# Patient Record
Sex: FEMALE | Race: WHITE | Employment: OTHER | URBAN - METROPOLITAN AREA
[De-identification: names, ages, dates, MRNs, and addresses within clinical notes are randomized per-mention and may not be internally consistent; named-entity substitution may affect disease eponyms.]

---

## 2018-06-19 ENCOUNTER — HOSPITAL ENCOUNTER (OUTPATIENT)
Age: 78
Discharge: HOME | End: 2018-06-21
Payer: MEDICARE

## 2018-06-19 PROCEDURE — 87088 URINE BACTERIA CULTURE: CPT

## 2018-06-19 PROCEDURE — 88112 CYTOPATH CELL ENHANCE TECH: CPT

## 2018-06-22 LAB — URINE CULTURE, ROUTINE: NORMAL

## 2018-06-28 ENCOUNTER — HOSPITAL ENCOUNTER (OUTPATIENT)
Dept: ULTRASOUND IMAGING | Age: 78
Discharge: HOME | End: 2018-06-30
Payer: MEDICARE

## 2018-06-28 DIAGNOSIS — M54.9 BACK PAIN, UNSPECIFIED BACK LOCATION, UNSPECIFIED BACK PAIN LATERALITY, UNSPECIFIED CHRONICITY: ICD-10-CM

## 2018-06-28 PROCEDURE — 76856 US EXAM PELVIC COMPLETE: CPT

## 2018-06-28 PROCEDURE — 76770 US EXAM ABDO BACK WALL COMP: CPT

## 2018-07-16 ENCOUNTER — HOSPITAL ENCOUNTER (OUTPATIENT)
Dept: NUCLEAR MEDICINE | Age: 78
Discharge: HOME | End: 2018-07-18
Payer: MEDICARE

## 2018-07-16 DIAGNOSIS — N13.30 HYDRONEPHROSIS, UNSPECIFIED HYDRONEPHROSIS TYPE: ICD-10-CM

## 2018-07-16 PROCEDURE — 78708 K FLOW/FUNCT IMAGE W/DRUG: CPT

## 2018-07-16 PROCEDURE — 3430000000 HC RX DIAGNOSTIC RADIOPHARMACEUTICAL: Performed by: RADIOLOGY

## 2018-07-16 PROCEDURE — 6360000002 HC RX W HCPCS: Performed by: UROLOGY

## 2018-07-16 PROCEDURE — A9562 TC99M MERTIATIDE: HCPCS | Performed by: RADIOLOGY

## 2018-07-16 RX ORDER — FUROSEMIDE 10 MG/ML
10 INJECTION INTRAMUSCULAR; INTRAVENOUS ONCE
Status: COMPLETED | OUTPATIENT
Start: 2018-07-16 | End: 2018-07-16

## 2018-07-16 RX ADMIN — FUROSEMIDE 10 MG: 10 INJECTION, SOLUTION INTRAMUSCULAR; INTRAVENOUS at 11:50

## 2018-07-16 RX ADMIN — Medication 10 MILLICURIE: at 11:24

## 2018-10-11 ENCOUNTER — HOSPITAL ENCOUNTER (EMERGENCY)
Age: 78
Discharge: HOME | End: 2018-10-11
Payer: MEDICARE

## 2018-10-11 ENCOUNTER — APPOINTMENT (OUTPATIENT)
Dept: GENERAL RADIOLOGY | Age: 78
End: 2018-10-11
Payer: MEDICARE

## 2018-10-11 VITALS
SYSTOLIC BLOOD PRESSURE: 148 MMHG | HEART RATE: 71 BPM | DIASTOLIC BLOOD PRESSURE: 72 MMHG | OXYGEN SATURATION: 99 % | TEMPERATURE: 98 F | RESPIRATION RATE: 17 BRPM

## 2018-10-11 DIAGNOSIS — M25.562 ACUTE PAIN OF LEFT KNEE: Primary | ICD-10-CM

## 2018-10-11 PROCEDURE — 6370000000 HC RX 637 (ALT 250 FOR IP): Performed by: PHYSICIAN ASSISTANT

## 2018-10-11 PROCEDURE — 73564 X-RAY EXAM KNEE 4 OR MORE: CPT

## 2018-10-11 PROCEDURE — 99283 EMERGENCY DEPT VISIT LOW MDM: CPT

## 2018-10-11 RX ORDER — TRAMADOL HYDROCHLORIDE 50 MG/1
50 TABLET ORAL ONCE
Status: COMPLETED | OUTPATIENT
Start: 2018-10-11 | End: 2018-10-11

## 2018-10-11 RX ORDER — TRAMADOL HYDROCHLORIDE 50 MG/1
50 TABLET ORAL EVERY 6 HOURS PRN
Qty: 20 TABLET | Refills: 0 | Status: SHIPPED | OUTPATIENT
Start: 2018-10-11 | End: 2018-10-16

## 2018-10-11 RX ADMIN — TRAMADOL HYDROCHLORIDE 50 MG: 50 TABLET, FILM COATED ORAL at 16:00

## 2018-10-11 ASSESSMENT — PAIN SCALES - GENERAL
PAINLEVEL_OUTOF10: 10
PAINLEVEL_OUTOF10: 10

## 2018-10-11 ASSESSMENT — PAIN DESCRIPTION - PAIN TYPE: TYPE: ACUTE PAIN

## 2018-10-11 ASSESSMENT — PAIN DESCRIPTION - LOCATION: LOCATION: KNEE

## 2018-10-11 ASSESSMENT — PAIN DESCRIPTION - DESCRIPTORS: DESCRIPTORS: ACHING

## 2018-10-11 ASSESSMENT — PAIN DESCRIPTION - ORIENTATION: ORIENTATION: RIGHT

## 2018-10-11 NOTE — ED PROVIDER NOTES
10/11/18  **This report was transcribed using voice recognition software. Every effort was made to ensure accuracy; however, inadvertent computerized transcription errors may be present.     END OF ED PROVIDER NOTE        Carlitos Owen PA-C  10/11/18 4020

## 2019-02-22 ENCOUNTER — HOSPITAL ENCOUNTER (OUTPATIENT)
Age: 79
Discharge: HOME | End: 2019-02-24
Payer: MEDICARE

## 2019-02-22 PROCEDURE — 88112 CYTOPATH CELL ENHANCE TECH: CPT

## 2019-06-24 ENCOUNTER — HOSPITAL ENCOUNTER (OUTPATIENT)
Dept: CT IMAGING | Age: 79
Discharge: HOME | End: 2019-06-26
Payer: MEDICARE

## 2019-06-24 DIAGNOSIS — R51.9 RIGHT-SIDED HEADACHE: ICD-10-CM

## 2019-06-24 PROCEDURE — 70486 CT MAXILLOFACIAL W/O DYE: CPT

## 2019-09-17 ENCOUNTER — HOSPITAL ENCOUNTER (OUTPATIENT)
Age: 79
Discharge: HOME | End: 2019-09-19
Payer: MEDICARE

## 2019-09-17 PROCEDURE — 87088 URINE BACTERIA CULTURE: CPT

## 2019-09-19 LAB — URINE CULTURE, ROUTINE: NORMAL

## 2020-04-15 ENCOUNTER — HOSPITAL ENCOUNTER (OUTPATIENT)
Age: 80
Discharge: HOME | End: 2020-04-17
Payer: MEDICARE

## 2020-04-15 PROCEDURE — 87186 SC STD MICRODIL/AGAR DIL: CPT

## 2020-04-15 PROCEDURE — 87088 URINE BACTERIA CULTURE: CPT

## 2020-04-17 LAB
ORGANISM: ABNORMAL
URINE CULTURE, ROUTINE: ABNORMAL

## 2020-05-01 ENCOUNTER — HOSPITAL ENCOUNTER (OUTPATIENT)
Age: 80
Discharge: HOME | End: 2020-05-03
Payer: MEDICARE

## 2020-05-01 PROCEDURE — 88112 CYTOPATH CELL ENHANCE TECH: CPT

## 2020-09-03 ENCOUNTER — HOSPITAL ENCOUNTER (OUTPATIENT)
Age: 80
Discharge: HOME | End: 2020-09-05
Payer: MEDICARE

## 2020-09-03 PROCEDURE — 87088 URINE BACTERIA CULTURE: CPT

## 2020-09-05 LAB — URINE CULTURE, ROUTINE: NORMAL

## 2020-11-11 ENCOUNTER — APPOINTMENT (OUTPATIENT)
Dept: CT IMAGING | Age: 80
End: 2020-11-11
Payer: MEDICARE

## 2020-11-11 ENCOUNTER — HOSPITAL ENCOUNTER (EMERGENCY)
Age: 80
Discharge: HOME | End: 2020-11-11
Attending: EMERGENCY MEDICINE
Payer: MEDICARE

## 2020-11-11 VITALS
WEIGHT: 149 LBS | HEART RATE: 95 BPM | TEMPERATURE: 96.9 F | BODY MASS INDEX: 24.83 KG/M2 | DIASTOLIC BLOOD PRESSURE: 85 MMHG | HEIGHT: 65 IN | RESPIRATION RATE: 18 BRPM | OXYGEN SATURATION: 99 % | SYSTOLIC BLOOD PRESSURE: 175 MMHG

## 2020-11-11 LAB
ALBUMIN SERPL-MCNC: 3.9 G/DL (ref 3.5–5.2)
ALP BLD-CCNC: 80 U/L (ref 35–104)
ALT SERPL-CCNC: 8 U/L (ref 0–32)
AMPHETAMINE SCREEN, URINE: NOT DETECTED
ANION GAP SERPL CALCULATED.3IONS-SCNC: 13 MMOL/L (ref 7–16)
AST SERPL-CCNC: 16 U/L (ref 0–31)
BACTERIA: ABNORMAL /HPF
BARBITURATE SCREEN URINE: NOT DETECTED
BASOPHILS ABSOLUTE: 0.02 E9/L (ref 0–0.2)
BASOPHILS RELATIVE PERCENT: 0.3 % (ref 0–2)
BENZODIAZEPINE SCREEN, URINE: POSITIVE
BILIRUB SERPL-MCNC: 0.5 MG/DL (ref 0–1.2)
BILIRUBIN URINE: NEGATIVE
BLOOD, URINE: ABNORMAL
BUN BLDV-MCNC: 10 MG/DL (ref 8–23)
CALCIUM SERPL-MCNC: 8.8 MG/DL (ref 8.6–10.2)
CANNABINOID SCREEN URINE: NOT DETECTED
CHLORIDE BLD-SCNC: 96 MMOL/L (ref 98–107)
CLARITY: CLEAR
CO2: 22 MMOL/L (ref 22–29)
COCAINE METABOLITE SCREEN URINE: NOT DETECTED
COLOR: YELLOW
CREAT SERPL-MCNC: 0.6 MG/DL (ref 0.5–1)
EOSINOPHILS ABSOLUTE: 0.01 E9/L (ref 0.05–0.5)
EOSINOPHILS RELATIVE PERCENT: 0.2 % (ref 0–6)
FENTANYL SCREEN, URINE: NOT DETECTED
GFR AFRICAN AMERICAN: >60
GFR NON-AFRICAN AMERICAN: >60 ML/MIN/1.73
GLUCOSE BLD-MCNC: 124 MG/DL (ref 74–99)
GLUCOSE URINE: NEGATIVE MG/DL
HCT VFR BLD CALC: 43.3 % (ref 34–48)
HEMOGLOBIN: 14.2 G/DL (ref 11.5–15.5)
IMMATURE GRANULOCYTES #: 0.02 E9/L
IMMATURE GRANULOCYTES %: 0.3 % (ref 0–5)
KETONES, URINE: 40 MG/DL
LEUKOCYTE ESTERASE, URINE: ABNORMAL
LYMPHOCYTES ABSOLUTE: 1.1 E9/L (ref 1.5–4)
LYMPHOCYTES RELATIVE PERCENT: 17.3 % (ref 20–42)
Lab: ABNORMAL
MCH RBC QN AUTO: 28.1 PG (ref 26–35)
MCHC RBC AUTO-ENTMCNC: 32.8 % (ref 32–34.5)
MCV RBC AUTO: 85.7 FL (ref 80–99.9)
METHADONE SCREEN, URINE: NOT DETECTED
MONOCYTES ABSOLUTE: 0.4 E9/L (ref 0.1–0.95)
MONOCYTES RELATIVE PERCENT: 6.3 % (ref 2–12)
NEUTROPHILS ABSOLUTE: 4.82 E9/L (ref 1.8–7.3)
NEUTROPHILS RELATIVE PERCENT: 75.6 % (ref 43–80)
NITRITE, URINE: NEGATIVE
OPIATE SCREEN URINE: NOT DETECTED
OXYCODONE URINE: NOT DETECTED
PDW BLD-RTO: 13.3 FL (ref 11.5–15)
PH UA: 6 (ref 5–9)
PHENCYCLIDINE SCREEN URINE: NOT DETECTED
PLATELET # BLD: 221 E9/L (ref 130–450)
PMV BLD AUTO: 8.8 FL (ref 7–12)
POTASSIUM SERPL-SCNC: 3.8 MMOL/L (ref 3.5–5)
PROTEIN UA: NEGATIVE MG/DL
RBC # BLD: 5.05 E12/L (ref 3.5–5.5)
RBC UA: ABNORMAL /HPF (ref 0–2)
SODIUM BLD-SCNC: 131 MMOL/L (ref 132–146)
SPECIFIC GRAVITY UA: 1.02 (ref 1–1.03)
TOTAL PROTEIN: 7.1 G/DL (ref 6.4–8.3)
TROPONIN: <0.01 NG/ML (ref 0–0.03)
UROBILINOGEN, URINE: 0.2 E.U./DL
WBC # BLD: 6.4 E9/L (ref 4.5–11.5)
WBC UA: ABNORMAL /HPF (ref 0–5)

## 2020-11-11 PROCEDURE — 96374 THER/PROPH/DIAG INJ IV PUSH: CPT

## 2020-11-11 PROCEDURE — 93005 ELECTROCARDIOGRAM TRACING: CPT | Performed by: PHYSICIAN ASSISTANT

## 2020-11-11 PROCEDURE — 99284 EMERGENCY DEPT VISIT MOD MDM: CPT

## 2020-11-11 PROCEDURE — 70450 CT HEAD/BRAIN W/O DYE: CPT

## 2020-11-11 PROCEDURE — 81001 URINALYSIS AUTO W/SCOPE: CPT

## 2020-11-11 PROCEDURE — 84484 ASSAY OF TROPONIN QUANT: CPT

## 2020-11-11 PROCEDURE — 6370000000 HC RX 637 (ALT 250 FOR IP): Performed by: EMERGENCY MEDICINE

## 2020-11-11 PROCEDURE — 2580000003 HC RX 258: Performed by: EMERGENCY MEDICINE

## 2020-11-11 PROCEDURE — 80307 DRUG TEST PRSMV CHEM ANLYZR: CPT

## 2020-11-11 PROCEDURE — 80053 COMPREHEN METABOLIC PANEL: CPT

## 2020-11-11 PROCEDURE — 85025 COMPLETE CBC W/AUTO DIFF WBC: CPT

## 2020-11-11 RX ORDER — CEFDINIR 300 MG/1
300 CAPSULE ORAL 2 TIMES DAILY
Qty: 14 CAPSULE | Refills: 0 | Status: SHIPPED | OUTPATIENT
Start: 2020-11-11 | End: 2020-11-18

## 2020-11-11 RX ORDER — CEFDINIR 300 MG/1
300 CAPSULE ORAL ONCE
Status: COMPLETED | OUTPATIENT
Start: 2020-11-11 | End: 2020-11-11

## 2020-11-11 RX ORDER — ONDANSETRON 2 MG/ML
4 INJECTION INTRAMUSCULAR; INTRAVENOUS ONCE
Status: DISCONTINUED | OUTPATIENT
Start: 2020-11-11 | End: 2020-11-11 | Stop reason: HOSPADM

## 2020-11-11 RX ORDER — 0.9 % SODIUM CHLORIDE 0.9 %
1000 INTRAVENOUS SOLUTION INTRAVENOUS ONCE
Status: COMPLETED | OUTPATIENT
Start: 2020-11-11 | End: 2020-11-11

## 2020-11-11 RX ADMIN — SODIUM CHLORIDE 1000 ML: 9 INJECTION, SOLUTION INTRAVENOUS at 18:36

## 2020-11-11 RX ADMIN — CEFDINIR 300 MG: 300 CAPSULE ORAL at 19:53

## 2020-11-11 NOTE — ED NOTES
FIRST PROVIDER CONTACT ASSESSMENT NOTE      Department of Emergency Medicine   ED  First Provider Note   11/11/20  1:23 PM EST    Chief Complaint: Headache (Since monday that is causing nausea and weakness. She states when she closes her eyes she can see [place] girls dancing and other things.)      History of Present Illness:    Everardo Oleary is a 78 y.o. female who presents to the ED by private car for headache, hallucinations. Focused Screening Exam:  Constitutional:  Alert, appears stated age and is in no distress. Regular heart rate and rhythm.      *ALLERGIES*     Sulfa antibiotics     ED Triage Vitals [11/11/20 1308]   BP Temp Temp src Pulse Resp SpO2 Height Weight   -- 96.9 °F (36.1 °C) -- 91 -- 96 % -- --        Initial Plan of Care:  Initiate Treatment-Testing, Proceed toTreatment Area When Bed Available for ED Attending/MLP to Continue Care    -----------------END OF FIRST PROVIDER CONTACT ASSESSMENT NOTE--------------  Electronically signed by TAWANNA Bowling   DD: 11/11/20       TAWANNA Bowling  11/11/20 8294

## 2020-11-11 NOTE — ED NOTES
Bed: 12  Expected date:   Expected time:   Means of arrival:   Comments:  triage     Batsheva Devlin RN  11/11/20 5968

## 2020-11-12 LAB
EKG ATRIAL RATE: 87 BPM
EKG P AXIS: 85 DEGREES
EKG P-R INTERVAL: 148 MS
EKG Q-T INTERVAL: 350 MS
EKG QRS DURATION: 82 MS
EKG QTC CALCULATION (BAZETT): 421 MS
EKG R AXIS: 30 DEGREES
EKG T AXIS: 76 DEGREES
EKG VENTRICULAR RATE: 87 BPM

## 2020-11-12 PROCEDURE — 93010 ELECTROCARDIOGRAM REPORT: CPT | Performed by: INTERNAL MEDICINE

## 2020-11-12 ASSESSMENT — ENCOUNTER SYMPTOMS
EYE PAIN: 0
ABDOMINAL PAIN: 0
SHORTNESS OF BREATH: 0
SORE THROAT: 0
NAUSEA: 1
BACK PAIN: 0
VOMITING: 0

## 2020-11-12 NOTE — ED PROVIDER NOTES
Patient is 66-year-old female presented emergency department due to having a headache Sunday night that was frontal in origin and then followed by the headache she had 2 days of what she states is seeing pictures and images when she closes her eyes that she is never had before. She also is seeing characters and faces. Patient denies having happened for she denies any new medications. She denies any drug use. States she has no pain anywhere she is worried about this change and want to get evaluated. Patient also worried that she might have had a stroke so she want to have a imaging done           Symptoms are worsened by nothing       Symptoms are improved by nothing    Denies any associated Chest pain, N/V/D    Review of Systems   Constitutional: Negative for chills and fever. HENT: Negative for ear pain and sore throat. Eyes: Positive for visual disturbance. Negative for pain. Respiratory: Negative for shortness of breath. Cardiovascular: Negative for chest pain. Gastrointestinal: Positive for nausea. Negative for abdominal pain and vomiting. Genitourinary: Negative for flank pain and pelvic pain. Musculoskeletal: Negative for back pain and neck pain. Skin: Negative for rash. Neurological: Negative for headaches (none currently). Physical Exam  Vitals signs and nursing note reviewed. Constitutional:       Appearance: She is well-developed. HENT:      Head: Normocephalic and atraumatic. Right Ear: External ear normal.      Left Ear: External ear normal.      Nose: Nose normal.      Mouth/Throat:      Mouth: Mucous membranes are moist.      Pharynx: Oropharynx is clear. Eyes:      Pupils: Pupils are equal, round, and reactive to light. Neck:      Musculoskeletal: Normal range of motion and neck supple. Cardiovascular:      Rate and Rhythm: Normal rate and regular rhythm. Heart sounds: Normal heart sounds.    Pulmonary:      Effort: Pulmonary effort is normal. No -------------------------------------------------  Labs:  Results for orders placed or performed during the hospital encounter of 11/11/20   CBC Auto Differential   Result Value Ref Range    WBC 6.4 4.5 - 11.5 E9/L    RBC 5.05 3.50 - 5.50 E12/L    Hemoglobin 14.2 11.5 - 15.5 g/dL    Hematocrit 43.3 34.0 - 48.0 %    MCV 85.7 80.0 - 99.9 fL    MCH 28.1 26.0 - 35.0 pg    MCHC 32.8 32.0 - 34.5 %    RDW 13.3 11.5 - 15.0 fL    Platelets 596 966 - 411 E9/L    MPV 8.8 7.0 - 12.0 fL    Neutrophils % 75.6 43.0 - 80.0 %    Immature Granulocytes % 0.3 0.0 - 5.0 %    Lymphocytes % 17.3 (L) 20.0 - 42.0 %    Monocytes % 6.3 2.0 - 12.0 %    Eosinophils % 0.2 0.0 - 6.0 %    Basophils % 0.3 0.0 - 2.0 %    Neutrophils Absolute 4.82 1.80 - 7.30 E9/L    Immature Granulocytes # 0.02 E9/L    Lymphocytes Absolute 1.10 (L) 1.50 - 4.00 E9/L    Monocytes Absolute 0.40 0.10 - 0.95 E9/L    Eosinophils Absolute 0.01 (L) 0.05 - 0.50 E9/L    Basophils Absolute 0.02 0.00 - 0.20 E9/L   Comprehensive Metabolic Panel   Result Value Ref Range    Sodium 131 (L) 132 - 146 mmol/L    Potassium 3.8 3.5 - 5.0 mmol/L    Chloride 96 (L) 98 - 107 mmol/L    CO2 22 22 - 29 mmol/L    Anion Gap 13 7 - 16 mmol/L    Glucose 124 (H) 74 - 99 mg/dL    BUN 10 8 - 23 mg/dL    CREATININE 0.6 0.5 - 1.0 mg/dL    GFR Non-African American >60 >=60 mL/min/1.73    GFR African American >60     Calcium 8.8 8.6 - 10.2 mg/dL    Total Protein 7.1 6.4 - 8.3 g/dL    Alb 3.9 3.5 - 5.2 g/dL    Total Bilirubin 0.5 0.0 - 1.2 mg/dL    Alkaline Phosphatase 80 35 - 104 U/L    ALT 8 0 - 32 U/L    AST 16 0 - 31 U/L   Troponin   Result Value Ref Range    Troponin <0.01 0.00 - 0.03 ng/mL   Urinalysis   Result Value Ref Range    Color, UA Yellow Straw/Yellow    Clarity, UA Clear Clear    Glucose, Ur Negative Negative mg/dL    Bilirubin Urine Negative Negative    Ketones, Urine 40 (A) Negative mg/dL    Specific Gravity, UA 1.020 1.005 - 1.030    Blood, Urine TRACE-INTACT Negative    pH, UA 6.0 5.0 - 9.0    Protein, UA Negative Negative mg/dL    Urobilinogen, Urine 0.2 <2.0 E.U./dL    Nitrite, Urine Negative Negative    Leukocyte Esterase, Urine MODERATE (A) Negative   URINE DRUG SCREEN   Result Value Ref Range    Amphetamine Screen, Urine NOT DETECTED Negative <1000 ng/mL    Barbiturate Screen, Ur NOT DETECTED Negative < 200 ng/mL    Benzodiazepine Screen, Urine POSITIVE (A) Negative < 200 ng/mL    Cannabinoid Scrn, Ur NOT DETECTED Negative < 50ng/mL    Cocaine Metabolite Screen, Urine NOT DETECTED Negative < 300 ng/mL    Opiate Scrn, Ur NOT DETECTED Negative < 300ng/mL    PCP Screen, Urine NOT DETECTED Negative < 25 ng/mL    Methadone Screen, Urine NOT DETECTED Negative <300 ng/mL    Oxycodone Urine NOT DETECTED Negative <100 ng/mL    FENTANYL SCREEN, URINE NOT DETECTED Negative <1 ng/mL    Drug Screen Comment: see below    Microscopic Urinalysis   Result Value Ref Range    WBC, UA 10-20 (A) 0 - 5 /HPF    RBC, UA 2-5 0 - 2 /HPF    Bacteria, UA FEW (A) None Seen /HPF   EKG 12 Lead   Result Value Ref Range    Ventricular Rate 87 BPM    Atrial Rate 87 BPM    P-R Interval 148 ms    QRS Duration 82 ms    Q-T Interval 350 ms    QTc Calculation (Bazett) 421 ms    P Axis 85 degrees    R Axis 30 degrees    T Axis 76 degrees       Radiology:  CT HEAD WO CONTRAST   Final Result   Mild atrophy and moderate likely chronic microvascular ischemic disease.             ------------------------- NURSING NOTES AND VITALS REVIEWED ---------------------------  Date / Time Roomed:  11/11/2020  4:54 PM  ED Bed Assignment:  CHAVO/CHAVO    The nursing notes within the ED encounter and vital signs as below have been reviewed.    BP (!) 175/85   Pulse 95   Temp 96.9 °F (36.1 °C)   Resp 18   Ht 5' 5\" (1.651 m)   Wt 149 lb (67.6 kg)   SpO2 99%   BMI 24.79 kg/m²   Oxygen Saturation Interpretation: Normal      ------------------------------------------ PROGRESS NOTES ------------------------------------------  9:42 AM EST  I have spoken with the patient and discussed todays results, in addition to providing specific details for the plan of care and counseling regarding the diagnosis and prognosis. Their questions are answered at this time and they are agreeable with the plan. I discussed at length with them reasons for immediate return here for re evaluation. They will followup with PCP by calling their office Tomorrow      --------------------------------- ADDITIONAL PROVIDER NOTES ---------------------------------  At this time the patient is without objective evidence of an acute process requiring hospitalization or inpatient management. They have remained hemodynamically stable throughout their entire ED visit and are stable for discharge with outpatient follow-up. The plan has been discussed in detail and they are aware of the specific conditions for emergent return, as well as the importance of follow-up. Discharge Medication List as of 11/11/2020  7:56 PM      START taking these medications    Details   cefdinir (OMNICEF) 300 MG capsule Take 1 capsule by mouth 2 times daily for 7 days, Disp-14 capsule,R-0Print             Diagnosis:  1. Acute cystitis with hematuria    2. Visual disturbance        Disposition:  Patient's disposition: Discharge to home  Patient's condition is stable.                   Susana Helton DO  Resident  11/12/20 9054

## 2021-03-09 ENCOUNTER — HOSPITAL ENCOUNTER (OUTPATIENT)
Age: 81
Discharge: HOME | End: 2021-03-09
Payer: MEDICARE

## 2021-03-09 LAB
ALBUMIN SERPL-MCNC: 4.2 G/DL (ref 3.5–5.2)
ALP BLD-CCNC: 81 U/L (ref 35–104)
ALT SERPL-CCNC: 20 U/L (ref 0–32)
ANION GAP SERPL CALCULATED.3IONS-SCNC: 8 MMOL/L (ref 7–16)
AST SERPL-CCNC: 17 U/L (ref 0–31)
BASOPHILS ABSOLUTE: 0.02 E9/L (ref 0–0.2)
BASOPHILS RELATIVE PERCENT: 0.2 % (ref 0–2)
BILIRUB SERPL-MCNC: 0.2 MG/DL (ref 0–1.2)
BUN BLDV-MCNC: 15 MG/DL (ref 8–23)
CALCIUM SERPL-MCNC: 9.2 MG/DL (ref 8.6–10.2)
CHLORIDE BLD-SCNC: 101 MMOL/L (ref 98–107)
CO2: 29 MMOL/L (ref 22–29)
CREAT SERPL-MCNC: 0.7 MG/DL (ref 0.5–1)
EOSINOPHILS ABSOLUTE: 0.01 E9/L (ref 0.05–0.5)
EOSINOPHILS RELATIVE PERCENT: 0.1 % (ref 0–6)
GFR AFRICAN AMERICAN: >60
GFR NON-AFRICAN AMERICAN: >60 ML/MIN/1.73
GLUCOSE BLD-MCNC: 122 MG/DL (ref 74–99)
HCT VFR BLD CALC: 49.3 % (ref 34–48)
HEMOGLOBIN: 15.8 G/DL (ref 11.5–15.5)
IMMATURE GRANULOCYTES #: 0.09 E9/L
IMMATURE GRANULOCYTES %: 0.9 % (ref 0–5)
LYMPHOCYTES ABSOLUTE: 1.72 E9/L (ref 1.5–4)
LYMPHOCYTES RELATIVE PERCENT: 16.7 % (ref 20–42)
MCH RBC QN AUTO: 27.8 PG (ref 26–35)
MCHC RBC AUTO-ENTMCNC: 32 % (ref 32–34.5)
MCV RBC AUTO: 86.8 FL (ref 80–99.9)
MONOCYTES ABSOLUTE: 1.01 E9/L (ref 0.1–0.95)
MONOCYTES RELATIVE PERCENT: 9.8 % (ref 2–12)
NEUTROPHILS ABSOLUTE: 7.46 E9/L (ref 1.8–7.3)
NEUTROPHILS RELATIVE PERCENT: 72.3 % (ref 43–80)
PDW BLD-RTO: 13.8 FL (ref 11.5–15)
PLATELET # BLD: 324 E9/L (ref 130–450)
PMV BLD AUTO: 9.2 FL (ref 7–12)
POTASSIUM SERPL-SCNC: 4.2 MMOL/L (ref 3.5–5)
RBC # BLD: 5.68 E12/L (ref 3.5–5.5)
SEDIMENTATION RATE, ERYTHROCYTE: 2 MM/HR (ref 0–20)
SODIUM BLD-SCNC: 138 MMOL/L (ref 132–146)
TOTAL PROTEIN: 7 G/DL (ref 6.4–8.3)
WBC # BLD: 10.3 E9/L (ref 4.5–11.5)

## 2021-03-09 PROCEDURE — 85025 COMPLETE CBC W/AUTO DIFF WBC: CPT

## 2021-03-09 PROCEDURE — 36415 COLL VENOUS BLD VENIPUNCTURE: CPT

## 2021-03-09 PROCEDURE — 85651 RBC SED RATE NONAUTOMATED: CPT

## 2021-03-09 PROCEDURE — 80053 COMPREHEN METABOLIC PANEL: CPT

## 2021-03-09 PROCEDURE — 86038 ANTINUCLEAR ANTIBODIES: CPT

## 2021-03-10 LAB — ANTI-NUCLEAR ANTIBODY (ANA): NEGATIVE

## 2022-02-27 ENCOUNTER — HOSPITAL ENCOUNTER (EMERGENCY)
Age: 82
Discharge: HOME | End: 2022-02-27
Attending: EMERGENCY MEDICINE
Payer: MEDICARE

## 2022-02-27 ENCOUNTER — APPOINTMENT (OUTPATIENT)
Dept: GENERAL RADIOLOGY | Age: 82
End: 2022-02-27
Payer: MEDICARE

## 2022-02-27 ENCOUNTER — APPOINTMENT (OUTPATIENT)
Dept: CT IMAGING | Age: 82
End: 2022-02-27
Payer: MEDICARE

## 2022-02-27 VITALS
DIASTOLIC BLOOD PRESSURE: 70 MMHG | RESPIRATION RATE: 24 BRPM | OXYGEN SATURATION: 98 % | HEART RATE: 97 BPM | SYSTOLIC BLOOD PRESSURE: 170 MMHG | TEMPERATURE: 98.1 F

## 2022-02-27 DIAGNOSIS — M54.50 ACUTE BILATERAL LOW BACK PAIN, UNSPECIFIED WHETHER SCIATICA PRESENT: Primary | ICD-10-CM

## 2022-02-27 DIAGNOSIS — M51.36 BULGING LUMBAR DISC: ICD-10-CM

## 2022-02-27 LAB
ALBUMIN SERPL-MCNC: 4.4 G/DL (ref 3.5–5.2)
ALP BLD-CCNC: 71 U/L (ref 35–104)
ALT SERPL-CCNC: 11 U/L (ref 0–32)
AMORPHOUS: NORMAL
ANION GAP SERPL CALCULATED.3IONS-SCNC: 10 MMOL/L (ref 7–16)
ANISOCYTOSIS: ABNORMAL
AST SERPL-CCNC: 15 U/L (ref 0–31)
BACTERIA: NORMAL /HPF
BASOPHILS ABSOLUTE: 0.07 E9/L (ref 0–0.2)
BASOPHILS RELATIVE PERCENT: 0.9 % (ref 0–2)
BILIRUB SERPL-MCNC: 0.3 MG/DL (ref 0–1.2)
BILIRUBIN URINE: NEGATIVE
BLOOD, URINE: NEGATIVE
BUN BLDV-MCNC: 15 MG/DL (ref 6–23)
BURR CELLS: ABNORMAL
CALCIUM SERPL-MCNC: 9.4 MG/DL (ref 8.6–10.2)
CHLORIDE BLD-SCNC: 102 MMOL/L (ref 98–107)
CLARITY: NORMAL
CO2: 26 MMOL/L (ref 22–29)
COLOR: YELLOW
CREAT SERPL-MCNC: 0.7 MG/DL (ref 0.5–1)
EOSINOPHILS ABSOLUTE: 0 E9/L (ref 0.05–0.5)
EOSINOPHILS RELATIVE PERCENT: 0.1 % (ref 0–6)
EPITHELIAL CELLS, UA: NORMAL /HPF
GFR AFRICAN AMERICAN: >60
GFR NON-AFRICAN AMERICAN: >60 ML/MIN/1.73
GLUCOSE BLD-MCNC: 116 MG/DL (ref 74–99)
GLUCOSE URINE: NEGATIVE MG/DL
HCT VFR BLD CALC: 40.3 % (ref 34–48)
HEMOGLOBIN: 13.1 G/DL (ref 11.5–15.5)
KETONES, URINE: NEGATIVE MG/DL
LACTIC ACID: 1 MMOL/L (ref 0.5–2.2)
LEUKOCYTE ESTERASE, URINE: NEGATIVE
LIPASE: 28 U/L (ref 13–60)
LYMPHOCYTES ABSOLUTE: 0.3 E9/L (ref 1.5–4)
LYMPHOCYTES RELATIVE PERCENT: 4.3 % (ref 20–42)
MCH RBC QN AUTO: 28.6 PG (ref 26–35)
MCHC RBC AUTO-ENTMCNC: 32.5 % (ref 32–34.5)
MCV RBC AUTO: 88 FL (ref 80–99.9)
MONOCYTES ABSOLUTE: 0.46 E9/L (ref 0.1–0.95)
MONOCYTES RELATIVE PERCENT: 6.1 % (ref 2–12)
NEUTROPHILS ABSOLUTE: 6.76 E9/L (ref 1.8–7.3)
NEUTROPHILS RELATIVE PERCENT: 88.7 % (ref 43–80)
NITRITE, URINE: NEGATIVE
OVALOCYTES: ABNORMAL
PDW BLD-RTO: 13 FL (ref 11.5–15)
PH UA: 6.5 (ref 5–9)
PLATELET # BLD: 205 E9/L (ref 130–450)
PMV BLD AUTO: 8.6 FL (ref 7–12)
POIKILOCYTES: ABNORMAL
POTASSIUM SERPL-SCNC: 4.3 MMOL/L (ref 3.5–5)
PROTEIN UA: NEGATIVE MG/DL
RBC # BLD: 4.58 E12/L (ref 3.5–5.5)
RBC UA: NORMAL /HPF (ref 0–2)
SODIUM BLD-SCNC: 138 MMOL/L (ref 132–146)
SPECIFIC GRAVITY UA: 1.01 (ref 1–1.03)
TEAR DROP CELLS: ABNORMAL
TOTAL PROTEIN: 7 G/DL (ref 6.4–8.3)
TROPONIN, HIGH SENSITIVITY: 15 NG/L (ref 0–9)
TROPONIN, HIGH SENSITIVITY: 15 NG/L (ref 0–9)
UROBILINOGEN, URINE: 0.2 E.U./DL
WBC # BLD: 7.6 E9/L (ref 4.5–11.5)
WBC UA: NORMAL /HPF (ref 0–5)

## 2022-02-27 PROCEDURE — 72131 CT LUMBAR SPINE W/O DYE: CPT

## 2022-02-27 PROCEDURE — 85025 COMPLETE CBC W/AUTO DIFF WBC: CPT

## 2022-02-27 PROCEDURE — 83690 ASSAY OF LIPASE: CPT

## 2022-02-27 PROCEDURE — 99285 EMERGENCY DEPT VISIT HI MDM: CPT

## 2022-02-27 PROCEDURE — 6360000004 HC RX CONTRAST MEDICATION: Performed by: RADIOLOGY

## 2022-02-27 PROCEDURE — 36415 COLL VENOUS BLD VENIPUNCTURE: CPT

## 2022-02-27 PROCEDURE — 74177 CT ABD & PELVIS W/CONTRAST: CPT

## 2022-02-27 PROCEDURE — 93005 ELECTROCARDIOGRAM TRACING: CPT | Performed by: STUDENT IN AN ORGANIZED HEALTH CARE EDUCATION/TRAINING PROGRAM

## 2022-02-27 PROCEDURE — 83605 ASSAY OF LACTIC ACID: CPT

## 2022-02-27 PROCEDURE — 84484 ASSAY OF TROPONIN QUANT: CPT

## 2022-02-27 PROCEDURE — 81001 URINALYSIS AUTO W/SCOPE: CPT

## 2022-02-27 PROCEDURE — 71045 X-RAY EXAM CHEST 1 VIEW: CPT

## 2022-02-27 PROCEDURE — 6360000002 HC RX W HCPCS: Performed by: STUDENT IN AN ORGANIZED HEALTH CARE EDUCATION/TRAINING PROGRAM

## 2022-02-27 PROCEDURE — 96374 THER/PROPH/DIAG INJ IV PUSH: CPT

## 2022-02-27 PROCEDURE — 80053 COMPREHEN METABOLIC PANEL: CPT

## 2022-02-27 RX ORDER — ORPHENADRINE CITRATE 30 MG/ML
INJECTION INTRAMUSCULAR; INTRAVENOUS
Status: DISCONTINUED
Start: 2022-02-27 | End: 2022-02-27 | Stop reason: HOSPADM

## 2022-02-27 RX ORDER — CYCLOBENZAPRINE HCL 10 MG
10 TABLET ORAL 3 TIMES DAILY PRN
Qty: 15 TABLET | Refills: 0 | Status: SHIPPED | OUTPATIENT
Start: 2022-02-27 | End: 2022-03-04

## 2022-02-27 RX ORDER — ORPHENADRINE CITRATE 30 MG/ML
60 INJECTION INTRAMUSCULAR; INTRAVENOUS ONCE
Status: DISCONTINUED | OUTPATIENT
Start: 2022-02-27 | End: 2022-02-27

## 2022-02-27 RX ORDER — ORPHENADRINE CITRATE 30 MG/ML
60 INJECTION INTRAMUSCULAR; INTRAVENOUS ONCE
Status: COMPLETED | OUTPATIENT
Start: 2022-02-27 | End: 2022-02-27

## 2022-02-27 RX ADMIN — IOPAMIDOL 90 ML: 755 INJECTION, SOLUTION INTRAVENOUS at 16:52

## 2022-02-27 RX ADMIN — ORPHENADRINE CITRATE 60 MG: 30 INJECTION INTRAMUSCULAR; INTRAVENOUS at 15:35

## 2022-02-27 ASSESSMENT — ENCOUNTER SYMPTOMS
ABDOMINAL PAIN: 1
VOMITING: 0
COUGH: 0
DIARRHEA: 0
COLOR CHANGE: 0
NAUSEA: 0
SHORTNESS OF BREATH: 0
BACK PAIN: 1

## 2022-02-27 ASSESSMENT — PAIN DESCRIPTION - ORIENTATION: ORIENTATION: LOWER

## 2022-02-27 ASSESSMENT — PAIN DESCRIPTION - DESCRIPTORS
DESCRIPTORS: SPASM
DESCRIPTORS: SPASM

## 2022-02-27 ASSESSMENT — PAIN SCALES - GENERAL
PAINLEVEL_OUTOF10: 9

## 2022-02-27 ASSESSMENT — PAIN DESCRIPTION - FREQUENCY: FREQUENCY: CONTINUOUS

## 2022-02-27 ASSESSMENT — PAIN DESCRIPTION - PAIN TYPE
TYPE: ACUTE PAIN
TYPE: ACUTE PAIN

## 2022-02-27 ASSESSMENT — PAIN DESCRIPTION - LOCATION
LOCATION: BACK
LOCATION: BACK

## 2022-02-27 NOTE — ED PROVIDER NOTES
HPI   This is an 73-year-old female patient presents to emergency department for evaluation of back pain. Patient reports pain sudden onset, to both sides of her back and nonradiating. Patient's pain started 5 days prior after that she bent over to lift up a case of water. She felt a pain in her back at that time. She does note some associated abdominal pain however she states this is chronic and has some urinary issues for which she follows with urology. She denies any saddle paresthesias or anesthesias, no urinary incontinence or bowel incontinence. Denies any fevers, chills, nausea, vomiting, chest pain, shortness of breath. Patient took Valium for her pain without any improvement. Review of Systems   Constitutional: Negative for chills and fever. HENT: Negative for congestion. Respiratory: Negative for cough and shortness of breath. Cardiovascular: Negative for chest pain. Gastrointestinal: Positive for abdominal pain. Negative for diarrhea, nausea and vomiting. Genitourinary: Negative for difficulty urinating, dysuria and hematuria. No bowel or bladder incontinence, no dysuria. Musculoskeletal: Positive for back pain. Skin: Negative for color change. All other systems reviewed and are negative. Physical Exam  Vitals and nursing note reviewed. Constitutional:       Appearance: She is not ill-appearing. HENT:      Head: Normocephalic and atraumatic. Nose: Nose normal. No congestion. Mouth/Throat:      Mouth: Mucous membranes are moist.      Pharynx: Oropharynx is clear. Eyes:      General: No scleral icterus. Conjunctiva/sclera: Conjunctivae normal.   Cardiovascular:      Rate and Rhythm: Regular rhythm. Pulses: Normal pulses. Heart sounds: Normal heart sounds. Pulmonary:      Effort: Pulmonary effort is normal.      Breath sounds: Normal breath sounds. Abdominal:      General: There is no distension. Palpations: Abdomen is soft. Tenderness: There is no abdominal tenderness. Musculoskeletal:      Comments: Lumbar tenderness to palpation, no overlying skin changes, paraspinal muscular tenderness to palpation bilaterally as well. No reproduction of pain with straight leg raising bilaterally. Skin:     General: Skin is warm and dry. Capillary Refill: Capillary refill takes less than 2 seconds. Neurological:      General: No focal deficit present. Mental Status: She is alert. Comments: Motor strength 5 out of 5 in the lower extremities bilaterally, sensation grossly intact. Procedures     MDM   49-year-old female patient presents to emergency department for evaluation of back pain. Patient's back pain reproducible with tenderness to palpation. Started after straining her back while lifting up a case of water. Basic labs and imaging performed. No acute findings on EKG, troponin negative x2. Abdominal labs negative, no lactic acidosis or leukocytosis. No acute findings on abdominal CT scan, patient found to have bulging disc. At this time her pain is controlled, patient discharged home with judicious ibuprofen and muscle relaxers. She is to follow-up with her primary care physician. EKG: This EKG is signed and interpreted by me. Rate: 89  Rhythm: Sinus  Interpretation: 1st degree block, normal QTC, no ST elevations, T wave inversions in aVL. Comparison: T wave inversions present in aVL on EKG in 2020. ED Course as of 02/28/22 1010   Sun Feb 27, 2022   1701 Patient's pain improved at this time. [FG]      ED Course User Index  [FG] Jos Weems DO          ED Course as of 02/28/22 1010   Sun Feb 27, 2022   1701 Patient's pain improved at this time.  [FG]      ED Course User Index  [FG] Jos Weems DO       --------------------------------------------- PAST HISTORY ---------------------------------------------  Past Medical History:  has a past medical history of Cancer ([facility] [address] and Cataract. Past Surgical History:  has a past surgical history that includes Hysterectomy; Cystoscopy; Eye surgery (11/29/2012); and eye surgery (Left, 08/18/2016). Social History:  reports that she has never smoked. She has never used smokeless tobacco. She reports current alcohol use. She reports that she does not use drugs. Family History: family history is not on file. The patients home medications have been reviewed.     Allergies: Sulfa antibiotics    -------------------------------------------------- RESULTS -------------------------------------------------  Labs:  Results for orders placed or performed during the hospital encounter of 02/27/22   CBC with Auto Differential   Result Value Ref Range    WBC 7.6 4.5 - 11.5 E9/L    RBC 4.58 3.50 - 5.50 E12/L    Hemoglobin 13.1 11.5 - 15.5 g/dL    Hematocrit 40.3 34.0 - 48.0 %    MCV 88.0 80.0 - 99.9 fL    MCH 28.6 26.0 - 35.0 pg    MCHC 32.5 32.0 - 34.5 %    RDW 13.0 11.5 - 15.0 fL    Platelets 291 355 - 114 E9/L    MPV 8.6 7.0 - 12.0 fL    Neutrophils % 88.7 (H) 43.0 - 80.0 %    Lymphocytes % 4.3 (L) 20.0 - 42.0 %    Monocytes % 6.1 2.0 - 12.0 %    Eosinophils % 0.1 0.0 - 6.0 %    Basophils % 0.9 0.0 - 2.0 %    Neutrophils Absolute 6.76 1.80 - 7.30 E9/L    Lymphocytes Absolute 0.30 (L) 1.50 - 4.00 E9/L    Monocytes Absolute 0.46 0.10 - 0.95 E9/L    Eosinophils Absolute 0.00 (L) 0.05 - 0.50 E9/L    Basophils Absolute 0.07 0.00 - 0.20 E9/L    Anisocytosis 1+     Poikilocytes 1+     Matilde Cells 2+     Ovalocytes 1+     Tear Drop Cells 1+    Comprehensive Metabolic Panel   Result Value Ref Range    Sodium 138 132 - 146 mmol/L    Potassium 4.3 3.5 - 5.0 mmol/L    Chloride 102 98 - 107 mmol/L    CO2 26 22 - 29 mmol/L    Anion Gap 10 7 - 16 mmol/L    Glucose 116 (H) 74 - 99 mg/dL    BUN 15 6 - 23 mg/dL    CREATININE 0.7 0.5 - 1.0 mg/dL    GFR Non-African American >60 >=60 mL/min/1.73    GFR African American >60     Calcium 9.4 8.6 - 10.2 mg/dL    Total Protein 7.0 6.4 - 8.3 g/dL    Albumin 4.4 3.5 - 5.2 g/dL    Total Bilirubin 0.3 0.0 - 1.2 mg/dL    Alkaline Phosphatase 71 35 - 104 U/L    ALT 11 0 - 32 U/L    AST 15 0 - 31 U/L   Lipase   Result Value Ref Range    Lipase 28 13 - 60 U/L   Lactic Acid   Result Value Ref Range    Lactic Acid 1.0 0.5 - 2.2 mmol/L   Troponin   Result Value Ref Range    Troponin, High Sensitivity 15 (H) 0 - 9 ng/L   Urinalysis with Microscopic   Result Value Ref Range    Color, UA Yellow Straw/Yellow    Clarity, UA SL CLOUDY Clear    Glucose, Ur Negative Negative mg/dL    Bilirubin Urine Negative Negative    Ketones, Urine Negative Negative mg/dL    Specific Gravity, UA 1.015 1.005 - 1.030    Blood, Urine Negative Negative    pH, UA 6.5 5.0 - 9.0    Protein, UA Negative Negative mg/dL    Urobilinogen, Urine 0.2 <2.0 E.U./dL    Nitrite, Urine Negative Negative    Leukocyte Esterase, Urine Negative Negative    WBC, UA NONE 0 - 5 /HPF    RBC, UA NONE 0 - 2 /HPF    Epithelial Cells, UA FEW /HPF    Bacteria, UA NONE SEEN None Seen /HPF    Amorphous, UA FEW    Troponin   Result Value Ref Range    Troponin, High Sensitivity 15 (H) 0 - 9 ng/L   EKG 12 Lead   Result Value Ref Range    Ventricular Rate 89 BPM    Atrial Rate 89 BPM    P-R Interval 236 ms    QRS Duration 84 ms    Q-T Interval 356 ms    QTc Calculation (Bazett) 433 ms    P Axis 60 degrees    R Axis 5 degrees    T Axis 66 degrees       Radiology:  CT LUMBAR SPINE WO CONTRAST   Final Result   1. No signs of acute fracture or spondylolisthesis      2. Prominent central disc osteophyte complex at L3-L4 causing mild spinal   stenosis      3. Mild broad-based disc bulging at L4-L5 causing only mild spinal stenosis      RECOMMENDATIONS:   Unavailable         CT ABDOMEN PELVIS W IV CONTRAST Additional Contrast? None   Final Result   1. Probable liver and bilateral renal cortical cysts, as described above.       2.  Mild fullness of the renal collecting systems, which could be secondary to reflux from a distended urinary bladder. 3.  Other findings, as described above. XR CHEST PORTABLE   Final Result   No acute process. ------------------------- NURSING NOTES AND VITALS REVIEWED ---------------------------  Date / Time Roomed:  2/27/2022  1:10 PM  ED Bed Assignment:  15/15    The nursing notes within the ED encounter and vital signs as below have been reviewed. BP (!) 170/70   Pulse 97   Temp 98.1 °F (36.7 °C)   Resp 24   SpO2 98%   Oxygen Saturation Interpretation: Normal      ------------------------------------------ PROGRESS NOTES ------------------------------------------  10:07 AM EST  I have spoken with the patient and discussed todays results, in addition to providing specific details for the plan of care and counseling regarding the diagnosis and prognosis. Their questions are answered at this time and they are agreeable with the plan. I discussed at length with them reasons for immediate return here for re evaluation. They will followup with their primary care physician by calling their office on Monday.      --------------------------------- ADDITIONAL PROVIDER NOTES ---------------------------------  At this time the patient is without objective evidence of an acute process requiring hospitalization or inpatient management. They have remained hemodynamically stable throughout their entire ED visit and are stable for discharge with outpatient follow-up. The plan has been discussed in detail and they are aware of the specific conditions for emergent return, as well as the importance of follow-up. Discharge Medication List as of 2/27/2022  7:21 PM      START taking these medications    Details   cyclobenzaprine (FLEXERIL) 10 MG tablet Take 1 tablet by mouth 3 times daily as needed for Muscle spasms, Disp-15 tablet, R-0Print             Diagnosis:  1.  Acute bilateral low back pain, unspecified whether sciatica present    2. Bulging lumbar disc Disposition:  Patient's disposition: Discharge to home  Patient's condition is stable.        Cuate Werner DO  Resident  02/28/22 1011

## 2022-02-27 NOTE — ED NOTES
The patient arrives via ems from home with 5 days ago lower back pain 10/10 describes as \"muscle spasm\". .  The patient was bending over  a 12 pack of pepsi      Leann Hensley RN  02/27/22 0740

## 2022-02-28 LAB
EKG ATRIAL RATE: 89 BPM
EKG P AXIS: 60 DEGREES
EKG P-R INTERVAL: 236 MS
EKG Q-T INTERVAL: 356 MS
EKG QRS DURATION: 84 MS
EKG QTC CALCULATION (BAZETT): 433 MS
EKG R AXIS: 5 DEGREES
EKG T AXIS: 66 DEGREES
EKG VENTRICULAR RATE: 89 BPM

## 2022-02-28 PROCEDURE — 93010 ELECTROCARDIOGRAM REPORT: CPT | Performed by: INTERNAL MEDICINE

## 2022-12-08 ENCOUNTER — HOSPITAL ENCOUNTER (INPATIENT)
Age: 82
LOS: 2 days | Discharge: HOME | End: 2022-12-10
Attending: EMERGENCY MEDICINE | Admitting: FAMILY MEDICINE
Payer: MEDICARE

## 2022-12-08 ENCOUNTER — APPOINTMENT (OUTPATIENT)
Dept: GENERAL RADIOLOGY | Age: 82
End: 2022-12-08
Payer: MEDICARE

## 2022-12-08 DIAGNOSIS — I48.91 ATRIAL FIBRILLATION WITH RAPID VENTRICULAR RESPONSE (HCC): Primary | ICD-10-CM

## 2022-12-08 LAB
ANION GAP SERPL CALCULATED.3IONS-SCNC: 14 MMOL/L (ref 7–16)
BASOPHILS ABSOLUTE: 0.02 E9/L (ref 0–0.2)
BASOPHILS RELATIVE PERCENT: 0.3 % (ref 0–2)
BUN BLDV-MCNC: 13 MG/DL (ref 6–23)
CALCIUM SERPL-MCNC: 9.6 MG/DL (ref 8.6–10.2)
CHLORIDE BLD-SCNC: 100 MMOL/L (ref 98–107)
CO2: 21 MMOL/L (ref 22–29)
CREAT SERPL-MCNC: 0.7 MG/DL (ref 0.5–1)
EKG ATRIAL RATE: 111 BPM
EKG Q-T INTERVAL: 270 MS
EKG QRS DURATION: 78 MS
EKG QTC CALCULATION (BAZETT): 412 MS
EKG R AXIS: 0 DEGREES
EKG T AXIS: 47 DEGREES
EKG VENTRICULAR RATE: 140 BPM
EOSINOPHILS ABSOLUTE: 0.01 E9/L (ref 0.05–0.5)
EOSINOPHILS RELATIVE PERCENT: 0.2 % (ref 0–6)
GFR SERPL CREATININE-BSD FRML MDRD: >60 ML/MIN/1.73
GLUCOSE BLD-MCNC: 129 MG/DL (ref 74–99)
HCT VFR BLD CALC: 39.5 % (ref 34–48)
HEMOGLOBIN: 13 G/DL (ref 11.5–15.5)
IMMATURE GRANULOCYTES #: 0.02 E9/L
IMMATURE GRANULOCYTES %: 0.3 % (ref 0–5)
INR BLD: 1.1
LYMPHOCYTES ABSOLUTE: 0.79 E9/L (ref 1.5–4)
LYMPHOCYTES RELATIVE PERCENT: 12.4 % (ref 20–42)
MAGNESIUM: 2.1 MG/DL (ref 1.6–2.6)
MCH RBC QN AUTO: 29.3 PG (ref 26–35)
MCHC RBC AUTO-ENTMCNC: 32.9 % (ref 32–34.5)
MCV RBC AUTO: 89.2 FL (ref 80–99.9)
MONOCYTES ABSOLUTE: 0.45 E9/L (ref 0.1–0.95)
MONOCYTES RELATIVE PERCENT: 7.1 % (ref 2–12)
NEUTROPHILS ABSOLUTE: 5.07 E9/L (ref 1.8–7.3)
NEUTROPHILS RELATIVE PERCENT: 79.7 % (ref 43–80)
PDW BLD-RTO: 12.9 FL (ref 11.5–15)
PLATELET # BLD: 235 E9/L (ref 130–450)
PMV BLD AUTO: 9.1 FL (ref 7–12)
POTASSIUM SERPL-SCNC: 4.8 MMOL/L (ref 3.5–5)
PROTHROMBIN TIME: 12 SEC (ref 9.3–12.4)
RBC # BLD: 4.43 E12/L (ref 3.5–5.5)
SARS-COV-2, NAAT: NOT DETECTED
SODIUM BLD-SCNC: 135 MMOL/L (ref 132–146)
TROPONIN, HIGH SENSITIVITY: 17 NG/L (ref 0–9)
TROPONIN, HIGH SENSITIVITY: 42 NG/L (ref 0–9)
TSH SERPL DL<=0.05 MIU/L-ACNC: 1.59 UIU/ML (ref 0.27–4.2)
WBC # BLD: 6.4 E9/L (ref 4.5–11.5)

## 2022-12-08 PROCEDURE — 85610 PROTHROMBIN TIME: CPT

## 2022-12-08 PROCEDURE — 83735 ASSAY OF MAGNESIUM: CPT

## 2022-12-08 PROCEDURE — 6370000000 HC RX 637 (ALT 250 FOR IP): Performed by: FAMILY MEDICINE

## 2022-12-08 PROCEDURE — 2060000000 HC ICU INTERMEDIATE R&B

## 2022-12-08 PROCEDURE — 85025 COMPLETE CBC W/AUTO DIFF WBC: CPT

## 2022-12-08 PROCEDURE — 80048 BASIC METABOLIC PNL TOTAL CA: CPT

## 2022-12-08 PROCEDURE — 2500000003 HC RX 250 WO HCPCS

## 2022-12-08 PROCEDURE — 87635 SARS-COV-2 COVID-19 AMP PRB: CPT

## 2022-12-08 PROCEDURE — 84443 ASSAY THYROID STIM HORMONE: CPT

## 2022-12-08 PROCEDURE — 93005 ELECTROCARDIOGRAM TRACING: CPT

## 2022-12-08 PROCEDURE — 99285 EMERGENCY DEPT VISIT HI MDM: CPT

## 2022-12-08 PROCEDURE — 96365 THER/PROPH/DIAG IV INF INIT: CPT

## 2022-12-08 PROCEDURE — 93010 ELECTROCARDIOGRAM REPORT: CPT | Performed by: INTERNAL MEDICINE

## 2022-12-08 PROCEDURE — 71045 X-RAY EXAM CHEST 1 VIEW: CPT

## 2022-12-08 PROCEDURE — 96366 THER/PROPH/DIAG IV INF ADDON: CPT

## 2022-12-08 PROCEDURE — 84484 ASSAY OF TROPONIN QUANT: CPT

## 2022-12-08 RX ORDER — MAGNESIUM OXIDE 400 MG/1
800 TABLET ORAL NIGHTLY
COMMUNITY

## 2022-12-08 RX ORDER — LANOLIN ALCOHOL/MO/W.PET/CERES
500 CREAM (GRAM) TOPICAL
Status: DISCONTINUED | OUTPATIENT
Start: 2022-12-08 | End: 2022-12-10 | Stop reason: HOSPADM

## 2022-12-08 RX ORDER — ACETAMINOPHEN 160 MG
1000 TABLET,DISINTEGRATING ORAL
COMMUNITY
Start: 2022-12-10

## 2022-12-08 RX ORDER — DILTIAZEM HYDROCHLORIDE 5 MG/ML
10 INJECTION INTRAVENOUS ONCE
Status: COMPLETED | OUTPATIENT
Start: 2022-12-08 | End: 2022-12-08

## 2022-12-08 RX ORDER — LISINOPRIL 10 MG/1
10 TABLET ORAL DAILY
COMMUNITY

## 2022-12-08 RX ORDER — DIAZEPAM 5 MG/1
5 TABLET ORAL EVERY 8 HOURS PRN
Status: DISCONTINUED | OUTPATIENT
Start: 2022-12-08 | End: 2022-12-10 | Stop reason: HOSPADM

## 2022-12-08 RX ORDER — LANOLIN ALCOHOL/MO/W.PET/CERES
800 CREAM (GRAM) TOPICAL NIGHTLY
Status: DISCONTINUED | OUTPATIENT
Start: 2022-12-08 | End: 2022-12-10 | Stop reason: HOSPADM

## 2022-12-08 RX ORDER — CHOLECALCIFEROL (VITAMIN D3) 125 MCG
500 CAPSULE ORAL
COMMUNITY

## 2022-12-08 RX ORDER — DIAZEPAM 5 MG/1
5 TABLET ORAL PRN
COMMUNITY

## 2022-12-08 RX ORDER — MULTIVITAMIN WITH IRON
1 TABLET ORAL DAILY
Status: DISCONTINUED | OUTPATIENT
Start: 2022-12-08 | End: 2022-12-09

## 2022-12-08 RX ORDER — HYDROXYCHLOROQUINE SULFATE 200 MG/1
200 TABLET, FILM COATED ORAL
Status: DISCONTINUED | OUTPATIENT
Start: 2022-12-09 | End: 2022-12-10 | Stop reason: HOSPADM

## 2022-12-08 RX ORDER — ASPIRIN 325 MG
325 TABLET ORAL DAILY PRN
Status: DISCONTINUED | OUTPATIENT
Start: 2022-12-09 | End: 2022-12-10 | Stop reason: HOSPADM

## 2022-12-08 RX ORDER — ASPIRIN 325 MG
325 TABLET ORAL PRN
COMMUNITY

## 2022-12-08 RX ORDER — HYDROXYCHLOROQUINE SULFATE 200 MG/1
200 TABLET, FILM COATED ORAL
COMMUNITY

## 2022-12-08 RX ORDER — LISINOPRIL 5 MG/1
10 TABLET ORAL DAILY
Status: DISCONTINUED | OUTPATIENT
Start: 2022-12-09 | End: 2022-12-10 | Stop reason: HOSPADM

## 2022-12-08 RX ADMIN — DEXTROSE MONOHYDRATE 5 MG/HR: 50 INJECTION, SOLUTION INTRAVENOUS at 14:36

## 2022-12-08 RX ADMIN — DILTIAZEM HYDROCHLORIDE 10 MG: 5 INJECTION INTRAVENOUS at 14:36

## 2022-12-08 RX ADMIN — MAGNESIUM OXIDE 400 MG (241.3 MG MAGNESIUM) TABLET 800 MG: TABLET at 22:26

## 2022-12-08 ASSESSMENT — ENCOUNTER SYMPTOMS
EYE REDNESS: 0
VOMITING: 0
ABDOMINAL PAIN: 0
SHORTNESS OF BREATH: 0
NAUSEA: 0
SORE THROAT: 0
DIARRHEA: 0
TROUBLE SWALLOWING: 0
EYES NEGATIVE: 1

## 2022-12-08 ASSESSMENT — LIFESTYLE VARIABLES
HOW MANY STANDARD DRINKS CONTAINING ALCOHOL DO YOU HAVE ON A TYPICAL DAY: PATIENT DOES NOT DRINK
HOW OFTEN DO YOU HAVE A DRINK CONTAINING ALCOHOL: NEVER

## 2022-12-08 ASSESSMENT — PAIN DESCRIPTION - DIRECTION: RADIATING_TOWARDS: LEFT ARM

## 2022-12-08 ASSESSMENT — PAIN DESCRIPTION - LOCATION: LOCATION: CHEST

## 2022-12-08 ASSESSMENT — PAIN DESCRIPTION - FREQUENCY: FREQUENCY: INTERMITTENT

## 2022-12-08 ASSESSMENT — PAIN - FUNCTIONAL ASSESSMENT: PAIN_FUNCTIONAL_ASSESSMENT: 0-10

## 2022-12-08 ASSESSMENT — PAIN SCALES - GENERAL: PAINLEVEL_OUTOF10: 8

## 2022-12-08 NOTE — ED PROVIDER NOTES
Tea Ball is an 24-year-old female presents the emergency department for palpitations that started 1 week ago. She reports the complaint is intermittent, moderate severity, nothing makes better or worse. She states that the palpitations worsened in severity over the past 24 hours and are now constant today. She used her pulse oximeter at home which showed her heart rate was in the 180s so she came to the emergency department for evaluation. She denies any history of atrial fibrillation. She denies history of blood clot, recent surgery, recent travel, heart disease. Does have a history of hypertension hyperlipidemia. No history of diabetes. She denies headache, shortness of breath, abdominal pain, nausea, vomiting, and diarrhea. She does complain of some chest pain that radiates to her left arm. The history is provided by the patient. Review of Systems   Constitutional:  Negative for chills and fever. HENT: Negative. Negative for congestion, sore throat and trouble swallowing. Eyes: Negative. Negative for redness. Respiratory:  Negative for shortness of breath. Cardiovascular:  Positive for chest pain and palpitations. Negative for leg swelling. Gastrointestinal:  Negative for abdominal pain, diarrhea, nausea and vomiting. Genitourinary: Negative. Negative for dysuria and hematuria. Musculoskeletal:  Negative for neck pain and neck stiffness. Skin: Negative. Neurological:  Negative for dizziness, light-headedness and headaches. Psychiatric/Behavioral: Negative. Negative for agitation and behavioral problems. Physical Exam  Vitals and nursing note reviewed. Constitutional:       Appearance: Normal appearance. HENT:      Head: Normocephalic and atraumatic. Eyes:      Pupils: Pupils are equal, round, and reactive to light. Cardiovascular:      Rate and Rhythm: Tachycardia present. Rhythm irregular.    Pulmonary:      Effort: Pulmonary effort is normal. Breath sounds: No wheezing, rhonchi or rales. Abdominal:      Palpations: Abdomen is soft. Tenderness: There is no abdominal tenderness. There is no guarding or rebound. Musculoskeletal:      Cervical back: Normal range of motion. No rigidity. Skin:     General: Skin is warm and dry. Capillary Refill: Capillary refill takes less than 2 seconds. Neurological:      General: No focal deficit present. Mental Status: She is alert and oriented to person, place, and time. Psychiatric:         Mood and Affect: Mood normal.         Behavior: Behavior normal.      EKG: This EKG is signed and interpreted by me. Rate: 140  Rhythm: Atrial fibrillation with RVR  Interpretation: Normal axis. No ST or T wave changes. No STEMI. Comparison: New onset atrial fibrillation. Procedures     MDM  Number of Diagnoses or Management Options  Atrial fibrillation with rapid ventricular response [facility])  Diagnosis management comments: Patient presented the emergency department with new onset atrial fibrillation and rapid ventricular response. Labs were unremarkable and EKG showed no ischemic changes. Patient was administered Cardizem with improvement on reevaluation, she is now in sinus rhythm. Discussed case with Dr. Marcai Chavarria who admit the patient to a telemetry floor. She wishes to see Dr. Mike De León as her cardiologist.         --------------------------------------------- PAST HISTORY ---------------------------------------------  Past Medical History:  has a past medical history of Cancer ([facility] [address] and Cataract. Past Surgical History:  has a past surgical history that includes Hysterectomy; Cystoscopy; Eye surgery (11/29/2012); and eye surgery (Left, 08/18/2016). Social History:  reports that she has never smoked. She has never used smokeless tobacco. She reports current alcohol use. She reports that she does not use drugs. Family History: family history is not on file.      The patients home medications have been reviewed.     Allergies: Sulfa antibiotics    -------------------------------------------------- RESULTS -------------------------------------------------    Lab  Results for orders placed or performed during the hospital encounter of 12/08/22   COVID-19, Rapid    Specimen: Nasopharyngeal Swab   Result Value Ref Range    SARS-CoV-2, NAAT Not Detected Not Detected   CBC with Auto Differential   Result Value Ref Range    WBC 6.4 4.5 - 11.5 E9/L    RBC 4.43 3.50 - 5.50 E12/L    Hemoglobin 13.0 11.5 - 15.5 g/dL    Hematocrit 39.5 34.0 - 48.0 %    MCV 89.2 80.0 - 99.9 fL    MCH 29.3 26.0 - 35.0 pg    MCHC 32.9 32.0 - 34.5 %    RDW 12.9 11.5 - 15.0 fL    Platelets 168 746 - 423 E9/L    MPV 9.1 7.0 - 12.0 fL    Neutrophils % 79.7 43.0 - 80.0 %    Immature Granulocytes % 0.3 0.0 - 5.0 %    Lymphocytes % 12.4 (L) 20.0 - 42.0 %    Monocytes % 7.1 2.0 - 12.0 %    Eosinophils % 0.2 0.0 - 6.0 %    Basophils % 0.3 0.0 - 2.0 %    Neutrophils Absolute 5.07 1.80 - 7.30 E9/L    Immature Granulocytes # 0.02 E9/L    Lymphocytes Absolute 0.79 (L) 1.50 - 4.00 E9/L    Monocytes Absolute 0.45 0.10 - 0.95 E9/L    Eosinophils Absolute 0.01 (L) 0.05 - 0.50 E9/L    Basophils Absolute 0.02 0.00 - 0.20 E9/L   BMP   Result Value Ref Range    Sodium 135 132 - 146 mmol/L    Potassium 4.8 3.5 - 5.0 mmol/L    Chloride 100 98 - 107 mmol/L    CO2 21 (L) 22 - 29 mmol/L    Anion Gap 14 7 - 16 mmol/L    Glucose 129 (H) 74 - 99 mg/dL    BUN 13 6 - 23 mg/dL    Creatinine 0.7 0.5 - 1.0 mg/dL    Est, Glom Filt Rate >60 >=60 mL/min/1.73    Calcium 9.6 8.6 - 10.2 mg/dL   Protime-INR   Result Value Ref Range    Protime 12.0 9.3 - 12.4 sec    INR 1.1    Troponin   Result Value Ref Range    Troponin, High Sensitivity 17 (H) 0 - 9 ng/L   TSH   Result Value Ref Range    TSH 1.590 0.270 - 4.200 uIU/mL   Magnesium   Result Value Ref Range    Magnesium 2.1 1.6 - 2.6 mg/dL   EKG 12 Lead   Result Value Ref Range    Ventricular Rate 140 BPM    Atrial Rate 111 BPM    QRS Duration 78 ms    Q-T Interval 270 ms    QTc Calculation (Bazett) 412 ms    R Axis 0 degrees    T Axis 47 degrees       Radiology  XR CHEST PORTABLE    Result Date: 12/8/2022  EXAMINATION: ONE XRAY VIEW OF THE CHEST 12/8/2022 2:08 pm COMPARISON: 02/27/2022 HISTORY: ORDERING SYSTEM PROVIDED HISTORY: chest pain TECHNOLOGIST PROVIDED HISTORY: Reason for exam:->chest pain What reading provider will be dictating this exam?->CRC FINDINGS: The lungs are without acute focal process. There is no effusion or pneumothorax. The cardiomediastinal silhouette is without acute process. The osseous structures are without acute process. No acute process. ------------------------- NURSING NOTES AND VITALS REVIEWED ---------------------------  Date / Time Roomed:  12/8/2022  1:25 PM  ED Bed Assignment:  16/16    The nursing notes within the ED encounter and vital signs as below have been reviewed. Patient Vitals for the past 24 hrs:   BP Temp Pulse Resp SpO2 Height Weight   12/08/22 1545 -- -- 77 22 98 % -- --   12/08/22 1530 -- -- 76 22 98 % -- --   12/08/22 1515 -- -- 74 25 98 % -- --   12/08/22 1500 (!) 127/59 -- 75 25 99 % -- --   12/08/22 1445 -- -- (!) 160 22 (!) 79 % -- --   12/08/22 1430 -- -- (!) 136 26 98 % -- --   12/08/22 1357 -- 98.1 °F (36.7 °C) -- -- -- -- --   12/08/22 1321 (!) 131/54 -- (!) 164 18 100 % 5' 4\" (1.626 m) 131 lb (59.4 kg)       Oxygen Saturation Interpretation: Normal      ------------------------------------------ PROGRESS NOTES ------------------------------------------      I have spoken with the patient and discussed todays results, in addition to providing specific details for the plan of care and counseling regarding the diagnosis and prognosis.   Their questions are answered at this time and they are agreeable with the plan.      --------------------------------- ADDITIONAL PROVIDER NOTES ---------------------------------  Consultations:  Spoke with Dr. Jerad Barrera, They will admit this patient. This patient's ED course included: a personal history and physicial examination, re-evaluation prior to disposition, IV medications, cardiac monitoring, and continuous pulse oximetry    This patient has improved after Cardizem during their ED course. Clinical Impression  1. Atrial fibrillation with rapid ventricular response (HCC)          Disposition  Patient's disposition: Admit to telemetry  Patient's condition is serious. ED Course as of 12/08/22 1629   Thu Dec 08, 2022   1518 Reevaluation-patient reports that her chest pressure is much improved after medications in the emergency department. She is sinus on the monitor. Ordered repeat EKG.  [DT]      ED Course User Index  [DT] Early Gregorio Flower DO  Resident  12/08/22 [address] DO Mook  Resident  12/08/22 0282

## 2022-12-09 PROBLEM — R77.8 ELEVATED TROPONIN: Status: ACTIVE | Noted: 2022-12-09

## 2022-12-09 PROBLEM — I10 PRIMARY HYPERTENSION: Status: ACTIVE | Noted: 2022-12-09

## 2022-12-09 PROBLEM — R79.89 ELEVATED TROPONIN: Status: ACTIVE | Noted: 2022-12-09

## 2022-12-09 LAB
ANION GAP SERPL CALCULATED.3IONS-SCNC: 9 MMOL/L (ref 7–16)
BUN BLDV-MCNC: 8 MG/DL (ref 6–23)
CALCIUM SERPL-MCNC: 9.2 MG/DL (ref 8.6–10.2)
CHLORIDE BLD-SCNC: 103 MMOL/L (ref 98–107)
CO2: 24 MMOL/L (ref 22–29)
CREAT SERPL-MCNC: 0.6 MG/DL (ref 0.5–1)
EKG ATRIAL RATE: 84 BPM
EKG P AXIS: 68 DEGREES
EKG P-R INTERVAL: 170 MS
EKG Q-T INTERVAL: 346 MS
EKG QRS DURATION: 78 MS
EKG QTC CALCULATION (BAZETT): 408 MS
EKG R AXIS: 20 DEGREES
EKG T AXIS: 58 DEGREES
EKG VENTRICULAR RATE: 84 BPM
GFR SERPL CREATININE-BSD FRML MDRD: >60 ML/MIN/1.73
GLUCOSE BLD-MCNC: 108 MG/DL (ref 74–99)
HCT VFR BLD CALC: 38.4 % (ref 34–48)
HEMOGLOBIN: 12.7 G/DL (ref 11.5–15.5)
MCH RBC QN AUTO: 28.9 PG (ref 26–35)
MCHC RBC AUTO-ENTMCNC: 33.1 % (ref 32–34.5)
MCV RBC AUTO: 87.5 FL (ref 80–99.9)
PDW BLD-RTO: 12.9 FL (ref 11.5–15)
PLATELET # BLD: 246 E9/L (ref 130–450)
PMV BLD AUTO: 8.9 FL (ref 7–12)
POTASSIUM SERPL-SCNC: 4.6 MMOL/L (ref 3.5–5)
RBC # BLD: 4.39 E12/L (ref 3.5–5.5)
SODIUM BLD-SCNC: 136 MMOL/L (ref 132–146)
TROPONIN, HIGH SENSITIVITY: 26 NG/L (ref 0–9)
WBC # BLD: 5.7 E9/L (ref 4.5–11.5)

## 2022-12-09 PROCEDURE — 93010 ELECTROCARDIOGRAM REPORT: CPT | Performed by: INTERNAL MEDICINE

## 2022-12-09 PROCEDURE — 99222 1ST HOSP IP/OBS MODERATE 55: CPT | Performed by: INTERNAL MEDICINE

## 2022-12-09 PROCEDURE — 2060000000 HC ICU INTERMEDIATE R&B

## 2022-12-09 PROCEDURE — 85027 COMPLETE CBC AUTOMATED: CPT

## 2022-12-09 PROCEDURE — 36415 COLL VENOUS BLD VENIPUNCTURE: CPT

## 2022-12-09 PROCEDURE — 93306 TTE W/DOPPLER COMPLETE: CPT

## 2022-12-09 PROCEDURE — 80048 BASIC METABOLIC PNL TOTAL CA: CPT

## 2022-12-09 PROCEDURE — APPSS60 APP SPLIT SHARED TIME 46-60 MINUTES: Performed by: CLINICAL NURSE SPECIALIST

## 2022-12-09 PROCEDURE — 6370000000 HC RX 637 (ALT 250 FOR IP): Performed by: FAMILY MEDICINE

## 2022-12-09 PROCEDURE — 84484 ASSAY OF TROPONIN QUANT: CPT

## 2022-12-09 RX ORDER — POLYVINYL ALCOHOL 14 MG/ML
1 SOLUTION/ DROPS OPHTHALMIC PRN
Status: DISCONTINUED | OUTPATIENT
Start: 2022-12-09 | End: 2022-12-10 | Stop reason: HOSPADM

## 2022-12-09 RX ORDER — VITAMIN B COMPLEX
1000 TABLET ORAL
Status: DISCONTINUED | OUTPATIENT
Start: 2022-12-10 | End: 2022-12-10 | Stop reason: HOSPADM

## 2022-12-09 RX ADMIN — LISINOPRIL 10 MG: 5 TABLET ORAL at 08:18

## 2022-12-09 RX ADMIN — CYANOCOBALAMIN TAB 1000 MCG 500 MCG: 1000 TAB at 12:56

## 2022-12-09 RX ADMIN — POLYVINYL ALCOHOL 1 DROP: 14 SOLUTION/ DROPS OPHTHALMIC at 12:56

## 2022-12-09 RX ADMIN — HYDROXYCHLOROQUINE SULFATE 200 MG: 200 TABLET ORAL at 08:20

## 2022-12-09 RX ADMIN — MAGNESIUM OXIDE 400 MG (241.3 MG MAGNESIUM) TABLET 800 MG: TABLET at 20:47

## 2022-12-09 ASSESSMENT — PAIN SCALES - GENERAL: PAINLEVEL_OUTOF10: 0

## 2022-12-09 ASSESSMENT — ENCOUNTER SYMPTOMS: SHORTNESS OF BREATH: 0

## 2022-12-09 NOTE — PLAN OF CARE
Problem: Discharge Planning  Goal: Discharge to home or other facility with appropriate resources  12/9/2022 0949 by Derek Agarwal RN  Outcome: Progressing  12/9/2022 0948 by Derek Agarwal RN  Outcome: Progressing  12/9/2022 0538 by Redd Drew RN  Outcome: Progressing  Flowsheets  Taken 12/8/2022 2219  Discharge to home or other facility with appropriate resources: Identify barriers to discharge with patient and caregiver  Taken 12/8/2022 2200  Discharge to home or other facility with appropriate resources: Identify barriers to discharge with patient and caregiver     Problem: Safety - Adult  Goal: Free from fall injury  12/9/2022 0949 by Derek Agarwal RN  Outcome: Progressing  12/9/2022 0948 by Derek Agarwal RN  Outcome: Progressing  12/9/2022 0538 by Redd Drew RN  Outcome: Progressing  Flowsheets (Taken 12/9/2022 0156)  Free From Fall Injury: Instruct family/caregiver on patient safety     Problem: ABCDS Injury Assessment  Goal: Absence of physical injury  12/9/2022 0949 by Derek Agarwal RN  Outcome: Progressing  12/9/2022 0948 by Derek Agarwal RN  Outcome: Progressing  12/9/2022 0538 by Redd Drew RN  Outcome: Progressing  Flowsheets (Taken 12/9/2022 0156)  Absence of Physical Injury: Implement safety measures based on patient assessment     Problem: Chronic Conditions and Co-morbidities  Goal: Patient's chronic conditions and co-morbidity symptoms are monitored and maintained or improved  12/9/2022 0949 by Derek Agarwal RN  Outcome: Progressing  12/9/2022 0948 by Derek Agarwal RN  Outcome: Progressing     Problem: Cardiovascular - Adult  Goal: Absence of cardiac dysrhythmias or at baseline  Outcome: Progressing

## 2022-12-09 NOTE — H&P
HISTORY AND PHYSICAL             Date: 12/9/2022        Patient Name: Kelvin John     YOB: 1940      Age:  78 y.o. Chief Complaint     Chief Complaint   Patient presents with    Chest Pain     C/O off and on chest pain for 2 days with pain radiating down left arm, no cardiac Hx Faxed EKG showed Afib RVR no meds given en route took 325 ASA this morning        History Obtained From   patient    History of Present Illness   Patient presented to the ER with intermittent chest discomfort and palpitations for two days. Past Medical History     Past Medical History:   Diagnosis Date    Cancer ([address] [address] [facility] [address] left    8-18-16 for removal        Past Surgical History     Past Surgical History:   Procedure Laterality Date    CYSTOSCOPY      MULITPLE    EYE SURGERY  11/29/2012    right eye cataract with lens implant    EYE SURGERY Left 08/18/2016    removal of cataract with insertion of IOL    HYSTERECTOMY          Medications Prior to Admission     Prior to Admission medications    Medication Sig Start Date End Date Taking? Authorizing Provider   magnesium oxide (MAG-OX) 400 MG tablet Take 800 mg by mouth nightly   Yes Historical Provider, MD   lisinopril (PRINIVIL;ZESTRIL) 10 MG tablet Take 10 mg by mouth daily   Yes Historical Provider, MD   hydroxychloroquine (PLAQUENIL) 200 MG tablet Take 200 mg by mouth daily (with breakfast)   Yes Historical Provider, MD   vitamin B-12 (CYANOCOBALAMIN) 500 MCG tablet Take 500 mcg by mouth Daily with lunch   Yes Historical Provider, MD   Cholecalciferol (VITAMIN D3) 50 MCG (2000 UT) CAPS Take 1,000 Units by mouth Daily with lunch   Yes Historical Provider, MD   aspirin 325 MG tablet Take 325 mg by mouth as needed for Pain   Yes Historical Provider, MD   diazePAM (VALIUM) 5 MG tablet Take 5 mg by mouth as needed for Anxiety. Yes Historical Provider, MD   Misc.  Devices [facility]) MISC Aid with ambulation 10/11/18   Jem Marquez PA-C HYDROcodone-acetaminophen (NORCO) 5-325 MG per tablet Take 1 tablet by mouth every 6 hours as needed for Pain . Patient not taking: Reported on 12/8/2022 4/5/17   Anju Meehan APRANANTH - CNP   Multiple Vitamin (MULTI VITAMIN DAILY PO) Take 1 tablet by mouth daily  8-12-16   Patient not taking: Reported on 12/8/2022    Historical Provider, MD        Allergies   Sulfa antibiotics    Social History     Social History       Tobacco History       Smoking Status  Never      Smokeless Tobacco Use  Never              Alcohol History       Alcohol Use Status  Yes Comment  occa              Drug Use       Drug Use Status  No              Sexual Activity       Sexually Active  Not Asked                    Family History   No family history on file. Review of Systems   Review of Systems   Constitutional:  Positive for activity change. HENT:  Negative for congestion. Respiratory:  Negative for shortness of breath. Cardiovascular:  Positive for chest pain and palpitations. Genitourinary:  Negative for difficulty urinating. Neurological:  Negative for dizziness. Physical Exam   BP (!) 156/68   Pulse 75   Temp 98 °F (36.7 °C) (Oral)   Resp 18   Ht 5' 4\" (1.626 m)   Wt 131 lb (59.4 kg)   SpO2 99%   BMI 22.49 kg/m²     Physical Exam  Vitals reviewed. HENT:      Head: Normocephalic. Mouth/Throat:      Mouth: Mucous membranes are moist.   Eyes:      Pupils: Pupils are equal, round, and reactive to light. Cardiovascular:      Rate and Rhythm: Normal rate. Rhythm irregular. Pulmonary:      Effort: Pulmonary effort is normal. No respiratory distress. Breath sounds: Normal breath sounds. No wheezing. Abdominal:      General: Abdomen is flat. Bowel sounds are normal. There is no distension. Tenderness: There is no abdominal tenderness. Skin:     General: Skin is warm and dry. Capillary Refill: Capillary refill takes less than 2 seconds.    Neurological:      General: No focal deficit present. Mental Status: She is alert and oriented to person, place, and time.    Psychiatric:         Mood and Affect: Mood normal.         Behavior: Behavior normal.       Labs      Recent Results (from the past 24 hour(s))   EKG 12 Lead    Collection Time: 12/08/22  1:29 PM   Result Value Ref Range    Ventricular Rate 140 BPM    Atrial Rate 111 BPM    QRS Duration 78 ms    Q-T Interval 270 ms    QTc Calculation (Bazett) 412 ms    R Axis 0 degrees    T Axis 47 degrees   CBC with Auto Differential    Collection Time: 12/08/22  1:50 PM   Result Value Ref Range    WBC 6.4 4.5 - 11.5 E9/L    RBC 4.43 3.50 - 5.50 E12/L    Hemoglobin 13.0 11.5 - 15.5 g/dL    Hematocrit 39.5 34.0 - 48.0 %    MCV 89.2 80.0 - 99.9 fL    MCH 29.3 26.0 - 35.0 pg    MCHC 32.9 32.0 - 34.5 %    RDW 12.9 11.5 - 15.0 fL    Platelets 884 551 - 168 E9/L    MPV 9.1 7.0 - 12.0 fL    Neutrophils % 79.7 43.0 - 80.0 %    Immature Granulocytes % 0.3 0.0 - 5.0 %    Lymphocytes % 12.4 (L) 20.0 - 42.0 %    Monocytes % 7.1 2.0 - 12.0 %    Eosinophils % 0.2 0.0 - 6.0 %    Basophils % 0.3 0.0 - 2.0 %    Neutrophils Absolute 5.07 1.80 - 7.30 E9/L    Immature Granulocytes # 0.02 E9/L    Lymphocytes Absolute 0.79 (L) 1.50 - 4.00 E9/L    Monocytes Absolute 0.45 0.10 - 0.95 E9/L    Eosinophils Absolute 0.01 (L) 0.05 - 0.50 E9/L    Basophils Absolute 0.02 0.00 - 0.20 E9/L   BMP    Collection Time: 12/08/22  1:50 PM   Result Value Ref Range    Sodium 135 132 - 146 mmol/L    Potassium 4.8 3.5 - 5.0 mmol/L    Chloride 100 98 - 107 mmol/L    CO2 21 (L) 22 - 29 mmol/L    Anion Gap 14 7 - 16 mmol/L    Glucose 129 (H) 74 - 99 mg/dL    BUN 13 6 - 23 mg/dL    Creatinine 0.7 0.5 - 1.0 mg/dL    Est, Glom Filt Rate >60 >=60 mL/min/1.73    Calcium 9.6 8.6 - 10.2 mg/dL   Protime-INR    Collection Time: 12/08/22  1:50 PM   Result Value Ref Range    Protime 12.0 9.3 - 12.4 sec    INR 1.1    Troponin    Collection Time: 12/08/22  1:50 PM   Result Value Ref Range    Troponin, High Sensitivity 17 (H) 0 - 9 ng/L   TSH    Collection Time: 12/08/22  1:50 PM   Result Value Ref Range    TSH 1.590 0.270 - 4.200 uIU/mL   COVID-19, Rapid    Collection Time: 12/08/22  1:50 PM    Specimen: Nasopharyngeal Swab   Result Value Ref Range    SARS-CoV-2, NAAT Not Detected Not Detected   Magnesium    Collection Time: 12/08/22  1:50 PM   Result Value Ref Range    Magnesium 2.1 1.6 - 2.6 mg/dL   Troponin    Collection Time: 12/08/22  4:33 PM   Result Value Ref Range    Troponin, High Sensitivity 42 (H) 0 - 9 ng/L   EKG 12 Lead    Collection Time: 12/08/22  4:54 PM   Result Value Ref Range    Ventricular Rate 84 BPM    Atrial Rate 84 BPM    P-R Interval 170 ms    QRS Duration 78 ms    Q-T Interval 346 ms    QTc Calculation (Bazett) 408 ms    P Axis 68 degrees    R Axis 20 degrees    T Axis 58 degrees        Imaging/Diagnostics Last 24 Hours   XR CHEST PORTABLE    Result Date: 12/8/2022  EXAMINATION: ONE XRAY VIEW OF THE CHEST 12/8/2022 2:08 pm COMPARISON: 02/27/2022 HISTORY: ORDERING SYSTEM PROVIDED HISTORY: chest pain TECHNOLOGIST PROVIDED HISTORY: Reason for exam:->chest pain What reading provider will be dictating this exam?->CRC FINDINGS: The lungs are without acute focal process. There is no effusion or pneumothorax. The cardiomediastinal silhouette is without acute process. The osseous structures are without acute process. No acute process. Assessment      Hospital Problems             Last Modified POA    * (Principal) Atrial fibrillation with RVR ([address] 12/8/2022 Yes   HTN    Plan   Cardizem gtt, wean as tolerated  Cardiology consult.   Will need [address]  Monitor labs and exam.    Consultations Ordered:  IP CONSULT TO INTERNAL MEDICINE  IP CONSULT TO CARDIOLOGY    Electronically signed by General Julia MD on 12/9/22 at 6:25 AM EST

## 2022-12-09 NOTE — CONSULTS
Inpatient Cardiology Consultation      Reason for Consult:  Atrial fibrillation     Consulting Physician: Dr. Joel León     Requesting Physician:  Dr. Shafer [facility]     Date of Consultation: 12/9/2022    History of Present Illness:   Ms. Sherlyn Taylor is an 78year old lady who is previously unknown to our practice; she has no known structural heart disease. She has been living alone since the death of her  earlier this year, and performs her own household chores without dyspnea or chest discomfort. About one week ago she began having palpitations, feeling like her heart was racing. This became worse yesterday and her pulse oximeter recorded heart rate in the 180s. On arrival, EKG showed AF with RVR at 140 bpm; she was treated with diltiazem and converted to SR. She remains in SR at this time and has had no further palpitations. Troponin levels are 17 >>42. Her sister is prescribed Eliquis for AF and she would prefer to use this as well. 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  Authorizing Provider   magnesium oxide (MAG-OX) 400 MG tablet Take 800 mg by mouth nightly   Yes Historical Provider, MD   lisinopril (PRINIVIL;ZESTRIL) 10 MG tablet Take 10 mg by mouth daily   Yes Historical Provider, MD   hydroxychloroquine (PLAQUENIL) 200 MG tablet Take 200 mg by mouth daily (with breakfast)   Yes Historical Provider, MD   vitamin B-12 (CYANOCOBALAMIN) 500 MCG tablet Take 500 mcg by mouth Daily with lunch   Yes Historical Provider, MD   Cholecalciferol (VITAMIN D3) 50 MCG (2000 UT) CAPS Take 1,000 Units by mouth Daily with lunch   Yes Historical Provider, MD   aspirin 325 MG tablet Take 325 mg by mouth as needed for Pain   Yes Historical Provider, MD   diazePAM (VALIUM) 5 MG tablet Take 5 mg by mouth as needed for Anxiety. Yes Historical Provider, MD Alfar[facility]. Devices [facility]en Far[facility]) [facility] Aid with ambulation 10/11/18   Sonu Marie PA-C       Current Medications:    Current Facility-Administered Medications: polyvinyl alcohol (LIQUIFILM TEARS) 1.4 % ophthalmic solution 1 drop, 1 drop, Both Eyes, PRN  dilTIAZem 100 mg in dextrose 5 % 100 mL infusion (ADD-Grahn), 2.5-15 mg/hr, IntraVENous, Continuous  multivitamin 1 tablet, 1 tablet, Oral, Daily  aspirin tablet 325 mg, 325 mg, Oral, PRN  diazePAM (VALIUM) tablet 5 mg, 5 mg, Oral, Q8H PRN  hydroxychloroquine (PLAQUENIL) tablet 200 mg, 200 mg, Oral, Daily with breakfast  lisinopril (PRINIVIL;ZESTRIL) tablet 10 mg, 10 mg, Oral, Daily  magnesium oxide (MAG-OX) tablet 800 mg, 800 mg, Oral, Nightly  vitamin B-12 (CYANOCOBALAMIN) tablet 500 mcg, 500 mcg, Oral, Lunch    Allergies:  Sulfa antibiotics    Social History:    She has a cane but typically doesn't use it. There is no history of tobacco, alcohol, or illicit drug use. Family History:   Noncontributory due to her age     Review of Systems:   Constitutional: Denies fatigue, fevers, chills or night sweats  ENT: Denies headaches, bleeding gums, or epistaxis   Cardiovascular: Denies chest pain or lower extremity swelling. Palpitations as above. Respiratory: Denies dyspnea, cough, orthopnea or PND. Gastrointestinal: Denies nausea, vomiting, diarrhea, abdominal pain or dark/bloody stools. Genitourinary: Denies urgency, dysuria or hematuria. Musculoskeletal: Denies gait disturbance,  myalgias or arthralgias. Integumentary: Denies rash or ulcerations. Neurological: Denies dizziness or syncope. Lower extremity neuropathy   Psychiatric: Denies anxiety or depression. Endocrine: Denies temperature intolerance or recent weight change. Hematologic/Lymphatic: Denies abnormal bruising or bleeding.      Physical Exam:   BP Range : 156/678 to 127/59  BP (!) 156/68   Pulse 75   Temp 98 °F (36.7 °C) (Oral) Resp 18   Ht 5' 4\" (1.626 m)   Wt 131 lb (59.4 kg)   SpO2 99%   BMI 22.49 kg/m²   CONST:  Well developed, well nourished elderly lady who appears of stated age. Awake, alert and cooperative. No apparent distress. HEENT:   Head- Normocephalic, atraumatic   Eyes- Conjunctivae pink  Throat- Oral mucosa pink and moist  Neck-  No stridor,  jugular venous distention or carotid bruit. CHEST: Chest symmetrical and non-tender to palpation. Respirations unlabored. RESPIRATORY:  Breath sounds clear throughout    CARDIOVASCULAR:     Heart Ausculation- Regular rate and rhythm, no murmur,  s3, s4 or rub   PV: No lower extremity edema. No varicosities. Pedal pulses palpable  ABDOMEN: Soft, non-tender to light palpation. Bowel sounds present. No palpable masses   MS: Good muscle strength and tone. No atrophy or abnormal movements. : Deferred  SKIN: Warm and dry   NEURO / PSYCH: Oriented to person, place and time. Speech clear and appropriate. Follows all commands. Pleasant affect     Telemetry: SR    ECG 12/08/2022: AF, 140 bpm   ECG 12/08/2022: SR, 84 bpm     No intake or output data in the 24 hours ending 12/09/22 0751    Labs:   CBC:   Recent Labs     12/08/22  1350 12/09/22  0602   WBC 6.4 5.7   HGB 13.0 12.7   HCT 39.5 38.4    246     BMP:   Recent Labs     12/08/22  1350 12/09/22  0602    136   K 4.8 4.6   CO2 21* 24   BUN 13 8   CREATININE 0.7 0.6   LABGLOM >60 >60   CALCIUM 9.6 9.2     Mag:   Recent Labs     12/08/22  1350   MG 2.1     TSH:   Recent Labs     12/08/22  1350   TSH 1.590       PT/INR:   Recent Labs     12/08/22  1350   PROTIME 12.0   INR 1.1       CARDIAC ENZYMES:  Recent Labs     12/08/22  1350 12/08/22  1633   TROPHS 17* 42*     CXR 12/08/2022:  No acute process. Assessment and Recommendations to follow by Dr. Yony Orlando.      Electronically signed by VARSHA Joyner on 12/9/2022 at 7:51 AM

## 2022-12-09 NOTE — PLAN OF CARE
Problem: Discharge Planning  Goal: Discharge to home or other facility with appropriate resources  Outcome: Progressing  Flowsheets  Taken 12/8/2022 2219  Discharge to home or other facility with appropriate resources: Identify barriers to discharge with patient and caregiver  Taken 12/8/2022 2200  Discharge to home or other facility with appropriate resources: Identify barriers to discharge with patient and caregiver     Problem: Safety - Adult  Goal: Free from fall injury  Outcome: Progressing  Flowsheets (Taken 12/9/2022 0156)  Free From Fall Injury: Instruct family/caregiver on patient safety     Problem: ABCDS Injury Assessment  Goal: Absence of physical injury  Outcome: Progressing  Flowsheets (Taken 12/9/2022 0156)  Absence of Physical Injury: Implement safety measures based on patient assessment

## 2022-12-09 NOTE — CARE COORDINATION
Spoke with patient. She is from home, lives alone. She does not use assistive devices typically but has a cane for days she needs it. PCP is Dr. Polo Mckeon. No current homecare. She plans for home with no needs when released. For questions I can be reached at [phone].  [place]

## 2022-12-10 VITALS
HEIGHT: 64 IN | RESPIRATION RATE: 18 BRPM | HEART RATE: 72 BPM | WEIGHT: 131 LBS | DIASTOLIC BLOOD PRESSURE: 60 MMHG | SYSTOLIC BLOOD PRESSURE: 148 MMHG | OXYGEN SATURATION: 97 % | TEMPERATURE: 98.3 F | BODY MASS INDEX: 22.36 KG/M2

## 2022-12-10 LAB
ANION GAP SERPL CALCULATED.3IONS-SCNC: 9 MMOL/L (ref 7–16)
BUN BLDV-MCNC: 9 MG/DL (ref 6–23)
CALCIUM SERPL-MCNC: 9.3 MG/DL (ref 8.6–10.2)
CHLORIDE BLD-SCNC: 101 MMOL/L (ref 98–107)
CO2: 26 MMOL/L (ref 22–29)
CREAT SERPL-MCNC: 0.7 MG/DL (ref 0.5–1)
GFR SERPL CREATININE-BSD FRML MDRD: >60 ML/MIN/1.73
GLUCOSE BLD-MCNC: 101 MG/DL (ref 74–99)
HCT VFR BLD CALC: 39 % (ref 34–48)
HEMOGLOBIN: 12.6 G/DL (ref 11.5–15.5)
MCH RBC QN AUTO: 28.8 PG (ref 26–35)
MCHC RBC AUTO-ENTMCNC: 32.3 % (ref 32–34.5)
MCV RBC AUTO: 89 FL (ref 80–99.9)
PDW BLD-RTO: 12.8 FL (ref 11.5–15)
PLATELET # BLD: 241 E9/L (ref 130–450)
PMV BLD AUTO: 8.7 FL (ref 7–12)
POTASSIUM SERPL-SCNC: 4.8 MMOL/L (ref 3.5–5)
RBC # BLD: 4.38 E12/L (ref 3.5–5.5)
SODIUM BLD-SCNC: 136 MMOL/L (ref 132–146)
WBC # BLD: 5.2 E9/L (ref 4.5–11.5)

## 2022-12-10 PROCEDURE — 99232 SBSQ HOSP IP/OBS MODERATE 35: CPT | Performed by: INTERNAL MEDICINE

## 2022-12-10 PROCEDURE — 36415 COLL VENOUS BLD VENIPUNCTURE: CPT

## 2022-12-10 PROCEDURE — 6370000000 HC RX 637 (ALT 250 FOR IP): Performed by: INTERNAL MEDICINE

## 2022-12-10 PROCEDURE — 6370000000 HC RX 637 (ALT 250 FOR IP): Performed by: FAMILY MEDICINE

## 2022-12-10 PROCEDURE — 85027 COMPLETE CBC AUTOMATED: CPT

## 2022-12-10 PROCEDURE — 80048 BASIC METABOLIC PNL TOTAL CA: CPT

## 2022-12-10 RX ORDER — DILTIAZEM HYDROCHLORIDE 120 MG/1
120 CAPSULE, COATED, EXTENDED RELEASE ORAL DAILY
Status: DISCONTINUED | OUTPATIENT
Start: 2022-12-10 | End: 2022-12-10

## 2022-12-10 RX ADMIN — DIAZEPAM 5 MG: 5 TABLET ORAL at 00:50

## 2022-12-10 RX ADMIN — HYDROXYCHLOROQUINE SULFATE 200 MG: 200 TABLET ORAL at 09:43

## 2022-12-10 RX ADMIN — LISINOPRIL 10 MG: 5 TABLET ORAL at 09:43

## 2022-12-10 RX ADMIN — APIXABAN 5 MG: 5 TABLET, FILM COATED ORAL at 09:30

## 2022-12-10 NOTE — PROGRESS NOTES
INPATIENT CARDIOLOGY FOLLOW-UP    Name: Florian Adams    Age: 78 y.o. Date of Admission: 12/8/2022  1:25 PM    Date of Service: 12/10/2022    Primary Cardiologist: New to me from this admission    Chief Complaint: Follow-up for new onset paroxysmal atrial fibrillation with RVR    Interim History:  No new overnight cardiac complaints. Currently with no complaints of CP, SOB, palpitations, dizziness, or lightheadedness. SR on telemetry. No recurrence of atrial fibrillation overnight.   Review of Systems:   Negative except as described above    Problem List:  Patient Active Problem List   Diagnosis    Atrial fibrillation with rapid ventricular response ([facility] [address]    Primary hypertension    Elevated troponin       Current Medications:    Current Facility-Administered Medications:     apixaban (ELIQUIS) tablet 5 mg, 5 mg, Oral, BID, Demetrice Hines MD, 5 mg at 12/10/22 0930    polyvinyl alcohol (LIQUIFILM TEARS) 1.4 % ophthalmic solution 1 drop, 1 drop, Both Eyes, PRN, Nura Jane MD, 1 drop at 12/09/22 1256    perflutren lipid microspheres (DEFINITY) injection 1.5 mL, 1.5 mL, IntraVENous, ONCE PRN, Johny Gutierrez, APRN - CNS    Vitamin D (CHOLECALCIFEROL) tablet 1,000 Units, 1,000 Units, Oral, Lunch, Nura Jane MD    dilTIAZem 100 mg in dextrose 5 % 100 mL infusion ([facility]), 2.5-15 mg/hr, IntraVENous, Continuous, Nura Jane MD, Held at 12/08/22 1811    aspirin tablet 325 mg, 325 mg, Oral, Daily PRN, Nura Jane MD    diazePAM (VALIUM) tablet 5 mg, 5 mg, Oral, Q8H PRN, Nura Jane MD, 5 mg at 12/10/22 0050    hydroxychloroquine (PLAQUENIL) tablet 200 mg, 200 mg, Oral, Daily with breakfast, Nura Jane MD, 200 mg at 12/10/22 0943    lisinopril (PRINIVIL;ZESTRIL) tablet 10 mg, 10 mg, Oral, Daily, Nura Jane MD, 10 mg at 12/10/22 0943    magnesium oxide (MAG-OX) tablet 800 mg, 800 mg, Oral, Nightly, Nura Jane MD, 800 mg at 12/09/22 2047    vitamin B-12 (CYANOCOBALAMIN) tablet 500 mcg, 500 mcg, Oral, Lunch, Ceasar López MD, 500 mcg at 12/09/22 1256    Current Outpatient Medications:     metoprolol tartrate (LOPRESSOR) 25 MG tablet, Take 1 tablet by mouth 2 times daily, Disp: 60 tablet, Rfl: 5    apixaban (ELIQUIS) 5 MG TABS tablet, Take 1 tablet by mouth 2 times daily, Disp: 60 tablet, Rfl: 5    magnesium oxide (MAG-OX) 400 MG tablet, Take 800 mg by mouth nightly, Disp: , Rfl:     lisinopril (PRINIVIL;ZESTRIL) 10 MG tablet, Take 10 mg by mouth daily, Disp: , Rfl:     hydroxychloroquine (PLAQUENIL) 200 MG tablet, Take 200 mg by mouth daily (with breakfast), Disp: , Rfl:     vitamin B-12 (CYANOCOBALAMIN) 500 MCG tablet, Take 500 mcg by mouth Daily with lunch, Disp: , Rfl:     Cholecalciferol (VITAMIN D3) 50 MCG (2000 UT) CAPS, Take 1,000 Units by mouth Daily with lunch, Disp: , Rfl:     aspirin 325 MG tablet, Take 325 mg by mouth as needed for Pain, Disp: , Rfl:     diazePAM (VALIUM) 5 MG tablet, Take 5 mg by mouth as needed for Anxiety. , Disp: , Rfl:     Misc. Devices (WALKER) MISC, Aid with ambulation, Disp: 1 each, Rfl: 0    Physical Exam:  BP (!) 148/60   Pulse 72   Temp 98.3 °F (36.8 °C) (Oral)   Resp 18   Ht 5' 4\" (1.626 m)   Wt 131 lb (59.4 kg)   SpO2 97%   BMI 22.49 kg/m²   Wt Readings from Last 3 Encounters:   12/08/22 131 lb (59.4 kg)   11/11/20 149 lb (67.6 kg)   04/05/17 154 lb (69.9 kg)     Appearance: Awake, alert, no acute respiratory distress  Skin: Intact, no rash  Head: Normocephalic, atraumatic  Eyes: EOMI, no conjunctival erythema  ENMT: No pharyngeal erythema, MMM, no rhinorrhea  Neck: Supple, no elevated JVP, no carotid bruits  Lungs: Clear to auscultation bilaterally. No wheezes, rales, or rhonchi.   Cardiac: PMI nondisplaced, Regular rhythm with a normal rate, S1 & S2 normal, no murmurs  Abdomen: Soft, nontender, +bowel sounds  Extremities: Moves all extremities x 4, no lower extremity edema  Neurologic: No focal motor deficits apparent, normal mood and affect  Peripheral Pulses: Intact posterior tibial pulses bilaterally    Intake/Output:  No intake or output data in the 24 hours ending 12/10/22 1415  No intake/output data recorded. Laboratory Tests:  Recent Labs     12/08/22  1350 12/09/22  0602 12/10/22  0524    136 136   K 4.8 4.6 4.8    103 101   CO2 21* 24 26   BUN 13 8 9   CREATININE 0.7 0.6 0.7   GLUCOSE 129* 108* 101*   CALCIUM 9.6 9.2 9.3     Lab Results   Component Value Date/Time    MG 2.1 12/08/2022 01:50 PM     No results for input(s): ALKPHOS, ALT, AST, PROT, BILITOT, BILIDIR, LABALBU in the last 72 hours. Recent Labs     12/08/22  1350 12/09/22  0602 12/10/22  0524   WBC 6.4 5.7 5.2   RBC 4.43 4.39 4.38   HGB 13.0 12.7 12.6   HCT 39.5 38.4 39.0   MCV 89.2 87.5 89.0   MCH 29.3 28.9 28.8   MCHC 32.9 33.1 32.3   RDW 12.9 12.9 12.8    246 241   MPV 9.1 8.9 8.7     Lab Results   Component Value Date    TROPONINI <0.01 11/11/2020    TROPONINI <0.01 06/02/2014     Lab Results   Component Value Date    INR 1.1 12/08/2022    PROTIME 12.0 12/08/2022     Lab Results   Component Value Date    TSH 1.590 12/08/2022     No results found for: LABA1C  No results found for: EAG  No results found for: CHOL  No results found for: TRIG  No results found for: HDL  No results found for: LDLCALC, LDLCHOLESTEROL  No results found for: LABVLDL, VLDL  No results found for: CHOLHDLRATIO  No results for input(s): PROBNP in the last 72 hours. Cardiac Tests:    Echocardiogram from 12/9/2022:   Normal left ventricular chamber size. Normal left ventricular systolic function. Visually estimated LVEF is 55-60 %. No wall motion abnormalities. Normal diastolic function. Normal left atrial pressure. Normal right ventricle structure and function. Normal left atrial size. Normal right atrial size   Mild tricuspid regurgitation. Likely normal estimated PA pressure.    No comparison study available. Impression  New onset atrial fibrillation with RVR-now in sinus rhythm. 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 We will initiate Lopressor 25 twice daily for rate control. We will also initiate Eliquis 5 twice daily for anticoagulation. Echocardiogram results as above. Thyroid function within normal limits. Disease process explained to patient. All questions answered  Continue home lisinopril as able. If she is able to ambulate without symptoms, she is overall stable from a cardiac standpoint. I will follow-up with her in the office in 1 month after discharge. Gregg Montana MD, [address] [facility]    NOTE: This report was transcribed using voice recognition software. Every effort was made to ensure accuracy; however, inadvertent computerized transcription errors may be present.

## 2022-12-10 NOTE — DISCHARGE SUMMARY
Discharge Summary    Date: 12/10/2022  Patient Name: Sloane Joaquin    YOB: 1940     Age: 78 y.o. Admit Date: 12/8/2022  Discharge Date: 12/10/2022  Discharge Condition: Stable    Admission Diagnosis  Atrial fibrillation with rapid ventricular response ([address] [I48.91]; Atrial fibrillation with RVR ([address] [I48.91]      Discharge Diagnosis  Principal Problem:    Atrial fibrillation with rapid ventricular response ([address]  Active Problems:    Primary hypertension    Elevated troponin  Resolved Problems:    * No resolved hospital problems. [facility] AND [facility] Stay  Narrative of Hospital Course:  Patient admitted with new onset Afib RVR. Converted to NSR with cardizem gtt  ECHO normal per cardiology  Lopressor and eliquis started on discharge. Consultants:  IP CONSULT TO INTERNAL MEDICINE  IP CONSULT TO CARDIOLOGY    Surgeries/procedures Performed:      Treatments:            Discharge Plan/Disposition:  Home    Hospital/Incidental Findings Requiring Follow Up:    Patient Instructions:    Diet: Cardiac Diet    Activity:Activity as Tolerated  For number of days (if applicable): Other Instructions:    Provider Follow-Up:   No follow-ups on file.      Significant Diagnostic Studies:    Recent Labs:  Admission on 12/08/2022  WBC                                           Date: 12/08/2022  Value: 6.4         Ref range: 4.5 - 11.5 E9/L    Status: Final  RBC                                           Date: 12/08/2022  Value: 4.43        Ref range: 3.50 - 5.50 E12/L  Status: Final  Hemoglobin                                    Date: 12/08/2022  Value: 13.0        Ref range: 11.5 - 15.5 g/dL   Status: Final  Hematocrit                                    Date: 12/08/2022  Value: 39.5        Ref range: 34.0 - 48.0 %      Status: Final  MCV                                           Date: 12/08/2022  Value: 89.2        Ref range: 80.0 - 99.9 fL     Status: Final  MCH                                           Date: 12/08/2022  Value: 29.3        Ref range: 26.0 - 35.0 pg     Status: Final  MCHC                                          Date: 12/08/2022  Value: 32.9        Ref range: 32.0 - 34.5 %      Status: Final  RDW                                           Date: 12/08/2022  Value: 12.9        Ref range: 11.5 - 15.0 fL     Status: Final  Platelets                                     Date: 12/08/2022  Value: 235         Ref range: 130 - 450 E9/L     Status: Final  MPV                                           Date: 12/08/2022  Value: 9.1         Ref range: 7.0 - 12.0 fL      Status: Final  Neutrophils %                                 Date: 12/08/2022  Value: 79.7        Ref range: 43.0 - 80.0 %      Status: Final  Immature Granulocytes %                       Date: 12/08/2022  Value: 0.3         Ref range: 0.0 - 5.0 %        Status: Final  Lymphocytes %                                 Date: 12/08/2022  Value: 12.4 (A)    Ref range: 20.0 - 42.0 %      Status: Final  Monocytes %                                   Date: 12/08/2022  Value: 7.1         Ref range: 2.0 - 12.0 %       Status: Final  Eosinophils %                                 Date: 12/08/2022  Value: 0.2         Ref range: 0.0 - 6.0 %        Status: Final  Basophils %                                   Date: 12/08/2022  Value: 0.3         Ref range: 0.0 - 2.0 %        Status: Final  Neutrophils Absolute                          Date: 12/08/2022  Value: 5.07        Ref range: 1.80 - 7.30 E9/L   Status: Final  Immature Granulocytes #                       Date: 12/08/2022  Value: 0.02        Ref range: E9/L               Status: Final  Lymphocytes Absolute                          Date: 12/08/2022  Value: 0.79 (A)    Ref range: 1.50 - 4.00 E9/L   Status: Final  Monocytes Absolute                            Date: 12/08/2022  Value: 0.45        Ref range: 0.10 - 0.95 E9/L   Status: Final  Eosinophils Absolute                          Date: 12/08/2022  Value: 0.01 (A) Ref range: 0.05 - 0.50 E9/L   Status: Final  Basophils Absolute                            Date: 12/08/2022  Value: 0.02        Ref range: 0.00 - 0.20 E9/L   Status: Final  Sodium                                        Date: 12/08/2022  Value: 135         Ref range: 132 - 146 mmol/L   Status: Final  Potassium                                     Date: 12/08/2022  Value: 4.8         Ref range: 3.5 - 5.0 mmol/L   Status: Final  Chloride                                      Date: 12/08/2022  Value: 100         Ref range: 98 - 107 mmol/L    Status: Final  CO2                                           Date: 12/08/2022  Value: 21 (A)      Ref range: 22 - 29 mmol/L     Status: Final  Anion Gap                                     Date: 12/08/2022  Value: 14          Ref range: 7 - 16 mmol/L      Status: Final  Glucose                                       Date: 12/08/2022  Value: 129 (A)     Ref range: 74 - 99 mg/dL      Status: Final  BUN                                           Date: 12/08/2022  Value: 13          Ref range: 6 - 23 mg/dL       Status: Final  Creatinine                                    Date: 12/08/2022  Value: 0.7         Ref range: 0.5 - 1.0 mg/dL    Status: Final  Est, Glom Filt Rate                           Date: 12/08/2022  Value: >60         Ref range: >=60 mL/min/1.73   Status: Final                Comment: Pediatric calculator link  Kiara.[email or web address]_calculatorped  Effective Oct 3, 2022  These results are not intended for use in patients  <25years of age. eGFR results are calculated without  a race factor using the 2021 CKD-EPI equation. Careful  clinical correlation is recommended, particularly when  comparing to results calculated using previous equations.   The CKD-EPI equation is less accurate in patients with  extremes of muscle mass, extra-renal metabolism of  creatinine, excessive creatinine ingestion, or following  therapy that affects renal tubular secretion. Calcium                                       Date: 12/08/2022  Value: 9.6         Ref range: 8.6 - 10.2 mg/dL   Status: Final  Protime                                       Date: 12/08/2022  Value: 12.0        Ref range: 9.3 - 12.4 sec     Status: Final  INR                                           Date: 12/08/2022  Value: 1.1           Status: Final  Troponin, High Sensitivity                    Date: 12/08/2022  Value: 17 (A)      Ref range: 0 - 9 ng/L         Status: Final                Comment: High Sensitivity Troponin values cannot be compared with  other Troponin methodologies. Patients with high levels of Biotin oral intake (i.e. >5 mg/day)  may have falsely decreased Troponin levels. Samples collected  within 8 hours of biotin intake may require additional information  for diagnosis.     TSH                                           Date: 12/08/2022  Value: 1.590       Ref range: 0.270 - 4.200 uI*  Status: Final  Ventricular Rate                              Date: 12/08/2022  Value: 140         Ref range: BPM                Status: Final  Atrial Rate                                   Date: 12/08/2022  Value: 111         Ref range: BPM                Status: Final  QRS Duration                                  Date: 12/08/2022  Value: 78          Ref range: ms                 Status: Final  Q-T Interval                                  Date: 12/08/2022  Value: 270         Ref range: ms                 Status: Final  QTc Calculation (Bazett)                      Date: 12/08/2022  Value: 412         Ref range: ms                 Status: Final  R Axis                                        Date: 12/08/2022  Value: 0           Ref range: degrees            Status: Final  T Axis                                        Date: 12/08/2022  Value: 47          Ref range: degrees            Status: Final  SARS-CoV-2, NAAT                              Date: 12/08/2022  Value: Not Detected Ref range: Not Detected       Status: Final                Comment: Rapid NAAT:   Negative results should be treated as presumptive and,  if inconsistent with clinical signs and symptoms or necessary for  patient management, should be tested with an alternative molecular  assay. Negative results do not preclude SARS-CoV-2 infection and  should not be used as the sole basis for patient management decisions. This test has been authorized by the FDA under an Emergency Use  Authorization (EUA) for use by authorized laboratories. Fact sheet for Healthcare Providers:  ResellerReport.com.cy  Fact sheet for Patients: CV Ingenuity.cy    METHODOLOGY: Isothermal Nucleic Acid Amplification    Magnesium                                     Date: 12/08/2022  Value: 2.1         Ref range: 1.6 - 2.6 mg/dL    Status: Final  Troponin, High Sensitivity                    Date: 12/08/2022  Value: 42 (A)      Ref range: 0 - 9 ng/L         Status: Final                Comment: High Sensitivity Troponin values cannot be compared with  other Troponin methodologies. Patients with high levels of Biotin oral intake (i.e. >5 mg/day)  may have falsely decreased Troponin levels. Samples collected  within 8 hours of biotin intake may require additional information  for diagnosis.     Ventricular Rate                              Date: 12/08/2022  Value: 84          Ref range: BPM                Status: Final  Atrial Rate                                   Date: 12/08/2022  Value: 84          Ref range: BPM                Status: Final  P-R Interval                                  Date: 12/08/2022  Value: 170         Ref range: ms                 Status: Final  QRS Duration                                  Date: 12/08/2022  Value: 78          Ref range: ms                 Status: Final  Q-T Interval                                  Date: 12/08/2022  Value: 346         Ref range: ms Status: Final  QTc Calculation (Bazett)                      Date: 12/08/2022  Value: 408         Ref range: ms                 Status: Final  P Axis                                        Date: 12/08/2022  Value: 68          Ref range: degrees            Status: Final  R Axis                                        Date: 12/08/2022  Value: 20          Ref range: degrees            Status: Final  T Axis                                        Date: 12/08/2022  Value: 58          Ref range: degrees            Status: Final  WBC                                           Date: 12/09/2022  Value: 5.7         Ref range: 4.5 - 11.5 E9/L    Status: Final  RBC                                           Date: 12/09/2022  Value: 4.39        Ref range: 3.50 - 5.50 E12/L  Status: Final  Hemoglobin                                    Date: 12/09/2022  Value: 12.7        Ref range: 11.5 - 15.5 g/dL   Status: Final  Hematocrit                                    Date: 12/09/2022  Value: 38.4        Ref range: 34.0 - 48.0 %      Status: Final  MCV                                           Date: 12/09/2022  Value: 87.5        Ref range: 80.0 - 99.9 fL     Status: Final  MCH                                           Date: 12/09/2022  Value: 28.9        Ref range: 26.0 - 35.0 pg     Status: Final  MCHC                                          Date: 12/09/2022  Value: 33.1        Ref range: 32.0 - 34.5 %      Status: Final  RDW                                           Date: 12/09/2022  Value: 12.9        Ref range: 11.5 - 15.0 fL     Status: Final  Platelets                                     Date: 12/09/2022  Value: 246         Ref range: 130 - 450 E9/L     Status: Final  MPV                                           Date: 12/09/2022  Value: 8.9         Ref range: 7.0 - 12.0 fL      Status: Final  Sodium                                        Date: 12/09/2022  Value: 136         Ref range: 132 - 146 mmol/L   Status: Final  Potassium Date: 12/09/2022  Value: 4.6         Ref range: 3.5 - 5.0 mmol/L   Status: Final  Chloride                                      Date: 12/09/2022  Value: 103         Ref range: 98 - 107 mmol/L    Status: Final  CO2                                           Date: 12/09/2022  Value: 24          Ref range: 22 - 29 mmol/L     Status: Final  Anion Gap                                     Date: 12/09/2022  Value: 9           Ref range: 7 - 16 mmol/L      Status: Final  Glucose                                       Date: 12/09/2022  Value: 108 (A)     Ref range: 74 - 99 mg/dL      Status: Final  BUN                                           Date: 12/09/2022  Value: 8           Ref range: 6 - 23 mg/dL       Status: Final  Creatinine                                    Date: 12/09/2022  Value: 0.6         Ref range: 0.5 - 1.0 mg/dL    Status: Final  Est, Glom Filt Rate                           Date: 12/09/2022  Value: >60         Ref range: >=60 mL/min/1.73   Status: Final                Comment: Pediatric calculator link  [facility].at. [email or web address]_calculatorped  Effective Oct 3, 2022  These results are not intended for use in patients  <25years of age. eGFR results are calculated without  a race factor using the 2021 CKD-EPI equation. Careful  clinical correlation is recommended, particularly when  comparing to results calculated using previous equations. The CKD-EPI equation is less accurate in patients with  extremes of muscle mass, extra-renal metabolism of  creatinine, excessive creatinine ingestion, or following  therapy that affects renal tubular secretion.     Calcium                                       Date: 12/09/2022  Value: 9.2         Ref range: 8.6 - 10.2 mg/dL   Status: Final  Troponin, High Sensitivity                    Date: 12/09/2022  Value: 26 (A)      Ref range: 0 - 9 ng/L         Status: Final                Comment: High Sensitivity Troponin values cannot be compared with  other Troponin methodologies. Patients with high levels of Biotin oral intake (i.e. >5 mg/day)  may have falsely decreased Troponin levels. Samples collected  within 8 hours of biotin intake may require additional information  for diagnosis.     WBC                                           Date: 12/10/2022  Value: 5.2         Ref range: 4.5 - 11.5 E9/L    Status: Final  RBC                                           Date: 12/10/2022  Value: 4.38        Ref range: 3.50 - 5.50 E12/L  Status: Final  Hemoglobin                                    Date: 12/10/2022  Value: 12.6        Ref range: 11.5 - 15.5 g/dL   Status: Final  Hematocrit                                    Date: 12/10/2022  Value: 39.0        Ref range: 34.0 - 48.0 %      Status: Final  MCV                                           Date: 12/10/2022  Value: 89.0        Ref range: 80.0 - 99.9 fL     Status: Final  MCH                                           Date: 12/10/2022  Value: 28.8        Ref range: 26.0 - 35.0 pg     Status: Final  MCHC                                          Date: 12/10/2022  Value: 32.3        Ref range: 32.0 - 34.5 %      Status: Final  RDW                                           Date: 12/10/2022  Value: 12.8        Ref range: 11.5 - 15.0 fL     Status: Final  Platelets                                     Date: 12/10/2022  Value: 241         Ref range: 130 - 450 E9/L     Status: Final  MPV                                           Date: 12/10/2022  Value: 8.7         Ref range: 7.0 - 12.0 fL      Status: Final  Sodium                                        Date: 12/10/2022  Value: 136         Ref range: 132 - 146 mmol/L   Status: Final  Potassium                                     Date: 12/10/2022  Value: 4.8         Ref range: 3.5 - 5.0 mmol/L   Status: Final  Chloride                                      Date: 12/10/2022  Value: 101         Ref range: 98 - 107 mmol/L    Status: Final  CO2 Date: 12/10/2022  Value: 26          Ref range: 22 - 29 mmol/L     Status: Final  Anion Gap                                     Date: 12/10/2022  Value: 9           Ref range: 7 - 16 mmol/L      Status: Final  Glucose                                       Date: 12/10/2022  Value: 101 (A)     Ref range: 74 - 99 mg/dL      Status: Final  BUN                                           Date: 12/10/2022  Value: 9           Ref range: 6 - 23 mg/dL       Status: Final  Creatinine                                    Date: 12/10/2022  Value: 0.7         Ref range: 0.5 - 1.0 mg/dL    Status: Final  Est, Glom Filt Rate                           Date: 12/10/2022  Value: >60         Ref range: >=60 mL/min/1.73   Status: Final                Comment: Pediatric calculator link  [email or web address]_[email or web address]  Effective Oct 3, 2022  These results are not intended for use in patients  <25years of age. eGFR results are calculated without  a race factor using the 2021 CKD-EPI equation. Careful  clinical correlation is recommended, particularly when  comparing to results calculated using previous equations. The CKD-EPI equation is less accurate in patients with  extremes of muscle mass, extra-renal metabolism of  creatinine, excessive creatinine ingestion, or following  therapy that affects renal tubular secretion. Calcium                                       Date: 12/10/2022  Value: 9.3         Ref range: 8.6 - 10.2 mg/dL   Status: Final  ------------    Radiology last 7 days:  XR CHEST PORTABLE    Result Date: 12/8/2022  No acute process.         [unfilled]    Discharge Medications    Current Discharge Medication List    START taking these medications    metoprolol tartrate (LOPRESSOR) 25 MG tablet  Take 1 tablet by mouth 2 times daily  Qty: 60 tablet Refills: 5    apixaban (ELIQUIS) 5 MG TABS tablet  Take 1 tablet by mouth 2 times daily  Qty: 60 tablet

## 2022-12-10 NOTE — PLAN OF CARE
Problem: Discharge Planning  Goal: Discharge to home or other facility with appropriate resources  12/9/2022 2142 by Jane Caballero RN  Outcome: Progressing  Flowsheets (Taken 12/9/2022 2050)  Discharge to home or other facility with appropriate resources:   Identify barriers to discharge with patient and caregiver   Arrange for needed discharge resources and transportation as appropriate   Identify discharge learning needs (meds, wound care, etc)  12/9/2022 0949 by Juliana Pollock RN  Outcome: Progressing  12/9/2022 0948 by Juliana Pollock RN  Outcome: Progressing     Problem: Safety - Adult  Goal: Free from fall injury  12/9/2022 2142 by Jane Caballero RN  Outcome: Progressing  [address] (Taken 12/9/2022 2139)  Free From Fall Injury: Instruct family/caregiver on patient safety  12/9/2022 0949 by Juliana Pollock RN  Outcome: Progressing  12/9/2022 0948 by Juliana Pollock RN  Outcome: Progressing     Problem: ABCDS Injury Assessment  Goal: Absence of physical injury  12/9/2022 2142 by Jane Caballero RN  Outcome: Progressing  Flowsheets (Taken 12/9/2022 2139)  Absence of Physical Injury: Implement safety measures based on patient assessment  12/9/2022 0949 by Juliana Pollock RN  Outcome: Progressing  12/9/2022 0948 by Juliana Pollock RN  Outcome: Progressing     Problem: Chronic Conditions and Co-morbidities  Goal: Patient's chronic conditions and co-morbidity symptoms are monitored and maintained or improved  12/9/2022 2142 by Jane Caballero RN  Outcome: Progressing  12/9/2022 0949 by Juliana Pollock RN  Outcome: Progressing  12/9/2022 0948 by Juliana Pollock RN  Outcome: Progressing     Problem: Cardiovascular - Adult  Goal: Absence of cardiac dysrhythmias or at baseline  12/9/2022 2142 by Jane Caballero RN  Outcome: Progressing  Flowsheets (Taken 12/9/2022 2050)  Absence of cardiac dysrhythmias or at baseline: Monitor cardiac rate and rhythm  12/9/2022 0949 by Juliana Pollock RN  Outcome: Progressing

## 2022-12-10 NOTE — PROGRESS NOTES
Patient requested not to be woken up at 0300 for vitals, stated she has not slept well since being here.

## 2022-12-10 NOTE — PLAN OF CARE
Problem: Discharge Planning  Goal: Discharge to home or other facility with appropriate resources  Recent Flowsheet Documentation  Taken 12/10/2022 0730 by Jo Ann Lopez RN  Discharge to home or other facility with appropriate resources:   Identify barriers to discharge with patient and caregiver   Arrange for needed discharge resources and transportation as appropriate  12/9/2022 2142 by Warden Conchis RN  Outcome: Progressing  Flowsheets (Taken 12/9/2022 2050)  Discharge to home or other facility with appropriate resources:   Identify barriers to discharge with patient and caregiver   Arrange for needed discharge resources and transportation as appropriate   Identify discharge learning needs (meds, wound care, etc)     Problem: Safety - Adult  Goal: Free from fall injury  12/10/2022 1137 by Jo Ann Lopez RN  Outcome: Progressing  Flowsheets (Taken 12/10/2022 0730)  Free From Fall Injury: Instruct family/caregiver on patient safety  12/9/2022 2142 by Warden Conchis RN  Outcome: Progressing  [address] (Taken 12/9/2022 2139)  Free From Fall Injury: Instruct family/caregiver on patient safety     Problem: ABCDS Injury Assessment  Goal: Absence of physical injury  Recent Flowsheet Documentation  Taken 12/10/2022 0730 by Jo Ann Lopez RN  Absence of Physical Injury: Implement safety measures based on patient assessment  12/9/2022 2142 by Warden Conchis RN  Outcome: Progressing  Flowsheets (Taken 12/9/2022 2139)  Absence of Physical Injury: Implement safety measures based on patient assessment     Problem: Chronic Conditions and Co-morbidities  Goal: Patient's chronic conditions and co-morbidity symptoms are monitored and maintained or improved  Recent Flowsheet Documentation  Taken 12/10/2022 0730 by Harsh Phipps 34 - Patient's Chronic Conditions and Co-Morbidity Symptoms are Monitored and Maintained or Improved: Monitor and assess patient's chronic conditions and comorbid symptoms for stability, deterioration, or improvement  12/9/2022 2142 by Allison Mario RN  Outcome: Progressing     Problem: Cardiovascular - Adult  Goal: Absence of cardiac dysrhythmias or at baseline  Recent Flowsheet Documentation  Taken 12/10/2022 0730 by Edwin Moraes RN  Absence of cardiac dysrhythmias or at baseline:   Monitor cardiac rate and rhythm   Assess for signs of decreased cardiac output  12/9/2022 2142 by Allison Mario RN  Outcome: Progressing  Flowsheets (Taken 12/9/2022 2050)  Absence of cardiac dysrhythmias or at baseline: Monitor cardiac rate and rhythm

## 2022-12-26 ENCOUNTER — HOSPITAL ENCOUNTER (INPATIENT)
Age: 82
LOS: 2 days | Discharge: HOME | DRG: 309 | End: 2022-12-28
Attending: EMERGENCY MEDICINE | Admitting: FAMILY MEDICINE
Payer: MEDICARE

## 2022-12-26 ENCOUNTER — APPOINTMENT (OUTPATIENT)
Dept: GENERAL RADIOLOGY | Age: 82
DRG: 309 | End: 2022-12-26
Payer: MEDICARE

## 2022-12-26 DIAGNOSIS — I48.0 PAROXYSMAL ATRIAL FIBRILLATION (HCC): ICD-10-CM

## 2022-12-26 DIAGNOSIS — R00.2 PALPITATIONS: Primary | ICD-10-CM

## 2022-12-26 PROBLEM — I48.91 A-FIB (HCC): Status: ACTIVE | Noted: 2022-12-26

## 2022-12-26 LAB
ALBUMIN SERPL-MCNC: 4.2 G/DL (ref 3.5–5.2)
ALP BLD-CCNC: 105 U/L (ref 35–104)
ALT SERPL-CCNC: 16 U/L (ref 0–32)
ANION GAP SERPL CALCULATED.3IONS-SCNC: 12 MMOL/L (ref 7–16)
AST SERPL-CCNC: 18 U/L (ref 0–31)
BASOPHILS ABSOLUTE: 0.03 E9/L (ref 0–0.2)
BASOPHILS RELATIVE PERCENT: 0.5 % (ref 0–2)
BILIRUB SERPL-MCNC: 0.4 MG/DL (ref 0–1.2)
BUN BLDV-MCNC: 15 MG/DL (ref 6–23)
CALCIUM SERPL-MCNC: 9.7 MG/DL (ref 8.6–10.2)
CHLORIDE BLD-SCNC: 97 MMOL/L (ref 98–107)
CHP ED QC CHECK: YES
CO2: 22 MMOL/L (ref 22–29)
CREAT SERPL-MCNC: 0.7 MG/DL (ref 0.5–1)
EOSINOPHILS ABSOLUTE: 0.02 E9/L (ref 0.05–0.5)
EOSINOPHILS RELATIVE PERCENT: 0.3 % (ref 0–6)
GFR SERPL CREATININE-BSD FRML MDRD: >60 ML/MIN/1.73
GLUCOSE BLD-MCNC: 108 MG/DL
GLUCOSE BLD-MCNC: 119 MG/DL (ref 74–99)
HCT VFR BLD CALC: 42 % (ref 34–48)
HEMOGLOBIN: 14 G/DL (ref 11.5–15.5)
IMMATURE GRANULOCYTES #: 0.02 E9/L
IMMATURE GRANULOCYTES %: 0.3 % (ref 0–5)
INFLUENZA A BY PCR: NOT DETECTED
INFLUENZA B BY PCR: NOT DETECTED
LACTIC ACID: 1.6 MMOL/L (ref 0.5–2.2)
LYMPHOCYTES ABSOLUTE: 1.11 E9/L (ref 1.5–4)
LYMPHOCYTES RELATIVE PERCENT: 17.2 % (ref 20–42)
MAGNESIUM: 2.4 MG/DL (ref 1.6–2.6)
MCH RBC QN AUTO: 29.2 PG (ref 26–35)
MCHC RBC AUTO-ENTMCNC: 33.3 % (ref 32–34.5)
MCV RBC AUTO: 87.5 FL (ref 80–99.9)
METER GLUCOSE: 108 MG/DL (ref 74–99)
MONOCYTES ABSOLUTE: 0.41 E9/L (ref 0.1–0.95)
MONOCYTES RELATIVE PERCENT: 6.4 % (ref 2–12)
NEUTROPHILS ABSOLUTE: 4.85 E9/L (ref 1.8–7.3)
NEUTROPHILS RELATIVE PERCENT: 75.3 % (ref 43–80)
PDW BLD-RTO: 12.8 FL (ref 11.5–15)
PLATELET # BLD: 237 E9/L (ref 130–450)
PMV BLD AUTO: 8.7 FL (ref 7–12)
POTASSIUM SERPL-SCNC: 4.9 MMOL/L (ref 3.5–5)
PRO-BNP: 217 PG/ML (ref 0–450)
RBC # BLD: 4.8 E12/L (ref 3.5–5.5)
SARS-COV-2, NAAT: NOT DETECTED
SODIUM BLD-SCNC: 131 MMOL/L (ref 132–146)
TOTAL PROTEIN: 7 G/DL (ref 6.4–8.3)
TROPONIN, HIGH SENSITIVITY: 17 NG/L (ref 0–9)
TROPONIN, HIGH SENSITIVITY: 18 NG/L (ref 0–9)
WBC # BLD: 6.4 E9/L (ref 4.5–11.5)

## 2022-12-26 PROCEDURE — 87502 INFLUENZA DNA AMP PROBE: CPT

## 2022-12-26 PROCEDURE — 93005 ELECTROCARDIOGRAM TRACING: CPT

## 2022-12-26 PROCEDURE — G0378 HOSPITAL OBSERVATION PER HR: HCPCS

## 2022-12-26 PROCEDURE — 87635 SARS-COV-2 COVID-19 AMP PRB: CPT

## 2022-12-26 PROCEDURE — 83605 ASSAY OF LACTIC ACID: CPT

## 2022-12-26 PROCEDURE — 82962 GLUCOSE BLOOD TEST: CPT

## 2022-12-26 PROCEDURE — 80053 COMPREHEN METABOLIC PANEL: CPT

## 2022-12-26 PROCEDURE — 84484 ASSAY OF TROPONIN QUANT: CPT

## 2022-12-26 PROCEDURE — 6370000000 HC RX 637 (ALT 250 FOR IP): Performed by: FAMILY MEDICINE

## 2022-12-26 PROCEDURE — 99285 EMERGENCY DEPT VISIT HI MDM: CPT

## 2022-12-26 PROCEDURE — 83880 ASSAY OF NATRIURETIC PEPTIDE: CPT

## 2022-12-26 PROCEDURE — 2140000000 HC CCU INTERMEDIATE R&B

## 2022-12-26 PROCEDURE — 71045 X-RAY EXAM CHEST 1 VIEW: CPT

## 2022-12-26 PROCEDURE — 36415 COLL VENOUS BLD VENIPUNCTURE: CPT

## 2022-12-26 PROCEDURE — 83735 ASSAY OF MAGNESIUM: CPT

## 2022-12-26 PROCEDURE — 85025 COMPLETE CBC W/AUTO DIFF WBC: CPT

## 2022-12-26 RX ORDER — DIAZEPAM 5 MG/1
5 TABLET ORAL PRN
Status: DISCONTINUED | OUTPATIENT
Start: 2022-12-26 | End: 2022-12-27

## 2022-12-26 RX ORDER — ASPIRIN 325 MG
325 TABLET ORAL PRN
Status: DISCONTINUED | OUTPATIENT
Start: 2022-12-26 | End: 2022-12-27

## 2022-12-26 RX ORDER — HYDROXYCHLOROQUINE SULFATE 200 MG/1
200 TABLET, FILM COATED ORAL
Status: DISCONTINUED | OUTPATIENT
Start: 2022-12-27 | End: 2022-12-28 | Stop reason: HOSPADM

## 2022-12-26 RX ORDER — LANOLIN ALCOHOL/MO/W.PET/CERES
800 CREAM (GRAM) TOPICAL NIGHTLY
Status: DISCONTINUED | OUTPATIENT
Start: 2022-12-26 | End: 2022-12-28 | Stop reason: HOSPADM

## 2022-12-26 RX ORDER — LANOLIN ALCOHOL/MO/W.PET/CERES
500 CREAM (GRAM) TOPICAL
Status: DISCONTINUED | OUTPATIENT
Start: 2022-12-26 | End: 2022-12-28 | Stop reason: HOSPADM

## 2022-12-26 RX ORDER — LISINOPRIL 10 MG/1
10 TABLET ORAL DAILY
Status: DISCONTINUED | OUTPATIENT
Start: 2022-12-26 | End: 2022-12-28 | Stop reason: HOSPADM

## 2022-12-26 RX ADMIN — CYANOCOBALAMIN TAB 1000 MCG 500 MCG: 1000 TAB at 18:55

## 2022-12-26 RX ADMIN — APIXABAN 2.5 MG: 2.5 TABLET, FILM COATED ORAL at 20:59

## 2022-12-26 ASSESSMENT — PAIN SCALES - GENERAL: PAINLEVEL_OUTOF10: 0

## 2022-12-26 ASSESSMENT — LIFESTYLE VARIABLES: HOW OFTEN DO YOU HAVE A DRINK CONTAINING ALCOHOL: NEVER

## 2022-12-26 NOTE — PROGRESS NOTES
Dr. Francois Prajapati MD,    Your patient is on a medication that requires a renal dose adjustment. Renal Function Assessment:    Date Body Weight IBW  Adjusted BW SCr  CrCl Dialysis status   12/26/2022 130 lb (59 kg)  Ideal body weight: 57 kg (125 lb 10.6 oz)  Adjusted ideal body weight: 57.8 kg (127 lb 6.4 oz) Serum creatinine: 0.7 mg/dL 12/26/22 1428  Estimated creatinine clearance: 54 mL/min N/a       Pharmacy has renally dose-adjusted the following medication(s):    Date Original Order Renally Adjusted Order   12/26/2022 Eliquis 5mg bid Eliquis 2.5mg bid               Please contact pharmacy with any questions regarding these changes.     Roland Mora [facility]  12/26/2022  6:23 PM

## 2022-12-26 NOTE — PLAN OF CARE
Problem: Discharge Planning  Goal: Discharge to home or other facility with appropriate resources  Outcome: Progressing  Flowsheets (Taken 12/26/2022 1735)  Discharge to home or other facility with appropriate resources:   Identify barriers to discharge with patient and caregiver   Arrange for needed discharge resources and transportation as appropriate     Problem: Safety - Adult  Goal: Free from fall injury  Outcome: Progressing  Flowsheets (Taken 12/26/2022 1735)  Free From Fall Injury: Instruct family/caregiver on patient safety     Problem: ABCDS Injury Assessment  Goal: Absence of physical injury  Outcome: Progressing  Flowsheets (Taken 12/26/2022 1735)  Absence of Physical Injury: Implement safety measures based on patient assessment

## 2022-12-26 NOTE — PROGRESS NOTES
Cardiology consult placed to Trudy Snider NP; [employer] states that the consult will be handled in the morning.

## 2022-12-26 NOTE — ED PROVIDER NOTES
26-year-old female history of A. apurva on metoprolol and Eliquis presents emerged department with concerns of palpitations. Patient said today her pulse ox at home  heart rate was jumping from . This concerned her for she has been feeling  since she was discharged from the hospital December 10 for A. apurva. Patient reports she has recently cut down her metoprolol dose. Her symptoms of palpitations have been constant with waxing waning severity. Worsened with exertion better with nothing. Patient is compliant with her Eliquis and metoprolol medications. She also endorses shortness of breath on exertion. She denies the following: Fever, headache, chills, chest pain, pleuritic chest pain, leg erythema/edema/swelling/asymmetry, abdominal pain, nausea, vomiting, changes in bowel and urinary habits, urinary symptoms. Chief Complaint   Patient presents with    Palpitations     Patient states her heart rate at home was anywhere between 24 and 180. She C/O weakness. Review of Systems   Pertinent stated in HPI above  Physical Exam  Constitutional:       General: She is not in acute distress. Comments: Patient has a soft spoken voice which is her baseline according to her sister. HENT:      Head: Normocephalic and atraumatic. Mouth/Throat:      Mouth: Mucous membranes are moist.   Eyes:      Extraocular Movements: Extraocular movements intact. Pupils: Pupils are equal, round, and reactive to light. Cardiovascular:      Rate and Rhythm: Normal rate and regular rhythm. Pulmonary:      Effort: Pulmonary effort is normal.      Breath sounds: Normal breath sounds. Abdominal:      Palpations: Abdomen is soft. Tenderness: There is no abdominal tenderness. Musculoskeletal:      Cervical back: Normal range of motion and neck supple. Right lower leg: No edema. Left lower leg: No edema. Skin:     General: Skin is warm.       Capillary Refill: Capillary refill takes less than 2 seconds. Neurological:      General: No focal deficit present. Mental Status: She is alert and oriented to person, place, and time. Mental status is at baseline. Psychiatric:         Mood and Affect: Mood is depressed. Comments: Patient is depressed due to the fact her   not too long ago. Procedures     EKG: This EKG is signed by emergency department physician. Rate: 89  Rhythm: Sinus  AXIS:left axis  ST Changes: No ST elevations  Interpretation: Normal sinus rhythm  Comparison: No acute changes      MDM     49-year-old female history of A. fib on metoprolol and Eliquis presents emerged department with concerns of palpitations. Patient said today her pulse ox at home  heart rate was jumping from . This concerned her for she has been feeling  since she was discharged from the hospital December 10 for A. fib. Patient reports she has recently cut down her metoprolol dose. Her symptoms of palpitations have been constant with waxing waning severity. Worsened with exertion better with nothing. Patient is compliant with her Eliquis and metoprolol medications. She also endorses shortness of breath on exertion. She denies the following: Fever, headache, chills, chest pain, pleuritic chest pain, leg erythema/edema/swelling/asymmetry, abdominal pain, nausea, vomiting, changes in bowel and urinary habits, urinary symptoms. Upon entering the room patient is responsive but soft-spoken. I brought her sister from the waiting room to get more history. The patient has been depressed due to losing her  back in March, and her soft voice is normal for her. Patient has not seen a cardiologist since being discharged from the hospital.  She is hemodynamically stable on 100% room air. She is in sinus rhythm. EKG shows no ST elevations. Lungs are clear to auscultation. Abdomen soft and nontender. No lower extremity edema/erythema/tenderness on palpation.   EKG is unremarkable for ischemic changes. Chest x-ray is unremarkable for cardiopulmonary process. Patient negative for the flu. She does not have a white blood cell count. Patient has remained in normal sinus rhythm throughout the stay of the emergency department course. Patient still reports she is having palpitations and shortness of breath on exertion. I spoke with Dr. Bettye Covarrubias about the patient in great detail and he agreed to admit the patient for follow-up and possibly adjust medicine doses. I spoke to the patient about the plan to admit her so she can get proper cardiology follow-up as she has not done so in the past.  She agreed to be admitted for further work-up.                     --------------------------------------------- PAST HISTORY ---------------------------------------------  Past Medical History:  has a past medical history of Cancer ([address] and Cataract. Past Surgical History:  has a past surgical history that includes Hysterectomy; Cystoscopy; Eye surgery (11/29/2012); and eye surgery (Left, 08/18/2016). Social History:  reports that she has never smoked. She has never used smokeless tobacco. She reports current alcohol use. She reports that she does not use drugs. Family History: family history is not on file. The patients home medications have been reviewed.     Allergies: Sulfa antibiotics    -------------------------------------------------- RESULTS -------------------------------------------------    LABS:  Results for orders placed or performed during the hospital encounter of 12/26/22   COVID-19, Rapid    Specimen: Nasopharyngeal Swab   Result Value Ref Range    SARS-CoV-2, NAAT Not Detected Not Detected   Rapid influenza A/B antigens    Specimen: Nasopharyngeal   Result Value Ref Range    Influenza A by PCR Not Detected Not Detected    Influenza B by PCR Not Detected Not Detected   CBC with Auto Differential   Result Value Ref Range    WBC 6.4 4.5 - 11.5 E9/L    RBC 4.80 3.50 - 5.50 E12/L Hemoglobin 14.0 11.5 - 15.5 g/dL    Hematocrit 42.0 34.0 - 48.0 %    MCV 87.5 80.0 - 99.9 fL    MCH 29.2 26.0 - 35.0 pg    MCHC 33.3 32.0 - 34.5 %    RDW 12.8 11.5 - 15.0 fL    Platelets 830 736 - 291 E9/L    MPV 8.7 7.0 - 12.0 fL    Neutrophils % 75.3 43.0 - 80.0 %    Immature Granulocytes % 0.3 0.0 - 5.0 %    Lymphocytes % 17.2 (L) 20.0 - 42.0 %    Monocytes % 6.4 2.0 - 12.0 %    Eosinophils % 0.3 0.0 - 6.0 %    Basophils % 0.5 0.0 - 2.0 %    Neutrophils Absolute 4.85 1.80 - 7.30 E9/L    Immature Granulocytes # 0.02 E9/L    Lymphocytes Absolute 1.11 (L) 1.50 - 4.00 E9/L    Monocytes Absolute 0.41 0.10 - 0.95 E9/L    Eosinophils Absolute 0.02 (L) 0.05 - 0.50 E9/L    Basophils Absolute 0.03 0.00 - 0.20 E9/L   Troponin   Result Value Ref Range    Troponin, High Sensitivity 17 (H) 0 - 9 ng/L   Lactic Acid   Result Value Ref Range    Lactic Acid 1.6 0.5 - 2.2 mmol/L   Comprehensive Metabolic Panel   Result Value Ref Range    Sodium 131 (L) 132 - 146 mmol/L    Potassium 4.9 3.5 - 5.0 mmol/L    Chloride 97 (L) 98 - 107 mmol/L    CO2 22 22 - 29 mmol/L    Anion Gap 12 7 - 16 mmol/L    Glucose 119 (H) 74 - 99 mg/dL    BUN 15 6 - 23 mg/dL    Creatinine 0.7 0.5 - 1.0 mg/dL    Est, Glom Filt Rate >60 >=60 mL/min/1.73    Calcium 9.7 8.6 - 10.2 mg/dL    Total Protein 7.0 6.4 - 8.3 g/dL    Albumin 4.2 3.5 - 5.2 g/dL    Total Bilirubin 0.4 0.0 - 1.2 mg/dL    Alkaline Phosphatase 105 (H) 35 - 104 U/L    ALT 16 0 - 32 U/L    AST 18 0 - 31 U/L   Magnesium   Result Value Ref Range    Magnesium 2.4 1.6 - 2.6 mg/dL   Brain Natriuretic Peptide   Result Value Ref Range    Pro- 0 - 450 pg/mL   Troponin   Result Value Ref Range    Troponin, High Sensitivity 18 (H) 0 - 9 ng/L   POCT Glucose   Result Value Ref Range    Glucose 108 mg/dL    QC OK?  yes    POCT Glucose   Result Value Ref Range    Meter Glucose 108 (H) 74 - 99 mg/dL   EKG 12 Lead   Result Value Ref Range    Ventricular Rate 89 BPM    Atrial Rate 89 BPM    P-R Interval 176 ms    QRS Duration 76 ms    Q-T Interval 328 ms    QTc Calculation (Bazett) 399 ms    P Axis 83 degrees    R Axis 5 degrees    T Axis 64 degrees       RADIOLOGY:  XR CHEST PORTABLE   Final Result   No acute process. ------------------------- NURSING NOTES AND VITALS REVIEWED ---------------------------  Date / Time Roomed:  12/26/2022  2:00 PM  ED Bed Assignment:  [id number]    The nursing notes within the ED encounter and vital signs as below have been reviewed. Patient Vitals for the past 24 hrs:   BP Temp Temp src Pulse Resp SpO2 Height Weight   12/26/22 1735 (!) 149/63 97.3 °F (36.3 °C) Temporal 99 20 99 % -- --   12/26/22 1532 (!) 140/62 -- -- 77 18 98 % -- --   12/26/22 1420 (!) 175/80 97.4 °F (36.3 °C) Rectal 88 18 100 % -- --   12/26/22 1403 (!) 163/100 97.7 °F (36.5 °C) Oral (!) 153 16 97 % 5' 5\" (1.651 m) 130 lb (59 kg)       Oxygen Saturation Interpretation: Normal      Diagnosis:  1. Palpitations        Disposition:  Patient's disposition: Admit to medicine for further work-up of controlling patient's palpitations. Cardiology is consulted. Patient's condition is stable. Attending was present and dose throughout encounter including all critical portions;  See Attending Note/Attestation for Final Plan      Julianna Chu DO  Resident  12/26/22 8592

## 2022-12-27 LAB
ANION GAP SERPL CALCULATED.3IONS-SCNC: 12 MMOL/L (ref 7–16)
BUN BLDV-MCNC: 9 MG/DL (ref 6–23)
CALCIUM SERPL-MCNC: 8.8 MG/DL (ref 8.6–10.2)
CHLORIDE BLD-SCNC: 99 MMOL/L (ref 98–107)
CO2: 21 MMOL/L (ref 22–29)
CREAT SERPL-MCNC: 0.6 MG/DL (ref 0.5–1)
EKG ATRIAL RATE: 89 BPM
EKG P AXIS: 83 DEGREES
EKG P-R INTERVAL: 176 MS
EKG Q-T INTERVAL: 328 MS
EKG QRS DURATION: 76 MS
EKG QTC CALCULATION (BAZETT): 399 MS
EKG R AXIS: 5 DEGREES
EKG T AXIS: 64 DEGREES
EKG VENTRICULAR RATE: 89 BPM
GFR SERPL CREATININE-BSD FRML MDRD: >60 ML/MIN/1.73
GLUCOSE BLD-MCNC: 90 MG/DL (ref 74–99)
HCT VFR BLD CALC: 36.2 % (ref 34–48)
HEMOGLOBIN: 12.1 G/DL (ref 11.5–15.5)
MCH RBC QN AUTO: 28.5 PG (ref 26–35)
MCHC RBC AUTO-ENTMCNC: 33.4 % (ref 32–34.5)
MCV RBC AUTO: 85.2 FL (ref 80–99.9)
PDW BLD-RTO: 12.9 FL (ref 11.5–15)
PLATELET # BLD: 211 E9/L (ref 130–450)
PMV BLD AUTO: 8.5 FL (ref 7–12)
POTASSIUM SERPL-SCNC: 4.4 MMOL/L (ref 3.5–5)
RBC # BLD: 4.25 E12/L (ref 3.5–5.5)
SODIUM BLD-SCNC: 132 MMOL/L (ref 132–146)
WBC # BLD: 5.4 E9/L (ref 4.5–11.5)

## 2022-12-27 PROCEDURE — 99223 1ST HOSP IP/OBS HIGH 75: CPT | Performed by: INTERNAL MEDICINE

## 2022-12-27 PROCEDURE — 36415 COLL VENOUS BLD VENIPUNCTURE: CPT

## 2022-12-27 PROCEDURE — 85027 COMPLETE CBC AUTOMATED: CPT

## 2022-12-27 PROCEDURE — 6370000000 HC RX 637 (ALT 250 FOR IP): Performed by: FAMILY MEDICINE

## 2022-12-27 PROCEDURE — 80048 BASIC METABOLIC PNL TOTAL CA: CPT

## 2022-12-27 PROCEDURE — G0378 HOSPITAL OBSERVATION PER HR: HCPCS

## 2022-12-27 PROCEDURE — 6370000000 HC RX 637 (ALT 250 FOR IP): Performed by: NURSE PRACTITIONER

## 2022-12-27 PROCEDURE — 2140000000 HC CCU INTERMEDIATE R&B

## 2022-12-27 PROCEDURE — 99222 1ST HOSP IP/OBS MODERATE 55: CPT | Performed by: INTERNAL MEDICINE

## 2022-12-27 PROCEDURE — 93010 ELECTROCARDIOGRAM REPORT: CPT | Performed by: INTERNAL MEDICINE

## 2022-12-27 RX ORDER — METOPROLOL SUCCINATE 25 MG/1
25 TABLET, EXTENDED RELEASE ORAL DAILY
Status: DISCONTINUED | OUTPATIENT
Start: 2022-12-27 | End: 2022-12-28 | Stop reason: HOSPADM

## 2022-12-27 RX ORDER — POLYVINYL ALCOHOL 14 MG/ML
1 SOLUTION/ DROPS OPHTHALMIC EVERY 4 HOURS PRN
Status: DISCONTINUED | OUTPATIENT
Start: 2022-12-27 | End: 2022-12-28 | Stop reason: HOSPADM

## 2022-12-27 RX ORDER — ASPIRIN 325 MG
325 TABLET ORAL NIGHTLY PRN
Status: DISCONTINUED | OUTPATIENT
Start: 2022-12-27 | End: 2022-12-28 | Stop reason: HOSPADM

## 2022-12-27 RX ORDER — DIAZEPAM 2 MG/1
5 TABLET ORAL EVERY 6 HOURS PRN
Status: DISCONTINUED | OUTPATIENT
Start: 2022-12-27 | End: 2022-12-28 | Stop reason: HOSPADM

## 2022-12-27 RX ADMIN — SALINE NASAL SPRAY 1 SPRAY: 1.5 SOLUTION NASAL at 14:19

## 2022-12-27 RX ADMIN — Medication 800 MG: at 22:15

## 2022-12-27 RX ADMIN — CYANOCOBALAMIN TAB 1000 MCG 500 MCG: 1000 TAB at 12:26

## 2022-12-27 RX ADMIN — POLYVINYL ALCOHOL 1 DROP: 14 SOLUTION/ DROPS OPHTHALMIC at 14:19

## 2022-12-27 RX ADMIN — APIXABAN 2.5 MG: 2.5 TABLET, FILM COATED ORAL at 08:22

## 2022-12-27 RX ADMIN — DIAZEPAM 5 MG: 2 TABLET ORAL at 01:18

## 2022-12-27 RX ADMIN — LISINOPRIL 10 MG: 10 TABLET ORAL at 08:22

## 2022-12-27 RX ADMIN — METOPROLOL SUCCINATE 25 MG: 25 TABLET, EXTENDED RELEASE ORAL at 16:23

## 2022-12-27 RX ADMIN — METOPROLOL TARTRATE 25 MG: 25 TABLET, FILM COATED ORAL at 08:22

## 2022-12-27 RX ADMIN — APIXABAN 2.5 MG: 2.5 TABLET, FILM COATED ORAL at 22:15

## 2022-12-27 RX ADMIN — DIAZEPAM 5 MG: 2 TABLET ORAL at 23:04

## 2022-12-27 RX ADMIN — HYDROXYCHLOROQUINE SULFATE 200 MG: 200 TABLET ORAL at 08:22

## 2022-12-27 ASSESSMENT — PAIN SCALES - GENERAL
PAINLEVEL_OUTOF10: 0

## 2022-12-27 ASSESSMENT — ENCOUNTER SYMPTOMS: SHORTNESS OF BREATH: 0

## 2022-12-27 NOTE — CONSULTS
[address] [facility],[address] and [facility] Electrophysiology  Consultation Report  PATIENT: Deena Santamaria  MEDICAL RECORD NUMBER: [id number]  DATE OF SERVICE:  12/27/2022  ATTENDING ELECTROPHYSIOLOGIST: Dr Manjit Florentino   PRIMARY ELECTROPHYSIOLOGIST: None previous  REFERRING PHYSICIAN: Dr. Awan, Lisa Johansen MD  CHIEF COMPLAINT: Palpitations  Admitting diagnosis: Paroxysmal A. fib, tachybradycardia syndrome possible    HPI: This is a 78 y.o. female with the PMH as noted below significant for lupus on chronic Plaquenil and recently diagnosed paroxysmal atrial fibrillation. She was first diagnosed with A. fib the beginning of December 2022 , was put on Eliquis and metoprolol 25 mg twice daily and discharged from the hospital with plans for outpatient follow-up. Since discharge from the hospital she has been doing well but she has had been checking her heart rate with a pulse oximeter and has found some irregular beats at times. She states that her heart rate has ranged sometimes in the 30s to 130s at home and she had spoken to her friend that is a retired RN who then also called her primary care doctor to report low heart rates at times and her metoprolol was discontinued. She was taken off metoprolol since last Thursday 12/22/22 by her PCP. On 12/26/2022, she started to have palpitations and checked her pulse ox again at home and her heart rate was up to 185 bpm, it did decreased down to more stable rates but she did see a low heart rate in the \"20s. \". She was nervous due to this and having more frequent palpitations recently so she called EMS and was taken to the emergency room. In the emergency room, lab work was unremarkable other than sodium 131 and at bedtime troponin of 18. EKG showed sinus rhythm 89 bpm.  She did have intermittent brief episodes of A. fib RVR in the 150s in the emergency room. She was admitted for further evaluation and treatment.   Cardiology evaluated patient and consult electrophysiology for possible tacky/bradycardia syndrome. She has been mostly sinus rhythm with only intermittent episode of A. fib since admission. She has been given metoprolol 25 this morning and tolerated this well. She went back into A. fib with rates 100s without symptoms this afternoon around 3:30 PM.  At home her symptoms in atrial fibrillation were palpitations, some dyspnea on exertion and fatigue. Today she was not symptomatic when she went back into A. fib but rates were mostly controlled. Patient Active Problem List    Diagnosis Date Noted    Palpitations 12/26/2022     Priority: Medium    A-fib ([address] 12/26/2022     Priority: Medium    Primary hypertension 12/09/2022     Priority: Medium    Elevated troponin 12/09/2022     Priority: Medium    Atrial fibrillation with rapid ventricular response ([address] 12/08/2022     Priority: Medium       Past Medical History:   Diagnosis Date    Cancer [facility]) [address] [facility] [address]    8-18-16 for removal       History reviewed. No pertinent family history.     Social History     Tobacco Use    Smoking status: Never    Smokeless tobacco: Never   Substance Use Topics    Alcohol use: Yes     Comment: occa       Current Facility-Administered Medications   Medication Dose Route Frequency Provider Last Rate Last Admin    diazePAM (VALIUM) tablet 5 mg  5 mg Oral Q6H PRN Faith Mayfield MD   5 mg at 12/27/22 0118    magnesium oxide (MAG-OX) tablet 800 mg  800 mg Oral Nightly Faith Mayfield MD        lisinopril (PRINIVIL;ZESTRIL) tablet 10 mg  10 mg Oral Daily Faith Mayfield MD   10 mg at 12/27/22 5255    hydroxychloroquine (PLAQUENIL) tablet 200 mg  200 mg Oral Daily with breakfast Faith Mayfield MD   200 mg at 12/27/22 6752    vitamin B-12 (CYANOCOBALAMIN) tablet 500 mcg  500 mcg Oral Lunch Faith Mayfield MD   500 mcg at 12/26/22 8883    aspirin tablet 325 mg  325 mg Oral PRN Faith Mayfield MD        metoprolol tartrate (LOPRESSOR) tablet 25 mg  25 mg Oral BID Alexa Gilliam MD   25 mg at 12/27/22 6859    apixaban (ELIQUIS) tablet 2.5 mg  2.5 mg Oral BID Alexa Gilliam MD   2.5 mg at 12/27/22 9623        Allergies   Allergen Reactions    Sulfa Antibiotics Other (See Comments)     Body aches       ROS:   Constitutional: Negative for fever, activity change and appetite change. HENT: Negative for epistaxis or JVD  Eyes: Negative for diploplia, blurred vision. Respiratory: Negative for cough, chest tightness, shortness of breath and wheezing. Cardiovascular: pertinent positives in HPI  Gastrointestinal: Negative for abdominal pain and blood in stool. All other review of systems are negative     PHYSICAL EXAM:   Vitals:    12/27/22 0405 12/27/22 0805 12/27/22 0820 12/27/22 1109   BP: (!) 137/59 (!) 143/60  (!) 162/65   Pulse: 75 92 66 66   Resp: 18 20  18   Temp: 97.5 °F (36.4 °C) 98.2 °F (36.8 °C)  98.6 °F (37 °C)   TempSrc: Temporal Temporal  Temporal   SpO2: 98% 100%  100%   Weight:       Height:          Constitutional: Well-developed, no acute distress  Eyes: conjunctivae normal, no xanthelasma   Ears, Nose, Throat: oral mucosa moist, no cyanosis   CV: no JVD or leg edema, normally placed PMI, irregular rate and rhythm. Normal S1S2 and no S3. No murmurs, rubs, or gallops.    Lungs: clear to auscultation bilaterally, normal respiratory effort without used of accessory muscles  Abdomen: soft, non-tender, nondistended  Musculoskeletal: no digital clubbing, no edema   Skin: warm, no rashes     I have personally reviewed the laboratory, cardiac diagnostic and radiographic testing as outlined below:    Data:    Recent Labs     12/26/22  1428 12/27/22  0453   WBC 6.4 5.4   HGB 14.0 12.1   HCT 42.0 36.2    211     Recent Labs     12/26/22  1428 12/27/22  0453   * 132   K 4.9 4.4   CL 97* 99   CO2 22 21*   BUN 15 9   CREATININE 0.7 0.6   CALCIUM 9.7 8.8      Lab Results Component Value Date/Time    MG 2.4 2022 02:28 PM     No results for input(s): TSH in the last 72 hours. No results for input(s): INR in the last 72 hours. Telemetry: Sinus rhythm, A. fib started around 3:30 PM today. EK2022: Sinus rhythm rate of 89 bpm,  please see scan in Cardiology. Echocardiogram: 2022    Summary   Normal left ventricular chamber size. Normal left ventricular systolic function. Visually estimated LVEF is 55-60 %. No wall motion abnormalities. Normal diastolic function. Normal left atrial pressure. Normal right ventricle structure and function. Normal left atrial size. Normal right atrial size   Mild tricuspid regurgitation. Likely normal estimated PA pressure. No comparison study available. ---by Amarjit Marie MD    Cardiac Catheterization: none    Stress Test: none     Assessment/Plan:     1. Paroxysmal atrial fibrillation  -New diagnosis on 2022, treated with IV Cardizem and started on Eliquis and metoprolol on discharge. Metoprolol was stopped due to reported bradycardia ( with rates of 30 bpm per patient found on pulse ox on 2022). She has not had any evidence of significant bradycardia since admission. She denies any symptoms of bradycardia and specifically denies any lightheadedness, dizziness, near-syncope or syncope. -She has tolerated metoprolol in the hospital with no evidence of heart block or significant bradycardia  -[id number] score of at least 3 on Eliquis. She should be on low-dose Eliquis due to age greater than [de-identified] and weight less than 60 kg. Discussed with patient today in the hospital and she understands this change. - ejection fraction normal    2. Lupus on chronic Plaquenil    3. Hypertension  -On lisinopril at home    4. History of Bladder cancer and no chemo therapy or radiation but did have BCG treatments    Recommendations:  1.   Change metoprolol to tartrate to metoprolol succinate 25 mg daily, give first dose this afternoon since she just flipped into atrial fibrillation. 2.  Possible discharge this evening or tomorrow morning with 28-day Zio patch monitor on discharge  3. Decrease Eliquis dose to 2.5 mg twice daily on discharge    Thank you for allowing me to participate in your patient's care. Please call me if there are any questions or concerns. Hubert Arango, APRN - CNP  Cardiac Electrophysiology  [facility]) Physicians  [facility] [place] [place] [facility]  12:21 PM  12/27/2022  PHYSICIAN ADDENDUM:  I independently contributed to, made amendments as needed, and finalized the above documentation. I contributed >50% to the overall encounter time including review of testing, formulating a plan with the APC and communication with the other care team members and family. Agree with history and physical as noted above    Assessment    New onset paroxysmal atrial fibrillation. Her episodes are self-limited  Normal echocardiography with normal atrial sizes    2. Possible sinus node dysfunction. Bradycardia has been noted by the patient using her oximeter and not documented on monitoring during her hospitalizations    Recommendation    Need for antiarrhythmic drug therapy or any other specific need for intervention from electrophysiology standpoint  will have to be defined. Therefore suggest use of an event monitor as outpatient and follow-up in the office thereafter to discuss the same if needed. This was explained to the patient and she understands and is agreeable. All of the above was discussed with the patient ,>50% of the time involved face-to-face time providing counseling and or coordination of care with the other providers, preparation for the clinic visit, reviewing records/tests, counseling/education of the patient, ordering medications/tests/procedures, coordinating care, and documenting clinical information in the EHR.      Kaushik Benítez MD [facility]  Cardiac electrophysiology  [facility] physicians

## 2022-12-27 NOTE — H&P
HISTORY AND PHYSICAL             Date: 12/27/2022        Patient Name: Angel Lucia     YOB: 1940      Age:  78 y.o. Chief Complaint     Chief Complaint   Patient presents with    Palpitations     Patient states her heart rate at home was anywhere between 24 and 180. She C/O weakness. History Obtained From   patient    HPI:  Patient says she felt weak and her pulse was on her pulse ox at home ranging from 24 to 180. Past Medical History     Past Medical History:   Diagnosis Date    Cancer ([facility] [id number] [id number] left    8-18-16 for removal        Past Surgical History     Past Surgical History:   Procedure Laterality Date    CYSTOSCOPY      MULITPLE    EYE SURGERY  11/29/2012    right eye cataract with lens implant    EYE SURGERY Left 08/18/2016    removal of cataract with insertion of IOL    HYSTERECTOMY (CERVIX STATUS UNKNOWN)          Medications Prior to Admission     Prior to Admission medications    Medication Sig Start Date End Date Taking?  Authorizing Provider   metoprolol tartrate (LOPRESSOR) 25 MG tablet Take 1 tablet by mouth 2 times daily 12/10/22   Kassandra Crigler, MD   apixaban Danygilbert Villalobos) 5 MG TABS tablet Take 1 tablet by mouth 2 times daily 12/10/22   Kassandra Crigler, MD   magnesium oxide (MAG-OX) 400 MG tablet Take 800 mg by mouth nightly    Historical Provider, MD   lisinopril (PRINIVIL;ZESTRIL) 10 MG tablet Take 10 mg by mouth daily    Historical Provider, MD   hydroxychloroquine (PLAQUENIL) 200 MG tablet Take 200 mg by mouth daily (with breakfast)    Historical Provider, MD   vitamin B-12 (CYANOCOBALAMIN) 500 MCG tablet Take 500 mcg by mouth Daily with lunch    Historical Provider, MD   Cholecalciferol (VITAMIN D3) 50 MCG (2000 UT) CAPS Take 1,000 Units by mouth Daily with lunch 12/10/22   Historical Provider, MD   aspirin 325 MG tablet Take 325 mg by mouth as needed for Pain    Historical Provider, MD   diazePAM (VALIUM) 5 MG tablet Take 5 mg by mouth as needed for Anxiety. Historical Provider, MD Alfaro. [employer]) MISC Aid with ambulation 10/11/18   Chai Toledo PA-C        Allergies   Sulfa antibiotics    Social History     Social History       Tobacco History       Smoking Status  Never      Smokeless Tobacco Use  Never              Alcohol History       Alcohol Use Status  Yes Comment  occa              Drug Use       Drug Use Status  No              Sexual Activity       Sexually Active  Not Asked                    Family History   History reviewed. No pertinent family history. Review of Systems   Review of Systems   Constitutional:  Positive for activity change. HENT:  Negative for congestion. Respiratory:  Negative for shortness of breath. Cardiovascular:  Positive for chest pain and palpitations. Genitourinary:  Negative for difficulty urinating. Neurological:  Negative for dizziness. Physical Exam   BP (!) 137/59   Pulse 75   Temp 97.5 °F (36.4 °C) (Temporal)   Resp 18   Ht 5' 5\" (1.651 m)   Wt 130 lb (59 kg)   SpO2 98%   BMI 21.63 kg/m²     Physical Exam  Vitals reviewed. HENT:      Head: Normocephalic. Mouth/Throat:      Mouth: Mucous membranes are moist.   Eyes:      Pupils: Pupils are equal, round, and reactive to light. Cardiovascular:      Rate and Rhythm: Normal rate and regular rhythm. Pulses: Normal pulses. Heart sounds: Normal heart sounds. Pulmonary:      Effort: Pulmonary effort is normal. No respiratory distress. Breath sounds: Normal breath sounds. No wheezing. Abdominal:      General: Abdomen is flat. Bowel sounds are normal. There is no distension. Tenderness: There is no abdominal tenderness. Skin:     General: Skin is warm and dry. Capillary Refill: Capillary refill takes less than 2 seconds. Neurological:      General: No focal deficit present. Mental Status: She is alert and oriented to person, place, and time.    Psychiatric: Mood and Affect: Mood normal.         Behavior: Behavior normal.       Labs      Recent Results (from the past 24 hour(s))   EKG 12 Lead    Collection Time: 12/26/22  2:07 PM   Result Value Ref Range    Ventricular Rate 89 BPM    Atrial Rate 89 BPM    P-R Interval 176 ms    QRS Duration 76 ms    Q-T Interval 328 ms    QTc Calculation (Bazett) 399 ms    P Axis 83 degrees    R Axis 5 degrees    T Axis 64 degrees   CBC with Auto Differential    Collection Time: 12/26/22  2:28 PM   Result Value Ref Range    WBC 6.4 4.5 - 11.5 E9/L    RBC 4.80 3.50 - 5.50 E12/L    Hemoglobin 14.0 11.5 - 15.5 g/dL    Hematocrit 42.0 34.0 - 48.0 %    MCV 87.5 80.0 - 99.9 fL    MCH 29.2 26.0 - 35.0 pg    MCHC 33.3 32.0 - 34.5 %    RDW 12.8 11.5 - 15.0 fL    Platelets 559 232 - 631 E9/L    MPV 8.7 7.0 - 12.0 fL    Neutrophils % 75.3 43.0 - 80.0 %    Immature Granulocytes % 0.3 0.0 - 5.0 %    Lymphocytes % 17.2 (L) 20.0 - 42.0 %    Monocytes % 6.4 2.0 - 12.0 %    Eosinophils % 0.3 0.0 - 6.0 %    Basophils % 0.5 0.0 - 2.0 %    Neutrophils Absolute 4.85 1.80 - 7.30 E9/L    Immature Granulocytes # 0.02 E9/L    Lymphocytes Absolute 1.11 (L) 1.50 - 4.00 E9/L    Monocytes Absolute 0.41 0.10 - 0.95 E9/L    Eosinophils Absolute 0.02 (L) 0.05 - 0.50 E9/L    Basophils Absolute 0.03 0.00 - 0.20 E9/L   Troponin    Collection Time: 12/26/22  2:28 PM   Result Value Ref Range    Troponin, High Sensitivity 17 (H) 0 - 9 ng/L   COVID-19, Rapid    Collection Time: 12/26/22  2:28 PM    Specimen: Nasopharyngeal Swab   Result Value Ref Range    SARS-CoV-2, NAAT Not Detected Not Detected   Lactic Acid    Collection Time: 12/26/22  2:28 PM   Result Value Ref Range    Lactic Acid 1.6 0.5 - 2.2 mmol/L   Comprehensive Metabolic Panel    Collection Time: 12/26/22  2:28 PM   Result Value Ref Range    Sodium 131 (L) 132 - 146 mmol/L    Potassium 4.9 3.5 - 5.0 mmol/L    Chloride 97 (L) 98 - 107 mmol/L    CO2 22 22 - 29 mmol/L    Anion Gap 12 7 - 16 mmol/L    Glucose 119 (H) 74 - 99 mg/dL    BUN 15 6 - 23 mg/dL    Creatinine 0.7 0.5 - 1.0 mg/dL    Est, Glom Filt Rate >60 >=60 mL/min/1.73    Calcium 9.7 8.6 - 10.2 mg/dL    Total Protein 7.0 6.4 - 8.3 g/dL    Albumin 4.2 3.5 - 5.2 g/dL    Total Bilirubin 0.4 0.0 - 1.2 mg/dL    Alkaline Phosphatase 105 (H) 35 - 104 U/L    ALT 16 0 - 32 U/L    AST 18 0 - 31 U/L   Magnesium    Collection Time: 12/26/22  2:28 PM   Result Value Ref Range    Magnesium 2.4 1.6 - 2.6 mg/dL   Rapid influenza A/B antigens    Collection Time: 12/26/22  2:28 PM    Specimen: Nasopharyngeal   Result Value Ref Range    Influenza A by PCR Not Detected Not Detected    Influenza B by PCR Not Detected Not Detected   Brain Natriuretic Peptide    Collection Time: 12/26/22  2:28 PM   Result Value Ref Range    Pro- 0 - 450 pg/mL   POCT Glucose    Collection Time: 12/26/22  2:33 PM   Result Value Ref Range    Meter Glucose 108 (H) 74 - 99 mg/dL   POCT Glucose    Collection Time: 12/26/22  2:37 PM   Result Value Ref Range    Glucose 108 mg/dL    QC OK? yes    Troponin    Collection Time: 12/26/22  3:38 PM   Result Value Ref Range    Troponin, High Sensitivity 18 (H) 0 - 9 ng/L   CBC    Collection Time: 12/27/22  4:53 AM   Result Value Ref Range    WBC 5.4 4.5 - 11.5 E9/L    RBC 4.25 3.50 - 5.50 E12/L    Hemoglobin 12.1 11.5 - 15.5 g/dL    Hematocrit 36.2 34.0 - 48.0 %    MCV 85.2 80.0 - 99.9 fL    MCH 28.5 26.0 - 35.0 pg    MCHC 33.4 32.0 - 34.5 %    RDW 12.9 11.5 - 15.0 fL    Platelets 211 130 - 450 E9/L    MPV 8.5 7.0 - 12.0 fL   Basic Metabolic Panel    Collection Time: 12/27/22  4:53 AM   Result Value Ref Range    Sodium 132 132 - 146 mmol/L    Potassium 4.4 3.5 - 5.0 mmol/L    Chloride 99 98 - 107 mmol/L    CO2 21 (L) 22 - 29 mmol/L    Anion Gap 12 7 - 16 mmol/L    Glucose 90 74 - 99 mg/dL    BUN 9 6 - 23 mg/dL    Creatinine 0.6 0.5 - 1.0 mg/dL    Est, Glom Filt Rate >60 >=60 mL/min/1.73    Calcium 8.8 8.6 - 10.2 mg/dL        Imaging/Diagnostics Last 24 Hours   XR  CHEST PORTABLE    Result Date: 12/8/2022  EXAMINATION: ONE XRAY VIEW OF THE CHEST 12/8/2022 2:08 pm COMPARISON: 02/27/2022 HISTORY: ORDERING SYSTEM PROVIDED HISTORY: chest pain TECHNOLOGIST PROVIDED HISTORY: Reason for exam:->chest pain What reading provider will be dictating this exam?->CRC FINDINGS: The lungs are without acute focal process. There is no effusion or pneumothorax. The cardiomediastinal silhouette is without acute process. The osseous structures are without acute process. No acute process. Assessment      Hospital Problems             Last Modified POA    * (Principal) Palpitations 12/26/2022 Yes    A-fib ([address] 12/26/2022 Yes   HTN    Plan:  Cardiology consult. Continue meds.   Monitor labs and exam.    Consultations Ordered:  IP CONSULT TO INTERNAL MEDICINE  IP CONSULT TO CARDIOLOGY

## 2022-12-27 NOTE — CONSULTS
Inpatient Cardiology Consultation      Reason for Consult: Palpitations    Consulting Physician: Dr. Vikki Perez    Requesting Physician: Dr. Donna Payan    Date of Consultation: 12/27/2022    HISTORY OF PRESENT ILLNESS:     Is a 78-year-old female is known to [facility] cardiology through a hospital consultation with Dr. Sree Palm on December 9 of this year. At that time she was having palpitations and was in atrial fibrillation with rapid ventricular response. She converted with IV diltiazem. She was started on oral anticoagulation with Eliquis, low-dose beta-blockers. Last Thursday she felt \"nutty \". She called her neighbor and she was told her heart rate was fast but she does not remember the number. She called paramedics. reportedly an EKG was sent to the emergency room and she was told that it was normal.  She later checked her pulse oximeter and her heart rate was 30 and the O2 saturation was in the 90s. She called primary care and was told to stop taking metoprolol. Yesterday she was not feeling well and was very weak. She checked her heart rate again and it was 186 bpm with an O2 saturation of 90%. Later her heart rate dropped to 40 bpm and then to 24 bpm.  She called paramedics and was told her heart rate was fast at 170 bpm.    She presented to the emergency room yesterday with weakness and an abnormal pulse oximeter reading with a heart rate of 186 bpm.      EKG on admission was normal sinus rhythm with a heart rate of 89 bpm no acute ST-T wave changes. Blood pressure on admission 163/100 and documented heart rate at 153 bpm and she was afebrile with no hypoxia on room air. Chest x-ray showed no acute process.     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 Normal left ventricular systolic function. Visually estimated LVEF is 55-60 %. No wall motion abnormalities. Normal diastolic function. Normal left atrial pressure. Normal right ventricle structure and function. Normal left atrial size. Normal right atrial size   Mild tricuspid regurgitation. Likely normal estimated PA pressure. No comparison study available           Medications Prior to admit:  Prior to Admission medications    Medication Sig Start Date End Date Taking? Authorizing Provider   metoprolol tartrate (LOPRESSOR) 25 MG tablet Take 1 tablet by mouth 2 times daily 12/10/22   Ady Hua MD   apixaban Indu Nipple) 5 MG TABS tablet Take 1 tablet by mouth 2 times daily 12/10/22   Ady Hua MD   magnesium oxide (MAG-OX) 400 MG tablet Take 800 mg by mouth nightly    Historical Provider, MD   lisinopril (PRINIVIL;ZESTRIL) 10 MG tablet Take 10 mg by mouth daily    Historical Provider, MD   hydroxychloroquine (PLAQUENIL) 200 MG tablet Take 200 mg by mouth daily (with breakfast)    Historical Provider, MD   vitamin B-12 (CYANOCOBALAMIN) 500 MCG tablet Take 500 mcg by mouth Daily with lunch    Historical Provider, MD   Cholecalciferol (VITAMIN D3) 50 MCG (2000 UT) CAPS Take 1,000 Units by mouth Daily with lunch 12/10/22   Historical Provider, MD   aspirin 325 MG tablet Take 325 mg by mouth as needed for Pain    Historical Provider, MD   diazePAM (VALIUM) 5 MG tablet Take 5 mg by mouth as needed for Anxiety. Historical Provider, MD   Misc.  Devices [facility]) MISC Aid with ambulation 10/11/18   Keara Scott PA-C Current Medications:    Current Facility-Administered Medications: diazePAM (VALIUM) tablet 5 mg, 5 mg, Oral, Q6H PRN  magnesium oxide (MAG-OX) tablet 800 mg, 800 mg, Oral, Nightly  lisinopril (PRINIVIL;ZESTRIL) tablet 10 mg, 10 mg, Oral, Daily  hydroxychloroquine (PLAQUENIL) tablet 200 mg, 200 mg, Oral, Daily with breakfast  vitamin B-12 (CYANOCOBALAMIN) tablet 500 mcg, 500 mcg, Oral, Lunch  aspirin tablet 325 mg, 325 mg, Oral, PRN  metoprolol tartrate (LOPRESSOR) tablet 25 mg, 25 mg, Oral, BID  apixaban (ELIQUIS) tablet 2.5 mg, 2.5 mg, Oral, BID    Allergies:  Sulfa antibiotics    Social History: Non-smoker nondrinker and recently  and lives alone        Family History: Noncontributory  History reviewed. No pertinent family history. REVIEW OF SYSTEMS:     Constitutional: Denies fatigue, fevers, chills or night sweats  Eyes: Denies visual changes or drainage  ENT: Denies headaches or hearing loss. No mouth sores or sore throat. No epistaxis   Cardiovascular: Denies chest pain, pressure or palpitations. No lower extremity swelling. Respiratory: Denies MCCOY, cough, orthopnea or PND. No hemoptysis   Gastrointestinal: Denies hematemesis or anorexia. No hematochezia or melena    Genitourinary: Denies urgency, dysuria or hematuria. Musculoskeletal: Denies gait disturbance, weakness or joint complaints  Integumentary: Denies rash, hives or pruritis   Neurological: Denies dizziness, headaches or seizures. No numbness or tingling  Psychiatric: Denies anxiety or depression. Endocrine: Denies temperature intolerance. No recent weight change. .  Hematologic/Lymphatic: Denies abnormal bruising or bleeding. No swollen lymph nodes    PHYSICAL EXAM:   BP (!) 143/60   Pulse 92   Temp 98.2 °F (36.8 °C) (Temporal)   Resp 20   Ht 5' 5\" (1.651 m)   Wt 130 lb (59 kg)   SpO2 100%   BMI 21.63 kg/m²   CONST:  Well developed, well nourished who appears of stated age. Awake, alert and cooperative.  No apparent distress. HEENT:   Head- Normocephalic, atraumatic   Eyes- Conjunctivae pink, anicteric  Throat- Oral mucosa pink and moist  Neck-  No stridor, trachea midline, no jugular venous distention. No carotid bruit. CHEST: Chest symmetrical and non-tender to palpation. No accessory muscle use or intercostal retractions  RESPIRATORY: Lung sounds - clear throughout fields   CARDIOVASCULAR:     Heart Inspection- shows no noted pulsations  Heart Palpation- no heaves or thrills; PMI is non-displaced   Heart Ausculation- Regular rate and rhythm, no murmur. No s3, s4 or rub   PV: No lower extremity edema. No varicosities. Pedal pulses palpable, no clubbing or cyanosis   ABDOMEN: Soft, non-tender to light palpation. Bowel sounds present. No palpable masses no organomegaly; no abdominal bruit  MS: Good muscle strength and tone. No atrophy or abnormal movements. : Deferred  SKIN: Warm and dry no statis dermatitis or ulcers   NEURO / PSYCH: Oriented to person, place and time. Speech clear and appropriate. Follows all commands. Pleasant affect     DATA:    ECG / Tele strips normal sinus rhythm  Diagnostic:      Intake/Output Summary (Last 24 hours) at 12/27/2022 0914  Last data filed at 12/27/2022 0405  Gross per 24 hour   Intake 120 ml   Output 400 ml   Net -280 ml       Labs:   CBC:   Recent Labs     12/26/22  1428 12/27/22  0453   WBC 6.4 5.4   HGB 14.0 12.1   HCT 42.0 36.2    211     BMP:   Recent Labs     12/26/22  1428 12/27/22  0453   * 132   K 4.9 4.4   CO2 22 21*   BUN 15 9   CREATININE 0.7 0.6   LABGLOM >60 >60   CALCIUM 9.7 8.8     Mag:   Recent Labs     12/26/22  1428   MG 2.4     Phos: No results for input(s): PHOS in the last 72 hours.   TFT:   Lab Results   Component Value Date    TSH 1.590 12/08/2022      HgA1c: No results found for: LABA1C  No results found for: EAG  proBNP:   Lab Results   Component Value Date/Time    PROBNP 217 12/26/2022 02:28 PM       TROPONIN:  Lab Results   Component Value Date/Time    TROPHS 18 12/26/2022 03:38 PM    TROPHS 17 12/26/2022 02:28 PM    TROPHS 26 12/09/2022 08:15 AM    TROPHS 42 12/08/2022 04:33 PM    TROPHS 17 12/08/2022 01:50 PM     CK:No results found for: CKTOTAL  FASTING LIPID PANEL:No results found for: CHOL, HDL, LDLDIRECT, LDLCALC, TRIG  LIVER PROFILE:  Recent Labs     12/26/22  1428   AST 18   ALT 16   LABALBU 4.2     Assessment discussed with Dr. Araseli Chacon  Paroxysmal atrial fibrillation with questionable sick sinus syndrome. Hypertension uncontrolled on admission  Hyponatremia  Cutaneous lupus  History of bladder cancer  Familial neuropathy    Plan:  Continue eliquis. EP consult obtained. Metoprolol was changed to Toprol as per EP. Probable discharge in a.m.  ZIO monitor on hospital discharge. Cardiology will sign off. May call if needed. May follow-up with Dr. Kiera Loza by the end of January 2023. Electronically signed by VARSHA Pimentel CNP on 12/27/2022 at 9:14 AM      Patient chart reviewed with VARSHA Pimentel CNP. Patient recent hospitalization, current hospitalization, past medical history, medications, EKG, laboratory data and imaging were all reviewed. Patient seen and examined independently in the presence of her nurse. I agree with the above assessment and plan made in collaboration with VARSHA Pimentel CNP. Thank you for allowing me to share in the care of patient.   Donata Mcburney, MD.

## 2022-12-27 NOTE — PLAN OF CARE
Problem: Discharge Planning  Goal: Discharge to home or other facility with appropriate resources  12/27/2022 1758 by Gibson Ca RN  Outcome: Progressing  Flowsheets  Taken 12/27/2022 1620  Discharge to home or other facility with appropriate resources:   Identify barriers to discharge with patient and caregiver   Arrange for needed discharge resources and transportation as appropriate  Taken 12/27/2022 1225  Discharge to home or other facility with appropriate resources:   Identify barriers to discharge with patient and caregiver   Arrange for needed discharge resources and transportation as appropriate  12/27/2022 1023 by Gibson Ca RN  Outcome: Progressing  Flowsheets  Taken 12/27/2022 0820 by Gibson Ca RN  Discharge to home or other facility with appropriate resources:   Identify barriers to discharge with patient and caregiver   Arrange for needed discharge resources and transportation as appropriate  Taken 12/26/2022 2030 by Ngoc Bass RN  Discharge to home or other facility with appropriate resources:   Identify barriers to discharge with patient and caregiver   Arrange for needed discharge resources and transportation as appropriate   Identify discharge learning needs (meds, wound care, etc)     Problem: Safety - Adult  Goal: Free from fall injury  12/27/2022 1758 by Gibson Ca RN  Outcome: Progressing  12/27/2022 1023 by Gibson Ca RN  Outcome: Progressing  Flowsheets (Taken 12/27/2022 0820)  Free From Fall Injury: Instruct family/caregiver on patient safety     Problem: ABCDS Injury Assessment  Goal: Absence of physical injury  12/27/2022 1758 by Gibson Ca RN  Outcome: Progressing  12/27/2022 1023 by Gibson Ca RN  Outcome: Progressing  Flowsheets (Taken 12/27/2022 0820)  Absence of Physical Injury: Implement safety measures based on patient assessment

## 2022-12-27 NOTE — PATIENT CARE CONFERENCE
[facility] Quality Flow/Interdisciplinary Rounds Progress Note        Quality Flow Rounds held on December 27, 2022    Disciplines Attending:  Bedside Nurse, , , and Nursing Unit Leadership    Latoya Shafer was admitted on 12/26/2022  2:00 PM    Anticipated Discharge Date:       Disposition:    Kailash Score:  Kailash Scale Score: 20    Readmission Risk              Risk of Unplanned Readmission:  10           Discussed patient goal for the day, patient clinical progression, and barriers to discharge.   The following Goal(s) of the Day/Commitment(s) have been identified:  Labs - Report Results      Saima Chaudhari RN  December 27, 2022

## 2022-12-28 VITALS
OXYGEN SATURATION: 99 % | TEMPERATURE: 97.5 F | BODY MASS INDEX: 21.66 KG/M2 | RESPIRATION RATE: 18 BRPM | DIASTOLIC BLOOD PRESSURE: 67 MMHG | HEART RATE: 71 BPM | SYSTOLIC BLOOD PRESSURE: 149 MMHG | WEIGHT: 130 LBS | HEIGHT: 65 IN

## 2022-12-28 PROBLEM — I49.5 SINUS NODE DYSFUNCTION (HCC): Status: ACTIVE | Noted: 2022-12-28

## 2022-12-28 LAB
ANION GAP SERPL CALCULATED.3IONS-SCNC: 10 MMOL/L (ref 7–16)
BUN BLDV-MCNC: 12 MG/DL (ref 6–23)
CALCIUM SERPL-MCNC: 8.8 MG/DL (ref 8.6–10.2)
CHLORIDE BLD-SCNC: 96 MMOL/L (ref 98–107)
CO2: 24 MMOL/L (ref 22–29)
CREAT SERPL-MCNC: 0.7 MG/DL (ref 0.5–1)
EKG ATRIAL RATE: 62 BPM
EKG P AXIS: 70 DEGREES
EKG P-R INTERVAL: 176 MS
EKG Q-T INTERVAL: 406 MS
EKG QRS DURATION: 78 MS
EKG QTC CALCULATION (BAZETT): 412 MS
EKG R AXIS: 19 DEGREES
EKG T AXIS: 56 DEGREES
EKG VENTRICULAR RATE: 62 BPM
GFR SERPL CREATININE-BSD FRML MDRD: >60 ML/MIN/1.73
GLUCOSE BLD-MCNC: 89 MG/DL (ref 74–99)
HCT VFR BLD CALC: 37.4 % (ref 34–48)
HEMOGLOBIN: 12.7 G/DL (ref 11.5–15.5)
MCH RBC QN AUTO: 29.6 PG (ref 26–35)
MCHC RBC AUTO-ENTMCNC: 34 % (ref 32–34.5)
MCV RBC AUTO: 87.2 FL (ref 80–99.9)
PDW BLD-RTO: 12.7 FL (ref 11.5–15)
PLATELET # BLD: 217 E9/L (ref 130–450)
PMV BLD AUTO: 8.7 FL (ref 7–12)
POTASSIUM SERPL-SCNC: 5.3 MMOL/L (ref 3.5–5)
RBC # BLD: 4.29 E12/L (ref 3.5–5.5)
SODIUM BLD-SCNC: 130 MMOL/L (ref 132–146)
WBC # BLD: 5.3 E9/L (ref 4.5–11.5)

## 2022-12-28 PROCEDURE — 6370000000 HC RX 637 (ALT 250 FOR IP): Performed by: FAMILY MEDICINE

## 2022-12-28 PROCEDURE — 99232 SBSQ HOSP IP/OBS MODERATE 35: CPT | Performed by: INTERNAL MEDICINE

## 2022-12-28 PROCEDURE — 85027 COMPLETE CBC AUTOMATED: CPT

## 2022-12-28 PROCEDURE — G0378 HOSPITAL OBSERVATION PER HR: HCPCS

## 2022-12-28 PROCEDURE — 93005 ELECTROCARDIOGRAM TRACING: CPT | Performed by: FAMILY MEDICINE

## 2022-12-28 PROCEDURE — 6370000000 HC RX 637 (ALT 250 FOR IP): Performed by: NURSE PRACTITIONER

## 2022-12-28 PROCEDURE — 99232 SBSQ HOSP IP/OBS MODERATE 35: CPT | Performed by: NURSE PRACTITIONER

## 2022-12-28 PROCEDURE — 80048 BASIC METABOLIC PNL TOTAL CA: CPT

## 2022-12-28 PROCEDURE — 93242 EXT ECG>48HR<7D RECORDING: CPT

## 2022-12-28 PROCEDURE — 36415 COLL VENOUS BLD VENIPUNCTURE: CPT

## 2022-12-28 PROCEDURE — 93010 ELECTROCARDIOGRAM REPORT: CPT | Performed by: INTERNAL MEDICINE

## 2022-12-28 RX ORDER — METOPROLOL SUCCINATE 25 MG/1
25 TABLET, EXTENDED RELEASE ORAL DAILY
Qty: 30 TABLET | Refills: 3 | Status: SHIPPED | OUTPATIENT
Start: 2022-12-29

## 2022-12-28 RX ADMIN — HYDROXYCHLOROQUINE SULFATE 200 MG: 200 TABLET ORAL at 08:56

## 2022-12-28 RX ADMIN — APIXABAN 2.5 MG: 2.5 TABLET, FILM COATED ORAL at 08:58

## 2022-12-28 RX ADMIN — METOPROLOL SUCCINATE 25 MG: 25 TABLET, EXTENDED RELEASE ORAL at 08:57

## 2022-12-28 ASSESSMENT — PAIN DESCRIPTION - DESCRIPTORS: DESCRIPTORS: OTHER (COMMENT)

## 2022-12-28 ASSESSMENT — PAIN SCALES - GENERAL
PAINLEVEL_OUTOF10: 0
PAINLEVEL_OUTOF10: 0
PAINLEVEL_OUTOF10: 2
PAINLEVEL_OUTOF10: 0
PAINLEVEL_OUTOF10: 4
PAINLEVEL_OUTOF10: 0

## 2022-12-28 ASSESSMENT — PAIN DESCRIPTION - PAIN TYPE: TYPE: ACUTE PAIN

## 2022-12-28 ASSESSMENT — PAIN DESCRIPTION - LOCATION: LOCATION: HEAD

## 2022-12-28 ASSESSMENT — PAIN DESCRIPTION - FREQUENCY: FREQUENCY: INTERMITTENT

## 2022-12-28 ASSESSMENT — PAIN DESCRIPTION - ONSET: ONSET: GRADUAL

## 2022-12-28 ASSESSMENT — PAIN DESCRIPTION - ORIENTATION: ORIENTATION: ANTERIOR

## 2022-12-28 ASSESSMENT — PAIN - FUNCTIONAL ASSESSMENT: PAIN_FUNCTIONAL_ASSESSMENT: ACTIVITIES ARE NOT PREVENTED

## 2022-12-28 NOTE — PROGRESS NOTES
CLINICAL PHARMACY NOTE: MEDS TO BEDS    Total # of Prescriptions Filled: 2   The following medications were delivered to the patient:  Metoprolol ER 25  Eliquis 2.5    Additional Documentation:    To PT in room

## 2022-12-28 NOTE — PROGRESS NOTES
Zio patch xt applied ,Steven Early notified that order for Taveras Frisk is  for 28 days . Serial number is [id number], pt registered in system.  Instructions given to patient, voiced understanding

## 2022-12-28 NOTE — PROGRESS NOTES
Physician Progress Note      PATIENT:               Epi Benavides  CSN #:                  [id number]  :                       1940  ADMIT DATE:       2022 2:00 PM  [address] [place] DATE:  RESPONDING  PROVIDER #:        Apple Franz MD          QUERY TEXT:    Patient admitted with paroxysmal atrial fibrillation with LYDIA?DS?-VASc score 4   (age, female, HTN) and is maintained on Eliquis. If possible, please   document in progress notes and discharge summary if you are evaluating and/or   treating any of the following: The medical record reflects the following:  Risk Factors: HTN, advanced age, female  Clinical Indicators: Paroxysmal atrial fibrillation; LYDIA?DS?-VASc score 3   (age, female, HTN)  Treatment: Eliquis  Options provided:  -- Secondary hypercoagulable state in a patient with atrial fibrillation  -- Other - I will add my own diagnosis  -- Disagree - Not applicable / Not valid  -- Disagree - Clinically unable to determine / Unknown  -- Refer to Clinical Documentation Reviewer    PROVIDER RESPONSE TEXT:    This patient has secondary hypercoagulable state in a patient with atrial   fibrillation.     Query created by: Molina Cruz on 2022 12:31 PM      Electronically signed by:  Apple Franz MD 2022 12:52 PM

## 2022-12-28 NOTE — DISCHARGE SUMMARY
Discharge Summary    Date: 12/28/2022  Patient Name: Antwon Arreola    YOB: 1940     Age: 78 y.o. Admit Date: 12/26/2022  Discharge Date: 12/28/2022  Discharge Condition: Stable    Admission Diagnosis  Palpitations [R00.2]; A-fib [facility]) [I48.91]      Discharge Diagnosis  Principal Problem:    Palpitations  Active Problems:    A-fib (HCC)    Sinus node dysfunction (HCC)  Resolved Problems:    * No resolved hospital problems. [facility] Stay  Narrative of Hospital Course:  Patient admitted for palpitations/ Afib  EP switched patient to metoprolol succinate. Home in stable   Condition. Consultants:  IP CONSULT TO INTERNAL MEDICINE  IP CONSULT TO CARDIOLOGY  IP CONSULT TO ELECTROPHYSIOLOGY    Surgeries/procedures Performed:      Treatments:            Discharge Plan/Disposition:  Home    Hospital/Incidental Findings Requiring Follow Up:    Patient Instructions:    Diet: Cardiac Diet    Activity:Activity as Tolerated  For number of days (if applicable): Other Instructions:    Provider Follow-Up:   No follow-ups on file.      Significant Diagnostic Studies:    Recent Labs:  Admission on 12/26/2022  WBC                                           Date: 12/26/2022  Value: 6.4         Ref range: 4.5 - 11.5 E9/L    Status: Final  RBC                                           Date: 12/26/2022  Value: 4.80        Ref range: 3.50 - 5.50 E12/L  Status: Final  Hemoglobin                                    Date: 12/26/2022  Value: 14.0        Ref range: 11.5 - 15.5 g/dL   Status: Final  Hematocrit                                    Date: 12/26/2022  Value: 42.0        Ref range: 34.0 - 48.0 %      Status: Final  MCV                                           Date: 12/26/2022  Value: 87.5        Ref range: 80.0 - 99.9 fL     Status: Final  MCH                                           Date: 12/26/2022  Value: 29.2        Ref range: 26.0 - 35.0 pg     Status: Final  MCHC Date: 12/26/2022  Value: 33.3        Ref range: 32.0 - 34.5 %      Status: Final  RDW                                           Date: 12/26/2022  Value: 12.8        Ref range: 11.5 - 15.0 fL     Status: Final  Platelets                                     Date: 12/26/2022  Value: 237         Ref range: 130 - 450 E9/L     Status: Final  MPV                                           Date: 12/26/2022  Value: 8.7         Ref range: 7.0 - 12.0 fL      Status: Final  Neutrophils %                                 Date: 12/26/2022  Value: 75.3        Ref range: 43.0 - 80.0 %      Status: Final  Immature Granulocytes %                       Date: 12/26/2022  Value: 0.3         Ref range: 0.0 - 5.0 %        Status: Final  Lymphocytes %                                 Date: 12/26/2022  Value: 17.2 (A)    Ref range: 20.0 - 42.0 %      Status: Final  Monocytes %                                   Date: 12/26/2022  Value: 6.4         Ref range: 2.0 - 12.0 %       Status: Final  Eosinophils %                                 Date: 12/26/2022  Value: 0.3         Ref range: 0.0 - 6.0 %        Status: Final  Basophils %                                   Date: 12/26/2022  Value: 0.5         Ref range: 0.0 - 2.0 %        Status: Final  Neutrophils Absolute                          Date: 12/26/2022  Value: 4.85        Ref range: 1.80 - 7.30 E9/L   Status: Final  Immature Granulocytes #                       Date: 12/26/2022  Value: 0.02        Ref range: E9/L               Status: Final  Lymphocytes Absolute                          Date: 12/26/2022  Value: 1.11 (A)    Ref range: 1.50 - 4.00 E9/L   Status: Final  Monocytes Absolute                            Date: 12/26/2022  Value: 0.41        Ref range: 0.10 - 0.95 E9/L   Status: Final  Eosinophils Absolute                          Date: 12/26/2022  Value: 0.02 (A)    Ref range: 0.05 - 0.50 E9/L   Status: Final  Basophils Absolute                            Date: 12/26/2022  Value: 0.03        Ref range: 0.00 - 0.20 E9/L   Status: Final  Glucose                                       Date: 12/26/2022  Value: 108         Ref range: mg/dL              Status: Final  QC OK? Date: 12/26/2022  Value: yes           Status: Final  Troponin, High Sensitivity                    Date: 12/26/2022  Value: 17 (A)      Ref range: 0 - 9 ng/L         Status: Final                Comment: High Sensitivity Troponin values cannot be compared with  other Troponin methodologies. Patients with high levels of Biotin oral intake (i.e. >5 mg/day)  may have falsely decreased Troponin levels. Samples collected  within 8 hours of biotin intake may require additional information  for diagnosis. SARS-CoV-2, NAAT                              Date: 12/26/2022  Value: Not Detected                     Ref range: Not Detected       Status: Final                Comment: Rapid NAAT:   Negative results should be treated as presumptive and,  if inconsistent with clinical signs and symptoms or necessary for  patient management, should be tested with an alternative molecular  assay. Negative results do not preclude SARS-CoV-2 infection and  should not be used as the sole basis for patient management decisions. This test has been authorized by the FDA under an Emergency Use  Authorization (EUA) for use by authorized laboratories.     Fact sheet for Healthcare Providers:  http://www.sheree.aleisha/  Fact sheet for Patients: http://www.fozia-magalie.aleisha/    METHODOLOGY: Isothermal Nucleic Acid Amplification    Lactic Acid                                   Date: 12/26/2022  Value: 1.6         Ref range: 0.5 - 2.2 mmol/L   Status: Final  Sodium                                        Date: 12/26/2022  Value: 131 (A)     Ref range: 132 - 146 mmol/L   Status: Final  Potassium                                     Date: 12/26/2022  Value: 4.9         Ref range: 3.5 - 5.0 mmol/L   Status: Final  Chloride                                      Date: 12/26/2022  Value: 97 (A)      Ref range: 98 - 107 mmol/L    Status: Final  CO2                                           Date: 12/26/2022  Value: 22          Ref range: 22 - 29 mmol/L     Status: Final  Anion Gap                                     Date: 12/26/2022  Value: 12          Ref range: 7 - 16 mmol/L      Status: Final  Glucose                                       Date: 12/26/2022  Value: 119 (A)     Ref range: 74 - 99 mg/dL      Status: Final  BUN                                           Date: 12/26/2022  Value: 15          Ref range: 6 - 23 mg/dL       Status: Final  Creatinine                                    Date: 12/26/2022  Value: 0.7         Ref range: 0.5 - 1.0 mg/dL    Status: Final  Est, Glom Filt Rate                           Date: 12/26/2022  Value: >60         Ref range: >=60 mL/min/1.73   Status: Final                Comment: Pediatric calculator link  [email or web address]. [email or web address]  Effective Oct 3, 2022  These results are not intended for use in patients  <25years of age. eGFR results are calculated without  a race factor using the 2021 CKD-EPI equation. Careful  clinical correlation is recommended, particularly when  comparing to results calculated using previous equations. The CKD-EPI equation is less accurate in patients with  extremes of muscle mass, extra-renal metabolism of  creatinine, excessive creatinine ingestion, or following  therapy that affects renal tubular secretion.     Calcium                                       Date: 12/26/2022  Value: 9.7         Ref range: 8.6 - 10.2 mg/dL   Status: Final  Total Protein                                 Date: 12/26/2022  Value: 7.0         Ref range: 6.4 - 8.3 g/dL     Status: Final  Albumin                                       Date: 12/26/2022  Value: 4.2         Ref range: 3.5 - 5.2 g/dL     Status: Final  Total Bilirubin                               Date: 12/26/2022  Value: 0.4         Ref range: 0.0 - 1.2 mg/dL    Status: Final  Alkaline Phosphatase                          Date: 12/26/2022  Value: 105 (A)     Ref range: 35 - 104 U/L       Status: Final  ALT                                           Date: 12/26/2022  Value: 16          Ref range: 0 - 32 U/L         Status: Final  AST                                           Date: 12/26/2022  Value: 18          Ref range: 0 - 31 U/L         Status: Final  Magnesium                                     Date: 12/26/2022  Value: 2.4         Ref range: 1.6 - 2.6 mg/dL    Status: Final  Influenza A by PCR                            Date: 12/26/2022  Value: Not Detected                     Ref range: Not Detected       Status: Final  Influenza B by PCR                            Date: 12/26/2022  Value: Not Detected                     Ref range: Not Detected       Status: Final  Ventricular Rate                              Date: 12/26/2022  Value: 89          Ref range: BPM                Status: Final  Atrial Rate                                   Date: 12/26/2022  Value: 89          Ref range: BPM                Status: Final  P-R Interval                                  Date: 12/26/2022  Value: 176         Ref range: ms                 Status: Final  QRS Duration                                  Date: 12/26/2022  Value: 76          Ref range: ms                 Status: Final  Q-T Interval                                  Date: 12/26/2022  Value: 328         Ref range: ms                 Status: Final  QTc Calculation (Bazett)                      Date: 12/26/2022  Value: 399         Ref range: ms                 Status: Final  P Axis                                        Date: 12/26/2022  Value: 83          Ref range: degrees            Status: Final  R Axis                                        Date: 12/26/2022  Value: 5           Ref range: degrees Status: Final  T Axis                                        Date: 12/26/2022  Value: 64          Ref range: degrees            Status: Final  Pro-BNP                                       Date: 12/26/2022  Value: 217         Ref range: 0 - 450 pg/mL      Status: Final  Meter Glucose                                 Date: 12/26/2022  Value: 108 (A)     Ref range: 74 - 99 mg/dL      Status: Final  Troponin, High Sensitivity                    Date: 12/26/2022  Value: 18 (A)      Ref range: 0 - 9 ng/L         Status: Final                Comment: High Sensitivity Troponin values cannot be compared with  other Troponin methodologies. Patients with high levels of Biotin oral intake (i.e. >5 mg/day)  may have falsely decreased Troponin levels. Samples collected  within 8 hours of biotin intake may require additional information  for diagnosis.     WBC                                           Date: 12/27/2022  Value: 5.4         Ref range: 4.5 - 11.5 E9/L    Status: Final  RBC                                           Date: 12/27/2022  Value: 4.25        Ref range: 3.50 - 5.50 E12/L  Status: Final  Hemoglobin                                    Date: 12/27/2022  Value: 12.1        Ref range: 11.5 - 15.5 g/dL   Status: Final  Hematocrit                                    Date: 12/27/2022  Value: 36.2        Ref range: 34.0 - 48.0 %      Status: Final  MCV                                           Date: 12/27/2022  Value: 85.2        Ref range: 80.0 - 99.9 fL     Status: Final  MCH                                           Date: 12/27/2022  Value: 28.5        Ref range: 26.0 - 35.0 pg     Status: Final  MCHC                                          Date: 12/27/2022  Value: 33.4        Ref range: 32.0 - 34.5 %      Status: Final  RDW                                           Date: 12/27/2022  Value: 12.9        Ref range: 11.5 - 15.0 fL     Status: Final  Platelets                                     Date: 12/27/2022  Value: 211         Ref range: 130 - 450 E9/L     Status: Final  MPV                                           Date: 12/27/2022  Value: 8.5         Ref range: 7.0 - 12.0 fL      Status: Final  Sodium                                        Date: 12/27/2022  Value: 132         Ref range: 132 - 146 mmol/L   Status: Final  Potassium                                     Date: 12/27/2022  Value: 4.4         Ref range: 3.5 - 5.0 mmol/L   Status: Final  Chloride                                      Date: 12/27/2022  Value: 99          Ref range: 98 - 107 mmol/L    Status: Final  CO2                                           Date: 12/27/2022  Value: 21 (A)      Ref range: 22 - 29 mmol/L     Status: Final  Anion Gap                                     Date: 12/27/2022  Value: 12          Ref range: 7 - 16 mmol/L      Status: Final  Glucose                                       Date: 12/27/2022  Value: 90          Ref range: 74 - 99 mg/dL      Status: Final  BUN                                           Date: 12/27/2022  Value: 9           Ref range: 6 - 23 mg/dL       Status: Final  Creatinine                                    Date: 12/27/2022  Value: 0.6         Ref range: 0.5 - 1.0 mg/dL    Status: Final  Est, Glom Filt Rate                           Date: 12/27/2022  Value: >60         Ref range: >=60 mL/min/1.73   Status: Final                Comment: Pediatric calculator link  [email or web address]  Effective Oct 3, 2022  These results are not intended for use in patients  <25years of age. eGFR results are calculated without  a race factor using the 2021 CKD-EPI equation. Careful  clinical correlation is recommended, particularly when  comparing to results calculated using previous equations.   The CKD-EPI equation is less accurate in patients with  extremes of muscle mass, extra-renal metabolism of  creatinine, excessive creatinine ingestion, or following  therapy that affects renal tubular secretion.     Calcium                                       Date: 12/27/2022  Value: 8.8         Ref range: 8.6 - 10.2 mg/dL   Status: Final  WBC                                           Date: 12/28/2022  Value: 5.3         Ref range: 4.5 - 11.5 E9/L    Status: Final  RBC                                           Date: 12/28/2022  Value: 4.29        Ref range: 3.50 - 5.50 E12/L  Status: Final  Hemoglobin                                    Date: 12/28/2022  Value: 12.7        Ref range: 11.5 - 15.5 g/dL   Status: Final  Hematocrit                                    Date: 12/28/2022  Value: 37.4        Ref range: 34.0 - 48.0 %      Status: Final  MCV                                           Date: 12/28/2022  Value: 87.2        Ref range: 80.0 - 99.9 fL     Status: Final  MCH                                           Date: 12/28/2022  Value: 29.6        Ref range: 26.0 - 35.0 pg     Status: Final  MCHC                                          Date: 12/28/2022  Value: 34.0        Ref range: 32.0 - 34.5 %      Status: Final  RDW                                           Date: 12/28/2022  Value: 12.7        Ref range: 11.5 - 15.0 fL     Status: Final  Platelets                                     Date: 12/28/2022  Value: 217         Ref range: 130 - 450 E9/L     Status: Final  MPV                                           Date: 12/28/2022  Value: 8.7         Ref range: 7.0 - 12.0 fL      Status: Final  Sodium                                        Date: 12/28/2022  Value: 130 (A)     Ref range: 132 - 146 mmol/L   Status: Final  Potassium                                     Date: 12/28/2022  Value: 5.3 (A)     Ref range: 3.5 - 5.0 mmol/L   Status: Final  Chloride                                      Date: 12/28/2022  Value: 96 (A)      Ref range: 98 - 107 mmol/L    Status: Final  CO2                                           Date: 12/28/2022  Value: 24          Ref range: 22 - 29 mmol/L     Status: Final  Anion Gap                                     Date: 12/28/2022  Value: 10          Ref range: 7 - 16 mmol/L      Status: Final  Glucose                                       Date: 12/28/2022  Value: 89          Ref range: 74 - 99 mg/dL      Status: Final  BUN                                           Date: 12/28/2022  Value: 12          Ref range: 6 - 23 mg/dL       Status: Final  Creatinine                                    Date: 12/28/2022  Value: 0.7         Ref range: 0.5 - 1.0 mg/dL    Status: Final  Est, Glom Filt Rate                           Date: 12/28/2022  Value: >60         Ref range: >=60 mL/min/1.73   Status: Final                Comment: Pediatric calculator link  [email or web address]. [email or web address]  Effective Oct 3, 2022  These results are not intended for use in patients  <25years of age. eGFR results are calculated without  a race factor using the 2021 CKD-EPI equation. Careful  clinical correlation is recommended, particularly when  comparing to results calculated using previous equations. The CKD-EPI equation is less accurate in patients with  extremes of muscle mass, extra-renal metabolism of  creatinine, excessive creatinine ingestion, or following  therapy that affects renal tubular secretion.     Calcium                                       Date: 12/28/2022  Value: 8.8         Ref range: 8.6 - 10.2 mg/dL   Status: Final  Ventricular Rate                              Date: 12/28/2022  Value: 62          Ref range: BPM                Status: Preliminary  Atrial Rate                                   Date: 12/28/2022  Value: 62          Ref range: BPM                Status: Preliminary  P-R Interval                                  Date: 12/28/2022  Value: 176         Ref range: ms                 Status: Preliminary  QRS Duration                                  Date: 12/28/2022  Value: 78          Ref range: ms                 Status: Preliminary  Q-T Interval                                  Date: 12/28/2022  Value: 406         Ref range: ms                 Status: Preliminary  QTc Calculation (Bazett)                      Date: 12/28/2022  Value: 412         Ref range: ms                 Status: Preliminary  P Axis                                        Date: 12/28/2022  Value: 70          Ref range: degrees            Status: Preliminary  R Axis                                        Date: 12/28/2022  Value: 19          Ref range: degrees            Status: Preliminary  T Axis                                        Date: 12/28/2022  Value: 56          Ref range: degrees            Status: Preliminary  ------------    Radiology last 7 days:  XR CHEST PORTABLE    Result Date: 12/26/2022  No acute process. [unfilled]    Discharge Medications    Current Discharge Medication List    START taking these medications    metoprolol succinate (TOPROL XL) 25 MG extended release tablet  Take 1 tablet by mouth daily  Qty: 30 tablet Refills: 3          Current Discharge Medication List    CONTINUE these medications which have CHANGED    apixaban (ELIQUIS) 2.5 MG TABS tablet  Take 1 tablet by mouth 2 times daily  Qty: 60 tablet Refills: 0          Current Discharge Medication List    CONTINUE these medications which have NOT CHANGED    magnesium oxide (MAG-OX) 400 MG tablet  Take 800 mg by mouth nightly    lisinopril (PRINIVIL;ZESTRIL) 10 MG tablet  Take 10 mg by mouth daily    hydroxychloroquine (PLAQUENIL) 200 MG tablet  Take 200 mg by mouth daily (with breakfast)    vitamin B-12 (CYANOCOBALAMIN) 500 MCG tablet  Take 500 mcg by mouth Daily with lunch    Cholecalciferol (VITAMIN D3) 50 MCG (2000 UT) CAPS  Take 1,000 Units by mouth Daily with lunch    aspirin 325 MG tablet  Take 325 mg by mouth as needed for Pain    diazePAM (VALIUM) 5 MG tablet  Take 5 mg by mouth as needed for Anxiety. Misc.  Devices Valeria Leung) MISC  Aid with ambulation  Qty: 1 each Refills: 0          Current Discharge Medication List    STOP taking these medications    metoprolol tartrate (LOPRESSOR) 25 MG tablet  Comments:  Reason for Stopping:          Time Spent on Discharge:  15 minutes were spent in patient examination, evaluation, counseling as well as medication reconciliation, prescriptions for required medications, discharge plan, and follow up.     Electronically signed by Nathaniel Leavitt MD on 12/28/22 at 11:53 AM EST

## 2022-12-28 NOTE — PROGRESS NOTES
[address] [facility],2[address] Floor and [address] [facility] Electrophysiology  Inpatient progress note   PATIENT: Nadia Root  MEDICAL RECORD NUMBER: [id number]  DATE OF SERVICE:  12/28/2022  ATTENDING ELECTROPHYSIOLOGIST: Dr Emiliano De La Garza   PRIMARY ELECTROPHYSIOLOGIST: None previous  REFERRING PHYSICIAN: Dr. Natali Murray, Cyndy Brar MD  CHIEF COMPLAINT: Palpitations  Admitting diagnosis: Paroxysmal A. fib, tachybradycardia syndrome possible    HPI: This is a 78 y.o. female with the PMH as noted below significant for lupus on chronic Plaquenil and recently diagnosed paroxysmal atrial fibrillation. She was first diagnosed with A. fib the beginning of December 2022 , was put on Eliquis and metoprolol 25 mg twice daily and discharged from the hospital with plans for outpatient follow-up. Since discharge from the hospital she has been doing well but she has had been checking her heart rate with a pulse oximeter and has found some irregular beats at times. She states that her heart rate has ranged sometimes in the 30s to 130s at home and she had spoken to her friend that is a retired RN who then also called her primary care doctor to report low heart rates at times and her metoprolol was discontinued. She was taken off metoprolol since last Thursday 12/22/22 by her PCP. On 12/26/2022, she started to have palpitations and checked her pulse ox again at home and her heart rate was up to 185 bpm, it did decreased down to more stable rates but she did see a low heart rate in the \"20s. \". She was nervous due to this and having more frequent palpitations recently so she called EMS and was taken to the emergency room. In the emergency room, lab work was unremarkable other than sodium 131 and at bedtime troponin of 18. EKG showed sinus rhythm 89 bpm.  She did have intermittent brief episodes of A. fib RVR in the 150s in the emergency room. She was admitted for further evaluation and treatment.   Cardiology evaluated patient and consult electrophysiology for possible tacky/bradycardia syndrome. She has been mostly sinus rhythm with only intermittent episode of A. fib since admission. She has been given metoprolol 25 this morning and tolerated this well. She went back into A. fib with rates 100s without symptoms this afternoon around 3:30 PM.  At home her symptoms in atrial fibrillation were palpitations, some dyspnea on exertion and fatigue. Today she was not symptomatic when she went back into A. fib but rates were mostly controlled. 12/28/22: Patient remains asymptomatic with short self-limited episodes of atrial fibrillation. Tolerating current medical therapy    Patient Active Problem List    Diagnosis Date Noted    Sinus node dysfunction ([address] 12/28/2022     Priority: Medium    Palpitations 12/26/2022     Priority: Medium    A-fib ([address] 12/26/2022     Priority: Medium    Primary hypertension 12/09/2022     Priority: Medium    Elevated troponin 12/09/2022     Priority: Medium    Atrial fibrillation with rapid ventricular response ([address] 12/08/2022     Priority: Medium       Past Medical History:   Diagnosis Date    Cancer [facility]) [address] [facility] [address] left    8-18-16 for removal    Familial peripheral neuropathy     Paroxysmal atrial fibrillation ([address]        History reviewed. No pertinent family history.     Social History     Tobacco Use    Smoking status: Never    Smokeless tobacco: Never   Substance Use Topics    Alcohol use: Yes     Comment: occa       Current Facility-Administered Medications   Medication Dose Route Frequency Provider Last Rate Last Admin    diazePAM (VALIUM) tablet 5 mg  5 mg Oral Q6H PRN Alexa Gilliam MD   5 mg at 12/27/22 2304    sodium chloride (OCEAN, BABY AYR) 0.65 % nasal spray 1 spray  1 spray Each Nostril Q4H PRN Alexa Gilliam MD   1 spray at 12/27/22 1419    polyvinyl alcohol (LIQUIFILM TEARS) 1.4 % ophthalmic solution 1 drop  1 drop Both Eyes Q4H PRN Rock Burner Minda Perrin MD   1 drop at 12/27/22 1419    aspirin tablet 325 mg  325 mg Oral Nightly PRN VARSHA Morris - CNP        metoprolol succinate (TOPROL XL) extended release tablet 25 mg  25 mg Oral Daily VARSHA Morris CNP   25 mg at 12/28/22 0857    magnesium oxide (MAG-OX) tablet 800 mg  800 mg Oral Nightly Francois Prajapati MD   800 mg at 12/27/22 2215    lisinopril (PRINIVIL;ZESTRIL) tablet 10 mg  10 mg Oral Daily Juventino Fernandez MD   10 mg at 12/27/22 9761    hydroxychloroquine (PLAQUENIL) tablet 200 mg  200 mg Oral Daily with breakfast Francois Prajapati MD   200 mg at 12/28/22 6972    vitamin B-12 (CYANOCOBALAMIN) tablet 500 mcg  500 mcg Oral Lunch Francois Prajapati MD   500 mcg at 12/27/22 1226    apixaban (ELIQUIS) tablet 2.5 mg  2.5 mg Oral BID Francois Prajapati MD   2.5 mg at 12/28/22 1748     Current Outpatient Medications   Medication Sig Dispense Refill    apixaban (ELIQUIS) 2.5 MG TABS tablet Take 1 tablet by mouth 2 times daily 60 tablet 0    [START ON 12/29/2022] metoprolol succinate (TOPROL XL) 25 MG extended release tablet Take 1 tablet by mouth daily 30 tablet 3    magnesium oxide (MAG-OX) 400 MG tablet Take 800 mg by mouth nightly      lisinopril (PRINIVIL;ZESTRIL) 10 MG tablet Take 10 mg by mouth daily      hydroxychloroquine (PLAQUENIL) 200 MG tablet Take 200 mg by mouth daily (with breakfast)      vitamin B-12 (CYANOCOBALAMIN) 500 MCG tablet Take 500 mcg by mouth Daily with lunch      Cholecalciferol (VITAMIN D3) 50 MCG (2000 UT) CAPS Take 1,000 Units by mouth Daily with lunch      aspirin 325 MG tablet Take 325 mg by mouth as needed for Pain      diazePAM (VALIUM) 5 MG tablet Take 5 mg by mouth as needed for Anxiety. Misc. Devices (WALKER) [address] [facility] Aid with ambulation 1 each 0        Allergies   Allergen Reactions    Sulfa Antibiotics Other (See Comments)     Body aches       ROS:   Constitutional: Negative for fever, activity change and appetite change.    HENT: Negative for epistaxis or JVD  Eyes: Negative for diploplia, blurred vision. Respiratory: Negative for cough, chest tightness, shortness of breath and wheezing. Cardiovascular: pertinent positives in HPI  Gastrointestinal: Negative for abdominal pain and blood in stool. All other review of systems are negative     PHYSICAL EXAM:   Vitals:    22 2238 22 0409 22 0707 22 1119   BP: (!) 130/55 (!) 97/53 (!) 124/59 (!) 149/67   Pulse: 62 62 65 71   Resp: 18 18 18 18   Temp: 97 °F (36.1 °C) 97.2 °F (36.2 °C) 97.2 °F (36.2 °C) 97.5 °F (36.4 °C)   TempSrc: Temporal Temporal Temporal Temporal   SpO2: 98% 98% 99% 99%   Weight:       Height:          Constitutional: Well-developed, no acute distress  Eyes: conjunctivae normal, no xanthelasma   Ears, Nose, Throat: oral mucosa moist, no cyanosis   CV: no JVD or leg edema, normally placed PMI, irregular rate and rhythm. Normal S1S2 and no S3. No murmurs, rubs, or gallops. Lungs: clear to auscultation bilaterally, normal respiratory effort without used of accessory muscles  Abdomen: soft, non-tender, nondistended  Musculoskeletal: no digital clubbing, no edema   Skin: warm, no rashes     I have personally reviewed the laboratory, cardiac diagnostic and radiographic testing as outlined below:    Data:    Recent Labs     22  1428 22  0453 22  0626   WBC 6.4 5.4 5.3   HGB 14.0 12.1 12.7   HCT 42.0 36.2 37.4    211 217     Recent Labs     22  1428 22  0453 22  0626   * 132 130*   K 4.9 4.4 5.3*   CL 97* 99 96*   CO2 22 21* 24   BUN 15 9 12   CREATININE 0.7 0.6 0.7   CALCIUM 9.7 8.8 8.8      Lab Results   Component Value Date/Time    MG 2.4 2022 02:28 PM     No results for input(s): TSH in the last 72 hours. No results for input(s): INR in the last 72 hours.     Telemetry: Sinus rhythm, intermittent short episodes of afib     EK2022: Sinus rhythm rate of 89 bpm,  please see scan in Cardiology. Echocardiogram: 12/9/2022    Summary   Normal left ventricular chamber size. Normal left ventricular systolic function. Visually estimated LVEF is 55-60 %. No wall motion abnormalities. Normal diastolic function. Normal left atrial pressure. Normal right ventricle structure and function. Normal left atrial size. Normal right atrial size   Mild tricuspid regurgitation. Likely normal estimated PA pressure. No comparison study available. ---by Joesph Lanza MD    Cardiac Catheterization: none    Stress Test: none     Assessment/Plan:     1. Paroxysmal atrial fibrillation  -New diagnosis on December 8, 2022, treated with IV Cardizem and started on Eliquis and metoprolol on discharge. Metoprolol was stopped due to reported bradycardia ( with rates of 30 bpm per patient found on pulse ox on 12/22/2022). She has not had any evidence of significant bradycardia since admission. She denies any symptoms of bradycardia and specifically denies any lightheadedness, dizziness, near-syncope or syncope. -She has tolerated metoprolol in the hospital with no evidence of heart block or significant bradycardia  -[id number] score of at least 3 on Eliquis. She should be on low-dose Eliquis due to age greater than [de-identified] and weight less than 60 kg. Discussed with patient today in the hospital and she understands this change. - ejection fraction normal    2. Lupus on chronic Plaquenil    3. Hypertension  -On lisinopril at home    4. History of Bladder cancer and no chemo therapy or radiation but did have BCG treatments    Recommendations:  1. Toprol 25 mg daily   2. Discharge with 28-day Zio patch monitor   3. Decrease Eliquis dose to 2.5 mg twice daily on discharge  4. Follow up in office after monitor with Dr Emiliano De La Garza     Thank you for allowing me to participate in your patient's care. Please call me if there are any questions or concerns.       Cheryle Banks, VARSHA - CNP  Cardiac Electrophysiology  [facility] Physicians  [facility] [place]: [facility]  4:08 PM  12/28/2022  PHYSICIAN ADDENDUM:  I independently contributed to, made amendments as needed, and finalized the above documentation. I contributed >50% to the overall encounter time including review of testing, formulating a plan with the APC and communication with the other care team members and family.      Marva Mason  Cardiac electrophysiology  [facility]) physicians

## 2022-12-29 ENCOUNTER — TELEPHONE (OUTPATIENT)
Dept: ADMINISTRATIVE | Age: 82
End: 2022-12-29

## 2022-12-29 NOTE — TELEPHONE ENCOUNTER
Pt calling at the request of  Dr. Tony Cohen to cancel her Rafat HFU with Dr. Almas Melendez. She is wearing a 28 day monitor and was informed to r/s for sometimes after that in Feb.  Please call her to r/s her HFU apt - when the Feb schedule is available. Thank you.

## 2023-01-08 PROBLEM — R77.8 ELEVATED TROPONIN: Status: RESOLVED | Noted: 2022-12-09 | Resolved: 2023-01-08

## 2023-01-08 PROBLEM — R79.89 ELEVATED TROPONIN: Status: RESOLVED | Noted: 2022-12-09 | Resolved: 2023-01-08

## 2023-01-10 ENCOUNTER — TELEPHONE (OUTPATIENT)
Dept: NON INVASIVE DIAGNOSTICS | Age: 83
End: 2023-01-10

## 2023-01-10 NOTE — TELEPHONE ENCOUNTER
Patient called because she hurt her back and can not move very well. She is going to take off the monitor and mail it in. She is cancelling her appointment to apply the new monitor because she needs an MRI.

## 2023-01-23 DIAGNOSIS — I48.0 PAROXYSMAL ATRIAL FIBRILLATION (HCC): ICD-10-CM

## 2023-01-23 DIAGNOSIS — R00.2 PALPITATIONS: ICD-10-CM

## 2023-02-20 ENCOUNTER — TELEPHONE (OUTPATIENT)
Dept: ADMINISTRATIVE | Age: 83
End: 2023-02-20

## 2023-02-21 ENCOUNTER — TELEPHONE (OUTPATIENT)
Dept: NON INVASIVE DIAGNOSTICS | Age: 83
End: 2023-02-21

## 2023-02-21 NOTE — TELEPHONE ENCOUNTER
----- Message from Garima Langston MD sent at 2/20/2023  9:37 PM EST -----  I spoke to her  MR  ----- Message -----  From: Santa Rocha  Sent: 2/20/2023  11:05 AM EST  To: Garima Langston MD    Anastacia Reyez is calling for her monitor results. She would like a sooner appointment but can not come to the [address] [place] [address] office. She states here heart is racing and then its in the 50's.   Please advise

## 2023-03-06 ENCOUNTER — OFFICE VISIT (OUTPATIENT)
Dept: CARDIOLOGY CLINIC | Age: 83
End: 2023-03-06
Payer: MEDICARE

## 2023-03-06 VITALS
BODY MASS INDEX: 20.93 KG/M2 | OXYGEN SATURATION: 95 % | WEIGHT: 125.6 LBS | RESPIRATION RATE: 16 BRPM | DIASTOLIC BLOOD PRESSURE: 60 MMHG | SYSTOLIC BLOOD PRESSURE: 138 MMHG | HEART RATE: 66 BPM | HEIGHT: 65 IN

## 2023-03-06 DIAGNOSIS — E78.49 OTHER HYPERLIPIDEMIA: ICD-10-CM

## 2023-03-06 DIAGNOSIS — I48.0 PAF (PAROXYSMAL ATRIAL FIBRILLATION) (HCC): Primary | ICD-10-CM

## 2023-03-06 DIAGNOSIS — I48.91 ATRIAL FIBRILLATION WITH RAPID VENTRICULAR RESPONSE (HCC): ICD-10-CM

## 2023-03-06 DIAGNOSIS — I49.5 SINUS NODE DYSFUNCTION (HCC): ICD-10-CM

## 2023-03-06 PROCEDURE — 1123F ACP DISCUSS/DSCN MKR DOCD: CPT | Performed by: INTERNAL MEDICINE

## 2023-03-06 PROCEDURE — 93000 ELECTROCARDIOGRAM COMPLETE: CPT | Performed by: INTERNAL MEDICINE

## 2023-03-06 PROCEDURE — 3075F SYST BP GE 130 - 139MM HG: CPT | Performed by: INTERNAL MEDICINE

## 2023-03-06 PROCEDURE — 3078F DIAST BP <80 MM HG: CPT | Performed by: INTERNAL MEDICINE

## 2023-03-06 PROCEDURE — 99214 OFFICE O/P EST MOD 30 MIN: CPT | Performed by: INTERNAL MEDICINE

## 2023-03-06 RX ORDER — ACETAMINOPHEN 500 MG
500 TABLET ORAL EVERY 6 HOURS PRN
COMMUNITY

## 2023-03-06 NOTE — PATIENT INSTRUCTIONS
Continue all your medications at current doses. Take the toprop xl at night. Please check blood work (Lipid panel)  Restrict sodium intake to less than 2-2.5 g/day. Restrict fluid intake to less than 2.2 L/day. Goal BP is less than 130/80. Please try to exercise for 150 minutes a week. I will see you back in the office in 6 months. Please call the office at ([phone], option [phone]) if you have any questions.

## 2023-03-06 NOTE — PROGRESS NOTES
CHIEF COMPLAINT:   Chief Complaint   Patient presents with    Atrial Fibrillation     Overdue post hosp visit. Echo 12/9/22. Wore 14 day Zio XT 12/28/22. Last labs 12/28/22. Is c/o SOBOE & palpitations. No other cardiac complaints. Shortness of Breath    Palpitations        HISTORY OF PRESENT ILLNESS: Patient is a 78 y.o. female who is here for a follow up visit with me. She has a history of paroxysmal atrial fibrillation, hypertension, hyperlipidemia, rheumatoid arthritis. She was admitted to the hospital in December with atrial fibrillation with RVR. She underwent spontaneous conversion. She was initiated on appropriate medications and discharged. She had another episode of atrial fibrillation for which she needed hospital admission. She was evaluated by Dr. Elder Decker with EP at that time. She again had spontaneous conversion. She was initiated on Toprol-XL. She was placed on appropriately dosed Eliquis. She underwent a 2-week monitor. Since then, she has not had any further visits to the hospital or emergency room    At today's office visit, She  denies any chest pain, shortness of breath, palpitations, dizziness, pedal edema. The patient is capable of activities of daily living. There is dyspnea on more than moderate exertion. There is no orthopnea or PND. She is compliant with medications as well as diet and exercise regimen. Prior Cardiac workup:  Echocardiogram: 12/9/2022    Summary   Normal left ventricular chamber size. Normal left ventricular systolic function. Visually estimated LVEF is 55-60 %. No wall motion abnormalities. Normal diastolic function. Normal left atrial pressure. Normal right ventricle structure and function. Normal left atrial size. Normal right atrial size   Mild tricuspid regurgitation. Likely normal estimated PA pressure. No comparison study available. Holter monitor from December 2022: Episodes of SVT seen.   Could be paroxysmal atrial fibrillation.  Longest episode 8 hours.  Past Medical History:   Diagnosis Date    Cancer (HCC) 1990 1995    BLADDER    Cataract left    8-18-16 for removal    Familial peripheral neuropathy     Lupus (HCC)     Paroxysmal atrial fibrillation (HCC)        Allergies   Allergen Reactions    Sulfa Antibiotics Other (See Comments)     Body aches       Current Outpatient Medications   Medication Sig Dispense Refill    acetaminophen (TYLENOL) 500 MG tablet Take 500 mg by mouth every 6 hours as needed for Pain      apixaban (ELIQUIS) 2.5 MG TABS tablet Take 1 tablet by mouth 2 times daily 60 tablet 0    metoprolol succinate (TOPROL XL) 25 MG extended release tablet Take 1 tablet by mouth daily 30 tablet 3    magnesium oxide (MAG-OX) 400 MG tablet Take 400 mg by mouth nightly      lisinopril (PRINIVIL;ZESTRIL) 10 MG tablet Take 10 mg by mouth daily      hydroxychloroquine (PLAQUENIL) 200 MG tablet Take 200 mg by mouth daily (with breakfast)      vitamin B-12 (CYANOCOBALAMIN) 500 MCG tablet Take 500 mcg by mouth Daily with lunch      Cholecalciferol (VITAMIN D3) 50 MCG (2000 UT) CAPS Take 1,000 Units by mouth Daily with lunch      diazePAM (VALIUM) 5 MG tablet Take 5 mg by mouth as needed for Anxiety.      Misc. Devices (WALKER) MISC Aid with ambulation 1 each 0     No current facility-administered medications for this visit.       Social History     Socioeconomic History    Marital status:      Spouse name: Not on file    Number of children: Not on file    Years of education: Not on file    Highest education level: Not on file   Occupational History    Not on file   Tobacco Use    Smoking status: Never    Smokeless tobacco: Never   Vaping Use    Vaping Use: Never used   Substance and Sexual Activity    Alcohol use: Yes     Comment: rare    Drug use: Never    Sexual activity: Not on file   Other Topics Concern    Not on file   Social History Narrative    Drinks decaf tea     Social Determinants of Health     Financial  Resource Strain: Not on file   Food Insecurity: Not on file   Transportation Needs: Not on file   Physical Activity: Not on file   Stress: Not on file   Social Connections: Not on file   Intimate Partner Violence: Not on file   Housing Stability: Not on file       Family History   Problem Relation Age of Onset    Other Mother         multiple myeloma    Pacemaker Mother     Colon Cancer Father        Review of Systems  Constitutional: Negative for fever, malaise/fatigue and weight loss. HENT: Negative for sore throat and tinnitus. Eyes: Negative for blurred vision and double vision. Respiratory: Negative for shortness of breath. Negative for cough and wheezing. Cardiovascular: As mentioned in HPI. Gastrointestinal: Negative for abdominal pain, heartburn, nausea and vomiting. Genitourinary: Negative. Musculoskeletal: Negative for back pain, joint pain and myalgias. Neurological: Negative for dizziness, tremors, loss of consciousness and headaches. Endo/Heme/Allergies: Negative. Psychiatric/Behavioral: Negative for depression and suicidal ideas. Physical Exam   /60 (Site: Right Upper Arm, Position: Sitting, Cuff Size: Medium Adult)   Pulse 66   Resp 16   Ht 5' 5\" (1.651 m)   Wt 125 lb 9.6 oz (57 kg)   SpO2 95%   BMI 20.90 kg/m²   Constitutional: Oriented to person, place, and time. Well-developed and well-nourished. No distress. Head: Normocephalic and atraumatic. Eyes: EOM are normal. Pupils are equal, round, and reactive to light. Neck: Normal range of motion. Neck supple. No hepatojugular reflux and no JVD present. Carotid bruit is not present. No tracheal deviation present. No thyromegaly present. Cardiovascular: Normal rate, regular rhythm, normal heart sounds and intact distal pulses. Exam reveals no gallop and no friction rub. No murmur heard. Pulmonary/Chest: Effort normal and breath sounds normal. No respiratory distress. No wheezes. No rales. No tenderness. Abdominal: Soft. Bowel sounds are normal. No distension and no mass. No tenderness. No rebound and no guarding. Musculoskeletal: Normal range of motion. No edema and no tenderness. Lymphadenopathy:   No cervical adenopathy. Neurological: Alert and oriented to person, place, and time. Skin: Skin is warm and dry. No rash noted. Not diaphoretic. No erythema. Psychiatric: Normal mood and affect. Behavior is normal.     Procedures and Testing  EKG: The office independently reviewed by me. 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 To be Holter monitor showing recurrent paroxysmal supraventricular tachycardia/PAF. She has had some episodes of symptomatic bradycardia. I will have her take her Toprol-XL at night instead of during the day. She will continue with appropriately dosed Eliquis at 2.5 mg twice daily. She does have a follow-up appointment with Dr. Kaushik Kenny which she will keep should she continue to have symptoms. There is some evidence of sinus node dysfunction and we will have to closely monitor her to see if she will need a pacemaker down the line. She is having ongoing issues with back pain secondary to stress fractures. She has a follow-up with orthopedic surgery as well as an MRI scheduled for later this week. Her pressures appear stable currently. Apart from Toprol-XL, she is on lisinopril 10 mg daily. No evidence of obstructive sleep apnea    I will follow-up with her in 6 months. Rob Jerez MD, [facility] Cardiology     NOTE: This report was transcribed using voice recognition software. Every effort was made to ensure accuracy; however, inadvertent computerized transcription errors may be present.

## 2023-03-07 ENCOUNTER — HOSPITAL ENCOUNTER (OUTPATIENT)
Age: 83
Discharge: HOME | End: 2023-03-07
Payer: MEDICARE

## 2023-03-07 DIAGNOSIS — E78.49 OTHER HYPERLIPIDEMIA: ICD-10-CM

## 2023-03-07 LAB
CHOLESTEROL, TOTAL: 217 MG/DL (ref 0–199)
HDLC SERPL-MCNC: 79 MG/DL
LDL CHOLESTEROL CALCULATED: 117 MG/DL (ref 0–99)
TRIGL SERPL-MCNC: 104 MG/DL (ref 0–149)
VLDLC SERPL CALC-MCNC: 21 MG/DL

## 2023-03-07 PROCEDURE — 36415 COLL VENOUS BLD VENIPUNCTURE: CPT

## 2023-03-07 PROCEDURE — 80061 LIPID PANEL: CPT

## 2023-03-10 ENCOUNTER — TELEPHONE (OUTPATIENT)
Dept: CARDIOLOGY CLINIC | Age: 83
End: 2023-03-10

## 2023-03-10 NOTE — TELEPHONE ENCOUNTER
----- Message from Zacarias Verdin MD sent at 3/10/2023  3:03 PM EST -----  LDL is at 117. Goal is less than 100. No changes needed apart from dietary and lifestyle modifications.

## 2023-03-17 ENCOUNTER — OFFICE VISIT (OUTPATIENT)
Dept: NON INVASIVE DIAGNOSTICS | Age: 83
End: 2023-03-17
Payer: MEDICARE

## 2023-03-17 VITALS
RESPIRATION RATE: 18 BRPM | WEIGHT: 125.2 LBS | SYSTOLIC BLOOD PRESSURE: 120 MMHG | OXYGEN SATURATION: 95 % | BODY MASS INDEX: 20.86 KG/M2 | HEART RATE: 73 BPM | HEIGHT: 65 IN | DIASTOLIC BLOOD PRESSURE: 60 MMHG

## 2023-03-17 DIAGNOSIS — I48.91 ATRIAL FIBRILLATION WITH RAPID VENTRICULAR RESPONSE (HCC): Primary | ICD-10-CM

## 2023-03-17 PROCEDURE — 1123F ACP DISCUSS/DSCN MKR DOCD: CPT | Performed by: INTERNAL MEDICINE

## 2023-03-17 PROCEDURE — 93000 ELECTROCARDIOGRAM COMPLETE: CPT | Performed by: INTERNAL MEDICINE

## 2023-03-17 PROCEDURE — 99214 OFFICE O/P EST MOD 30 MIN: CPT | Performed by: INTERNAL MEDICINE

## 2023-03-17 PROCEDURE — 3078F DIAST BP <80 MM HG: CPT | Performed by: INTERNAL MEDICINE

## 2023-03-17 PROCEDURE — 3074F SYST BP LT 130 MM HG: CPT | Performed by: INTERNAL MEDICINE

## 2023-03-17 NOTE — PROGRESS NOTES
Cardiac Electrophysiology Outpatient Progress Note    Jan Phillips  1940  Date of Service: 3/17/2023  Referring Provider/PCP: Alexandra Tejada MD  Chief Complaint:   Chief Complaint   Patient presents with    Atrial Fibrillation     Ov- F/U in office after monitor w/ Dr. Macho Alfaro. Patient has palpitations         Patient Active Problem List    Diagnosis Date Noted    Sinus node dysfunction ([facility] 12/28/2022     Priority: Medium    Palpitations 12/26/2022     Priority: Medium    A-fib ([facility] 12/26/2022     Priority: Medium    Primary hypertension 12/09/2022     Priority: Medium    Atrial fibrillation with rapid ventricular response ([facility] 12/08/2022     Priority: Medium       Current Outpatient Medications   Medication Sig Dispense Refill    acetaminophen (TYLENOL) 500 MG tablet Take 500 mg by mouth every 6 hours as needed for Pain      apixaban (ELIQUIS) 2.5 MG TABS tablet Take 1 tablet by mouth 2 times daily 60 tablet 0    metoprolol succinate (TOPROL XL) 25 MG extended release tablet Take 1 tablet by mouth daily 30 tablet 3    magnesium oxide (MAG-OX) 400 MG tablet Take 400 mg by mouth nightly      lisinopril (PRINIVIL;ZESTRIL) 10 MG tablet Take 10 mg by mouth daily      hydroxychloroquine (PLAQUENIL) 200 MG tablet Take 200 mg by mouth daily (with breakfast)      vitamin B-12 (CYANOCOBALAMIN) 500 MCG tablet Take 500 mcg by mouth Daily with lunch      Cholecalciferol (VITAMIN D3) 50 MCG (2000 UT) CAPS Take 1,000 Units by mouth Daily with lunch      diazePAM (VALIUM) 5 MG tablet Take 5 mg by mouth as needed for Anxiety. Misc. Devices (WALKER) MISC Aid with ambulation 1 each 0     No current facility-administered medications for this visit.          Allergies   Allergen Reactions    Sulfa Antibiotics Other (See Comments)     Body aches       SUBJECTIVE: Jan Phillips presents to the office today for follow up.  78 y.o. female with the PMH as noted below significant for lupus on chronic Plaquenil and recently diagnosed paroxysmal atrial fibrillation. She was first diagnosed with A. fib the beginning of December 2022 , was put on Eliquis and metoprolol 25 mg twice daily and discharged from the hospital with plans for outpatient follow-up. Subsequently her dose of metoprolol had to be decreased due to bradycardia documented by the patient with a pulse oximeter at home. She was hospitalized in evaluated by me in late December. She was discharged home on Eliquis and low-dose beta-blockade. She was discharged home with an event monitor which has shown multiple episodes of SVT/AF with rapid rates. Patient developed compression fractures in her lumbar spine in early January 2023 but has not required kyphoplasty or any other surgical intervention so far. She was wearing her event monitor at that time and the episodes of the rapid heartbeat noted were most likely related to severe back pain at that time. She continues to have episodes of sudden weakness but is not clear about her heart rate at that time. Her main complaint however currently is of the difficulty with her back pain and inability to sit down due to the back discomfort. She is undecided about any surgical intervention so far. ROS:   Constitutional: Negative for fever, activity change and appetite change. HENT: Negative for epistaxis, difficulty swallowing. Eyes: Negative for blurred vision or double vision. Respiratory: Negative for cough, chest tightness, shortness of breath and wheezing. Cardiovascular: Negative for chest pain, palpitations and leg swelling. Gastrointestinal: Negative for abdominal pain, heartburn, or blood in stool. Genitourinary: Negative for hematuria, burning or frequency. Musculoskeletal: Negative for myalgias, stiffness, or swelling. Skin: Negative for rash, pain, or lumps. Neurological: Negative for syncope, seizures, or headaches. Psychiatric/Behavioral: Negative for confusion and agitation.  The patient is not nervous/anxious. PHYSICAL EXAM:  Vitals:    03/17/23 1142   BP: 120/60   Pulse: 73   Resp: 18   SpO2: 95%   Weight: 125 lb 3.2 oz (56.8 kg)   Height: 5' 5\" (1.651 m)     Constitutional: Oriented to person, place, and time. Well-developed and cooperative. Head: Normocephalic and atraumatic. Eyes: Conjunctivae are normal. Pupils are equal, round, and reactive to light. Neck: No hepatojugular reflux and no JVD present. Cardiovascular: Laterally displaced PMI, normal S1 and S2 with left sternal border and the apex,   Pulmonary/Chest: Effort normal and breath sounds normal. No respiratory distress. Abdominal: Soft. Normal appearance and nondistended  Musculoskeletal: Normal range of motion of all extremities, no muscle weakness. Neurological: Alert and oriented to person, place, and time. Gait normal.   Skin: Skin is warm and dry. No bruising, no ecchymosis and no rash noted. Extremity: No clubbing or cyanosis. No edema. Psychiatric: Normal mood and affect.  Thought content normal.     Pertinent Labs:  CBC:   WBC   Date Value Ref Range Status   12/28/2022 5.3 4.5 - 11.5 E9/L Final   12/27/2022 5.4 4.5 - 11.5 E9/L Final   12/26/2022 6.4 4.5 - 11.5 E9/L Final     Hemoglobin   Date Value Ref Range Status   12/28/2022 12.7 11.5 - 15.5 g/dL Final   12/27/2022 12.1 11.5 - 15.5 g/dL Final   12/26/2022 14.0 11.5 - 15.5 g/dL Final     Hematocrit   Date Value Ref Range Status   12/28/2022 37.4 34.0 - 48.0 % Final   12/27/2022 36.2 34.0 - 48.0 % Final   12/26/2022 42.0 34.0 - 48.0 % Final     Platelets   Date Value Ref Range Status   12/28/2022 217 130 - 450 E9/L Final   12/27/2022 211 130 - 450 E9/L Final   12/26/2022 237 130 - 450 E9/L Final     BMP:   Lab Results   Component Value Date/Time     12/28/2022 06:26 AM    K 5.3 12/28/2022 06:26 AM    CL 96 12/28/2022 06:26 AM    CO2 24 12/28/2022 06:26 AM    BUN 12 12/28/2022 06:26 AM    CREATININE 0.7 12/28/2022 06:26 AM    GLUCOSE 89 12/28/2022 06:26 AM    CALCIUM 8.8 12/28/2022 06:26 AM      ABGs: No results found for: PHART, PO2ART, MIQ3LPF  INR:   Lab Results   Component Value Date    INR 1.1 12/08/2022     BNP: No results found for: BNP  TSH:   Lab Results   Component Value Date    TSH 1.590 12/08/2022      Cardiac Injury Profile:   Troponin   Date Value Ref Range Status   11/11/2020 <0.01 0.00 - 0.03 ng/mL Final     Comment:     TROPONIN T BLOOD LEVELS:         0.03 ng/mL     Upper Reference Limit  0.04 - 0.09 ng/mL     Possible myocardial injury      >= 0.10 ng/mL     Myocardial injury       Lipid Profile:   Lab Results   Component Value Date/Time    TRIG 104 03/07/2023 07:52 AM    HDL 79 03/07/2023 07:52 AM    LDLCALC 117 03/07/2023 07:52 AM    CHOL 217 03/07/2023 07:52 AM      Hemoglobin A1C: No results found for: LABA1C      Pertinent Cardiac Testing:       ECG 3/17/2023: Normal sinus rhythm    Echocardiogram: 12/9/2022    Summary   Normal left ventricular chamber size.   Normal left ventricular systolic function.   Visually estimated LVEF is 55-60 %.   No wall motion abnormalities.   Normal diastolic function.   Normal left atrial pressure.   Normal right ventricle structure and function.   Normal left atrial size.   Normal right atrial size   Mild tricuspid regurgitation.   Likely normal estimated PA pressure.   No comparison study available.  ---by Romeo Pereyra MD      Event monitor--12/28/2022 to 1/11/2023    Patient had a min HR of 43 bpm, max HR of 112 bpm, and avg HR of 67  bpm.  Predominant underlying rhythm was Sinus Rhythm. 13 Supraventricular  Tachycardia runs occurred, the run with the fastest interval lasting 4 hours 58 mins with a max rate of 203 bpm, the longest lasting 8 hours 21 mins with an avg rate of 127 bpm.  Supraventricular Tachycardia was detected within +/- 45 seconds of  Symptomatic patient event(s). SVT episodes are AT/PAF.      I have independently reviewed all of the ECGs and rhythm strips per above    I have  personally reviewed the laboratory, cardiac diagnostic and radiographic testing as outlined above:      Impression:       1.  Paroxysmal atrial fibrillation  -New diagnosis on December 8, 2022.  -She  tolerated metoprolol in the hospital with no evidence of heart block or significant bradycardia  -[id number] score of at least 3 on Eliquis.  Event monitoring in early January shows paroxysmal atrial fibrillation with ventricular rates from 120 to 200 bpm.  However the patient did have severe back pain at that time.    2.  Possible underlying sinus node dysfunction    3.  Lupus on chronic Plaquenil     4.  Hypertension  -On lisinopril at home     5. History of Bladder cancer and no chemo therapy or radiation but did have BCG treatments      Recommendations:    1.  Patient will contact her orthopedic surgeon for intervention for her back in the near future  2.  Repeat event monitoring following improvement in back pain.  3.  3 months    All of the above was discussed with the patient ,>50% of the time involved face-to-face time providing counseling and or coordination of care with the other providers, preparation for the clinic visit, reviewing records/tests, counseling/education of the patient, ordering medications/tests/procedures, coordinating care, and documenting clinical information in the EHR.     Thank you for allowing me to participate in your patient's care.    Rosa Ludwig MD, [facility]  Cardiac Electrophysiology  [facility]  [facility] [place]: Valentin Fischer

## 2023-03-23 ENCOUNTER — HOSPITAL ENCOUNTER (EMERGENCY)
Age: 83
Discharge: HOME | End: 2023-03-23
Attending: EMERGENCY MEDICINE
Payer: MEDICARE

## 2023-03-23 ENCOUNTER — APPOINTMENT (OUTPATIENT)
Dept: GENERAL RADIOLOGY | Age: 83
End: 2023-03-23
Payer: MEDICARE

## 2023-03-23 VITALS
HEART RATE: 93 BPM | SYSTOLIC BLOOD PRESSURE: 147 MMHG | RESPIRATION RATE: 18 BRPM | OXYGEN SATURATION: 100 % | DIASTOLIC BLOOD PRESSURE: 83 MMHG | TEMPERATURE: 97.1 F

## 2023-03-23 DIAGNOSIS — R07.9 CHEST PAIN, UNSPECIFIED TYPE: Primary | ICD-10-CM

## 2023-03-23 LAB
ANION GAP SERPL CALCULATED.3IONS-SCNC: 12 MMOL/L (ref 7–16)
BASOPHILS # BLD: 0.03 E9/L (ref 0–0.2)
BASOPHILS NFR BLD: 0.5 % (ref 0–2)
BUN SERPL-MCNC: 13 MG/DL (ref 6–23)
CALCIUM SERPL-MCNC: 9.4 MG/DL (ref 8.6–10.2)
CHLORIDE SERPL-SCNC: 98 MMOL/L (ref 98–107)
CO2 SERPL-SCNC: 25 MMOL/L (ref 22–29)
CREAT SERPL-MCNC: 0.6 MG/DL (ref 0.5–1)
EOSINOPHIL # BLD: 0.03 E9/L (ref 0.05–0.5)
EOSINOPHIL NFR BLD: 0.5 % (ref 0–6)
ERYTHROCYTE [DISTWIDTH] IN BLOOD BY AUTOMATED COUNT: 14.2 FL (ref 11.5–15)
GLUCOSE SERPL-MCNC: 109 MG/DL (ref 74–99)
HCT VFR BLD AUTO: 40.1 % (ref 34–48)
HGB BLD-MCNC: 12.8 G/DL (ref 11.5–15.5)
IMM GRANULOCYTES # BLD: 0.02 E9/L
IMM GRANULOCYTES NFR BLD: 0.3 % (ref 0–5)
LYMPHOCYTES # BLD: 1.33 E9/L (ref 1.5–4)
LYMPHOCYTES NFR BLD: 22.4 % (ref 20–42)
MCH RBC QN AUTO: 28.4 PG (ref 26–35)
MCHC RBC AUTO-ENTMCNC: 31.9 % (ref 32–34.5)
MCV RBC AUTO: 89.1 FL (ref 80–99.9)
MONOCYTES # BLD: 0.43 E9/L (ref 0.1–0.95)
MONOCYTES NFR BLD: 7.2 % (ref 2–12)
NEUTROPHILS # BLD: 4.11 E9/L (ref 1.8–7.3)
NEUTS SEG NFR BLD: 69.1 % (ref 43–80)
PLATELET # BLD AUTO: 280 E9/L (ref 130–450)
PMV BLD AUTO: 8.9 FL (ref 7–12)
POTASSIUM SERPL-SCNC: 4.4 MMOL/L (ref 3.5–5)
RBC # BLD AUTO: 4.5 E12/L (ref 3.5–5.5)
SARS-COV-2 RDRP RESP QL NAA+PROBE: NOT DETECTED
SODIUM SERPL-SCNC: 135 MMOL/L (ref 132–146)
TROPONIN, HIGH SENSITIVITY: 17 NG/L (ref 0–9)
TROPONIN, HIGH SENSITIVITY: 19 NG/L (ref 0–9)
WBC # BLD: 6 E9/L (ref 4.5–11.5)

## 2023-03-23 PROCEDURE — 36415 COLL VENOUS BLD VENIPUNCTURE: CPT

## 2023-03-23 PROCEDURE — 71045 X-RAY EXAM CHEST 1 VIEW: CPT

## 2023-03-23 PROCEDURE — 93005 ELECTROCARDIOGRAM TRACING: CPT

## 2023-03-23 PROCEDURE — 84484 ASSAY OF TROPONIN QUANT: CPT

## 2023-03-23 PROCEDURE — 80048 BASIC METABOLIC PNL TOTAL CA: CPT

## 2023-03-23 PROCEDURE — 87635 SARS-COV-2 COVID-19 AMP PRB: CPT

## 2023-03-23 PROCEDURE — 85025 COMPLETE CBC W/AUTO DIFF WBC: CPT

## 2023-03-23 PROCEDURE — 99285 EMERGENCY DEPT VISIT HI MDM: CPT

## 2023-03-23 ASSESSMENT — LIFESTYLE VARIABLES
HOW OFTEN DO YOU HAVE A DRINK CONTAINING ALCOHOL: NEVER
HOW MANY STANDARD DRINKS CONTAINING ALCOHOL DO YOU HAVE ON A TYPICAL DAY: PATIENT DOES NOT DRINK

## 2023-03-23 ASSESSMENT — PAIN DESCRIPTION - LOCATION: LOCATION: CHEST

## 2023-03-23 ASSESSMENT — PAIN DESCRIPTION - ONSET: ONSET: ON-GOING

## 2023-03-23 ASSESSMENT — PAIN DESCRIPTION - PAIN TYPE: TYPE: ACUTE PAIN

## 2023-03-23 ASSESSMENT — PAIN DESCRIPTION - ORIENTATION: ORIENTATION: MID;LEFT

## 2023-03-23 ASSESSMENT — PAIN SCALES - GENERAL: PAINLEVEL_OUTOF10: 7

## 2023-03-23 ASSESSMENT — PAIN - FUNCTIONAL ASSESSMENT: PAIN_FUNCTIONAL_ASSESSMENT: 0-10

## 2023-03-23 ASSESSMENT — PAIN DESCRIPTION - FREQUENCY: FREQUENCY: CONTINUOUS

## 2023-03-23 NOTE — ED PROVIDER NOTES
considerations/shared decision making with patient, consults, disposition:        CC/HPI Summary, DDx, ED Course, Reassessment, Tests Considered, Patient expectation:   67-year-old female presenting to the emergency department with chief complaint of chest pain. Differential diagnosis includes but is not limited to: Acute coronary syndrome, pneumonia, pneumothorax. No evidence of pneumonia or pneumothorax on imaging. Normal EKG with no evidence of elevating troponin. ED Course as of 03/24/23 0100   Thu Mar 23, 2023   1753 Evaluated at bedside at the time and is resting comfortably and hemodynamically stable. Patient not demonstrating any signs of distress. Lab studies for CBC, BMP, EKG and troponin as well as COVID-19 swab were ordered. [RW]   1937 Initial troponin found to be 19 so repeat troponin is ordered at this time. [RW]   1937 Portable chest x-ray demonstrate no significant findings of pneumonia, pneumothorax, or mediastinal widening on my interpretation. [RW]   2032 The patient and through inform decision-making patient to be discharged home with close cardiology follow-up. Patient is chest pain-free and is comfortable with this decision. [RW]      ED Course User Index  [RW] Deisi Hernandez DO      Extensively reviewed laboratory and imaging findings with patient/family members and all questions answered at this time to the fullest extent possible. Patient has remained hemodynamically stable throughout the course of her emergency department stay and is to be discharged home after shared decision making. She has been closely monitored with multiple reevaluations and is agreeable with decision to be discharged home with close follow-up with cardiologist as well as her family physician. Patient given strict return precautions to the emergency department and is discharged at this time.     Social Determinants affecting Dx or Tx: Patient has good follow-up and is a active participant in her medical coronary artery disease. Records Reviewed: EKG from 3/17/2023 reviewed in comparison to the one performed today. I am the Primary Clinician of Record. CONSULTS: (Who and What was discussed)  None    FINAL IMPRESSION      1. Chest pain, unspecified type          DISPOSITION/PLAN     DISPOSITION Decision To Discharge 03/23/2023 08:32:12 PM      PATIENT REFERRED TO:  Denver Stoner MD  [facility] [address]  [phone]    Schedule an appointment as soon as possible for a visit in 2 days      [address] [place] [address] Emergency Department  [address] [place] [id number]  [phone]  Go to   If symptoms worsen    DISCHARGE MEDICATIONS:  Discharge Medication List as of 3/23/2023  8:33 PM               (Please note that portions of this note were completed with a voice recognition program.  Efforts were made to edit the dictations but occasionally words are mis-transcribed.)    Petra Pantoja DO (electronically signed)       Petra Pantoja DO  Resident  03/24/23 0100        ATTENDING PROVIDER ATTESTATION:     Huma Dhillon presented to the emergency department for evaluation of Chest Pain (Mid/Left CP, 7/10,  wraps around to back and under arm, worse when laying down,  Eliquis, Afib. Refused ASA and nitro for EMS)    I have reviewed and discussed the case, including pertinent history (medical, surgical, family and social) and exam findings with the Resident and the Nurse assigned to Huma Dhillon. I have personally performed and/or participated in the history, exam, medical decision making, and procedures and agree with all pertinent clinical information. I have reviewed my findings and recommendations with Huma Dhillon and members of family present at the time of disposition. I have personally reviewed all laboratory radiology results from today's visit.   I have personally reviewed and agree with resident interpretation of EKG

## 2023-03-23 NOTE — ED NOTES
The following labs were labeled with appropriate pt sticker and tubed to lab:     [] Blue     [] Lavender   [] on ice  [x] Green/yellow  [] Green/black [] on ice  [] Reji Felty  [] on ice  [] Yellow  [] Red  [] Type/ Screen  [] ABG  [] VBG    [] COVID-19 swab    [] Rapid  [] PCR  [] Flu swab  [] Peds Viral Panel     [] Urine Sample  [] Fecal Sample  [] Pelvic Cultures  [] Blood Cultures  [] X 2  [] STREP Cultures         Nathaly Rodriguez RN  03/23/23 3852

## 2023-03-23 NOTE — ED NOTES
Received report from [address]; assumed care of pt at this time; currently pain free; vitals stable; needs met; call bell in reach; will monitor     Yadi Johnson RN  03/23/23 5440

## 2023-03-24 LAB
EKG ATRIAL RATE: 79 BPM
EKG P AXIS: 88 DEGREES
EKG P-R INTERVAL: 174 MS
EKG Q-T INTERVAL: 360 MS
EKG QRS DURATION: 80 MS
EKG QTC CALCULATION (BAZETT): 412 MS
EKG R AXIS: 27 DEGREES
EKG T AXIS: 75 DEGREES
EKG VENTRICULAR RATE: 79 BPM

## 2023-03-24 PROCEDURE — 93010 ELECTROCARDIOGRAM REPORT: CPT | Performed by: INTERNAL MEDICINE

## 2023-03-24 NOTE — ED NOTES
Patient family called to advise that Andra Montanez is on her way to pick patient up.       Debbie Dale RN  03/23/23 2100

## 2023-03-24 NOTE — DISCHARGE INSTRUCTIONS
Please follow-up with your family physician as well as your cardiologist over the next several weeks. Return to the emergency department if your symptoms persist or worsen.

## 2023-04-19 ENCOUNTER — OFFICE VISIT (OUTPATIENT)
Dept: NEUROSURGERY | Age: 83
End: 2023-04-19
Payer: MEDICARE

## 2023-04-19 VITALS
DIASTOLIC BLOOD PRESSURE: 69 MMHG | OXYGEN SATURATION: 96 % | TEMPERATURE: 97.9 F | HEART RATE: 75 BPM | SYSTOLIC BLOOD PRESSURE: 156 MMHG

## 2023-04-19 DIAGNOSIS — S32.000A COMPRESSION FRACTURE OF LUMBAR VERTEBRA, UNSPECIFIED LUMBAR VERTEBRAL LEVEL, INITIAL ENCOUNTER (HCC): Primary | ICD-10-CM

## 2023-04-19 PROCEDURE — 3078F DIAST BP <80 MM HG: CPT | Performed by: NEUROLOGICAL SURGERY

## 2023-04-19 PROCEDURE — 1123F ACP DISCUSS/DSCN MKR DOCD: CPT | Performed by: NEUROLOGICAL SURGERY

## 2023-04-19 PROCEDURE — 3077F SYST BP >= 140 MM HG: CPT | Performed by: NEUROLOGICAL SURGERY

## 2023-04-19 PROCEDURE — 99204 OFFICE O/P NEW MOD 45 MIN: CPT | Performed by: NEUROLOGICAL SURGERY

## 2023-04-19 ASSESSMENT — ENCOUNTER SYMPTOMS
RESPIRATORY NEGATIVE: 1
EYES NEGATIVE: 1
GASTROINTESTINAL NEGATIVE: 1
BACK PAIN: 1
ALLERGIC/IMMUNOLOGIC NEGATIVE: 1

## 2023-04-19 NOTE — PROGRESS NOTES
Mouth/Throat:      Mouth: Mucous membranes are moist.      Pharynx: Oropharynx is clear. No oropharyngeal exudate or posterior oropharyngeal erythema. Eyes:      General: No visual field deficit. Right eye: No discharge. Left eye: Discharge present. Extraocular Movements: Extraocular movements intact. Conjunctiva/sclera: Conjunctivae normal.      Pupils: Pupils are equal, round, and reactive to light. Pulmonary:      Effort: Pulmonary effort is normal. No respiratory distress. Abdominal:      General: Abdomen is flat. There is no distension. Musculoskeletal:         General: Tenderness present. No swelling, deformity or signs of injury. Right lower leg: No edema. Left lower leg: No edema. Skin:     General: Skin is warm and dry. Capillary Refill: Capillary refill takes less than 2 seconds. Coloration: Skin is not jaundiced or pale. Findings: No bruising, erythema, lesion or rash. Neurological:      General: No focal deficit present. Mental Status: She is alert and oriented to person, place, and time. Mental status is at baseline. GCS: GCS eye subscore is 4. GCS verbal subscore is 5. GCS motor subscore is 6. Cranial Nerves: No cranial nerve deficit, dysarthria or facial asymmetry. Sensory: Sensation is intact. No sensory deficit. Motor: No weakness, tremor, atrophy, abnormal muscle tone, seizure activity or pronator drift. Coordination: Coordination is intact. Coordination normal.      Gait: Gait normal.      Deep Tendon Reflexes: Reflexes normal. Babinski sign absent on the right side. Babinski sign absent on the left side. Reflex Scores:       Tricep reflexes are 2+ on the right side and 2+ on the left side. Bicep reflexes are 2+ on the right side and 2+ on the left side. Brachioradialis reflexes are 2+ on the right side and 2+ on the left side.        Patellar reflexes are 2+ on the right side and 2+ on the

## 2023-04-20 ENCOUNTER — TELEPHONE (OUTPATIENT)
Dept: NEUROSURGERY | Age: 83
End: 2023-04-20

## 2023-04-20 ENCOUNTER — PREP FOR PROCEDURE (OUTPATIENT)
Dept: NEUROSURGERY | Age: 83
End: 2023-04-20

## 2023-04-20 PROBLEM — M48.56XA COMPRESSION FRACTURE OF LUMBAR VERTEBRA, NON-TRAUMATIC (HCC): Status: ACTIVE | Noted: 2023-04-20

## 2023-04-20 RX ORDER — SODIUM CHLORIDE 9 MG/ML
INJECTION, SOLUTION INTRAVENOUS CONTINUOUS
Status: CANCELLED | OUTPATIENT
Start: 2023-04-20

## 2023-04-20 RX ORDER — SODIUM CHLORIDE 9 MG/ML
INJECTION, SOLUTION INTRAVENOUS PRN
Status: CANCELLED | OUTPATIENT
Start: 2023-04-20

## 2023-04-20 RX ORDER — SODIUM CHLORIDE 0.9 % (FLUSH) 0.9 %
5-40 SYRINGE (ML) INJECTION PRN
Status: CANCELLED | OUTPATIENT
Start: 2023-04-20

## 2023-04-20 RX ORDER — SODIUM CHLORIDE 0.9 % (FLUSH) 0.9 %
5-40 SYRINGE (ML) INJECTION EVERY 12 HOURS SCHEDULED
Status: CANCELLED | OUTPATIENT
Start: 2023-04-20

## 2023-04-20 NOTE — TELEPHONE ENCOUNTER
Prior Authorization Form:      DEMOGRAPHICS:                     Patient Name:  Peter Soriano  Patient :  1940            Insurance:  Payor: Marylene Murrain / Plan: Joe Wakefield ESSENTIAL/PLUS / Product Type: *No Product type* /   Insurance ID Number:    Payer/Plan Subscr  Sex Relation Sub. Ins. ID Effective Group Num   1.  BCBS MEDICAREEmelia Flies 1940 Female Self [id number] [facility]                                    [id number]         DIAGNOSIS & PROCEDURE:                       Procedure/Operation: L1 and L2 kyphoplasty           CPT Code: 58374, 81024    Diagnosis:  L1 and L2 acute osteopathic compression fractures    ICD10 Code: M48.56XA    Location:  main    Surgeon:  Min Malik INFORMATION:                          Date: 5/3/23    Anesthesia:  general                                                       Status:  Outpatient        Special Comments:  [employer]       Electronically signed by Sil Carrasco MA on 2023 at 2:46 PM

## 2023-04-27 RX ORDER — DIAZEPAM 5 MG/1
TABLET ORAL
COMMUNITY
End: 2023-04-27

## 2023-04-27 RX ORDER — CELECOXIB 200 MG/1
CAPSULE ORAL
COMMUNITY
End: 2023-04-27

## 2023-04-27 RX ORDER — CALCITONIN SALMON 200 [IU]/.09ML
SPRAY, METERED NASAL
COMMUNITY
Start: 2023-01-20 | End: 2023-04-27

## 2023-05-02 NOTE — H&P
Liset Cuellar (:  1940) is a 78 y.o. female,New patient, here for evaluation of the following chief complaint(s):  New Patient (Ref: Cannelton Shelling, other intervertebral disc degeneration lumbar/Provided disc )        ASSESSMENT/PLAN:  1. Compression fracture of lumbar vertebra, unspecified lumbar vertebral level, initial encounter [facility])  78year old lady who presents with back pain. Her MRI shows acute L1 and L2 osteoporotic compression fractures. She has tried and failed medication and bracing and continues to have worsening back pain. The pain is interfering with her activities of daily living. I am recommending an L1 and L2 kyphoplasty. Risks, benefits and alternatives have been discussed and she wishes to proceed. No follow-ups on file. Subjective   SUBJECTIVE/OBJECTIVE:  HPI  78year old lady who presents with back pain. The pain is made worse with activity and better with rest.  It is worse with prolonged sitting. The pain does not radiate into katya legs and she denies numbness, tingling and weakness. She  denies loss of control of bowel or bladder function. The pain is rated as a 7/10. Her MRI shows acute L1 and L2 compression fractures. Past Medical History:   Diagnosis Date    Cancer [facility]) [address] [place] [address] left    16 for removal    Familial peripheral neuropathy     Lupus ([facility]     Paroxysmal atrial fibrillation [facility])      Past Surgical History:   Procedure Laterality Date    CYSTOSCOPY      MULITPLE    EYE SURGERY  2012    right eye cataract with lens implant    EYE SURGERY Left 2016    removal of cataract with insertion of IOL    HYSTERECTOMY (CERVIX STATUS UNKNOWN)       Social History     Socioeconomic History    Marital status:       Spouse name: Not on file    Number of children: Not on file    Years of education: Not on file    Highest education level: Not on file   Occupational History    Not on file   Tobacco Use    Smoking

## 2023-05-03 ENCOUNTER — ANESTHESIA (OUTPATIENT)
Dept: OPERATING ROOM | Age: 83
End: 2023-05-03
Payer: MEDICARE

## 2023-05-03 ENCOUNTER — APPOINTMENT (OUTPATIENT)
Dept: GENERAL RADIOLOGY | Age: 83
End: 2023-05-03
Attending: NEUROLOGICAL SURGERY
Payer: MEDICARE

## 2023-05-03 ENCOUNTER — ANESTHESIA EVENT (OUTPATIENT)
Dept: OPERATING ROOM | Age: 83
End: 2023-05-03
Payer: MEDICARE

## 2023-05-03 ENCOUNTER — HOSPITAL ENCOUNTER (OUTPATIENT)
Age: 83
Setting detail: OUTPATIENT SURGERY
Discharge: HOME | End: 2023-05-03
Attending: NEUROLOGICAL SURGERY | Admitting: NEUROLOGICAL SURGERY
Payer: MEDICARE

## 2023-05-03 VITALS
DIASTOLIC BLOOD PRESSURE: 76 MMHG | TEMPERATURE: 97.7 F | OXYGEN SATURATION: 98 % | SYSTOLIC BLOOD PRESSURE: 137 MMHG | BODY MASS INDEX: 20.66 KG/M2 | RESPIRATION RATE: 18 BRPM | HEART RATE: 81 BPM | HEIGHT: 65 IN | WEIGHT: 124 LBS

## 2023-05-03 DIAGNOSIS — M48.56XA COMPRESSION FRACTURE OF LUMBAR VERTEBRA, NON-TRAUMATIC (HCC): ICD-10-CM

## 2023-05-03 PROBLEM — S32.010A CLOSED COMPRESSION FRACTURE OF BODY OF L1 VERTEBRA (HCC): Status: ACTIVE | Noted: 2023-04-20

## 2023-05-03 PROCEDURE — 88307 TISSUE EXAM BY PATHOLOGIST: CPT

## 2023-05-03 PROCEDURE — 88311 DECALCIFY TISSUE: CPT

## 2023-05-03 PROCEDURE — 2500000003 HC RX 250 WO HCPCS: Performed by: ANESTHESIOLOGY

## 2023-05-03 PROCEDURE — 2580000003 HC RX 258: Performed by: PHYSICIAN ASSISTANT

## 2023-05-03 PROCEDURE — 3600000004 HC SURGERY LEVEL 4 BASE: Performed by: NEUROLOGICAL SURGERY

## 2023-05-03 PROCEDURE — 7100000001 HC PACU RECOVERY - ADDTL 15 MIN: Performed by: NEUROLOGICAL SURGERY

## 2023-05-03 PROCEDURE — 7100000011 HC PHASE II RECOVERY - ADDTL 15 MIN: Performed by: NEUROLOGICAL SURGERY

## 2023-05-03 PROCEDURE — 3600000014 HC SURGERY LEVEL 4 ADDTL 15MIN: Performed by: NEUROLOGICAL SURGERY

## 2023-05-03 PROCEDURE — C1713 ANCHOR/SCREW BN/BN,TIS/BN: HCPCS | Performed by: NEUROLOGICAL SURGERY

## 2023-05-03 PROCEDURE — 2709999900 HC NON-CHARGEABLE SUPPLY: Performed by: NEUROLOGICAL SURGERY

## 2023-05-03 PROCEDURE — 7100000010 HC PHASE II RECOVERY - FIRST 15 MIN: Performed by: NEUROLOGICAL SURGERY

## 2023-05-03 PROCEDURE — 2500000003 HC RX 250 WO HCPCS

## 2023-05-03 PROCEDURE — C1894 INTRO/SHEATH, NON-LASER: HCPCS | Performed by: NEUROLOGICAL SURGERY

## 2023-05-03 PROCEDURE — 3700000000 HC ANESTHESIA ATTENDED CARE

## 2023-05-03 PROCEDURE — 7100000000 HC PACU RECOVERY - FIRST 15 MIN: Performed by: NEUROLOGICAL SURGERY

## 2023-05-03 PROCEDURE — 2500000003 HC RX 250 WO HCPCS: Performed by: NEUROLOGICAL SURGERY

## 2023-05-03 PROCEDURE — 22514 PERQ VERTEBRAL AUGMENTATION: CPT | Performed by: NEUROLOGICAL SURGERY

## 2023-05-03 PROCEDURE — 22515 PERQ VERTEBRAL AUGMENTATION: CPT | Performed by: NEUROLOGICAL SURGERY

## 2023-05-03 PROCEDURE — 3700000001 HC ADD 15 MINUTES (ANESTHESIA)

## 2023-05-03 PROCEDURE — 6360000002 HC RX W HCPCS

## 2023-05-03 PROCEDURE — 6360000002 HC RX W HCPCS: Performed by: PHYSICIAN ASSISTANT

## 2023-05-03 PROCEDURE — 2720000010 HC SURG SUPPLY STERILE: Performed by: NEUROLOGICAL SURGERY

## 2023-05-03 PROCEDURE — 3209999900 FLUORO FOR SURGICAL PROCEDURES

## 2023-05-03 DEVICE — BONE CEMENT C01B HV-R WITH MIXER  US
Type: IMPLANTABLE DEVICE | Site: BACK | Status: FUNCTIONAL
Brand: KYPHON® HV-R® BONE CEMENT AND KYPHON® MIXER PACK

## 2023-05-03 DEVICE — CEMENT C01A KYPHX HV-R BONE CEMENT EN
Type: IMPLANTABLE DEVICE | Site: BACK | Status: FUNCTIONAL
Brand: KYPHON® HV-R® BONE CEMENT

## 2023-05-03 RX ORDER — HYDROMORPHONE HYDROCHLORIDE 1 MG/ML
0.25 INJECTION, SOLUTION INTRAMUSCULAR; INTRAVENOUS; SUBCUTANEOUS EVERY 5 MIN PRN
Status: DISCONTINUED | OUTPATIENT
Start: 2023-05-03 | End: 2023-05-03 | Stop reason: HOSPADM

## 2023-05-03 RX ORDER — BUPIVACAINE HYDROCHLORIDE 2.5 MG/ML
INJECTION, SOLUTION EPIDURAL; INFILTRATION; INTRACAUDAL PRN
Status: DISCONTINUED | OUTPATIENT
Start: 2023-05-03 | End: 2023-05-03 | Stop reason: ALTCHOICE

## 2023-05-03 RX ORDER — DEXAMETHASONE SODIUM PHOSPHATE 10 MG/ML
INJECTION INTRAMUSCULAR; INTRAVENOUS PRN
Status: DISCONTINUED | OUTPATIENT
Start: 2023-05-03 | End: 2023-05-04 | Stop reason: SDUPTHER

## 2023-05-03 RX ORDER — ONDANSETRON 2 MG/ML
4 INJECTION INTRAMUSCULAR; INTRAVENOUS
Status: DISCONTINUED | OUTPATIENT
Start: 2023-05-03 | End: 2023-05-03 | Stop reason: HOSPADM

## 2023-05-03 RX ORDER — LIDOCAINE HYDROCHLORIDE AND EPINEPHRINE 10; 10 MG/ML; UG/ML
INJECTION, SOLUTION INFILTRATION; PERINEURAL PRN
Status: DISCONTINUED | OUTPATIENT
Start: 2023-05-03 | End: 2023-05-03 | Stop reason: ALTCHOICE

## 2023-05-03 RX ORDER — SODIUM CHLORIDE 0.9 % (FLUSH) 0.9 %
5-40 SYRINGE (ML) INJECTION EVERY 12 HOURS SCHEDULED
Status: DISCONTINUED | OUTPATIENT
Start: 2023-05-03 | End: 2023-05-03 | Stop reason: HOSPADM

## 2023-05-03 RX ORDER — OXYCODONE HYDROCHLORIDE AND ACETAMINOPHEN 5; 325 MG/1; MG/1
1 TABLET ORAL EVERY 4 HOURS PRN
Qty: 42 TABLET | Refills: 0 | Status: SHIPPED | OUTPATIENT
Start: 2023-05-03 | End: 2023-05-10

## 2023-05-03 RX ORDER — PROPOFOL 10 MG/ML
INJECTION, EMULSION INTRAVENOUS PRN
Status: DISCONTINUED | OUTPATIENT
Start: 2023-05-03 | End: 2023-05-04 | Stop reason: SDUPTHER

## 2023-05-03 RX ORDER — ONDANSETRON 2 MG/ML
INJECTION INTRAMUSCULAR; INTRAVENOUS PRN
Status: DISCONTINUED | OUTPATIENT
Start: 2023-05-03 | End: 2023-05-04 | Stop reason: SDUPTHER

## 2023-05-03 RX ORDER — ROCURONIUM BROMIDE 10 MG/ML
INJECTION, SOLUTION INTRAVENOUS PRN
Status: DISCONTINUED | OUTPATIENT
Start: 2023-05-03 | End: 2023-05-04 | Stop reason: SDUPTHER

## 2023-05-03 RX ORDER — SODIUM CHLORIDE 9 MG/ML
INJECTION, SOLUTION INTRAVENOUS PRN
Status: DISCONTINUED | OUTPATIENT
Start: 2023-05-03 | End: 2023-05-03 | Stop reason: HOSPADM

## 2023-05-03 RX ORDER — LABETALOL HYDROCHLORIDE 5 MG/ML
INJECTION, SOLUTION INTRAVENOUS PRN
Status: DISCONTINUED | OUTPATIENT
Start: 2023-05-03 | End: 2023-05-04 | Stop reason: SDUPTHER

## 2023-05-03 RX ORDER — SODIUM CHLORIDE 0.9 % (FLUSH) 0.9 %
5-40 SYRINGE (ML) INJECTION PRN
Status: DISCONTINUED | OUTPATIENT
Start: 2023-05-03 | End: 2023-05-03 | Stop reason: HOSPADM

## 2023-05-03 RX ORDER — LIDOCAINE HYDROCHLORIDE 20 MG/ML
INJECTION, SOLUTION INTRAVENOUS PRN
Status: DISCONTINUED | OUTPATIENT
Start: 2023-05-03 | End: 2023-05-04 | Stop reason: SDUPTHER

## 2023-05-03 RX ORDER — SODIUM CHLORIDE 9 MG/ML
INJECTION, SOLUTION INTRAVENOUS CONTINUOUS
Status: DISCONTINUED | OUTPATIENT
Start: 2023-05-03 | End: 2023-05-03 | Stop reason: HOSPADM

## 2023-05-03 RX ORDER — HYDROMORPHONE HYDROCHLORIDE 1 MG/ML
0.5 INJECTION, SOLUTION INTRAMUSCULAR; INTRAVENOUS; SUBCUTANEOUS EVERY 5 MIN PRN
Status: DISCONTINUED | OUTPATIENT
Start: 2023-05-03 | End: 2023-05-03 | Stop reason: HOSPADM

## 2023-05-03 RX ORDER — FENTANYL CITRATE 50 UG/ML
INJECTION, SOLUTION INTRAMUSCULAR; INTRAVENOUS PRN
Status: DISCONTINUED | OUTPATIENT
Start: 2023-05-03 | End: 2023-05-04 | Stop reason: SDUPTHER

## 2023-05-03 RX ADMIN — FENTANYL CITRATE 50 MCG: 50 INJECTION, SOLUTION INTRAMUSCULAR; INTRAVENOUS at 11:22

## 2023-05-03 RX ADMIN — DEXAMETHASONE SODIUM PHOSPHATE 10 MG: 10 INJECTION INTRAMUSCULAR; INTRAVENOUS at 11:14

## 2023-05-03 RX ADMIN — SODIUM CHLORIDE: 9 INJECTION, SOLUTION INTRAVENOUS at 09:46

## 2023-05-03 RX ADMIN — CEFAZOLIN 2000 MG: 2 INJECTION, POWDER, FOR SOLUTION INTRAMUSCULAR; INTRAVENOUS at 11:12

## 2023-05-03 RX ADMIN — LIDOCAINE HYDROCHLORIDE 40 MG: 20 INJECTION, SOLUTION INTRAVENOUS at 11:00

## 2023-05-03 RX ADMIN — SUGAMMADEX 225 MG: 100 INJECTION, SOLUTION INTRAVENOUS at 11:33

## 2023-05-03 RX ADMIN — HYDROMORPHONE HYDROCHLORIDE 0.5 MG: 1 INJECTION, SOLUTION INTRAMUSCULAR; INTRAVENOUS; SUBCUTANEOUS at 12:56

## 2023-05-03 RX ADMIN — LABETALOL HYDROCHLORIDE 5 MG: 5 INJECTION INTRAVENOUS at 11:25

## 2023-05-03 RX ADMIN — HYDROMORPHONE HYDROCHLORIDE 0.5 MG: 1 INJECTION, SOLUTION INTRAMUSCULAR; INTRAVENOUS; SUBCUTANEOUS at 12:22

## 2023-05-03 RX ADMIN — PROPOFOL 130 MG: 10 INJECTION, EMULSION INTRAVENOUS at 11:00

## 2023-05-03 RX ADMIN — FENTANYL CITRATE 50 MCG: 50 INJECTION, SOLUTION INTRAMUSCULAR; INTRAVENOUS at 11:00

## 2023-05-03 RX ADMIN — ONDANSETRON 4 MG: 2 INJECTION INTRAMUSCULAR; INTRAVENOUS at 11:28

## 2023-05-03 RX ADMIN — ROCURONIUM BROMIDE 50 MG: 10 INJECTION, SOLUTION INTRAVENOUS at 11:00

## 2023-05-03 ASSESSMENT — PAIN DESCRIPTION - DESCRIPTORS
DESCRIPTORS: DULL;ACHING
DESCRIPTORS: ACHING;DISCOMFORT
DESCRIPTORS: ACHING;DISCOMFORT
DESCRIPTORS: THROBBING

## 2023-05-03 ASSESSMENT — PAIN SCALES - GENERAL
PAINLEVEL_OUTOF10: 0
PAINLEVEL_OUTOF10: 10
PAINLEVEL_OUTOF10: 0
PAINLEVEL_OUTOF10: 2
PAINLEVEL_OUTOF10: 10

## 2023-05-03 ASSESSMENT — PAIN - FUNCTIONAL ASSESSMENT: PAIN_FUNCTIONAL_ASSESSMENT: 0-10

## 2023-05-03 ASSESSMENT — PAIN DESCRIPTION - LOCATION
LOCATION: BACK

## 2023-05-03 ASSESSMENT — PAIN DESCRIPTION - ORIENTATION: ORIENTATION: UPPER

## 2023-05-03 NOTE — ANESTHESIA POSTPROCEDURE EVALUATION
Department of Anesthesiology  Postprocedure Note    Patient: Emily Glasgow  MRN: [id number]  YOB: 1940  Date of evaluation: 5/3/2023      Procedure Summary     Date: 05/03/23 Room / Location: [facility] OR 06 / [facility]    Anesthesia Start: 6160 Anesthesia Stop:     Procedure: L1 and L2 KYPHOPLASTY (Bilateral: Back) Diagnosis:       Compression fracture of lumbar vertebra, non-traumatic (HCC)      (Compression fracture of lumbar vertebra, non-traumatic ([facility] [H40.46ZL])    Surgeons: Nubia Mendez MD Responsible Provider: Casper Tenorio MD    Anesthesia Type: general ASA Status: 3          Anesthesia Type: No value filed.     Gómez Phase I: Gómez Score: 8    Gómez Phase II:        Anesthesia Post Evaluation    Patient location during evaluation: PACU  Patient participation: complete - patient participated  Level of consciousness: awake  Pain score: 2  Airway patency: patent  Nausea & Vomiting: no nausea  Complications: no  Cardiovascular status: hemodynamically stable  Respiratory status: acceptable  Hydration status: stable  Multimodal analgesia pain management approach

## 2023-05-03 NOTE — H&P
I have examined the patient and reviewed the H and P and no changes are noted    Catherine Bateman MD

## 2023-05-03 NOTE — DISCHARGE INSTRUCTIONS
bath or use a swimming pool until the doctor clears you. 4. Resume adult activities 48 hours after discharge. 5. If you have any questions or concerns, telephone the doctor. Follow up appointment:    ___________________________________  Date     Time                                            [facility] SE 5W Kyphoplasty. Doc03/03/09      A vertebral compression fracture occurs when a bone in the spine fractures and collapses. Most of these of types of fractures are caused by osteoporosis. Osteoporosis is characterized by decreased bone mass and increased susceptibility to fractures in the absence of other recognizable causes of bone loss. Kyphoplasty is accomplished through a small incision through the skin to create a pathway to the bone (vertebra). A working channel is inserted into the bone to pass the balloon catheter. The balloon is inflated to raise the collapsed bone. Once the balloon creates the cavity inside the bone, the balloon is then deflated and removed. The cavity is then filled with bone void filler to maintain the height of the cavity once the filling hardens. The procedure is guided by x-ray to visualize the working channel, the balloon and the void filler. On completion of the operation, the working channel is removed and the skin is closed with absorbable suture. Paper steri-strips are applied over the small incision and a sterile gauze dressing is taped to the skin. You will be required to lie flat on your back for 4 hours after surgery to allow adequate time for the void filler to harden. After the filler hardens, you will be allowed to get out of bed and use the bathroom. Your may also eat or drink your normal diet. Pain medication will be ordered as needed for local wound pain. You will be seen in the doctors office within 2 weeks after surgery to check your wound and progress.   You may resume your normal activities once you are discharged

## 2023-05-03 NOTE — ANESTHESIA PRE PROCEDURE
Department of Anesthesiology  Preprocedure Note       Name:  Janie Rod   Age:  78 y.o.  :  1940                                          MRN:  [id number]         Date:  5/3/2023      Surgeon: Reece Nunez):  Joselin Muñoz MD    Procedure: Procedure(s):  L1 and L2 KYPHOPLASTY/ c-arm x2, kendell table, Medtronic    Medications prior to admission:   Prior to Admission medications    Medication Sig Start Date End Date Taking? Authorizing Provider   acetaminophen (TYLENOL) 500 MG tablet Take 1 tablet by mouth every 6 hours as needed for Pain    Historical Provider, MD   apixaban (ELIQUIS) 2.5 MG TABS tablet Take 1 tablet by mouth 2 times daily 22   Irina Mathis MD   metoprolol succinate (TOPROL XL) 25 MG extended release tablet Take 1 tablet by mouth daily 22   Irina Mathis MD   magnesium oxide (MAG-OX) 400 MG tablet Take 1 tablet by mouth nightly    Historical Provider, MD   lisinopril (PRINIVIL;ZESTRIL) 10 MG tablet Take 1 tablet by mouth daily    Historical Provider, MD   hydroxychloroquine (PLAQUENIL) 200 MG tablet Take 1 tablet by mouth daily (with breakfast)    Historical Provider, MD   vitamin B-12 (CYANOCOBALAMIN) 500 MCG tablet Take 1 tablet by mouth Daily with lunch    Historical Provider, MD   Cholecalciferol (VITAMIN D3) 50 MCG (2000 UT) CAPS Take 1,000 Units by mouth Daily with lunch 12/10/22   Historical Provider, MD   diazePAM (VALIUM) 5 MG tablet Take 1 tablet by mouth as needed for Anxiety. Historical Provider, MD   Misc.  Devices [facility]) MISC Aid with ambulation 10/11/18   Luis Daniel Krishna PA-C       Current medications:    Current Facility-Administered Medications   Medication Dose Route Frequency Provider Last Rate Last Admin    0.9 % sodium chloride infusion   IntraVENous Continuous Xochitl Pagan PA-C        sodium chloride flush 0.9 % injection 5-40 mL  5-40 mL IntraVENous 2 times per day Ena Espinoza PA-C        sodium chloride flush 0.9 % injection

## 2023-05-03 NOTE — PROGRESS NOTES
CLINICAL PHARMACY NOTE: MEDS TO BEDS    Total # of Prescriptions Filled: 1   The following medications were delivered to the patient:  Oxycodone-apap 5-325    Additional Documentation:

## 2023-05-04 ENCOUNTER — TELEPHONE (OUTPATIENT)
Dept: NEUROSURGERY | Age: 83
End: 2023-05-04

## 2023-05-04 NOTE — OP NOTE
[address]                  [address] [facility],  [facility]                                OPERATIVE REPORT    PATIENT NAME: Neema Chase                   :        1940  MED REC NO:   [id number]                            ROOM:  ACCOUNT NO:   [de-identified]                           ADMIT DATE: 2023  PROVIDER:     Truong Christensen MD    DATE OF PROCEDURE:  2023    PREOPERATIVE DIAGNOSIS:  Acute L1 and L2 osteoporotic compression  fracture. POSTOPERATIVE DIAGNOSIS:  Acute L1 and L2 osteoporotic compression  fracture. OPERATIVE PROCEDURES:  1.  Left-sided unilateral percutaneous transpedicular approach to L1  vertebral body for L1 vertebral body bone biopsy and L1 vertebral body  balloon kyphoplasty with use of Medtronic Kyphon balloon  polymethylmethacrylate. 2.  Left-sided unilateral percutaneous transpedicular approach to L2  vertebral body for L2 vertebral body bone biopsy and L2 vertebral body  balloon kyphoplasty with use of Medtronic Kyphon balloon  polymethylmethacrylate. 3.  Use of biplanar fluoroscopy interpreted by myself, the surgeon. ANESTHESIA:  Generalized endotracheal anesthesia. SURGEON:  Truong Christensen MD    ASSISTANT:  None. COMPLICATIONS:  No complications. ESTIMATED BLOOD LOSS:  Minimal.    SPECIMEN:  Bone. OPERATIVE INDICATIONS:  The patient is an 78-year-old lady who presented  to the office complaining of excruciating back pain. She was found to  have an acute L1 and L2 osteoporotic compression fracture. She had  failed conservative therapy, and after risks, benefits, and alternatives  were discussed with the patient, it was determined that she would  undergo the above-listed procedure. OPERATIVE PROCEDURE:  The patient was brought into the operating room.    A timeout was performed where she was identified by her name, medical  record number, and the operative procedure which she was about

## 2023-05-31 ENCOUNTER — OFFICE VISIT (OUTPATIENT)
Dept: NEUROSURGERY | Age: 83
End: 2023-05-31
Payer: MEDICARE

## 2023-05-31 ENCOUNTER — HOSPITAL ENCOUNTER (OUTPATIENT)
Dept: GENERAL RADIOLOGY | Age: 83
Discharge: HOME | End: 2023-06-02
Payer: MEDICARE

## 2023-05-31 ENCOUNTER — HOSPITAL ENCOUNTER (OUTPATIENT)
Age: 83
Discharge: HOME | End: 2023-06-02
Payer: MEDICARE

## 2023-05-31 VITALS
SYSTOLIC BLOOD PRESSURE: 130 MMHG | DIASTOLIC BLOOD PRESSURE: 70 MMHG | WEIGHT: 124 LBS | BODY MASS INDEX: 20.66 KG/M2 | HEART RATE: 81 BPM | OXYGEN SATURATION: 99 % | HEIGHT: 65 IN

## 2023-05-31 DIAGNOSIS — M54.50 CHRONIC MIDLINE LOW BACK PAIN WITHOUT SCIATICA: Primary | ICD-10-CM

## 2023-05-31 DIAGNOSIS — M54.50 CHRONIC MIDLINE LOW BACK PAIN WITHOUT SCIATICA: ICD-10-CM

## 2023-05-31 DIAGNOSIS — G89.29 CHRONIC MIDLINE LOW BACK PAIN WITHOUT SCIATICA: Primary | ICD-10-CM

## 2023-05-31 DIAGNOSIS — M54.9 MID BACK PAIN: ICD-10-CM

## 2023-05-31 DIAGNOSIS — M25.552 BILATERAL HIP PAIN: ICD-10-CM

## 2023-05-31 DIAGNOSIS — Z98.890 HISTORY OF KYPHOPLASTY: ICD-10-CM

## 2023-05-31 DIAGNOSIS — M25.551 BILATERAL HIP PAIN: ICD-10-CM

## 2023-05-31 DIAGNOSIS — G89.29 CHRONIC MIDLINE LOW BACK PAIN WITHOUT SCIATICA: ICD-10-CM

## 2023-05-31 PROCEDURE — 72100 X-RAY EXAM L-S SPINE 2/3 VWS: CPT

## 2023-05-31 PROCEDURE — 99024 POSTOP FOLLOW-UP VISIT: CPT | Performed by: STUDENT IN AN ORGANIZED HEALTH CARE EDUCATION/TRAINING PROGRAM

## 2023-05-31 PROCEDURE — 72070 X-RAY EXAM THORAC SPINE 2VWS: CPT

## 2023-05-31 PROCEDURE — 99212 OFFICE O/P EST SF 10 MIN: CPT

## 2023-05-31 PROCEDURE — 73521 X-RAY EXAM HIPS BI 2 VIEWS: CPT

## 2023-05-31 NOTE — PROGRESS NOTES
Post-Operative Follow-up     This is a 78year old female who presents to the office for a 1 month follow-up s/p L1 and L2 kyphoplasty     Subjective: Patient states she is doing [place]". States she is having increased low back pain that radiates into both of her hips. No pain down the legs. No new numbness or weakness. Denies any recent trauma. Physical Exam:              WDWN, no apparent distress              Non-labored breathing               Vitals Stable              Alert and oriented x3              CN 3-12 intact              PERRL              EOMI              HOANG well              Motor strength symmetric              Sensation to LT intact bilaterally   Tenderness to palpation of lower lumbar spine    Assessment: This is a 78 y.o.  female presenting for a 1 month follow-up s/p L1 and L2 kyphoplasty      Plan:  -Pain control and expectations discussed  -Thoracic, Lumbar and Hip XR ordered to evaluate for new fractures  -No restrictions   -OARRS report reviewed   -Call or return to neurosurgery office sooner if symptoms worsen or if new issues arise in the interim.     Electronically signed by Ramsey Willis PA-C on 5/31/2023 at 7:26 PM

## 2023-06-08 ENCOUNTER — OFFICE VISIT (OUTPATIENT)
Dept: NEUROSURGERY | Age: 83
End: 2023-06-08
Payer: MEDICARE

## 2023-06-08 VITALS
WEIGHT: 125 LBS | RESPIRATION RATE: 16 BRPM | DIASTOLIC BLOOD PRESSURE: 79 MMHG | BODY MASS INDEX: 20.83 KG/M2 | HEART RATE: 78 BPM | SYSTOLIC BLOOD PRESSURE: 163 MMHG | OXYGEN SATURATION: 95 % | TEMPERATURE: 97 F | HEIGHT: 65 IN

## 2023-06-08 DIAGNOSIS — S32.020A CLOSED COMPRESSION FRACTURE OF L2 VERTEBRA, INITIAL ENCOUNTER (HCC): ICD-10-CM

## 2023-06-08 DIAGNOSIS — S32.010A CLOSED COMPRESSION FRACTURE OF BODY OF L1 VERTEBRA (HCC): Primary | ICD-10-CM

## 2023-06-08 PROCEDURE — 3078F DIAST BP <80 MM HG: CPT | Performed by: STUDENT IN AN ORGANIZED HEALTH CARE EDUCATION/TRAINING PROGRAM

## 2023-06-08 PROCEDURE — 99213 OFFICE O/P EST LOW 20 MIN: CPT | Performed by: STUDENT IN AN ORGANIZED HEALTH CARE EDUCATION/TRAINING PROGRAM

## 2023-06-08 PROCEDURE — 3077F SYST BP >= 140 MM HG: CPT | Performed by: STUDENT IN AN ORGANIZED HEALTH CARE EDUCATION/TRAINING PROGRAM

## 2023-06-08 PROCEDURE — 1123F ACP DISCUSS/DSCN MKR DOCD: CPT | Performed by: STUDENT IN AN ORGANIZED HEALTH CARE EDUCATION/TRAINING PROGRAM

## 2023-06-08 PROCEDURE — 99212 OFFICE O/P EST SF 10 MIN: CPT

## 2023-06-08 RX ORDER — NEOMYCIN POLYMYXIN B SULFATES AND DEXAMETHASONE 3.5; 10000; 1 MG/ML; [USP'U]/ML; MG/ML
SUSPENSION/ DROPS OPHTHALMIC
COMMUNITY

## 2023-06-08 RX ORDER — LIDOCAINE 50 MG/G
1 PATCH TOPICAL DAILY
Qty: 30 PATCH | Refills: 0 | Status: SHIPPED | OUTPATIENT
Start: 2023-06-08 | End: 2023-07-08

## 2023-06-08 RX ORDER — OXYCODONE HYDROCHLORIDE AND ACETAMINOPHEN 5; 325 MG/1; MG/1
TABLET ORAL
COMMUNITY

## 2023-06-08 RX ORDER — LISINOPRIL 10 MG/1
TABLET ORAL
COMMUNITY

## 2023-06-08 NOTE — PROGRESS NOTES
Problem Focused Office Visit     Subjective: Kirk Guadarrama is a 78 y.o.  female who had previous L1 and L2 kyphoplasty by Dr. Star Ring on 2023. Patient presents today for increased low back pain. She describes this pain as a sharp pain that radiates into both of her hips. She rates this pain as a 15/10. She denies any radiation of pain down the legs. No numbness or weakness. No associated numbness or weakness. No recent trauma. Denies any loss of bowel or bladder incontinence. Physical Exam:  WDWN, no apparent distress              Non-labored breathing               Vitals Stable              Alert and oriented x3              CN 3-12 intact              PERRL              EOMI              HOANG well              Motor strength symmetric              Sensation to LT intact bilaterally              Tenderness to palpation of lower lumbar spine                Imagin2023 XR Lumbar Spine and XR Hip/Pelvis  Compression deformities of L1 and L2, severe involving L1  No acute abnormality of the pelvis. Assessment: This is a 78 y.o.  female presenting for a follow up for increased low back pain with radiation into her hips. XR as above.       Plan:  -Pain control and expectations discussed, lidocaine patches given  -MRI lumbar spine offered-patient declined, Bone scan ordered for eval of new fractures  -Pain Management referral given  -OARRS report reviewed   -Call/Return to Neurosurgery clinic after completion of imaging to discuss results and further treatment plan  -Call/return sooner if symptoms worsen or new issues arise in the interim       Electronically signed by Marybeth Galicia PA-C on 2023 at 5:02 PM

## 2023-06-16 ENCOUNTER — APPOINTMENT (OUTPATIENT)
Dept: GENERAL RADIOLOGY | Age: 83
DRG: 552 | End: 2023-06-16
Payer: MEDICARE

## 2023-06-16 ENCOUNTER — APPOINTMENT (OUTPATIENT)
Dept: CT IMAGING | Age: 83
DRG: 552 | End: 2023-06-16
Payer: MEDICARE

## 2023-06-16 ENCOUNTER — HOSPITAL ENCOUNTER (INPATIENT)
Age: 83
LOS: 1 days | Discharge: HOME | DRG: 552 | End: 2023-06-17
Attending: EMERGENCY MEDICINE | Admitting: FAMILY MEDICINE
Payer: MEDICARE

## 2023-06-16 DIAGNOSIS — G89.29 ACUTE EXACERBATION OF CHRONIC LOW BACK PAIN: Primary | ICD-10-CM

## 2023-06-16 DIAGNOSIS — M54.50 ACUTE EXACERBATION OF CHRONIC LOW BACK PAIN: Primary | ICD-10-CM

## 2023-06-16 DIAGNOSIS — R33.9 URINARY RETENTION: ICD-10-CM

## 2023-06-16 LAB
ALBUMIN SERPL-MCNC: 4.3 G/DL (ref 3.5–5.2)
ALP SERPL-CCNC: 85 U/L (ref 35–104)
ALT SERPL-CCNC: 27 U/L (ref 0–32)
ANION GAP SERPL CALCULATED.3IONS-SCNC: 10 MMOL/L (ref 7–16)
AST SERPL-CCNC: 25 U/L (ref 0–31)
BACTERIA URNS QL MICRO: ABNORMAL /HPF
BASOPHILS # BLD: 0.02 E9/L (ref 0–0.2)
BASOPHILS NFR BLD: 0.4 % (ref 0–2)
BILIRUB SERPL-MCNC: 0.2 MG/DL (ref 0–1.2)
BILIRUB UR QL STRIP: NEGATIVE
BUN SERPL-MCNC: 13 MG/DL (ref 6–23)
CALCIUM SERPL-MCNC: 9.4 MG/DL (ref 8.6–10.2)
CHLORIDE SERPL-SCNC: 98 MMOL/L (ref 98–107)
CLARITY UR: CLEAR
CO2 SERPL-SCNC: 25 MMOL/L (ref 22–29)
COLOR UR: YELLOW
CREAT SERPL-MCNC: 0.5 MG/DL (ref 0.5–1)
EOSINOPHIL # BLD: 0.02 E9/L (ref 0.05–0.5)
EOSINOPHIL NFR BLD: 0.4 % (ref 0–6)
ERYTHROCYTE [DISTWIDTH] IN BLOOD BY AUTOMATED COUNT: 12.7 FL (ref 11.5–15)
GLUCOSE SERPL-MCNC: 118 MG/DL (ref 74–99)
GLUCOSE UR STRIP-MCNC: NEGATIVE MG/DL
HCT VFR BLD AUTO: 39.1 % (ref 34–48)
HGB BLD-MCNC: 12.6 G/DL (ref 11.5–15.5)
HGB UR QL STRIP: NEGATIVE
IMM GRANULOCYTES # BLD: 0.02 E9/L
IMM GRANULOCYTES NFR BLD: 0.4 % (ref 0–5)
INR BLD: 1.2
KETONES UR STRIP-MCNC: NEGATIVE MG/DL
LACTATE BLDV-SCNC: 1 MMOL/L (ref 0.5–2.2)
LEUKOCYTE ESTERASE UR QL STRIP: ABNORMAL
LYMPHOCYTES # BLD: 0.71 E9/L (ref 1.5–4)
LYMPHOCYTES NFR BLD: 12.6 % (ref 20–42)
MCH RBC QN AUTO: 29 PG (ref 26–35)
MCHC RBC AUTO-ENTMCNC: 32.2 % (ref 32–34.5)
MCV RBC AUTO: 89.9 FL (ref 80–99.9)
MONOCYTES # BLD: 0.34 E9/L (ref 0.1–0.95)
MONOCYTES NFR BLD: 6 % (ref 2–12)
NEUTROPHILS # BLD: 4.51 E9/L (ref 1.8–7.3)
NEUTS SEG NFR BLD: 80.2 % (ref 43–80)
NITRITE UR QL STRIP: NEGATIVE
PH UR STRIP: 6.5 [PH] (ref 5–9)
PLATELET # BLD AUTO: 282 E9/L (ref 130–450)
PMV BLD AUTO: 8.5 FL (ref 7–12)
POTASSIUM SERPL-SCNC: 4.4 MMOL/L (ref 3.5–5)
PROT SERPL-MCNC: 6.7 G/DL (ref 6.4–8.3)
PROT UR STRIP-MCNC: NEGATIVE MG/DL
PROTHROMBIN TIME: 13.3 SEC (ref 9.3–12.4)
RBC # BLD AUTO: 4.35 E12/L (ref 3.5–5.5)
RBC #/AREA URNS HPF: ABNORMAL /HPF (ref 0–2)
SODIUM SERPL-SCNC: 133 MMOL/L (ref 132–146)
SP GR UR STRIP: <=1.005 (ref 1–1.03)
UROBILINOGEN UR STRIP-ACNC: 0.2 E.U./DL
WBC # BLD: 5.6 E9/L (ref 4.5–11.5)
WBC #/AREA URNS HPF: ABNORMAL /HPF (ref 0–5)

## 2023-06-16 PROCEDURE — 96376 TX/PRO/DX INJ SAME DRUG ADON: CPT

## 2023-06-16 PROCEDURE — 96375 TX/PRO/DX INJ NEW DRUG ADDON: CPT

## 2023-06-16 PROCEDURE — 6360000004 HC RX CONTRAST MEDICATION: Performed by: RADIOLOGY

## 2023-06-16 PROCEDURE — 85610 PROTHROMBIN TIME: CPT

## 2023-06-16 PROCEDURE — 72131 CT LUMBAR SPINE W/O DYE: CPT

## 2023-06-16 PROCEDURE — 96361 HYDRATE IV INFUSION ADD-ON: CPT

## 2023-06-16 PROCEDURE — 81001 URINALYSIS AUTO W/SCOPE: CPT

## 2023-06-16 PROCEDURE — 85025 COMPLETE CBC W/AUTO DIFF WBC: CPT

## 2023-06-16 PROCEDURE — 71045 X-RAY EXAM CHEST 1 VIEW: CPT

## 2023-06-16 PROCEDURE — 2580000003 HC RX 258: Performed by: EMERGENCY MEDICINE

## 2023-06-16 PROCEDURE — 93005 ELECTROCARDIOGRAM TRACING: CPT | Performed by: EMERGENCY MEDICINE

## 2023-06-16 PROCEDURE — 74177 CT ABD & PELVIS W/CONTRAST: CPT

## 2023-06-16 PROCEDURE — 80053 COMPREHEN METABOLIC PANEL: CPT

## 2023-06-16 PROCEDURE — 99285 EMERGENCY DEPT VISIT HI MDM: CPT

## 2023-06-16 PROCEDURE — 6360000002 HC RX W HCPCS: Performed by: EMERGENCY MEDICINE

## 2023-06-16 PROCEDURE — 96374 THER/PROPH/DIAG INJ IV PUSH: CPT

## 2023-06-16 PROCEDURE — 83605 ASSAY OF LACTIC ACID: CPT

## 2023-06-16 RX ORDER — ONDANSETRON 2 MG/ML
4 INJECTION INTRAMUSCULAR; INTRAVENOUS ONCE
Status: COMPLETED | OUTPATIENT
Start: 2023-06-16 | End: 2023-06-16

## 2023-06-16 RX ORDER — FENTANYL CITRATE 50 UG/ML
50 INJECTION, SOLUTION INTRAMUSCULAR; INTRAVENOUS ONCE
Status: COMPLETED | OUTPATIENT
Start: 2023-06-16 | End: 2023-06-16

## 2023-06-16 RX ORDER — 0.9 % SODIUM CHLORIDE 0.9 %
500 INTRAVENOUS SOLUTION INTRAVENOUS ONCE
Status: COMPLETED | OUTPATIENT
Start: 2023-06-16 | End: 2023-06-16

## 2023-06-16 RX ADMIN — SODIUM CHLORIDE 500 ML: 9 INJECTION, SOLUTION INTRAVENOUS at 17:07

## 2023-06-16 RX ADMIN — IOPAMIDOL 75 ML: 755 INJECTION, SOLUTION INTRAVENOUS at 22:50

## 2023-06-16 RX ADMIN — FENTANYL CITRATE 50 MCG: 50 INJECTION, SOLUTION INTRAMUSCULAR; INTRAVENOUS at 17:07

## 2023-06-16 RX ADMIN — ONDANSETRON 4 MG: 2 INJECTION INTRAMUSCULAR; INTRAVENOUS at 17:07

## 2023-06-16 RX ADMIN — FENTANYL CITRATE 50 MCG: 50 INJECTION, SOLUTION INTRAMUSCULAR; INTRAVENOUS at 22:02

## 2023-06-16 ASSESSMENT — PAIN DESCRIPTION - ORIENTATION
ORIENTATION: LOWER
ORIENTATION: LOWER

## 2023-06-16 ASSESSMENT — PAIN SCALES - GENERAL: PAINLEVEL_OUTOF10: 10

## 2023-06-16 ASSESSMENT — PAIN DESCRIPTION - LOCATION
LOCATION: BACK
LOCATION: BACK

## 2023-06-16 ASSESSMENT — PAIN DESCRIPTION - DESCRIPTORS: DESCRIPTORS: ACHING

## 2023-06-16 NOTE — ED PROVIDER NOTES
HPI:  6/16/23,   Time: 4:27 PM EDT       Ave Diego is a 78 y.o. female presenting to the ED for back pain/can't urinate, beginning 1 day ago. The complaint has been persistent, moderate in severity, and worsened by nothing. History of compression fracture early May with kyphoplasty. Back pain for past couple days. No incontinence/saddle esthesia. Does have history of urinary tension. States hard to pee. No fever/chills/sweats. Also complains abdominal pain. Review of Systems:   Pertinent positives and negatives are stated within HPI, all other systems reviewed and are negative.          --------------------------------------------- PAST HISTORY ---------------------------------------------  Past Medical History:  has a past medical history of Cancer ([facility] [address] Cataract, Familial peripheral neuropathy, Lupus ([facility] [address] and Paroxysmal atrial fibrillation ([facility] Past Surgical History:  has a past surgical history that includes Hysterectomy; Cystoscopy; Eye surgery (11/29/2012); eye surgery (Left, 08/18/2016); and Spine surgery (Bilateral, 5/3/2023). Social History:  reports that she has never smoked. She has never used smokeless tobacco. She reports current alcohol use. She reports that she does not use drugs. Family History: family history includes Colon Cancer in her father; Other in her mother; Pacemaker in her mother. The patients home medications have been reviewed.     Allergies: Sulfa antibiotics        ---------------------------------------------------PHYSICAL EXAM--------------------------------------    Constitutional/General: Alert and oriented x3, uncomfortable  Head: Normocephalic and atraumatic  Eyes: PERRL, EOMI, conjunctive normal, sclera non icteric  Mouth: Oropharynx clear, handling secretions, no trismus, no asymmetry of the posterior oropharynx or uvular edema  Neck: Supple, full ROM, non tender to palpation in the midline, no stridor, no crepitus, no meningeal

## 2023-06-16 NOTE — ED NOTES
The following labs were labeled with appropriate pt sticker and tubed to lab:     [x] Blue     [x] Lavender   [] on ice  [x] Green/yellow  [] Green/black [] on ice  [x] Grey  [x] on ice  [] Yellow  [] Red  [] Type/ Screen  [] ABG  [] VBG    [] COVID-19 swab    [] Rapid  [] PCR  [] Flu swab  [] Peds Viral Panel     [] Urine Sample  [] Fecal Sample  [] Pelvic Cultures  [] Blood Cultures  [] X 2  [] STREP Cultures       Kae Mcghee  06/16/23 [id number]

## 2023-06-17 VITALS
RESPIRATION RATE: 17 BRPM | HEART RATE: 88 BPM | DIASTOLIC BLOOD PRESSURE: 64 MMHG | BODY MASS INDEX: 20.8 KG/M2 | WEIGHT: 125 LBS | SYSTOLIC BLOOD PRESSURE: 140 MMHG | TEMPERATURE: 97.8 F | OXYGEN SATURATION: 96 %

## 2023-06-17 PROBLEM — M54.9 INTRACTABLE BACK PAIN: Status: ACTIVE | Noted: 2023-06-17

## 2023-06-17 PROCEDURE — 6370000000 HC RX 637 (ALT 250 FOR IP): Performed by: FAMILY MEDICINE

## 2023-06-17 PROCEDURE — 1200000000 HC SEMI PRIVATE

## 2023-06-17 RX ORDER — LISINOPRIL 10 MG/1
10 TABLET ORAL DAILY
Status: DISCONTINUED | OUTPATIENT
Start: 2023-06-17 | End: 2023-06-17 | Stop reason: HOSPADM

## 2023-06-17 RX ORDER — METOPROLOL SUCCINATE 25 MG/1
25 TABLET, EXTENDED RELEASE ORAL DAILY
Status: DISCONTINUED | OUTPATIENT
Start: 2023-06-17 | End: 2023-06-17 | Stop reason: HOSPADM

## 2023-06-17 RX ORDER — HYDROXYCHLOROQUINE SULFATE 200 MG/1
200 TABLET, FILM COATED ORAL
Status: DISCONTINUED | OUTPATIENT
Start: 2023-06-17 | End: 2023-06-17 | Stop reason: HOSPADM

## 2023-06-17 RX ORDER — LANOLIN ALCOHOL/MO/W.PET/CERES
400 CREAM (GRAM) TOPICAL NIGHTLY
Status: DISCONTINUED | OUTPATIENT
Start: 2023-06-17 | End: 2023-06-17 | Stop reason: HOSPADM

## 2023-06-17 RX ORDER — ACETAMINOPHEN 500 MG
500 TABLET ORAL EVERY 6 HOURS PRN
Status: DISCONTINUED | OUTPATIENT
Start: 2023-06-17 | End: 2023-06-17 | Stop reason: HOSPADM

## 2023-06-17 RX ORDER — LIDOCAINE 4 G/G
1 PATCH TOPICAL DAILY
Status: DISCONTINUED | OUTPATIENT
Start: 2023-06-17 | End: 2023-06-17 | Stop reason: HOSPADM

## 2023-06-17 RX ORDER — ENOXAPARIN SODIUM 100 MG/ML
40 INJECTION SUBCUTANEOUS DAILY
Status: DISCONTINUED | OUTPATIENT
Start: 2023-06-17 | End: 2023-06-17 | Stop reason: HOSPADM

## 2023-06-17 RX ORDER — MORPHINE SULFATE 2 MG/ML
2 INJECTION, SOLUTION INTRAMUSCULAR; INTRAVENOUS EVERY 4 HOURS PRN
Status: DISCONTINUED | OUTPATIENT
Start: 2023-06-17 | End: 2023-06-17 | Stop reason: HOSPADM

## 2023-06-17 RX ORDER — CHOLECALCIFEROL (VITAMIN D3) 50 MCG
2000 TABLET ORAL
Status: DISCONTINUED | OUTPATIENT
Start: 2023-06-17 | End: 2023-06-17 | Stop reason: HOSPADM

## 2023-06-17 RX ORDER — DIAZEPAM 5 MG/1
5 TABLET ORAL 2 TIMES DAILY PRN
Status: DISCONTINUED | OUTPATIENT
Start: 2023-06-17 | End: 2023-06-17 | Stop reason: HOSPADM

## 2023-06-17 RX ORDER — DIAZEPAM 5 MG/1
5 TABLET ORAL 2 TIMES DAILY PRN
COMMUNITY

## 2023-06-17 RX ADMIN — METOPROLOL SUCCINATE 25 MG: 25 TABLET, EXTENDED RELEASE ORAL at 09:34

## 2023-06-17 ASSESSMENT — ENCOUNTER SYMPTOMS
ABDOMINAL PAIN: 0
BACK PAIN: 1
SHORTNESS OF BREATH: 0

## 2023-06-17 NOTE — PROGRESS NOTES
\"FYI, she wants to go AMA cause they concepción her she was okay in the ER. I encouraged her to wait for NS but she said seh will do better at home.  \" Message sent to  Via [facility]

## 2023-06-17 NOTE — H&P
L1-L3, resulting in mild spinal canal stenoses at these levels. No acute abdominopelvic abnormality. Markedly distended urinary bladder. Chronic appearing compression deformities L1-L3, with vertebroplasty material noted at L1 and L2.     CT ABDOMEN PELVIS W IV CONTRAST Additional Contrast? None    Result Date: 6/17/2023  EXAMINATION: CT OF THE ABDOMEN AND PELVIS WITH CONTRAST; CT OF THE LUMBAR SPINE WITHOUT CONTRAST 6/16/2023 10:49 pm TECHNIQUE: CT of the abdomen and pelvis was performed with the administration of intravenous contrast. Multiplanar reformatted images are provided for review. Automated exposure control, iterative reconstruction, and/or weight based adjustment of the mA/kV was utilized to reduce the radiation dose to as low as reasonably achievable.; CT of the lumbar spine was performed without the administration of intravenous contrast. Multiplanar reformatted images are provided for review. Adjustment of mA and/or kV according to patient size was utilized. Automated exposure control, iterative reconstruction, and/or weight based adjustment of the mA/kV was utilized to reduce the radiation dose to as low as reasonably achievable. COMPARISON: 02/27/2022 HISTORY: ORDERING SYSTEM PROVIDED HISTORY: urinary retention/back pain TECHNOLOGIST PROVIDED HISTORY: Additional Contrast?->None Reason for exam:->urinary retention/back pain Decision Support Exception - unselect if not a suspected or confirmed emergency medical condition->Emergency Medical Condition (MA) What reading provider will be dictating this exam?->CRC FINDINGS: Lower Chest:  Visualized portion of the lower chest demonstrates no acute abnormality. Organs: Multiple cystic lesions throughout the liver again noted, unchanged from prior examination. The gallbladder, spleen, pancreas, and adrenals are within normal limits. Bilateral subcentimeter renal cysts are noted. No hydronephrosis. GI/Bowel: There is no evidence of bowel obstruction.

## 2023-06-17 NOTE — PROGRESS NOTES
Pt resistive with admission assessment and instructions by nurse Pt insisting she can get up because \"she is fine\" Per  instructions that were clarified by nurse pt is to remain in bed.  Pt states she is just going to go home since [place] aren't helping her here\"

## 2023-06-19 ENCOUNTER — HOSPITAL ENCOUNTER (OUTPATIENT)
Dept: NUCLEAR MEDICINE | Age: 83
Discharge: HOME | End: 2023-06-21
Payer: MEDICARE

## 2023-06-19 DIAGNOSIS — S32.020A CLOSED COMPRESSION FRACTURE OF L2 VERTEBRA, INITIAL ENCOUNTER (HCC): ICD-10-CM

## 2023-06-19 DIAGNOSIS — S32.010A CLOSED COMPRESSION FRACTURE OF BODY OF L1 VERTEBRA (HCC): ICD-10-CM

## 2023-06-19 PROCEDURE — A9503 TC99M MEDRONATE: HCPCS | Performed by: RADIOLOGY

## 2023-06-19 PROCEDURE — 3430000000 HC RX DIAGNOSTIC RADIOPHARMACEUTICAL: Performed by: RADIOLOGY

## 2023-06-19 PROCEDURE — 78306 BONE IMAGING WHOLE BODY: CPT | Performed by: STUDENT IN AN ORGANIZED HEALTH CARE EDUCATION/TRAINING PROGRAM

## 2023-06-19 RX ORDER — TC 99M MEDRONATE 20 MG/10ML
25 INJECTION, POWDER, LYOPHILIZED, FOR SOLUTION INTRAVENOUS
Status: COMPLETED | OUTPATIENT
Start: 2023-06-19 | End: 2023-06-19

## 2023-06-19 RX ADMIN — TC 99M MEDRONATE 25 MILLICURIE: 20 INJECTION, POWDER, LYOPHILIZED, FOR SOLUTION INTRAVENOUS at 09:49

## 2023-06-22 LAB
EKG ATRIAL RATE: 80 BPM
EKG P AXIS: 75 DEGREES
EKG P-R INTERVAL: 174 MS
EKG Q-T INTERVAL: 364 MS
EKG QRS DURATION: 84 MS
EKG QTC CALCULATION (BAZETT): 419 MS
EKG R AXIS: 31 DEGREES
EKG T AXIS: 57 DEGREES
EKG VENTRICULAR RATE: 80 BPM

## 2023-07-06 ENCOUNTER — OFFICE VISIT (OUTPATIENT)
Dept: PAIN MANAGEMENT | Age: 83
End: 2023-07-06
Payer: MEDICARE

## 2023-07-06 ENCOUNTER — PREP FOR PROCEDURE (OUTPATIENT)
Dept: PAIN MANAGEMENT | Age: 83
End: 2023-07-06

## 2023-07-06 VITALS
TEMPERATURE: 97.5 F | BODY MASS INDEX: 20.83 KG/M2 | OXYGEN SATURATION: 98 % | HEART RATE: 73 BPM | DIASTOLIC BLOOD PRESSURE: 66 MMHG | SYSTOLIC BLOOD PRESSURE: 151 MMHG | RESPIRATION RATE: 16 BRPM | WEIGHT: 125 LBS | HEIGHT: 65 IN

## 2023-07-06 DIAGNOSIS — G89.29 CHRONIC BILATERAL LOW BACK PAIN WITHOUT SCIATICA: ICD-10-CM

## 2023-07-06 DIAGNOSIS — M54.50 CHRONIC BILATERAL LOW BACK PAIN WITHOUT SCIATICA: ICD-10-CM

## 2023-07-06 DIAGNOSIS — M54.50 CHRONIC BILATERAL LOW BACK PAIN WITHOUT SCIATICA: Primary | ICD-10-CM

## 2023-07-06 DIAGNOSIS — S32.030A CLOSED COMPRESSION FRACTURE OF L3 VERTEBRA, INITIAL ENCOUNTER (HCC): ICD-10-CM

## 2023-07-06 DIAGNOSIS — G89.29 CHRONIC BILATERAL LOW BACK PAIN WITHOUT SCIATICA: Primary | ICD-10-CM

## 2023-07-06 DIAGNOSIS — G89.4 CHRONIC PAIN SYNDROME: Primary | ICD-10-CM

## 2023-07-06 PROCEDURE — 99205 OFFICE O/P NEW HI 60 MIN: CPT | Performed by: PAIN MEDICINE

## 2023-07-06 PROCEDURE — 3078F DIAST BP <80 MM HG: CPT | Performed by: PAIN MEDICINE

## 2023-07-06 PROCEDURE — 3077F SYST BP >= 140 MM HG: CPT | Performed by: PAIN MEDICINE

## 2023-07-06 PROCEDURE — 99213 OFFICE O/P EST LOW 20 MIN: CPT | Performed by: PAIN MEDICINE

## 2023-07-06 PROCEDURE — 1123F ACP DISCUSS/DSCN MKR DOCD: CPT | Performed by: PAIN MEDICINE

## 2023-07-25 ENCOUNTER — TELEPHONE (OUTPATIENT)
Dept: ADMINISTRATIVE | Age: 83
End: 2023-07-25

## 2023-07-25 NOTE — TELEPHONE ENCOUNTER
Pt calling she is due in Sept for a 6 mth OV with Dr. Rima Hernandez. His schedule is full - going into Nov - not opened up yet. Can you please return her call for an appropriate apt.

## 2023-08-03 ENCOUNTER — TELEPHONE (OUTPATIENT)
Dept: CARDIOLOGY CLINIC | Age: 83
End: 2023-08-03

## 2023-08-03 NOTE — TELEPHONE ENCOUNTER
Mich Rod PT is scheduled for lumnar epidural  steroid injection 8-10-23    [facility] pain management is requesting clearance for a cardiac standpoint     Also requesting to hold Eliquis for 3 days       Please advise

## 2023-08-04 ENCOUNTER — TELEPHONE (OUTPATIENT)
Dept: PAIN MANAGEMENT | Age: 83
End: 2023-08-04

## 2023-08-04 DIAGNOSIS — S32.030A CLOSED COMPRESSION FRACTURE OF L3 VERTEBRA, INITIAL ENCOUNTER (HCC): ICD-10-CM

## 2023-08-04 DIAGNOSIS — G89.29 CHRONIC BILATERAL LOW BACK PAIN WITHOUT SCIATICA: Primary | ICD-10-CM

## 2023-08-04 DIAGNOSIS — M54.50 CHRONIC BILATERAL LOW BACK PAIN WITHOUT SCIATICA: Primary | ICD-10-CM

## 2023-08-08 ENCOUNTER — TELEPHONE (OUTPATIENT)
Dept: PAIN MANAGEMENT | Age: 83
End: 2023-08-08

## 2023-08-08 RX ORDER — CELECOXIB 200 MG/1
CAPSULE ORAL
COMMUNITY

## 2023-08-08 NOTE — TELEPHONE ENCOUNTER
The staff is very experienced in getting people into the appropriate position, comfortably. Please reassure her.

## 2023-08-08 NOTE — TELEPHONE ENCOUNTER
Radha Edgar called in and asked what can she do for her injection that is scheduled on Thursday, sh stated that she can not lay flat on her belly, she is only able to lay on her left side because of the pain. She is concerned that she will not be able to have the injection. Please advise.

## 2023-08-08 NOTE — PROGRESS NOTES
[address] [facility] PAIN MANAGEMENT  INSTRUCTIONS  . .......................................................................................................................................... [x] Parking the day of Surgery is located in the [facility].   Upon entering the door, make immediate right into the surgery reception room    [x]  Bring photo ID and insurance card     [x] You may have a light breakfast day of procedure    [x]  Wear loose comfortable clothing    [x]  Please follow instructions for medications as given per Dr's office    [x] You can expect a call the business day prior to procedure to notify you of your arrival time     [x] Please arrange for     []  Other instructions

## 2023-08-09 NOTE — TELEPHONE ENCOUNTER
I called Becky Krishna and explained to her that the staff is very good at positioning and will help her get through the procedure as easy as possible. .  She thanked me and stated understanding.

## 2023-08-10 ENCOUNTER — HOSPITAL ENCOUNTER (OUTPATIENT)
Dept: GENERAL RADIOLOGY | Age: 83
Discharge: HOME | End: 2023-08-12
Attending: PAIN MEDICINE
Payer: MEDICARE

## 2023-08-10 ENCOUNTER — HOSPITAL ENCOUNTER (OUTPATIENT)
Age: 83
Setting detail: OUTPATIENT SURGERY
Discharge: HOME | End: 2023-08-10
Attending: PAIN MEDICINE | Admitting: PAIN MEDICINE
Payer: MEDICARE

## 2023-08-10 VITALS
OXYGEN SATURATION: 97 % | TEMPERATURE: 97.8 F | SYSTOLIC BLOOD PRESSURE: 164 MMHG | HEART RATE: 76 BPM | DIASTOLIC BLOOD PRESSURE: 69 MMHG | RESPIRATION RATE: 18 BRPM | WEIGHT: 123 LBS | HEIGHT: 64 IN | BODY MASS INDEX: 21 KG/M2

## 2023-08-10 DIAGNOSIS — R52 PAIN MANAGEMENT: ICD-10-CM

## 2023-08-10 PROCEDURE — 2500000003 HC RX 250 WO HCPCS: Performed by: PAIN MEDICINE

## 2023-08-10 PROCEDURE — 6360000002 HC RX W HCPCS: Performed by: PAIN MEDICINE

## 2023-08-10 PROCEDURE — 7100000011 HC PHASE II RECOVERY - ADDTL 15 MIN: Performed by: PAIN MEDICINE

## 2023-08-10 PROCEDURE — 7100000010 HC PHASE II RECOVERY - FIRST 15 MIN: Performed by: PAIN MEDICINE

## 2023-08-10 PROCEDURE — 6360000004 HC RX CONTRAST MEDICATION: Performed by: PAIN MEDICINE

## 2023-08-10 PROCEDURE — 2709999900 HC NON-CHARGEABLE SUPPLY: Performed by: PAIN MEDICINE

## 2023-08-10 PROCEDURE — 3600000002 HC SURGERY LEVEL 2 BASE: Performed by: PAIN MEDICINE

## 2023-08-10 RX ORDER — METHYLPREDNISOLONE ACETATE 40 MG/ML
INJECTION, SUSPENSION INTRA-ARTICULAR; INTRALESIONAL; INTRAMUSCULAR; SOFT TISSUE PRN
Status: DISCONTINUED | OUTPATIENT
Start: 2023-08-10 | End: 2023-08-10 | Stop reason: ALTCHOICE

## 2023-08-10 RX ORDER — LIDOCAINE HYDROCHLORIDE 5 MG/ML
INJECTION, SOLUTION INFILTRATION; INTRAVENOUS PRN
Status: DISCONTINUED | OUTPATIENT
Start: 2023-08-10 | End: 2023-08-10 | Stop reason: ALTCHOICE

## 2023-08-10 ASSESSMENT — PAIN SCALES - GENERAL
PAINLEVEL_OUTOF10: 8
PAINLEVEL_OUTOF10: 7
PAINLEVEL_OUTOF10: 6
PAINLEVEL_OUTOF10: 7

## 2023-08-10 ASSESSMENT — PAIN DESCRIPTION - LOCATION
LOCATION: BACK

## 2023-08-10 ASSESSMENT — PAIN DESCRIPTION - ORIENTATION
ORIENTATION: LOWER

## 2023-08-10 ASSESSMENT — PAIN DESCRIPTION - DESCRIPTORS
DESCRIPTORS: DISCOMFORT
DESCRIPTORS: DISCOMFORT
DESCRIPTORS: ACHING;DISCOMFORT
DESCRIPTORS: ACHING;DISCOMFORT

## 2023-08-10 ASSESSMENT — PAIN DESCRIPTION - PAIN TYPE
TYPE: CHRONIC PAIN

## 2023-08-10 ASSESSMENT — PAIN DESCRIPTION - FREQUENCY: FREQUENCY: CONTINUOUS

## 2023-08-10 NOTE — DISCHARGE INSTRUCTIONS
Bertin Mcmanus Block/Radiofrequency  Home Going Instructions    1-Go home, rest for the remainder of the day  2-Please do not lift over 20 pounds the day of the injection  3-If you received sedation No: alcohol, driving, operating lawn mowers, plows, tractors or other dangerous equipment until next morning. Do not make important decisions or sign legal documents for 24 hours. You may experience light headedness, dizziness, nausea or sleepiness after sedation. Do not stay alone. A responsible adult must be with you for 24 hours. You could be nauseated from the medications you have received. Your IV site may be sore and bruised. 4-No dietary restrictions     5-Resume all medications the same day, blood thinners to be resumed 24 hours after injection if you were instructed to stop any. 6-Keep the surgical site clean and dry, you may shower the next morning and remove the      dressing. 7- No sitz baths, tub baths or hot tubs/swimming for 24 hours. 8- If you have any pain at the injection site(s), application of an ice pack to the area should be       helpful, 20 minutes on/20 minutes off for next 48 hours. 9- Call [facility] immediately at if you develop.   Fever greater than 100.4 F  Have bleeding or drainage from the puncture site  Have progressive Leg/arm numbness and or weakness  Loss of control of bowel and or bladder (wet/soil yourself)  Severe headache with inability to lift head  10-You may return to work the next day

## 2023-08-10 NOTE — H&P
[facility] Pain Management        [address] [facility] [facility]  Dept: [phone]    Procedure History & Physical      [facility]     HPI:    Patient  is here for LBP for L3-4 RYNE  Labs/imaging studies reviewed   All question and concerns addressed including R/B/A associated with the procedure    Past Medical History:   Diagnosis Date    Cancer ([address]) [address] left    8-18-16 for removal    Familial peripheral neuropathy     Hypertension     Lupus ([address])     Paroxysmal atrial fibrillation [facility])        Past Surgical History:   Procedure Laterality Date    CYSTOSCOPY      MULITPLE    EYE SURGERY  11/29/2012    right eye cataract with lens implant    EYE SURGERY Left 08/18/2016    removal of cataract with insertion of IOL    HYSTERECTOMY (CERVIX STATUS UNKNOWN)      SPINE SURGERY Bilateral 5/3/2023    L1 and L2 KYPHOPLASTY performed by Faina Alfonso MD at [address] [place] [address]       Prior to Admission medications    Medication Sig Start Date End Date Taking? Authorizing Provider   celecoxib (CELEBREX) 200 MG capsule TAKE 1 CAPSULE BY MOUTH EVERY DAY FOR 30 DAYS  Patient not taking: Reported on 8/8/2023    Historical Provider, MD   diazePAM (VALIUM) 5 MG tablet Take 1 tablet by mouth 2 times daily as needed for Anxiety.     Historical Provider, MD   acetaminophen (TYLENOL) 500 MG tablet Take 1 tablet by mouth every 6 hours as needed for Pain    Historical Provider, MD   apixaban (ELIQUIS) 2.5 MG TABS tablet Take 1 tablet by mouth 2 times daily 12/28/22   Keeley Trinh MD   metoprolol succinate (TOPROL XL) 25 MG extended release tablet Take 1 tablet by mouth daily 12/29/22   Keeley Trinh MD   magnesium oxide (MAG-OX) 400 MG tablet Take 1 tablet by mouth nightly    Historical Provider, MD   lisinopril (PRINIVIL;ZESTRIL) 10 MG tablet Take 1 tablet by mouth daily    Historical Provider, MD   hydroxychloroquine (PLAQUENIL) 200 MG tablet Take 1 tablet by mouth daily (with breakfast)    Historical Provider, MD   vitamin B-12 (CYANOCOBALAMIN) 500 MCG tablet Take 1 tablet by mouth Daily with lunch    Historical Provider, MD   Cholecalciferol (VITAMIN D3) 50 MCG (2000 UT) CAPS Take 1,000 Units by mouth Daily with lunch 12/10/22   Historical Provider, MD   Misc. Devices [facility]) Brianda Stokes Aid with ambulation 10/11/18   Matthieu Graf PA-C       Allergies   Allergen Reactions    Sulfa Antibiotics Other (See Comments)     Body aches       Social History     Socioeconomic History    Marital status:       Spouse name: Not on file    Number of children: Not on file    Years of education: Not on file    Highest education level: Not on file   Occupational History    Not on file   Tobacco Use    Smoking status: Never    Smokeless tobacco: Never   Vaping Use    Vaping Use: Never used   Substance and Sexual Activity    Alcohol use: Yes     Comment: rare    Drug use: Never    Sexual activity: Not on file   Other Topics Concern    Not on file   Social History Narrative    Drinks decaf tea     Social Determinants of Health     Financial Resource Strain: Not on file   Food Insecurity: Not on file   Transportation Needs: Not on file   Physical Activity: Not on file   Stress: Not on file   Social Connections: Not on file   Intimate Partner Violence: Not on file   Housing Stability: Not on file       Family History   Problem Relation Age of Onset    Other Mother         multiple myeloma    Pacemaker Mother     Colon Cancer Father          REVIEW OF SYSTEMS:    CONSTITUTIONAL:  negative for  fevers, chills, sweats and fatigue    RESPIRATORY:  negative for  dry cough, cough with sputum, dyspnea, wheezing and chest pain    CARDIOVASCULAR:  negative for chest pain, dyspnea, palpitations, syncope    GASTROINTESTINAL:  negative for nausea, vomiting, change in bowel habits, diarrhea, constipation and abdominal pain    MUSCULOSKELETAL: negative for muscle weakness    SKIN: negative for itching or

## 2023-08-10 NOTE — OP NOTE
8/10/2023    Patient: Garima Khan  :  1940  Age:  78 y.o. Sex:  female     PRE-OPERATIVE DIAGNOSIS: L3 compression fracture. POST-OPERATIVE DIAGNOSIS: Same. PROCEDURE: Fluoroscopic guided therapeutic lumbar epidural steroid injection at the L3-4 level. SURGEON: STEPHANIA Nath. ANESTHESIA: local    ESTIMATED BLOOD LOSS: None.  __________________________________________________    BRIEF HISTORY:  Garima Khan comes in today for the lumbar epidural injection at L3-4 level. The potential complications of this procedure were discussed with her again today. She has elected to undergo the aforementioned procedure. Morena complete History & Physical examination were reviewed in depth, a copy of which is in the chart. DESCRIPTION OF PROCEDURE:    After confirming written and informed consent, a time-out was performed and Morena name and date of birth, the procedure to be performed as well as the plan for the location of the needle insertion were confirmed. The patient was brought into the procedure room and placed in the prone position on the fluoroscopy table. A pillow was placed under the patient's lower abdomen/upper pelvis to increase lumbar interlaminar space. Standard monitors were placed, and vital signs were observed throughout the procedure. The area of the lumbar spine was prepped with chloraprep and draped in a sterile manner. The L3-4 interspace was identified and marked under AP fluoroscopy. The skin and subcutaneous tissues at the above level were anesthestized with 0.5% lidocaine. With intermittent fluoroscopy, an # 18 gauge 3 1/2 tuohy epidural needle was inserted and directed toward the interlaminar space. The needle was slowly advanced using loss of resistance technique and 5 cc glass syringe until the tip of the epidural needle has passed through the ligamentum flavum and entered the epidural space.  AP and lateral fluoroscopic imaging was performed to verify

## 2023-08-24 PROBLEM — M99.53 INTERVERTEBRAL DISC STENOSIS OF NEURAL CANAL OF LUMBAR REGION: Status: ACTIVE | Noted: 2023-08-24

## 2023-08-25 ENCOUNTER — APPOINTMENT (OUTPATIENT)
Dept: CT IMAGING | Age: 83
DRG: 479 | End: 2023-08-25
Payer: MEDICARE

## 2023-08-25 ENCOUNTER — HOSPITAL ENCOUNTER (INPATIENT)
Age: 83
LOS: 5 days | Discharge: HOME | DRG: 479 | End: 2023-08-30
Attending: EMERGENCY MEDICINE | Admitting: FAMILY MEDICINE
Payer: MEDICARE

## 2023-08-25 DIAGNOSIS — S22.000A COMPRESSION FRACTURE OF BODY OF THORACIC VERTEBRA (HCC): Primary | ICD-10-CM

## 2023-08-25 DIAGNOSIS — S22.080A COMPRESSION FRACTURE OF T12 VERTEBRA, INITIAL ENCOUNTER (HCC): ICD-10-CM

## 2023-08-25 LAB
ANION GAP SERPL CALCULATED.3IONS-SCNC: 12 MMOL/L (ref 7–16)
BASOPHILS # BLD: 0.02 K/UL (ref 0–0.2)
BASOPHILS NFR BLD: 0 % (ref 0–2)
BUN SERPL-MCNC: 11 MG/DL (ref 6–23)
CALCIUM SERPL-MCNC: 9.2 MG/DL (ref 8.6–10.2)
CHLORIDE SERPL-SCNC: 99 MMOL/L (ref 98–107)
CO2 SERPL-SCNC: 24 MMOL/L (ref 22–29)
CREAT SERPL-MCNC: 0.6 MG/DL (ref 0.5–1)
EOSINOPHIL # BLD: 0.02 K/UL (ref 0.05–0.5)
EOSINOPHILS RELATIVE PERCENT: 0 % (ref 0–6)
ERYTHROCYTE [DISTWIDTH] IN BLOOD BY AUTOMATED COUNT: 13.2 % (ref 11.5–15)
GFR SERPL CREATININE-BSD FRML MDRD: >60 ML/MIN/1.73M2
GLUCOSE SERPL-MCNC: 113 MG/DL (ref 74–99)
HCT VFR BLD AUTO: 37.3 % (ref 34–48)
HGB BLD-MCNC: 12.1 G/DL (ref 11.5–15.5)
IMM GRANULOCYTES # BLD AUTO: 0.03 K/UL (ref 0–0.58)
IMM GRANULOCYTES NFR BLD: 1 % (ref 0–5)
LYMPHOCYTES NFR BLD: 0.65 K/UL (ref 1.5–4)
LYMPHOCYTES RELATIVE PERCENT: 11 % (ref 20–42)
MCH RBC QN AUTO: 28.7 PG (ref 26–35)
MCHC RBC AUTO-ENTMCNC: 32.4 G/DL (ref 32–34.5)
MCV RBC AUTO: 88.6 FL (ref 80–99.9)
MONOCYTES NFR BLD: 0.35 K/UL (ref 0.1–0.95)
MONOCYTES NFR BLD: 6 % (ref 2–12)
NEUTROPHILS NFR BLD: 82 % (ref 43–80)
NEUTS SEG NFR BLD: 4.75 K/UL (ref 1.8–7.3)
PLATELET # BLD AUTO: 208 K/UL (ref 130–450)
PMV BLD AUTO: 8.6 FL (ref 7–12)
POTASSIUM SERPL-SCNC: 4.3 MMOL/L (ref 3.5–5)
RBC # BLD AUTO: 4.21 M/UL (ref 3.5–5.5)
SODIUM SERPL-SCNC: 135 MMOL/L (ref 132–146)
WBC OTHER # BLD: 5.8 K/UL (ref 4.5–11.5)

## 2023-08-25 PROCEDURE — 96374 THER/PROPH/DIAG INJ IV PUSH: CPT

## 2023-08-25 PROCEDURE — 99222 1ST HOSP IP/OBS MODERATE 55: CPT | Performed by: NEUROLOGICAL SURGERY

## 2023-08-25 PROCEDURE — 6360000002 HC RX W HCPCS: Performed by: FAMILY MEDICINE

## 2023-08-25 PROCEDURE — 6370000000 HC RX 637 (ALT 250 FOR IP): Performed by: FAMILY MEDICINE

## 2023-08-25 PROCEDURE — 72131 CT LUMBAR SPINE W/O DYE: CPT

## 2023-08-25 PROCEDURE — 96376 TX/PRO/DX INJ SAME DRUG ADON: CPT

## 2023-08-25 PROCEDURE — 80048 BASIC METABOLIC PNL TOTAL CA: CPT

## 2023-08-25 PROCEDURE — 85025 COMPLETE CBC W/AUTO DIFF WBC: CPT

## 2023-08-25 PROCEDURE — 99285 EMERGENCY DEPT VISIT HI MDM: CPT

## 2023-08-25 PROCEDURE — 6360000002 HC RX W HCPCS

## 2023-08-25 PROCEDURE — 1200000000 HC SEMI PRIVATE

## 2023-08-25 RX ORDER — METOPROLOL SUCCINATE 25 MG/1
25 TABLET, EXTENDED RELEASE ORAL DAILY
Status: DISCONTINUED | OUTPATIENT
Start: 2023-08-26 | End: 2023-08-30 | Stop reason: HOSPADM

## 2023-08-25 RX ORDER — DIAZEPAM 5 MG/1
5 TABLET ORAL 2 TIMES DAILY PRN
Status: DISCONTINUED | OUTPATIENT
Start: 2023-08-25 | End: 2023-08-30 | Stop reason: HOSPADM

## 2023-08-25 RX ORDER — MORPHINE SULFATE 2 MG/ML
2 INJECTION, SOLUTION INTRAMUSCULAR; INTRAVENOUS EVERY 4 HOURS PRN
Status: DISCONTINUED | OUTPATIENT
Start: 2023-08-25 | End: 2023-08-30 | Stop reason: HOSPADM

## 2023-08-25 RX ORDER — HYDROXYCHLOROQUINE SULFATE 200 MG/1
200 TABLET, FILM COATED ORAL
Status: DISCONTINUED | OUTPATIENT
Start: 2023-08-26 | End: 2023-08-30 | Stop reason: HOSPADM

## 2023-08-25 RX ORDER — LISINOPRIL 10 MG/1
10 TABLET ORAL DAILY
Status: DISCONTINUED | OUTPATIENT
Start: 2023-08-26 | End: 2023-08-30 | Stop reason: HOSPADM

## 2023-08-25 RX ORDER — LANOLIN ALCOHOL/MO/W.PET/CERES
500 CREAM (GRAM) TOPICAL
Status: DISCONTINUED | OUTPATIENT
Start: 2023-08-25 | End: 2023-08-30 | Stop reason: HOSPADM

## 2023-08-25 RX ORDER — MORPHINE SULFATE 2 MG/ML
2 INJECTION, SOLUTION INTRAMUSCULAR; INTRAVENOUS ONCE
Status: COMPLETED | OUTPATIENT
Start: 2023-08-25 | End: 2023-08-25

## 2023-08-25 RX ORDER — ACETAMINOPHEN 500 MG
500 TABLET ORAL EVERY 6 HOURS PRN
Status: DISCONTINUED | OUTPATIENT
Start: 2023-08-25 | End: 2023-08-30 | Stop reason: HOSPADM

## 2023-08-25 RX ORDER — LANOLIN ALCOHOL/MO/W.PET/CERES
400 CREAM (GRAM) TOPICAL NIGHTLY
Status: DISCONTINUED | OUTPATIENT
Start: 2023-08-25 | End: 2023-08-28

## 2023-08-25 RX ORDER — CHOLECALCIFEROL (VITAMIN D3) 50 MCG
2000 TABLET ORAL
Status: DISCONTINUED | OUTPATIENT
Start: 2023-08-25 | End: 2023-08-30 | Stop reason: HOSPADM

## 2023-08-25 RX ADMIN — MORPHINE SULFATE 2 MG: 2 INJECTION, SOLUTION INTRAMUSCULAR; INTRAVENOUS at 22:49

## 2023-08-25 RX ADMIN — Medication 400 MG: at 22:47

## 2023-08-25 RX ADMIN — MORPHINE SULFATE 2 MG: 2 INJECTION, SOLUTION INTRAMUSCULAR; INTRAVENOUS at 18:22

## 2023-08-25 RX ADMIN — APIXABAN 2.5 MG: 2.5 TABLET, FILM COATED ORAL at 22:51

## 2023-08-25 RX ADMIN — DIAZEPAM 5 MG: 5 TABLET ORAL at 22:48

## 2023-08-25 RX ADMIN — CYANOCOBALAMIN TAB 1000 MCG 500 MCG: 1000 TAB at 22:48

## 2023-08-25 RX ADMIN — MORPHINE SULFATE 2 MG: 2 INJECTION, SOLUTION INTRAMUSCULAR; INTRAVENOUS at 15:13

## 2023-08-25 RX ADMIN — Medication 2000 UNITS: at 22:49

## 2023-08-25 ASSESSMENT — PAIN DESCRIPTION - LOCATION
LOCATION: BACK;RIB CAGE
LOCATION: BACK;RIB CAGE
LOCATION: BACK

## 2023-08-25 ASSESSMENT — PAIN SCALES - GENERAL
PAINLEVEL_OUTOF10: 5
PAINLEVEL_OUTOF10: 10
PAINLEVEL_OUTOF10: 10
PAINLEVEL_OUTOF10: 9
PAINLEVEL_OUTOF10: 10
PAINLEVEL_OUTOF10: 10

## 2023-08-25 ASSESSMENT — ENCOUNTER SYMPTOMS
BACK PAIN: 1
SHORTNESS OF BREATH: 0
DIARRHEA: 0
ABDOMINAL PAIN: 0
CONSTIPATION: 0
VOMITING: 0
BLOOD IN STOOL: 0
COUGH: 0
NAUSEA: 0

## 2023-08-25 ASSESSMENT — PAIN - FUNCTIONAL ASSESSMENT
PAIN_FUNCTIONAL_ASSESSMENT: ACTIVITIES ARE NOT PREVENTED
PAIN_FUNCTIONAL_ASSESSMENT: PREVENTS OR INTERFERES WITH MANY ACTIVE NOT PASSIVE ACTIVITIES
PAIN_FUNCTIONAL_ASSESSMENT: PREVENTS OR INTERFERES SOME ACTIVE ACTIVITIES AND ADLS
PAIN_FUNCTIONAL_ASSESSMENT: 0-10
PAIN_FUNCTIONAL_ASSESSMENT: PREVENTS OR INTERFERES WITH MANY ACTIVE NOT PASSIVE ACTIVITIES
PAIN_FUNCTIONAL_ASSESSMENT: 0-10
PAIN_FUNCTIONAL_ASSESSMENT: PREVENTS OR INTERFERES WITH MANY ACTIVE NOT PASSIVE ACTIVITIES

## 2023-08-25 ASSESSMENT — PAIN SCALES - WONG BAKER: WONGBAKER_NUMERICALRESPONSE: 0

## 2023-08-25 ASSESSMENT — PAIN DESCRIPTION - DESCRIPTORS
DESCRIPTORS: ACHING;PENETRATING
DESCRIPTORS: ACHING

## 2023-08-25 ASSESSMENT — PAIN DESCRIPTION - ORIENTATION
ORIENTATION: MID
ORIENTATION: RIGHT;LEFT;MID
ORIENTATION: RIGHT;LEFT;MID

## 2023-08-25 ASSESSMENT — PAIN DESCRIPTION - PAIN TYPE: TYPE: CHRONIC PAIN

## 2023-08-25 NOTE — ED PROVIDER NOTES
[address] ENCOUNTER        Pt Name: Abdulkadir Flores  MRN: [id number]  [address] [facility] [place] 1940  Date of evaluation: 8/25/2023  Provider: Man Zhou MD  PCP: Gunnar Allan MD  Note Started: 7:20 PM EDT 8/25/23    CHIEF COMPLAINT       Chief Complaint   Patient presents with    Back Pain     Patient from home alone, unable to walk per EMS needed x2 assistance to get to stretcher. Given fentanyl 50 by EMS prior to arrival. EMS states patient has hx of back pain. HISTORY OF PRESENT ILLNESS: 1 or more Elements   History From: The patient    Abdulkadir Flores is a 78 y.o. female who presents ED due to complaints of back pain. The patient has a history of compression fracture of the vertebra previously. Patient also has kyphoplasty done in the past.  The patient was given fentanyl in on her way to the ED. The patient mentioned that she tried to  garbage can a few days back when she hurt her back and that is when she is it started to hurt. Nursing Notes were all reviewed and agreed with or any disagreements were addressed in the HPI. REVIEW OF SYSTEMS :      Review of Systems   Constitutional:  Negative for chills and fever. HENT:  Negative for congestion. Respiratory:  Negative for cough and shortness of breath. Cardiovascular:  Negative for chest pain, palpitations and leg swelling. Gastrointestinal:  Negative for abdominal pain, blood in stool, constipation, diarrhea, nausea and vomiting. Musculoskeletal:  Positive for back pain. Negative for myalgias. Skin:  Negative for rash. Neurological:  Negative for dizziness and headaches. Psychiatric/Behavioral:  The patient is not nervous/anxious.         SURGICAL HISTORY     Past Surgical History:   Procedure Laterality Date    CYSTOSCOPY      MULITPLE    EYE SURGERY  11/29/2012    right eye cataract with lens implant    EYE SURGERY Left 08/18/2016

## 2023-08-26 LAB
ANION GAP SERPL CALCULATED.3IONS-SCNC: 12 MMOL/L (ref 7–16)
BASOPHILS # BLD: 0.02 K/UL (ref 0–0.2)
BASOPHILS NFR BLD: 0 % (ref 0–2)
BUN SERPL-MCNC: 11 MG/DL (ref 6–23)
CALCIUM SERPL-MCNC: 8.9 MG/DL (ref 8.6–10.2)
CHLORIDE SERPL-SCNC: 94 MMOL/L (ref 98–107)
CO2 SERPL-SCNC: 25 MMOL/L (ref 22–29)
CREAT SERPL-MCNC: 0.5 MG/DL (ref 0.5–1)
EOSINOPHIL # BLD: 0.04 K/UL (ref 0.05–0.5)
EOSINOPHILS RELATIVE PERCENT: 1 % (ref 0–6)
ERYTHROCYTE [DISTWIDTH] IN BLOOD BY AUTOMATED COUNT: 13.3 % (ref 11.5–15)
GFR SERPL CREATININE-BSD FRML MDRD: >60 ML/MIN/1.73M2
GLUCOSE SERPL-MCNC: 90 MG/DL (ref 74–99)
HCT VFR BLD AUTO: 34.8 % (ref 34–48)
HGB BLD-MCNC: 11.5 G/DL (ref 11.5–15.5)
IMM GRANULOCYTES # BLD AUTO: <0.03 K/UL (ref 0–0.58)
IMM GRANULOCYTES NFR BLD: 0 % (ref 0–5)
LYMPHOCYTES NFR BLD: 0.78 K/UL (ref 1.5–4)
LYMPHOCYTES RELATIVE PERCENT: 14 % (ref 20–42)
MCH RBC QN AUTO: 29 PG (ref 26–35)
MCHC RBC AUTO-ENTMCNC: 33 G/DL (ref 32–34.5)
MCV RBC AUTO: 87.9 FL (ref 80–99.9)
MONOCYTES NFR BLD: 0.45 K/UL (ref 0.1–0.95)
MONOCYTES NFR BLD: 8 % (ref 2–12)
NEUTROPHILS NFR BLD: 77 % (ref 43–80)
NEUTS SEG NFR BLD: 4.39 K/UL (ref 1.8–7.3)
PLATELET # BLD AUTO: 216 K/UL (ref 130–450)
PMV BLD AUTO: 8.9 FL (ref 7–12)
POTASSIUM SERPL-SCNC: 4.3 MMOL/L (ref 3.5–5)
RBC # BLD AUTO: 3.96 M/UL (ref 3.5–5.5)
SODIUM SERPL-SCNC: 131 MMOL/L (ref 132–146)
WBC OTHER # BLD: 5.7 K/UL (ref 4.5–11.5)

## 2023-08-26 PROCEDURE — 85025 COMPLETE CBC W/AUTO DIFF WBC: CPT

## 2023-08-26 PROCEDURE — 6370000000 HC RX 637 (ALT 250 FOR IP): Performed by: FAMILY MEDICINE

## 2023-08-26 PROCEDURE — 36415 COLL VENOUS BLD VENIPUNCTURE: CPT

## 2023-08-26 PROCEDURE — 80048 BASIC METABOLIC PNL TOTAL CA: CPT

## 2023-08-26 PROCEDURE — 1200000000 HC SEMI PRIVATE

## 2023-08-26 PROCEDURE — 6360000002 HC RX W HCPCS: Performed by: FAMILY MEDICINE

## 2023-08-26 RX ORDER — POLYVINYL ALCOHOL 14 MG/ML
2 SOLUTION/ DROPS OPHTHALMIC PRN
Status: DISCONTINUED | OUTPATIENT
Start: 2023-08-26 | End: 2023-08-30 | Stop reason: HOSPADM

## 2023-08-26 RX ADMIN — MORPHINE SULFATE 2 MG: 2 INJECTION, SOLUTION INTRAMUSCULAR; INTRAVENOUS at 21:58

## 2023-08-26 RX ADMIN — MORPHINE SULFATE 2 MG: 2 INJECTION, SOLUTION INTRAMUSCULAR; INTRAVENOUS at 09:38

## 2023-08-26 RX ADMIN — Medication 400 MG: at 21:59

## 2023-08-26 RX ADMIN — MORPHINE SULFATE 2 MG: 2 INJECTION, SOLUTION INTRAMUSCULAR; INTRAVENOUS at 05:03

## 2023-08-26 RX ADMIN — HYDROXYCHLOROQUINE SULFATE 200 MG: 200 TABLET ORAL at 09:32

## 2023-08-26 RX ADMIN — MORPHINE SULFATE 2 MG: 2 INJECTION, SOLUTION INTRAMUSCULAR; INTRAVENOUS at 18:03

## 2023-08-26 RX ADMIN — LISINOPRIL 10 MG: 10 TABLET ORAL at 09:31

## 2023-08-26 RX ADMIN — CYANOCOBALAMIN TAB 1000 MCG 500 MCG: 1000 TAB at 12:54

## 2023-08-26 RX ADMIN — METOPROLOL SUCCINATE 25 MG: 25 TABLET, EXTENDED RELEASE ORAL at 09:32

## 2023-08-26 RX ADMIN — Medication 2000 UNITS: at 12:54

## 2023-08-26 ASSESSMENT — PAIN SCALES - WONG BAKER: WONGBAKER_NUMERICALRESPONSE: 0

## 2023-08-26 ASSESSMENT — PAIN - FUNCTIONAL ASSESSMENT
PAIN_FUNCTIONAL_ASSESSMENT: PREVENTS OR INTERFERES SOME ACTIVE ACTIVITIES AND ADLS
PAIN_FUNCTIONAL_ASSESSMENT: ACTIVITIES ARE NOT PREVENTED
PAIN_FUNCTIONAL_ASSESSMENT: PREVENTS OR INTERFERES SOME ACTIVE ACTIVITIES AND ADLS
PAIN_FUNCTIONAL_ASSESSMENT: PREVENTS OR INTERFERES SOME ACTIVE ACTIVITIES AND ADLS

## 2023-08-26 ASSESSMENT — PAIN DESCRIPTION - ORIENTATION
ORIENTATION: MID
ORIENTATION: MID
ORIENTATION: UPPER;LOWER
ORIENTATION: POSTERIOR
ORIENTATION: MID

## 2023-08-26 ASSESSMENT — PAIN DESCRIPTION - DESCRIPTORS
DESCRIPTORS: ACHING;BURNING;THROBBING
DESCRIPTORS: ACHING;POUNDING;SQUEEZING
DESCRIPTORS: ACHING
DESCRIPTORS: ACHING;THROBBING
DESCRIPTORS: ACHING

## 2023-08-26 ASSESSMENT — ENCOUNTER SYMPTOMS
BACK PAIN: 1
ABDOMINAL DISTENTION: 0
SHORTNESS OF BREATH: 0

## 2023-08-26 ASSESSMENT — PAIN DESCRIPTION - LOCATION
LOCATION: BACK

## 2023-08-26 ASSESSMENT — PAIN SCALES - GENERAL
PAINLEVEL_OUTOF10: 2
PAINLEVEL_OUTOF10: 9
PAINLEVEL_OUTOF10: 8
PAINLEVEL_OUTOF10: 9
PAINLEVEL_OUTOF10: 3
PAINLEVEL_OUTOF10: 7

## 2023-08-26 ASSESSMENT — PAIN DESCRIPTION - ONSET
ONSET: ON-GOING
ONSET: ON-GOING

## 2023-08-26 ASSESSMENT — PAIN DESCRIPTION - PAIN TYPE
TYPE: ACUTE PAIN
TYPE: ACUTE PAIN

## 2023-08-26 ASSESSMENT — PAIN DESCRIPTION - FREQUENCY
FREQUENCY: INTERMITTENT
FREQUENCY: INTERMITTENT

## 2023-08-26 NOTE — H&P
HISTORY AND PHYSICAL             Date: 8/26/2023        Patient Name: Brayan Fonseca     YOB: 1940      Age:  78 y.o. Chief Complaint     Chief Complaint   Patient presents with    Back Pain     Patient from home alone, unable to walk per EMS needed x2 assistance to get to stretcher. Given fentanyl 50 by EMS prior to arrival. EMS states patient has hx of back pain. History Obtained From   patient    History of Present Illness   Patient says she lifted a garbage bag and then developed lower back pain and is now unable to ambulate. Past Medical History     Past Medical History:   Diagnosis Date    Cancer [facility]) [address] left    8-18-16 for removal    Familial peripheral neuropathy     Hypertension     Lupus ([address] [facility])     Paroxysmal atrial fibrillation [facility])         Past Surgical History     Past Surgical History:   Procedure Laterality Date    CYSTOSCOPY      MULITPLE    EYE SURGERY  11/29/2012    right eye cataract with lens implant    EYE SURGERY Left 08/18/2016    removal of cataract with insertion of IOL    HYSTERECTOMY (CERVIX STATUS UNKNOWN)      PAIN MANAGEMENT PROCEDURE N/A 8/10/2023    L3-4 EPIDURAL STEROID INJECTION performed by Mila Mcmanus DO at [address] Bilateral 5/3/2023    L1 and L2 KYPHOPLASTY performed by Julio Cesar Yi MD at [facility] OR        Medications Prior to Admission     Prior to Admission medications    Medication Sig Start Date End Date Taking? Authorizing Provider   celecoxib (CELEBREX) 200 MG capsule TAKE 1 CAPSULE BY MOUTH EVERY DAY FOR 30 DAYS  Patient not taking: Reported on 8/8/2023    Historical Provider, MD   diazePAM (VALIUM) 5 MG tablet Take 1 tablet by mouth 2 times daily as needed for Anxiety.     Historical Provider, MD   acetaminophen (TYLENOL) 500 MG tablet Take 1 tablet by mouth every 6 hours as needed for Pain    Historical Provider, MD   apixaban (ELIQUIS) 2.5 MG TABS tablet Take 1 tablet by mouth 2 times daily

## 2023-08-26 NOTE — ED NOTES
Report given to Roshan Mcgarry RN for [address] [facility] CARLOS Clemente  08/25/23 2049

## 2023-08-26 NOTE — CONSULTS
[address], [address] [facility]                                  CONSULTATION    PATIENT NAME: Scott Guerrero                   :        1940  MED REC NO:   [id number]                            ROOM:       5402  ACCOUNT NO:   [de-identified]                           ADMIT DATE: 2023  PROVIDER:     Lawence Pallas, MD    CONSULT DATE:  2023    REASON FOR CONSULT:  T12 compression fracture. HISTORY OF PRESENT ILLNESS:  The patient is an 78-year-old lady with a  history of severe osteoporosis who is known to my service. She has  actually had several kyphoplasties. She presented to [facility] by EMS and due to inability to ambulate, she states that she  picked up a trash bag out of the garbage approximately four days ago and  since then she has had worsening back pain. The pain is described as a  sharp, stabbing pain. It has been worse with activity and better with  rest.  She has tried IV narcotic pain medication including fentanyl, but  still having quite a bit of back pain. PAST MEDICAL HISTORY:  Positive for bladder cancer, cataracts,  hypertension, lupus, atrial fibrillation. PAST SURGICAL HISTORY:  Positive for cystoscopy, eye surgery,  hysterectomy, kyphoplasty. SOCIAL HISTORY:  Negative for tobacco use. She does consume alcoholic  beverages socially. ALLERGIES:  Include SULFA ANTIBIOTICS. HOME MEDICATIONS:  Include Eliquis. REVIEW OF SYSTEMS:  HEENT:  Negative for headache, double vision, blurry  vision. CARDIOVASCULAR:  Positive for regular heart rate. RESPIRATORY:  Negative for shortness of breath, asthma, bronchitis or pneumonia. GASTROINTESTINAL:  Negative for heartburn, nausea, vomiting, diarrhea,  or constipation. GENITOURINARY:  Negative for hematuria or dysuria. HEMATOLOGIC:  Positive for easy bruising. INFECTIOUS:  Negative for any  recent infection.

## 2023-08-27 ENCOUNTER — APPOINTMENT (OUTPATIENT)
Dept: MRI IMAGING | Age: 83
DRG: 479 | End: 2023-08-27
Payer: MEDICARE

## 2023-08-27 LAB
ANION GAP SERPL CALCULATED.3IONS-SCNC: 8 MMOL/L (ref 7–16)
BASOPHILS # BLD: 0.03 K/UL (ref 0–0.2)
BASOPHILS NFR BLD: 1 % (ref 0–2)
BUN SERPL-MCNC: 10 MG/DL (ref 6–23)
CALCIUM SERPL-MCNC: 9 MG/DL (ref 8.6–10.2)
CHLORIDE SERPL-SCNC: 100 MMOL/L (ref 98–107)
CO2 SERPL-SCNC: 27 MMOL/L (ref 22–29)
CREAT SERPL-MCNC: 0.5 MG/DL (ref 0.5–1)
EOSINOPHIL # BLD: 0.08 K/UL (ref 0.05–0.5)
EOSINOPHILS RELATIVE PERCENT: 2 % (ref 0–6)
ERYTHROCYTE [DISTWIDTH] IN BLOOD BY AUTOMATED COUNT: 13.3 % (ref 11.5–15)
GFR SERPL CREATININE-BSD FRML MDRD: >60 ML/MIN/1.73M2
GLUCOSE SERPL-MCNC: 84 MG/DL (ref 74–99)
HCT VFR BLD AUTO: 33.8 % (ref 34–48)
HGB BLD-MCNC: 10.9 G/DL (ref 11.5–15.5)
IMM GRANULOCYTES # BLD AUTO: <0.03 K/UL (ref 0–0.58)
IMM GRANULOCYTES NFR BLD: 0 % (ref 0–5)
LYMPHOCYTES NFR BLD: 0.95 K/UL (ref 1.5–4)
LYMPHOCYTES RELATIVE PERCENT: 20 % (ref 20–42)
MCH RBC QN AUTO: 28.7 PG (ref 26–35)
MCHC RBC AUTO-ENTMCNC: 32.2 G/DL (ref 32–34.5)
MCV RBC AUTO: 88.9 FL (ref 80–99.9)
MONOCYTES NFR BLD: 0.46 K/UL (ref 0.1–0.95)
MONOCYTES NFR BLD: 9 % (ref 2–12)
NEUTROPHILS NFR BLD: 69 % (ref 43–80)
NEUTS SEG NFR BLD: 3.34 K/UL (ref 1.8–7.3)
PLATELET # BLD AUTO: 197 K/UL (ref 130–450)
PMV BLD AUTO: 8.7 FL (ref 7–12)
POTASSIUM SERPL-SCNC: 3.8 MMOL/L (ref 3.5–5)
RBC # BLD AUTO: 3.8 M/UL (ref 3.5–5.5)
SODIUM SERPL-SCNC: 135 MMOL/L (ref 132–146)
WBC OTHER # BLD: 4.9 K/UL (ref 4.5–11.5)

## 2023-08-27 PROCEDURE — 6360000002 HC RX W HCPCS: Performed by: FAMILY MEDICINE

## 2023-08-27 PROCEDURE — 72148 MRI LUMBAR SPINE W/O DYE: CPT

## 2023-08-27 PROCEDURE — 6370000000 HC RX 637 (ALT 250 FOR IP): Performed by: FAMILY MEDICINE

## 2023-08-27 PROCEDURE — 99232 SBSQ HOSP IP/OBS MODERATE 35: CPT | Performed by: NEUROLOGICAL SURGERY

## 2023-08-27 PROCEDURE — 1200000000 HC SEMI PRIVATE

## 2023-08-27 PROCEDURE — 36415 COLL VENOUS BLD VENIPUNCTURE: CPT

## 2023-08-27 PROCEDURE — 80048 BASIC METABOLIC PNL TOTAL CA: CPT

## 2023-08-27 PROCEDURE — 2580000003 HC RX 258: Performed by: NEUROLOGICAL SURGERY

## 2023-08-27 PROCEDURE — 85025 COMPLETE CBC W/AUTO DIFF WBC: CPT

## 2023-08-27 RX ORDER — SODIUM CHLORIDE 0.9 % (FLUSH) 0.9 %
5-40 SYRINGE (ML) INJECTION EVERY 12 HOURS SCHEDULED
Status: DISCONTINUED | OUTPATIENT
Start: 2023-08-27 | End: 2023-08-28 | Stop reason: HOSPADM

## 2023-08-27 RX ORDER — SODIUM CHLORIDE 0.9 % (FLUSH) 0.9 %
5-40 SYRINGE (ML) INJECTION PRN
Status: DISCONTINUED | OUTPATIENT
Start: 2023-08-27 | End: 2023-08-28 | Stop reason: HOSPADM

## 2023-08-27 RX ORDER — SODIUM CHLORIDE 9 MG/ML
INJECTION, SOLUTION INTRAVENOUS PRN
Status: DISCONTINUED | OUTPATIENT
Start: 2023-08-27 | End: 2023-08-28 | Stop reason: HOSPADM

## 2023-08-27 RX ADMIN — METOPROLOL SUCCINATE 25 MG: 25 TABLET, EXTENDED RELEASE ORAL at 08:02

## 2023-08-27 RX ADMIN — DIAZEPAM 5 MG: 5 TABLET ORAL at 23:20

## 2023-08-27 RX ADMIN — LISINOPRIL 10 MG: 10 TABLET ORAL at 08:01

## 2023-08-27 RX ADMIN — HYDROXYCHLOROQUINE SULFATE 200 MG: 200 TABLET ORAL at 08:02

## 2023-08-27 RX ADMIN — MORPHINE SULFATE 2 MG: 2 INJECTION, SOLUTION INTRAMUSCULAR; INTRAVENOUS at 12:46

## 2023-08-27 RX ADMIN — Medication 2000 UNITS: at 11:32

## 2023-08-27 RX ADMIN — CYANOCOBALAMIN TAB 1000 MCG 500 MCG: 1000 TAB at 11:32

## 2023-08-27 RX ADMIN — SODIUM CHLORIDE, PRESERVATIVE FREE 10 ML: 5 INJECTION INTRAVENOUS at 21:19

## 2023-08-27 RX ADMIN — Medication 400 MG: at 21:17

## 2023-08-27 RX ADMIN — MORPHINE SULFATE 2 MG: 2 INJECTION, SOLUTION INTRAMUSCULAR; INTRAVENOUS at 21:17

## 2023-08-27 RX ADMIN — MORPHINE SULFATE 2 MG: 2 INJECTION, SOLUTION INTRAMUSCULAR; INTRAVENOUS at 08:01

## 2023-08-27 RX ADMIN — MORPHINE SULFATE 2 MG: 2 INJECTION, SOLUTION INTRAMUSCULAR; INTRAVENOUS at 18:15

## 2023-08-27 ASSESSMENT — PAIN DESCRIPTION - DESCRIPTORS
DESCRIPTORS: ACHING;BURNING;THROBBING
DESCRIPTORS: ACHING;THROBBING
DESCRIPTORS: ACHING
DESCRIPTORS: ACHING;THROBBING
DESCRIPTORS: ACHING

## 2023-08-27 ASSESSMENT — PAIN DESCRIPTION - ORIENTATION
ORIENTATION: LOWER
ORIENTATION: MID;LOWER
ORIENTATION: MID
ORIENTATION: MID;LOWER
ORIENTATION: POSTERIOR

## 2023-08-27 ASSESSMENT — PAIN DESCRIPTION - LOCATION
LOCATION: BACK

## 2023-08-27 ASSESSMENT — PAIN SCALES - GENERAL
PAINLEVEL_OUTOF10: 2
PAINLEVEL_OUTOF10: 2
PAINLEVEL_OUTOF10: 9
PAINLEVEL_OUTOF10: 7
PAINLEVEL_OUTOF10: 10
PAINLEVEL_OUTOF10: 2
PAINLEVEL_OUTOF10: 6
PAINLEVEL_OUTOF10: 9

## 2023-08-27 ASSESSMENT — PAIN - FUNCTIONAL ASSESSMENT
PAIN_FUNCTIONAL_ASSESSMENT: PREVENTS OR INTERFERES SOME ACTIVE ACTIVITIES AND ADLS

## 2023-08-27 ASSESSMENT — PAIN DESCRIPTION - ONSET: ONSET: ON-GOING

## 2023-08-27 ASSESSMENT — ENCOUNTER SYMPTOMS
BACK PAIN: 1
SHORTNESS OF BREATH: 0
ABDOMINAL PAIN: 0

## 2023-08-27 ASSESSMENT — PAIN DESCRIPTION - FREQUENCY: FREQUENCY: INTERMITTENT

## 2023-08-27 ASSESSMENT — PAIN DESCRIPTION - PAIN TYPE: TYPE: ACUTE PAIN

## 2023-08-27 NOTE — PLAN OF CARE
Problem: Discharge Planning  Goal: Discharge to home or other facility with appropriate resources  Outcome: Progressing     Problem: Safety - Adult  Goal: Free from fall injury  Outcome: Progressing     Problem: Pain  Goal: Verbalizes/displays adequate comfort level or baseline comfort level  Outcome: Progressing  Flowsheets (Taken 8/26/2023 1945)  Verbalizes/displays adequate comfort level or baseline comfort level: Encourage patient to monitor pain and request assistance     Problem: Skin/Tissue Integrity  Goal: Absence of new skin breakdown  Description: 1. Monitor for areas of redness and/or skin breakdown  2. Assess vascular access sites hourly  3. Every 4-6 hours minimum:  Change oxygen saturation probe site  4. Every 4-6 hours:  If on nasal continuous positive airway pressure, respiratory therapy assess nares and determine need for appliance change or resting period.   Outcome: Progressing

## 2023-08-27 NOTE — PLAN OF CARE
Problem: Discharge Planning  Goal: Discharge to home or other facility with appropriate resources  8/27/2023 1200 by Peri Amezcua RN  Outcome: Progressing  8/27/2023 0503 by Salomón Manzanares RN  Outcome: Progressing     Problem: Safety - Adult  Goal: Free from fall injury  8/27/2023 1200 by Peri Amezcua RN  Outcome: Progressing  8/27/2023 0503 by Salomón Manzanares RN  Outcome: Progressing     Problem: Pain  Goal: Verbalizes/displays adequate comfort level or baseline comfort level  8/27/2023 1200 by Peri Amezcua RN  Outcome: Progressing  8/27/2023 0503 by Salomón Manzanares RN  Outcome: Progressing  Flowsheets (Taken 8/26/2023 1945)  Verbalizes/displays adequate comfort level or baseline comfort level: Encourage patient to monitor pain and request assistance     Problem: Skin/Tissue Integrity  Goal: Absence of new skin breakdown  Description: 1. Monitor for areas of redness and/or skin breakdown  2. Assess vascular access sites hourly  3. Every 4-6 hours minimum:  Change oxygen saturation probe site  4. Every 4-6 hours:  If on nasal continuous positive airway pressure, respiratory therapy assess nares and determine need for appliance change or resting period.   8/27/2023 1200 by Peri Amezcua RN  Outcome: Progressing  8/27/2023 0503 by Salomón Manzanares RN  Outcome: Progressing

## 2023-08-28 ENCOUNTER — ANESTHESIA (OUTPATIENT)
Dept: OPERATING ROOM | Age: 83
DRG: 479 | End: 2023-08-28
Payer: MEDICARE

## 2023-08-28 ENCOUNTER — APPOINTMENT (OUTPATIENT)
Dept: GENERAL RADIOLOGY | Age: 83
DRG: 479 | End: 2023-08-28
Payer: MEDICARE

## 2023-08-28 ENCOUNTER — ANESTHESIA EVENT (OUTPATIENT)
Dept: OPERATING ROOM | Age: 83
DRG: 479 | End: 2023-08-28
Payer: MEDICARE

## 2023-08-28 LAB
ANION GAP SERPL CALCULATED.3IONS-SCNC: 11 MMOL/L (ref 7–16)
BASOPHILS # BLD: 0.03 K/UL (ref 0–0.2)
BASOPHILS NFR BLD: 1 % (ref 0–2)
BUN SERPL-MCNC: 11 MG/DL (ref 6–23)
CALCIUM SERPL-MCNC: 8.6 MG/DL (ref 8.6–10.2)
CHLORIDE SERPL-SCNC: 100 MMOL/L (ref 98–107)
CO2 SERPL-SCNC: 25 MMOL/L (ref 22–29)
CREAT SERPL-MCNC: 0.6 MG/DL (ref 0.5–1)
EOSINOPHIL # BLD: 0.08 K/UL (ref 0.05–0.5)
EOSINOPHILS RELATIVE PERCENT: 2 % (ref 0–6)
ERYTHROCYTE [DISTWIDTH] IN BLOOD BY AUTOMATED COUNT: 13.2 % (ref 11.5–15)
GFR SERPL CREATININE-BSD FRML MDRD: >60 ML/MIN/1.73M2
GLUCOSE SERPL-MCNC: 86 MG/DL (ref 74–99)
HCT VFR BLD AUTO: 32.3 % (ref 34–48)
HGB BLD-MCNC: 10.6 G/DL (ref 11.5–15.5)
IMM GRANULOCYTES # BLD AUTO: <0.03 K/UL (ref 0–0.58)
IMM GRANULOCYTES NFR BLD: 0 % (ref 0–5)
LYMPHOCYTES NFR BLD: 1.03 K/UL (ref 1.5–4)
LYMPHOCYTES RELATIVE PERCENT: 22 % (ref 20–42)
MCH RBC QN AUTO: 29.2 PG (ref 26–35)
MCHC RBC AUTO-ENTMCNC: 32.8 G/DL (ref 32–34.5)
MCV RBC AUTO: 89 FL (ref 80–99.9)
MONOCYTES NFR BLD: 0.48 K/UL (ref 0.1–0.95)
MONOCYTES NFR BLD: 10 % (ref 2–12)
NEUTROPHILS NFR BLD: 65 % (ref 43–80)
NEUTS SEG NFR BLD: 3.06 K/UL (ref 1.8–7.3)
PLATELET # BLD AUTO: 201 K/UL (ref 130–450)
PMV BLD AUTO: 8.8 FL (ref 7–12)
POTASSIUM SERPL-SCNC: 3.9 MMOL/L (ref 3.5–5)
RBC # BLD AUTO: 3.63 M/UL (ref 3.5–5.5)
SODIUM SERPL-SCNC: 136 MMOL/L (ref 132–146)
WBC OTHER # BLD: 4.7 K/UL (ref 4.5–11.5)

## 2023-08-28 PROCEDURE — 0PS43ZZ REPOSITION THORACIC VERTEBRA, PERCUTANEOUS APPROACH: ICD-10-PCS | Performed by: NEUROLOGICAL SURGERY

## 2023-08-28 PROCEDURE — 88311 DECALCIFY TISSUE: CPT

## 2023-08-28 PROCEDURE — 22513 PERQ VERTEBRAL AUGMENTATION: CPT | Performed by: NEUROLOGICAL SURGERY

## 2023-08-28 PROCEDURE — 0PU43JZ SUPPLEMENT THORACIC VERTEBRA WITH SYNTHETIC SUBSTITUTE, PERCUTANEOUS APPROACH: ICD-10-PCS | Performed by: NEUROLOGICAL SURGERY

## 2023-08-28 PROCEDURE — 6360000002 HC RX W HCPCS: Performed by: NEUROLOGICAL SURGERY

## 2023-08-28 PROCEDURE — 2580000003 HC RX 258

## 2023-08-28 PROCEDURE — 2500000003 HC RX 250 WO HCPCS: Performed by: NEUROLOGICAL SURGERY

## 2023-08-28 PROCEDURE — 2500000003 HC RX 250 WO HCPCS

## 2023-08-28 PROCEDURE — 0QB03ZX EXCISION OF LUMBAR VERTEBRA, PERCUTANEOUS APPROACH, DIAGNOSTIC: ICD-10-PCS | Performed by: NEUROLOGICAL SURGERY

## 2023-08-28 PROCEDURE — 6360000002 HC RX W HCPCS: Performed by: FAMILY MEDICINE

## 2023-08-28 PROCEDURE — 0QU03JZ SUPPLEMENT LUMBAR VERTEBRA WITH SYNTHETIC SUBSTITUTE, PERCUTANEOUS APPROACH: ICD-10-PCS | Performed by: NEUROLOGICAL SURGERY

## 2023-08-28 PROCEDURE — 7100000001 HC PACU RECOVERY - ADDTL 15 MIN: Performed by: NEUROLOGICAL SURGERY

## 2023-08-28 PROCEDURE — 1200000000 HC SEMI PRIVATE

## 2023-08-28 PROCEDURE — 3700000000 HC ANESTHESIA ATTENDED CARE: Performed by: NEUROLOGICAL SURGERY

## 2023-08-28 PROCEDURE — 3600000004 HC SURGERY LEVEL 4 BASE: Performed by: NEUROLOGICAL SURGERY

## 2023-08-28 PROCEDURE — C9399 UNCLASSIFIED DRUGS OR BIOLOG: HCPCS

## 2023-08-28 PROCEDURE — 0PB43ZX EXCISION OF THORACIC VERTEBRA, PERCUTANEOUS APPROACH, DIAGNOSTIC: ICD-10-PCS | Performed by: NEUROLOGICAL SURGERY

## 2023-08-28 PROCEDURE — 6370000000 HC RX 637 (ALT 250 FOR IP): Performed by: FAMILY MEDICINE

## 2023-08-28 PROCEDURE — 7100000000 HC PACU RECOVERY - FIRST 15 MIN: Performed by: NEUROLOGICAL SURGERY

## 2023-08-28 PROCEDURE — 2580000003 HC RX 258: Performed by: NEUROLOGICAL SURGERY

## 2023-08-28 PROCEDURE — 0QS03ZZ REPOSITION LUMBAR VERTEBRA, PERCUTANEOUS APPROACH: ICD-10-PCS | Performed by: NEUROLOGICAL SURGERY

## 2023-08-28 PROCEDURE — C1894 INTRO/SHEATH, NON-LASER: HCPCS | Performed by: NEUROLOGICAL SURGERY

## 2023-08-28 PROCEDURE — 3700000001 HC ADD 15 MINUTES (ANESTHESIA): Performed by: NEUROLOGICAL SURGERY

## 2023-08-28 PROCEDURE — 80048 BASIC METABOLIC PNL TOTAL CA: CPT

## 2023-08-28 PROCEDURE — 22515 PERQ VERTEBRAL AUGMENTATION: CPT | Performed by: NEUROLOGICAL SURGERY

## 2023-08-28 PROCEDURE — 36415 COLL VENOUS BLD VENIPUNCTURE: CPT

## 2023-08-28 PROCEDURE — 85025 COMPLETE CBC W/AUTO DIFF WBC: CPT

## 2023-08-28 PROCEDURE — 3600000014 HC SURGERY LEVEL 4 ADDTL 15MIN: Performed by: NEUROLOGICAL SURGERY

## 2023-08-28 PROCEDURE — 93005 ELECTROCARDIOGRAM TRACING: CPT | Performed by: NEUROLOGICAL SURGERY

## 2023-08-28 PROCEDURE — C1713 ANCHOR/SCREW BN/BN,TIS/BN: HCPCS | Performed by: NEUROLOGICAL SURGERY

## 2023-08-28 PROCEDURE — 88307 TISSUE EXAM BY PATHOLOGIST: CPT

## 2023-08-28 PROCEDURE — 2720000010 HC SURG SUPPLY STERILE: Performed by: NEUROLOGICAL SURGERY

## 2023-08-28 PROCEDURE — 6360000002 HC RX W HCPCS

## 2023-08-28 PROCEDURE — 2709999900 HC NON-CHARGEABLE SUPPLY: Performed by: NEUROLOGICAL SURGERY

## 2023-08-28 DEVICE — CEMENT C01A KYPHX HV-R BONE CEMENT EN
Type: IMPLANTABLE DEVICE | Site: BACK | Status: FUNCTIONAL
Brand: KYPHON® HV-R® BONE CEMENT

## 2023-08-28 RX ORDER — BUPIVACAINE HYDROCHLORIDE 2.5 MG/ML
INJECTION, SOLUTION EPIDURAL; INFILTRATION; INTRACAUDAL PRN
Status: DISCONTINUED | OUTPATIENT
Start: 2023-08-28 | End: 2023-08-28 | Stop reason: ALTCHOICE

## 2023-08-28 RX ORDER — SODIUM CHLORIDE 9 MG/ML
INJECTION, SOLUTION INTRAVENOUS PRN
Status: DISCONTINUED | OUTPATIENT
Start: 2023-08-28 | End: 2023-08-28 | Stop reason: HOSPADM

## 2023-08-28 RX ORDER — HYDROMORPHONE HYDROCHLORIDE 1 MG/ML
0.5 INJECTION, SOLUTION INTRAMUSCULAR; INTRAVENOUS; SUBCUTANEOUS EVERY 5 MIN PRN
Status: DISCONTINUED | OUTPATIENT
Start: 2023-08-28 | End: 2023-08-28 | Stop reason: HOSPADM

## 2023-08-28 RX ORDER — ROCURONIUM BROMIDE 10 MG/ML
INJECTION, SOLUTION INTRAVENOUS PRN
Status: DISCONTINUED | OUTPATIENT
Start: 2023-08-28 | End: 2023-08-28 | Stop reason: SDUPTHER

## 2023-08-28 RX ORDER — DEXAMETHASONE SODIUM PHOSPHATE 10 MG/ML
INJECTION INTRAMUSCULAR; INTRAVENOUS PRN
Status: DISCONTINUED | OUTPATIENT
Start: 2023-08-28 | End: 2023-08-28 | Stop reason: SDUPTHER

## 2023-08-28 RX ORDER — PROPOFOL 10 MG/ML
INJECTION, EMULSION INTRAVENOUS PRN
Status: DISCONTINUED | OUTPATIENT
Start: 2023-08-28 | End: 2023-08-28 | Stop reason: SDUPTHER

## 2023-08-28 RX ORDER — SODIUM CHLORIDE 0.9 % (FLUSH) 0.9 %
5-40 SYRINGE (ML) INJECTION EVERY 12 HOURS SCHEDULED
Status: DISCONTINUED | OUTPATIENT
Start: 2023-08-28 | End: 2023-08-28 | Stop reason: HOSPADM

## 2023-08-28 RX ORDER — LIDOCAINE HYDROCHLORIDE 20 MG/ML
INJECTION, SOLUTION INTRAVENOUS PRN
Status: DISCONTINUED | OUTPATIENT
Start: 2023-08-28 | End: 2023-08-28 | Stop reason: SDUPTHER

## 2023-08-28 RX ORDER — CEFAZOLIN SODIUM 1 G/3ML
INJECTION, POWDER, FOR SOLUTION INTRAMUSCULAR; INTRAVENOUS PRN
Status: DISCONTINUED | OUTPATIENT
Start: 2023-08-28 | End: 2023-08-28 | Stop reason: SDUPTHER

## 2023-08-28 RX ORDER — LIDOCAINE HYDROCHLORIDE AND EPINEPHRINE 5; 5 MG/ML; UG/ML
INJECTION, SOLUTION INFILTRATION; PERINEURAL PRN
Status: DISCONTINUED | OUTPATIENT
Start: 2023-08-28 | End: 2023-08-28 | Stop reason: ALTCHOICE

## 2023-08-28 RX ORDER — FENTANYL CITRATE 50 UG/ML
INJECTION, SOLUTION INTRAMUSCULAR; INTRAVENOUS PRN
Status: DISCONTINUED | OUTPATIENT
Start: 2023-08-28 | End: 2023-08-28 | Stop reason: SDUPTHER

## 2023-08-28 RX ORDER — LANOLIN ALCOHOL/MO/W.PET/CERES
800 CREAM (GRAM) TOPICAL NIGHTLY
Status: DISCONTINUED | OUTPATIENT
Start: 2023-08-28 | End: 2023-08-30 | Stop reason: HOSPADM

## 2023-08-28 RX ORDER — ONDANSETRON 2 MG/ML
INJECTION INTRAMUSCULAR; INTRAVENOUS PRN
Status: DISCONTINUED | OUTPATIENT
Start: 2023-08-28 | End: 2023-08-28 | Stop reason: SDUPTHER

## 2023-08-28 RX ORDER — LABETALOL HYDROCHLORIDE 5 MG/ML
INJECTION, SOLUTION INTRAVENOUS PRN
Status: DISCONTINUED | OUTPATIENT
Start: 2023-08-28 | End: 2023-08-28 | Stop reason: SDUPTHER

## 2023-08-28 RX ORDER — MEPERIDINE HYDROCHLORIDE 25 MG/ML
12.5 INJECTION INTRAMUSCULAR; INTRAVENOUS; SUBCUTANEOUS EVERY 5 MIN PRN
Status: DISCONTINUED | OUTPATIENT
Start: 2023-08-28 | End: 2023-08-28 | Stop reason: HOSPADM

## 2023-08-28 RX ORDER — SODIUM CHLORIDE 0.9 % (FLUSH) 0.9 %
5-40 SYRINGE (ML) INJECTION PRN
Status: DISCONTINUED | OUTPATIENT
Start: 2023-08-28 | End: 2023-08-28 | Stop reason: HOSPADM

## 2023-08-28 RX ORDER — SODIUM CHLORIDE 0.9 % (FLUSH) 0.9 %
SYRINGE (ML) INJECTION
Status: COMPLETED
Start: 2023-08-28 | End: 2023-08-28

## 2023-08-28 RX ADMIN — SODIUM CHLORIDE: 9 INJECTION, SOLUTION INTRAVENOUS at 10:09

## 2023-08-28 RX ADMIN — LISINOPRIL 10 MG: 10 TABLET ORAL at 07:46

## 2023-08-28 RX ADMIN — SUGAMMADEX 200 MG: 100 INJECTION, SOLUTION INTRAVENOUS at 10:35

## 2023-08-28 RX ADMIN — Medication 2000 UNITS: at 16:09

## 2023-08-28 RX ADMIN — Medication 800 MG: at 20:16

## 2023-08-28 RX ADMIN — DEXAMETHASONE SODIUM PHOSPHATE 10 MG: 10 INJECTION INTRAMUSCULAR; INTRAVENOUS at 10:16

## 2023-08-28 RX ADMIN — SODIUM CHLORIDE, PRESERVATIVE FREE 10 ML: 5 INJECTION INTRAVENOUS at 23:13

## 2023-08-28 RX ADMIN — FENTANYL CITRATE 50 MCG: 0.05 INJECTION, SOLUTION INTRAMUSCULAR; INTRAVENOUS at 10:11

## 2023-08-28 RX ADMIN — ONDANSETRON HYDROCHLORIDE 4 MG: 2 SOLUTION INTRAMUSCULAR; INTRAVENOUS at 10:16

## 2023-08-28 RX ADMIN — METOPROLOL SUCCINATE 25 MG: 25 TABLET, EXTENDED RELEASE ORAL at 07:46

## 2023-08-28 RX ADMIN — MORPHINE SULFATE 2 MG: 2 INJECTION, SOLUTION INTRAMUSCULAR; INTRAVENOUS at 07:45

## 2023-08-28 RX ADMIN — LIDOCAINE HYDROCHLORIDE 80 MG: 20 INJECTION, SOLUTION INTRAVENOUS at 10:11

## 2023-08-28 RX ADMIN — DIAZEPAM 5 MG: 5 TABLET ORAL at 23:12

## 2023-08-28 RX ADMIN — ROCURONIUM BROMIDE 30 MG: 10 INJECTION, SOLUTION INTRAVENOUS at 10:11

## 2023-08-28 RX ADMIN — LABETALOL HYDROCHLORIDE 10 MG: 5 INJECTION INTRAVENOUS at 10:33

## 2023-08-28 RX ADMIN — MORPHINE SULFATE 2 MG: 2 INJECTION, SOLUTION INTRAMUSCULAR; INTRAVENOUS at 02:04

## 2023-08-28 RX ADMIN — HYDROXYCHLOROQUINE SULFATE 200 MG: 200 TABLET ORAL at 07:46

## 2023-08-28 RX ADMIN — CEFAZOLIN 2 G: 1 INJECTION, POWDER, FOR SOLUTION INTRAMUSCULAR; INTRAVENOUS at 10:11

## 2023-08-28 RX ADMIN — PROPOFOL 100 MG: 10 INJECTION, EMULSION INTRAVENOUS at 10:11

## 2023-08-28 RX ADMIN — CYANOCOBALAMIN TAB 1000 MCG 500 MCG: 1000 TAB at 16:09

## 2023-08-28 RX ADMIN — SODIUM CHLORIDE, PRESERVATIVE FREE 10 ML: 5 INJECTION INTRAVENOUS at 07:46

## 2023-08-28 ASSESSMENT — PAIN DESCRIPTION - ORIENTATION
ORIENTATION: POSTERIOR
ORIENTATION: MID;POSTERIOR

## 2023-08-28 ASSESSMENT — PAIN DESCRIPTION - DESCRIPTORS
DESCRIPTORS: ACHING;SHARP
DESCRIPTORS: ACHING

## 2023-08-28 ASSESSMENT — PAIN SCALES - GENERAL
PAINLEVEL_OUTOF10: 8
PAINLEVEL_OUTOF10: 5
PAINLEVEL_OUTOF10: 7

## 2023-08-28 ASSESSMENT — PAIN - FUNCTIONAL ASSESSMENT: PAIN_FUNCTIONAL_ASSESSMENT: PREVENTS OR INTERFERES SOME ACTIVE ACTIVITIES AND ADLS

## 2023-08-28 ASSESSMENT — ENCOUNTER SYMPTOMS
ABDOMINAL PAIN: 0
SHORTNESS OF BREATH: 0
BACK PAIN: 1

## 2023-08-28 ASSESSMENT — PAIN DESCRIPTION - PAIN TYPE: TYPE: ACUTE PAIN

## 2023-08-28 ASSESSMENT — PAIN DESCRIPTION - LOCATION
LOCATION: BACK
LOCATION: BACK

## 2023-08-28 NOTE — CARE COORDINATION
8/28. Met with the pt at the bedside. She is for Kyphoplasty today. She lives alone. Her sister helps her. Explained that she will be evaluated by PT/OT tomorrow to assist with discharge planning. She would like to return home but is agreeable to skilled nursing if needed. Vernon Brito RN  Case Management Assessment  Initial Evaluation    Date/Time of Evaluation: 8/28/2023 10:08 AM  Assessment Completed by: Vernon Brito RN    If patient is discharged prior to next notation, then this note serves as note for discharge by case management. Patient Name: Scott Guerrero                   YOB: 1940  Diagnosis: Compression fracture of body of thoracic vertebra ([address]) [U85.343B]                   Date / Time: 8/25/2023  2:24 PM    Patient Admission Status: Inpatient   Readmission Risk (Low < 19, Mod (19-27), High > 27): Readmission Risk Score: 14.1    Current PCP: Sammy Rizo MD  PCP verified by CM? Yes    Chart Reviewed: Yes      History Provided by:    Patient Orientation: Alert and Oriented    Patient Cognition: Alert    Hospitalization in the last 30 days (Readmission):  No    If yes, Readmission Assessment in CM Navigator will be completed. Advance Directives:      Code Status: Full Code   Patient's Primary Decision Maker is: Named in Scanned ACP Document    Primary Decision Maker: Delvis Vickers - Child - [phone]    Secondary Decision Maker: Ruth Bryant - Brother/Sister - [phone]    Discharge Planning:    Patient lives with: Alone Type of Home: House  Primary Care Giver: Self  Patient Support Systems include: Family Members   Current Financial resources:    Current community resources:    Current services prior to admission: None            Current DME:              Type of Home Care services:  None    ADLS  Prior functional level:  Independent in ADLs/IADLs  Current functional level: Assistance with the following:, Toileting, Cooking, Housework, Shopping, Mobility    PT

## 2023-08-28 NOTE — BRIEF OP NOTE
Brief Postoperative Note      Patient: Carlos Garcia  YOB: 1940  MRN: [id number]    Date of Procedure: 8/28/2023    Pre-Op Diagnosis Codes:     * Compression fracture of T12 vertebra, initial encounter ([address]) [K90.746M]    Post-Op Diagnosis: Same       Procedure(s):  T12, L3, L4 KYPHOPLASTY    Surgeon(s):  Jessi Martins MD    Assistant:  * No surgical staff found *    Anesthesia: General    Estimated Blood Loss (mL): Minimal    Complications: None    Specimens:   ID Type Source Tests Collected by Time Destination   A : T12, L3, L4 BONE BIOPSY Tissue Spine SURGICAL PATHOLOGY Jessi Martins MD 8/28/2023 1037        Implants:  Implant Name Type Inv.  Item Serial No.  Lot No. LRB No. Used Action   CEMENT BNE HI VISC RADIOPAQUE KYPHON HV-R - [id number]  CEMENT BNE HI VISC RADIOPAQUE KYPHON HV-R  [facility] [id number] N/A 1 Implanted         Drains: * No LDAs found *    Findings: see dictated op note      Electronically signed by Kaushik Nichols MD on 8/28/2023 at 12:14 PM

## 2023-08-28 NOTE — ANESTHESIA POSTPROCEDURE EVALUATION
Department of Anesthesiology  Postprocedure Note    Patient: Claudeen Levine  MRN: [id number]  YOB: 1940  Date of evaluation: 8/28/2023      Procedure Summary     Date: 08/28/23 Room / Location: [facility] [address] [facility]    Anesthesia Start: 8552 Anesthesia Stop:     Procedure: T12, L3, L4 KYPHOPLASTY (Back) Diagnosis:       Compression fracture of T12 vertebra, initial encounter ([address])      (Compression fracture of T12 vertebra, initial encounter ([address]) [F54.389M])    Surgeons: Bianca Temple MD Responsible Provider: Blake Best MD    Anesthesia Type: general ASA Status: 3          Anesthesia Type: No value filed.     Gómez Phase I: Gómez Score: 8    Gómez Phase II:        Anesthesia Post Evaluation    Patient location during evaluation: PACU  Patient participation: complete - patient participated  Level of consciousness: awake  Pain score: 3  Airway patency: patent  Nausea & Vomiting: no nausea and no vomiting  Complications: no  Cardiovascular status: blood pressure returned to baseline  Respiratory status: acceptable  Hydration status: euvolemic

## 2023-08-28 NOTE — PLAN OF CARE
Problem: Discharge Planning  Goal: Discharge to home or other facility with appropriate resources  8/28/2023 0034 by Tam Lee RN  Outcome: Progressing  8/27/2023 1200 by Kika Patel RN  Outcome: Progressing     Problem: Safety - Adult  Goal: Free from fall injury  8/28/2023 0034 by Tam Lee RN  Outcome: Progressing  8/27/2023 1200 by Kika Patel RN  Outcome: Progressing     Problem: Pain  Goal: Verbalizes/displays adequate comfort level or baseline comfort level  8/28/2023 0034 by Tam Lee RN  Outcome: Progressing  Flowsheets (Taken 8/27/2023 2015)  Verbalizes/displays adequate comfort level or baseline comfort level: Encourage patient to monitor pain and request assistance  8/27/2023 1200 by Kika Patel RN  Outcome: Progressing     Problem: Skin/Tissue Integrity  Goal: Absence of new skin breakdown  Description: 1. Monitor for areas of redness and/or skin breakdown  2. Assess vascular access sites hourly  3. Every 4-6 hours minimum:  Change oxygen saturation probe site  4. Every 4-6 hours:  If on nasal continuous positive airway pressure, respiratory therapy assess nares and determine need for appliance change or resting period.   8/28/2023 0034 by Tam Lee RN  Outcome: Progressing  8/27/2023 1200 by Kika Patel RN  Outcome: Progressing

## 2023-08-28 NOTE — ANESTHESIA PRE PROCEDURE
discussed with CRNA and attending. Riana Smith RN   8/28/2023    Pt seen, examined, chart reviewed, plan discussed.   José Miguel Yoo MD  8/28/2023  10:29 AM

## 2023-08-29 LAB
EKG ATRIAL RATE: 69 BPM
EKG P AXIS: 84 DEGREES
EKG P-R INTERVAL: 158 MS
EKG Q-T INTERVAL: 380 MS
EKG QRS DURATION: 80 MS
EKG QTC CALCULATION (BAZETT): 407 MS
EKG R AXIS: 32 DEGREES
EKG T AXIS: 66 DEGREES
EKG VENTRICULAR RATE: 69 BPM

## 2023-08-29 PROCEDURE — 97530 THERAPEUTIC ACTIVITIES: CPT

## 2023-08-29 PROCEDURE — 93010 ELECTROCARDIOGRAM REPORT: CPT | Performed by: INTERNAL MEDICINE

## 2023-08-29 PROCEDURE — 6360000002 HC RX W HCPCS: Performed by: FAMILY MEDICINE

## 2023-08-29 PROCEDURE — 97161 PT EVAL LOW COMPLEX 20 MIN: CPT

## 2023-08-29 PROCEDURE — 97165 OT EVAL LOW COMPLEX 30 MIN: CPT

## 2023-08-29 PROCEDURE — 1200000000 HC SEMI PRIVATE

## 2023-08-29 PROCEDURE — 6370000000 HC RX 637 (ALT 250 FOR IP): Performed by: FAMILY MEDICINE

## 2023-08-29 RX ADMIN — Medication 2000 UNITS: at 12:56

## 2023-08-29 RX ADMIN — MORPHINE SULFATE 2 MG: 2 INJECTION, SOLUTION INTRAMUSCULAR; INTRAVENOUS at 11:19

## 2023-08-29 RX ADMIN — Medication 800 MG: at 19:48

## 2023-08-29 RX ADMIN — METOPROLOL SUCCINATE 25 MG: 25 TABLET, EXTENDED RELEASE ORAL at 08:22

## 2023-08-29 RX ADMIN — HYDROXYCHLOROQUINE SULFATE 200 MG: 200 TABLET ORAL at 08:22

## 2023-08-29 RX ADMIN — MORPHINE SULFATE 2 MG: 2 INJECTION, SOLUTION INTRAMUSCULAR; INTRAVENOUS at 23:17

## 2023-08-29 RX ADMIN — CYANOCOBALAMIN TAB 1000 MCG 500 MCG: 1000 TAB at 12:57

## 2023-08-29 RX ADMIN — LISINOPRIL 10 MG: 10 TABLET ORAL at 08:22

## 2023-08-29 ASSESSMENT — ENCOUNTER SYMPTOMS
ABDOMINAL PAIN: 0
SHORTNESS OF BREATH: 0
BACK PAIN: 1

## 2023-08-29 ASSESSMENT — PAIN DESCRIPTION - LOCATION
LOCATION: BACK
LOCATION: BACK

## 2023-08-29 ASSESSMENT — PAIN - FUNCTIONAL ASSESSMENT
PAIN_FUNCTIONAL_ASSESSMENT: ACTIVITIES ARE NOT PREVENTED
PAIN_FUNCTIONAL_ASSESSMENT: ACTIVITIES ARE NOT PREVENTED

## 2023-08-29 ASSESSMENT — PAIN DESCRIPTION - DESCRIPTORS
DESCRIPTORS: ACHING;DISCOMFORT;STABBING
DESCRIPTORS: ACHING;DISCOMFORT;SORE

## 2023-08-29 ASSESSMENT — PAIN SCALES - GENERAL
PAINLEVEL_OUTOF10: 0
PAINLEVEL_OUTOF10: 6
PAINLEVEL_OUTOF10: 0
PAINLEVEL_OUTOF10: 9
PAINLEVEL_OUTOF10: 7

## 2023-08-29 ASSESSMENT — PAIN DESCRIPTION - FREQUENCY
FREQUENCY: CONTINUOUS
FREQUENCY: INTERMITTENT

## 2023-08-29 ASSESSMENT — PAIN DESCRIPTION - ONSET
ONSET: GRADUAL
ONSET: ON-GOING

## 2023-08-29 ASSESSMENT — PAIN DESCRIPTION - PAIN TYPE
TYPE: SURGICAL PAIN
TYPE: SURGICAL PAIN

## 2023-08-29 ASSESSMENT — PAIN DESCRIPTION - ORIENTATION
ORIENTATION: LOWER
ORIENTATION: LOWER

## 2023-08-29 NOTE — OP NOTE
stylet. I left the outer  working cannula in place. I inserted a bone biopsy tool. After  obtaining a biopsy, I inserted Medtronic Kyphon balloon that was  inflated to 340 PSI. I deflated the balloon. I injected 4.5 mL of  polymethylmethacrylate into the vertebral body. I removed the outer  working cannula and closed the skin with Dermabond. Next, I identified  the entry point to L4 pedicle on the patient's left side. I infiltrated  the skin with lidocaine with epinephrine 1:100,000. I used a 15-blade  to make a stab incision. I docked [employer]iash[employer]ex[employer] One-Step Osteo Introducer on  the facet, advanced into the pedicle, into the vertebral body. I  removed the outer working cannula. I then proceeded to then insert a  biopsy tool. After obtaining a biopsy, I inserted Medtronic Kyphon  balloon that was inflated to 250 PSI, deflated balloon, injected a total  of 4.5 mL of polymethylmethacrylate into the vertebral body. After this  was done, I removed the outer working cannula. I closed the skin with  Dermabond. I obtained a final AP and lateral fluoroscopic images. There was no evidence of extravasation of cement.  _____ Band-Aids were  placed over all three incisions. The patient was then flipped in supine  position on her hospital bed, was extubated, transported to the  postanesthesia care unit in stable condition. There were no  complications. Counts were correct. I was present for the entire case.   Estimated blood loss minimal.        Je Covarrubias MD    D: 08/28/2023 20:27:01       T: 08/28/2023 20:29:40     [email or web address]  Job#: [id number]     Doc#: [id number]    CC:

## 2023-08-29 NOTE — CARE COORDINATION
8/29. Worked with PT/OT. The performed well. Discharge plan is home when medically stable.  Tia Reyes RN

## 2023-08-30 VITALS
BODY MASS INDEX: 21 KG/M2 | DIASTOLIC BLOOD PRESSURE: 46 MMHG | TEMPERATURE: 96.8 F | OXYGEN SATURATION: 97 % | HEIGHT: 64 IN | RESPIRATION RATE: 16 BRPM | SYSTOLIC BLOOD PRESSURE: 120 MMHG | WEIGHT: 123 LBS | HEART RATE: 78 BPM

## 2023-08-30 PROCEDURE — 6370000000 HC RX 637 (ALT 250 FOR IP): Performed by: NEUROLOGICAL SURGERY

## 2023-08-30 PROCEDURE — 6370000000 HC RX 637 (ALT 250 FOR IP): Performed by: FAMILY MEDICINE

## 2023-08-30 RX ORDER — PSEUDOEPHEDRINE HCL 30 MG
200 TABLET ORAL DAILY
Qty: 60 CAPSULE | Refills: 0 | Status: SHIPPED | OUTPATIENT
Start: 2023-08-31

## 2023-08-30 RX ORDER — DIAZEPAM 5 MG/1
5 TABLET ORAL EVERY 6 HOURS PRN
Qty: 28 TABLET | Refills: 0 | Status: SHIPPED | OUTPATIENT
Start: 2023-08-30 | End: 2023-09-06

## 2023-08-30 RX ORDER — DOCUSATE SODIUM 100 MG/1
200 CAPSULE, LIQUID FILLED ORAL DAILY
Status: DISCONTINUED | OUTPATIENT
Start: 2023-08-30 | End: 2023-08-30 | Stop reason: HOSPADM

## 2023-08-30 RX ORDER — OXYCODONE HYDROCHLORIDE 5 MG/1
5 TABLET ORAL EVERY 4 HOURS PRN
Qty: 42 TABLET | Refills: 0 | Status: SHIPPED | OUTPATIENT
Start: 2023-08-30 | End: 2023-09-06

## 2023-08-30 RX ORDER — OXYCODONE HYDROCHLORIDE 5 MG/1
5 TABLET ORAL EVERY 4 HOURS PRN
Status: DISCONTINUED | OUTPATIENT
Start: 2023-08-30 | End: 2023-08-30 | Stop reason: HOSPADM

## 2023-08-30 RX ADMIN — CYANOCOBALAMIN TAB 1000 MCG 500 MCG: 1000 TAB at 11:38

## 2023-08-30 RX ADMIN — LISINOPRIL 10 MG: 10 TABLET ORAL at 08:19

## 2023-08-30 RX ADMIN — OXYCODONE HYDROCHLORIDE 5 MG: 5 TABLET ORAL at 11:36

## 2023-08-30 RX ADMIN — METOPROLOL SUCCINATE 25 MG: 25 TABLET, EXTENDED RELEASE ORAL at 08:19

## 2023-08-30 RX ADMIN — DOCUSATE SODIUM 200 MG: 100 CAPSULE, LIQUID FILLED ORAL at 11:37

## 2023-08-30 RX ADMIN — HYDROXYCHLOROQUINE SULFATE 200 MG: 200 TABLET ORAL at 08:19

## 2023-08-30 RX ADMIN — Medication 2000 UNITS: at 11:38

## 2023-08-30 RX ADMIN — PSYLLIUM HUSK 1 PACKET: 3.4 POWDER ORAL at 11:38

## 2023-08-30 ASSESSMENT — PAIN SCALES - GENERAL
PAINLEVEL_OUTOF10: 6
PAINLEVEL_OUTOF10: 0

## 2023-08-30 ASSESSMENT — PAIN DESCRIPTION - LOCATION: LOCATION: BACK

## 2023-08-30 ASSESSMENT — PAIN DESCRIPTION - ORIENTATION: ORIENTATION: MID

## 2023-08-30 ASSESSMENT — ENCOUNTER SYMPTOMS
ABDOMINAL PAIN: 0
BACK PAIN: 1
SHORTNESS OF BREATH: 0

## 2023-08-30 ASSESSMENT — PAIN DESCRIPTION - DESCRIPTORS: DESCRIPTORS: ACHING;BURNING;THROBBING

## 2023-08-30 ASSESSMENT — PAIN - FUNCTIONAL ASSESSMENT: PAIN_FUNCTIONAL_ASSESSMENT: PREVENTS OR INTERFERES SOME ACTIVE ACTIVITIES AND ADLS

## 2023-08-30 ASSESSMENT — PAIN SCALES - WONG BAKER: WONGBAKER_NUMERICALRESPONSE: 0

## 2023-08-30 NOTE — CARE COORDINATION
8/30. Discharge plan is home. The pt's son is home from [address]. He will provide transportation.  Nathalia Roque RN

## 2023-08-30 NOTE — PLAN OF CARE
Problem: Discharge Planning  Goal: Discharge to home or other facility with appropriate resources  8/30/2023 0914 by Kika Patel RN  Outcome: Progressing  8/29/2023 2333 by Willam Camacho RN  Outcome: Progressing     Problem: Safety - Adult  Goal: Free from fall injury  8/30/2023 0914 by Kika Patel RN  Outcome: Progressing  8/29/2023 2333 by Willam Camacho RN  Outcome: Progressing     Problem: Pain  Goal: Verbalizes/displays adequate comfort level or baseline comfort level  8/30/2023 0914 by Kika Patel RN  Outcome: Progressing  8/29/2023 2333 by Willam Camacho RN  Outcome: Progressing     Problem: Skin/Tissue Integrity  Goal: Absence of new skin breakdown  Description: 1. Monitor for areas of redness and/or skin breakdown  2. Assess vascular access sites hourly  3. Every 4-6 hours minimum:  Change oxygen saturation probe site  4. Every 4-6 hours:  If on nasal continuous positive airway pressure, respiratory therapy assess nares and determine need for appliance change or resting period.   8/30/2023 0914 by Kika Patel RN  Outcome: Progressing  8/29/2023 2333 by Willam Camacho RN  Outcome: Progressing

## 2023-08-30 NOTE — ACP (ADVANCE CARE PLANNING)
Advance Care Planning     General Advance Care Planning (ACP) Conversation    Date of Conversation: 8/25/2023  Conducted with: Patient with Decision Making Capacity    Healthcare Decision Maker:    Primary Decision Maker: [facility] - Child - [phone]    Secondary Decision Maker: Nadir Bryantothy - Brother/Sister - [phone]  Click here to complete Healthcare Decision Makers including selection of the Healthcare Decision Maker Relationship (ie \"Primary\"). Content/Action Overview:   Has ACP document(s) on file - reflects the patient's care preferences  Reviewed DNR/DNI and patient elects Full Code (Attempt Resuscitation)        Length of Voluntary ACP Conversation in minutes:      Khris Ricardo RN

## 2023-08-30 NOTE — PLAN OF CARE
Problem: Discharge Planning  Goal: Discharge to home or other facility with appropriate resources  8/29/2023 2333 by Rima Metz RN  Outcome: Progressing  8/29/2023 1018 by Neal Mcrae RN  Outcome: Progressing     Problem: Safety - Adult  Goal: Free from fall injury  8/29/2023 2333 by Rima Metz RN  Outcome: Progressing  8/29/2023 1018 by Neal Mcrae RN  Outcome: Progressing     Problem: Pain  Goal: Verbalizes/displays adequate comfort level or baseline comfort level  8/29/2023 2333 by Rima Metz RN  Outcome: Progressing  8/29/2023 1018 by Neal Mcrae RN  Outcome: Progressing     Problem: Skin/Tissue Integrity  Goal: Absence of new skin breakdown  Description: 1. Monitor for areas of redness and/or skin breakdown  2. Assess vascular access sites hourly  3. Every 4-6 hours minimum:  Change oxygen saturation probe site  4. Every 4-6 hours:  If on nasal continuous positive airway pressure, respiratory therapy assess nares and determine need for appliance change or resting period.   8/29/2023 2333 by Rima Metz RN  Outcome: Progressing  8/29/2023 1018 by Neal Mcrae RN  Outcome: Progressing

## 2023-09-01 LAB — SURGICAL PATHOLOGY REPORT: NORMAL

## 2023-09-06 ENCOUNTER — HOSPITAL ENCOUNTER (INPATIENT)
Age: 83
LOS: 3 days | Discharge: HOME | DRG: 948 | End: 2023-09-09
Attending: EMERGENCY MEDICINE | Admitting: FAMILY MEDICINE
Payer: MEDICARE

## 2023-09-06 ENCOUNTER — APPOINTMENT (OUTPATIENT)
Dept: CT IMAGING | Age: 83
DRG: 948 | End: 2023-09-06
Payer: MEDICARE

## 2023-09-06 DIAGNOSIS — R26.2 UNABLE TO AMBULATE: ICD-10-CM

## 2023-09-06 DIAGNOSIS — G89.18 POST-OPERATIVE PAIN: Primary | ICD-10-CM

## 2023-09-06 LAB
ALBUMIN SERPL-MCNC: 3.8 G/DL (ref 3.5–5.2)
ALP SERPL-CCNC: 96 U/L (ref 35–104)
ALT SERPL-CCNC: 12 U/L (ref 0–32)
ANION GAP SERPL CALCULATED.3IONS-SCNC: 9 MMOL/L (ref 7–16)
AST SERPL-CCNC: 17 U/L (ref 0–31)
BASOPHILS # BLD: 0.04 K/UL (ref 0–0.2)
BASOPHILS NFR BLD: 1 % (ref 0–2)
BILIRUB SERPL-MCNC: 0.4 MG/DL (ref 0–1.2)
BNP SERPL-MCNC: 330 PG/ML (ref 0–450)
BUN SERPL-MCNC: 8 MG/DL (ref 6–23)
CALCIUM SERPL-MCNC: 9 MG/DL (ref 8.6–10.2)
CHLORIDE SERPL-SCNC: 99 MMOL/L (ref 98–107)
CO2 SERPL-SCNC: 27 MMOL/L (ref 22–29)
CREAT SERPL-MCNC: 0.6 MG/DL (ref 0.5–1)
EKG ATRIAL RATE: 83 BPM
EKG P AXIS: 89 DEGREES
EKG P-R INTERVAL: 162 MS
EKG Q-T INTERVAL: 348 MS
EKG QRS DURATION: 78 MS
EKG QTC CALCULATION (BAZETT): 408 MS
EKG R AXIS: 38 DEGREES
EKG T AXIS: 66 DEGREES
EKG VENTRICULAR RATE: 83 BPM
EOSINOPHIL # BLD: 0.02 K/UL (ref 0.05–0.5)
EOSINOPHILS RELATIVE PERCENT: 1 % (ref 0–6)
ERYTHROCYTE [DISTWIDTH] IN BLOOD BY AUTOMATED COUNT: 13.4 % (ref 11.5–15)
GFR SERPL CREATININE-BSD FRML MDRD: >60 ML/MIN/1.73M2
GLUCOSE SERPL-MCNC: 104 MG/DL (ref 74–99)
HCT VFR BLD AUTO: 34.3 % (ref 34–48)
HGB BLD-MCNC: 11.1 G/DL (ref 11.5–15.5)
IMM GRANULOCYTES # BLD AUTO: <0.03 K/UL (ref 0–0.58)
IMM GRANULOCYTES NFR BLD: 1 % (ref 0–5)
LYMPHOCYTES NFR BLD: 0.73 K/UL (ref 1.5–4)
LYMPHOCYTES RELATIVE PERCENT: 17 % (ref 20–42)
MCH RBC QN AUTO: 28.6 PG (ref 26–35)
MCHC RBC AUTO-ENTMCNC: 32.4 G/DL (ref 32–34.5)
MCV RBC AUTO: 88.4 FL (ref 80–99.9)
MONOCYTES NFR BLD: 0.33 K/UL (ref 0.1–0.95)
MONOCYTES NFR BLD: 8 % (ref 2–12)
NEUTROPHILS NFR BLD: 74 % (ref 43–80)
NEUTS SEG NFR BLD: 3.23 K/UL (ref 1.8–7.3)
PLATELET # BLD AUTO: 301 K/UL (ref 130–450)
PMV BLD AUTO: 8.3 FL (ref 7–12)
POTASSIUM SERPL-SCNC: 4.2 MMOL/L (ref 3.5–5)
PROT SERPL-MCNC: 6.1 G/DL (ref 6.4–8.3)
RBC # BLD AUTO: 3.88 M/UL (ref 3.5–5.5)
SODIUM SERPL-SCNC: 135 MMOL/L (ref 132–146)
TROPONIN I SERPL HS-MCNC: 25 NG/L (ref 0–9)
TROPONIN I SERPL HS-MCNC: 25 NG/L (ref 0–9)
WBC OTHER # BLD: 4.4 K/UL (ref 4.5–11.5)

## 2023-09-06 PROCEDURE — 93005 ELECTROCARDIOGRAM TRACING: CPT

## 2023-09-06 PROCEDURE — 99285 EMERGENCY DEPT VISIT HI MDM: CPT

## 2023-09-06 PROCEDURE — 6360000002 HC RX W HCPCS: Performed by: FAMILY MEDICINE

## 2023-09-06 PROCEDURE — 72131 CT LUMBAR SPINE W/O DYE: CPT

## 2023-09-06 PROCEDURE — 6370000000 HC RX 637 (ALT 250 FOR IP): Performed by: FAMILY MEDICINE

## 2023-09-06 PROCEDURE — 84484 ASSAY OF TROPONIN QUANT: CPT

## 2023-09-06 PROCEDURE — 6360000002 HC RX W HCPCS

## 2023-09-06 PROCEDURE — 93010 ELECTROCARDIOGRAM REPORT: CPT | Performed by: INTERNAL MEDICINE

## 2023-09-06 PROCEDURE — 71275 CT ANGIOGRAPHY CHEST: CPT

## 2023-09-06 PROCEDURE — 1200000000 HC SEMI PRIVATE

## 2023-09-06 PROCEDURE — 96375 TX/PRO/DX INJ NEW DRUG ADDON: CPT

## 2023-09-06 PROCEDURE — 96374 THER/PROPH/DIAG INJ IV PUSH: CPT

## 2023-09-06 PROCEDURE — 85025 COMPLETE CBC W/AUTO DIFF WBC: CPT

## 2023-09-06 PROCEDURE — 80053 COMPREHEN METABOLIC PANEL: CPT

## 2023-09-06 PROCEDURE — 83880 ASSAY OF NATRIURETIC PEPTIDE: CPT

## 2023-09-06 PROCEDURE — 6360000004 HC RX CONTRAST MEDICATION: Performed by: RADIOLOGY

## 2023-09-06 PROCEDURE — 2580000003 HC RX 258: Performed by: RADIOLOGY

## 2023-09-06 RX ORDER — LANOLIN ALCOHOL/MO/W.PET/CERES
500 CREAM (GRAM) TOPICAL
Status: DISCONTINUED | OUTPATIENT
Start: 2023-09-07 | End: 2023-09-09 | Stop reason: HOSPADM

## 2023-09-06 RX ORDER — OXYCODONE HYDROCHLORIDE 5 MG/1
5 TABLET ORAL EVERY 4 HOURS PRN
Status: DISCONTINUED | OUTPATIENT
Start: 2023-09-06 | End: 2023-09-09 | Stop reason: HOSPADM

## 2023-09-06 RX ORDER — HYDROXYCHLOROQUINE SULFATE 200 MG/1
200 TABLET, FILM COATED ORAL
Status: DISCONTINUED | OUTPATIENT
Start: 2023-09-07 | End: 2023-09-09 | Stop reason: HOSPADM

## 2023-09-06 RX ORDER — VITAMIN B COMPLEX
2000 TABLET ORAL
Status: DISCONTINUED | OUTPATIENT
Start: 2023-09-07 | End: 2023-09-09 | Stop reason: HOSPADM

## 2023-09-06 RX ORDER — FENTANYL CITRATE 50 UG/ML
50 INJECTION, SOLUTION INTRAMUSCULAR; INTRAVENOUS ONCE
Status: COMPLETED | OUTPATIENT
Start: 2023-09-06 | End: 2023-09-06

## 2023-09-06 RX ORDER — SODIUM CHLORIDE 0.9 % (FLUSH) 0.9 %
10 SYRINGE (ML) INJECTION
Status: COMPLETED | OUTPATIENT
Start: 2023-09-06 | End: 2023-09-06

## 2023-09-06 RX ORDER — DIAZEPAM 5 MG/1
5 TABLET ORAL EVERY 6 HOURS PRN
Status: DISCONTINUED | OUTPATIENT
Start: 2023-09-06 | End: 2023-09-09 | Stop reason: HOSPADM

## 2023-09-06 RX ORDER — ACETAMINOPHEN 500 MG
500 TABLET ORAL EVERY 6 HOURS PRN
Status: DISCONTINUED | OUTPATIENT
Start: 2023-09-06 | End: 2023-09-09 | Stop reason: SDUPTHER

## 2023-09-06 RX ORDER — DOCUSATE SODIUM 100 MG/1
200 CAPSULE, LIQUID FILLED ORAL DAILY
Status: DISCONTINUED | OUTPATIENT
Start: 2023-09-06 | End: 2023-09-09 | Stop reason: HOSPADM

## 2023-09-06 RX ORDER — LISINOPRIL 10 MG/1
10 TABLET ORAL DAILY
Status: DISCONTINUED | OUTPATIENT
Start: 2023-09-06 | End: 2023-09-09 | Stop reason: HOSPADM

## 2023-09-06 RX ORDER — METOPROLOL SUCCINATE 25 MG/1
25 TABLET, EXTENDED RELEASE ORAL DAILY
Status: DISCONTINUED | OUTPATIENT
Start: 2023-09-06 | End: 2023-09-09 | Stop reason: HOSPADM

## 2023-09-06 RX ORDER — MORPHINE SULFATE 2 MG/ML
2 INJECTION, SOLUTION INTRAMUSCULAR; INTRAVENOUS EVERY 4 HOURS PRN
Status: DISCONTINUED | OUTPATIENT
Start: 2023-09-06 | End: 2023-09-09 | Stop reason: HOSPADM

## 2023-09-06 RX ORDER — LANOLIN ALCOHOL/MO/W.PET/CERES
400 CREAM (GRAM) TOPICAL NIGHTLY
Status: DISCONTINUED | OUTPATIENT
Start: 2023-09-06 | End: 2023-09-09 | Stop reason: HOSPADM

## 2023-09-06 RX ADMIN — MORPHINE SULFATE 2 MG: 2 INJECTION, SOLUTION INTRAMUSCULAR; INTRAVENOUS at 17:28

## 2023-09-06 RX ADMIN — FENTANYL CITRATE 50 MCG: 50 INJECTION, SOLUTION INTRAMUSCULAR; INTRAVENOUS at 11:37

## 2023-09-06 RX ADMIN — Medication 10 ML: at 13:04

## 2023-09-06 RX ADMIN — IOPAMIDOL 75 ML: 755 INJECTION, SOLUTION INTRAVENOUS at 13:04

## 2023-09-06 RX ADMIN — Medication 400 MG: at 20:39

## 2023-09-06 RX ADMIN — OXYCODONE HYDROCHLORIDE 5 MG: 5 TABLET ORAL at 22:28

## 2023-09-06 RX ADMIN — DIAZEPAM 5 MG: 5 TABLET ORAL at 20:39

## 2023-09-06 RX ADMIN — APIXABAN 2.5 MG: 2.5 TABLET, FILM COATED ORAL at 21:40

## 2023-09-06 ASSESSMENT — PAIN DESCRIPTION - LOCATION
LOCATION: CHEST
LOCATION: BACK
LOCATION: BACK

## 2023-09-06 ASSESSMENT — ENCOUNTER SYMPTOMS
BACK PAIN: 1
SHORTNESS OF BREATH: 1
EYES NEGATIVE: 1
ALLERGIC/IMMUNOLOGIC NEGATIVE: 1
GASTROINTESTINAL NEGATIVE: 1
CHEST TIGHTNESS: 1

## 2023-09-06 ASSESSMENT — PAIN SCALES - GENERAL
PAINLEVEL_OUTOF10: 7
PAINLEVEL_OUTOF10: 3
PAINLEVEL_OUTOF10: 6
PAINLEVEL_OUTOF10: 8

## 2023-09-06 ASSESSMENT — PAIN DESCRIPTION - DESCRIPTORS
DESCRIPTORS: ACHING
DESCRIPTORS: ACHING;DISCOMFORT;NAGGING

## 2023-09-06 ASSESSMENT — PAIN DESCRIPTION - ORIENTATION
ORIENTATION: LOWER
ORIENTATION: LOWER

## 2023-09-06 ASSESSMENT — PAIN - FUNCTIONAL ASSESSMENT: PAIN_FUNCTIONAL_ASSESSMENT: ACTIVITIES ARE NOT PREVENTED

## 2023-09-06 NOTE — ED PROVIDER NOTES
[address]        Pt Name: Claudeen Levine  MRN: [id number]  [address] [facility] 1940  Date of evaluation: 9/6/2023  Provider: Benjamin Branch DO  PCP: Karel Mosquera MD  Note Started: 10:33 AM EDT 9/6/23    CHIEF COMPLAINT       Chief Complaint   Patient presents with    Chest Pain     Patient recent back surgery. Verbalizing multiple complaints, including chest pain and abdominal discomfort. HISTORY OF PRESENT ILLNESS: 1 or more Elements   History From: Patient      Claudeen Levine is a 78 y.o. female who presents to the emergency department for concerns of worsening back pain. Patient has a recent kyphoplasty from 8/28/2023. Patient states she has had worsening pain since the procedure. Patient states she has abdominal pain, chest pain, back pain. Patient denies any fever or chills associated with this. Patient states she is unable to sleep on her back. Patient states she is has been unable to eat. Review of Systems   Constitutional:  Negative for chills and fever. HENT:  Negative for congestion and sore throat. Respiratory:  Negative for cough, choking and shortness of breath. Cardiovascular:  Positive for chest pain. Negative for palpitations and leg swelling. Gastrointestinal:  Positive for abdominal pain. Negative for constipation, diarrhea, nausea and vomiting. Genitourinary:  Negative for dysuria. Musculoskeletal:  Positive for back pain. Skin:  Negative for rash. Neurological:  Negative for headaches. Nursing Notes were all reviewed and agreed with or any disagreements were addressed in the HPI. REVIEW OF SYSTEMS :      Positives and Pertinent negatives as per HPI.      SURGICAL HISTORY     Past Surgical History:   Procedure Laterality Date    CYSTOSCOPY      MULITPLE    EYE SURGERY  11/29/2012    right eye cataract with lens implant    EYE SURGERY Left 08/18/2016    removal of cataract with insertion of IOL    HYSTERECTOMY All other components within normal limits   COMPREHENSIVE METABOLIC PANEL - Abnormal; Notable for the following components:    Glucose 104 (*)     Total Protein 6.1 (*)     All other components within normal limits   TROPONIN - Abnormal; Notable for the following components:    Troponin, High Sensitivity 25 (*)     All other components within normal limits   TROPONIN - Abnormal; Notable for the following components:    Troponin, High Sensitivity 25 (*)     All other components within normal limits   BRAIN NATRIURETIC PEPTIDE   CBC WITH AUTO DIFFERENTIAL   BASIC METABOLIC PANEL       As interpreted by me, the above displayed labs are abnormal. All other labs obtained during this visit were within normal range or not returned as of this dictation. RADIOLOGY:   Non-plain film images such as CT, Ultrasound and MRI are read by the radiologist. Plain radiographic images are visualized and preliminarily interpreted by the ED Provider with the below findings:    N/A    Interpretation per the Radiologist below, if available at the time of this note:    CT LUMBAR SPINE WO CONTRAST   Final Result   New kyphoplasties at T12, L3 and L4. The degree of vertebral deformity at   superior endplate of X24 is greater than on the prior study. The other   levels demonstrate stable appearance. Osteopenia. CTA PULMONARY W CONTRAST   Final Result   1. There is no pulmonary embolus, aortic aneurysm or aortic dissection   2. There is no pneumothorax or pulmonary infiltrate   3. Minimal atelectasis seen within the right lung base posteriorly. 4. There is no pleural effusion   5. Degenerative changes of the thoracic spine. No acute compression fracture   is noted. .           No results found. No results found.         PROCEDURES   Unless otherwise noted below, none    PAST MEDICAL HISTORY/Chronic Conditions Affecting Care      has a past medical history of Cancer ([address]) ([address]), Cataract (left), Familial

## 2023-09-06 NOTE — ED NOTES
Patient ambulated to restroom with assistance.       [facility] Todd Lawler, [address]  09/06/23 4555

## 2023-09-06 NOTE — ED NOTES
Gave nurse to nurse to RN.      [facility] Todd Lawler, [address] [place] [address]  09/06/23 9917

## 2023-09-07 LAB
ANION GAP SERPL CALCULATED.3IONS-SCNC: 7 MMOL/L (ref 7–16)
BASOPHILS # BLD: 0.03 K/UL (ref 0–0.2)
BASOPHILS NFR BLD: 1 % (ref 0–2)
BUN SERPL-MCNC: 6 MG/DL (ref 6–23)
CALCIUM SERPL-MCNC: 8.7 MG/DL (ref 8.6–10.2)
CHLORIDE SERPL-SCNC: 97 MMOL/L (ref 98–107)
CO2 SERPL-SCNC: 27 MMOL/L (ref 22–29)
CREAT SERPL-MCNC: 0.5 MG/DL (ref 0.5–1)
EOSINOPHIL # BLD: 0.08 K/UL (ref 0.05–0.5)
EOSINOPHILS RELATIVE PERCENT: 2 % (ref 0–6)
ERYTHROCYTE [DISTWIDTH] IN BLOOD BY AUTOMATED COUNT: 13.2 % (ref 11.5–15)
GFR SERPL CREATININE-BSD FRML MDRD: >60 ML/MIN/1.73M2
GLUCOSE SERPL-MCNC: 81 MG/DL (ref 74–99)
HCT VFR BLD AUTO: 33 % (ref 34–48)
HGB BLD-MCNC: 10.6 G/DL (ref 11.5–15.5)
IMM GRANULOCYTES # BLD AUTO: <0.03 K/UL (ref 0–0.58)
IMM GRANULOCYTES NFR BLD: 1 % (ref 0–5)
LYMPHOCYTES NFR BLD: 1.03 K/UL (ref 1.5–4)
LYMPHOCYTES RELATIVE PERCENT: 24 % (ref 20–42)
MCH RBC QN AUTO: 28.8 PG (ref 26–35)
MCHC RBC AUTO-ENTMCNC: 32.1 G/DL (ref 32–34.5)
MCV RBC AUTO: 89.7 FL (ref 80–99.9)
MONOCYTES NFR BLD: 0.45 K/UL (ref 0.1–0.95)
MONOCYTES NFR BLD: 11 % (ref 2–12)
NEUTROPHILS NFR BLD: 62 % (ref 43–80)
NEUTS SEG NFR BLD: 2.63 K/UL (ref 1.8–7.3)
PLATELET # BLD AUTO: 286 K/UL (ref 130–450)
PMV BLD AUTO: 8.8 FL (ref 7–12)
POTASSIUM SERPL-SCNC: 3.8 MMOL/L (ref 3.5–5)
RBC # BLD AUTO: 3.68 M/UL (ref 3.5–5.5)
SODIUM SERPL-SCNC: 131 MMOL/L (ref 132–146)
WBC OTHER # BLD: 4.2 K/UL (ref 4.5–11.5)

## 2023-09-07 PROCEDURE — 80048 BASIC METABOLIC PNL TOTAL CA: CPT

## 2023-09-07 PROCEDURE — 1200000000 HC SEMI PRIVATE

## 2023-09-07 PROCEDURE — 36415 COLL VENOUS BLD VENIPUNCTURE: CPT

## 2023-09-07 PROCEDURE — 85025 COMPLETE CBC W/AUTO DIFF WBC: CPT

## 2023-09-07 PROCEDURE — 6360000002 HC RX W HCPCS: Performed by: FAMILY MEDICINE

## 2023-09-07 PROCEDURE — 6370000000 HC RX 637 (ALT 250 FOR IP): Performed by: FAMILY MEDICINE

## 2023-09-07 PROCEDURE — 96376 TX/PRO/DX INJ SAME DRUG ADON: CPT

## 2023-09-07 RX ADMIN — CYANOCOBALAMIN TAB 1000 MCG 500 MCG: 1000 TAB at 12:59

## 2023-09-07 RX ADMIN — DOCUSATE SODIUM 200 MG: 100 CAPSULE, LIQUID FILLED ORAL at 08:29

## 2023-09-07 RX ADMIN — LISINOPRIL 10 MG: 10 TABLET ORAL at 08:29

## 2023-09-07 RX ADMIN — MORPHINE SULFATE 2 MG: 2 INJECTION, SOLUTION INTRAMUSCULAR; INTRAVENOUS at 03:58

## 2023-09-07 RX ADMIN — DIAZEPAM 5 MG: 5 TABLET ORAL at 21:09

## 2023-09-07 RX ADMIN — Medication 2000 UNITS: at 12:59

## 2023-09-07 RX ADMIN — Medication 400 MG: at 21:09

## 2023-09-07 RX ADMIN — MORPHINE SULFATE 2 MG: 2 INJECTION, SOLUTION INTRAMUSCULAR; INTRAVENOUS at 11:39

## 2023-09-07 RX ADMIN — METOPROLOL SUCCINATE 25 MG: 25 TABLET, FILM COATED, EXTENDED RELEASE ORAL at 08:29

## 2023-09-07 ASSESSMENT — ENCOUNTER SYMPTOMS
ABDOMINAL PAIN: 1
SHORTNESS OF BREATH: 0
CONSTIPATION: 0
VOMITING: 0
NAUSEA: 0
CHOKING: 0
SORE THROAT: 0
DIARRHEA: 0
ABDOMINAL DISTENTION: 0
BACK PAIN: 1
COUGH: 0

## 2023-09-07 ASSESSMENT — PAIN DESCRIPTION - ORIENTATION
ORIENTATION: LOWER

## 2023-09-07 ASSESSMENT — PAIN DESCRIPTION - DESCRIPTORS
DESCRIPTORS: ACHING;DISCOMFORT;SORE
DESCRIPTORS: ACHING;DISCOMFORT;SORE
DESCRIPTORS: ACHING;DISCOMFORT;NAGGING

## 2023-09-07 ASSESSMENT — PAIN SCALES - GENERAL
PAINLEVEL_OUTOF10: 4
PAINLEVEL_OUTOF10: 3
PAINLEVEL_OUTOF10: 7
PAINLEVEL_OUTOF10: 9

## 2023-09-07 ASSESSMENT — PAIN DESCRIPTION - FREQUENCY: FREQUENCY: INTERMITTENT

## 2023-09-07 ASSESSMENT — PAIN DESCRIPTION - LOCATION
LOCATION: BACK

## 2023-09-07 ASSESSMENT — PAIN DESCRIPTION - PAIN TYPE: TYPE: SURGICAL PAIN

## 2023-09-07 ASSESSMENT — PAIN - FUNCTIONAL ASSESSMENT
PAIN_FUNCTIONAL_ASSESSMENT: PREVENTS OR INTERFERES SOME ACTIVE ACTIVITIES AND ADLS
PAIN_FUNCTIONAL_ASSESSMENT: PREVENTS OR INTERFERES SOME ACTIVE ACTIVITIES AND ADLS

## 2023-09-07 NOTE — CARE COORDINATION
9/7. Met with the pt at the bedside to discuss transition of care. She normally lives alone. Her son is in town, but will be leaving soon. She had a kyphoplasty 8/28, went home and was doing well, but began having pain. The pt is for an MRI. Will need PT/OT when able. She provided 2 choices for rehab. [address] 2. [facility]. Referral made to [facility]. Tia Reyes RN    Case Management Assessment  Initial Evaluation    Date/Time of Evaluation: 9/7/2023 12:24 PM  Assessment Completed by: Tia Reyes RN    If patient is discharged prior to next notation, then this note serves as note for discharge by case management. Patient Name: Louisa Szymanski                   YOB: 1940  Diagnosis: Post-operative pain [G89.18]                   Date / Time: 9/6/2023 10:25 AM    Patient Admission Status: Inpatient   Readmission Risk (Low < 19, Mod (19-27), High > 27): Readmission Risk Score: 21.3    Current PCP: Donny Perez MD  PCP verified by CM? No    Chart Reviewed: Yes      History Provided by: Patient  Patient Orientation: Alert and Oriented    Patient Cognition: Alert    Hospitalization in the last 30 days (Readmission):  Yes    If yes, Readmission Assessment in  Navigator will be completed.     Advance Directives:      Code Status: Prior   Patient's Primary Decision Maker is: Named in [facility] E Alessandra     Primary Decision Maker: Larry Goins  Child - [phone]    Secondary Decision Maker: Ruth Bryant - Brother/Sister - [phone]    Discharge Planning:    Patient lives with: Alone Type of Home: House  Primary Care Giver: Self  Patient Support Systems include: Children, Family Members   Current Financial resources:    Current community resources:    Current services prior to admission: None            Current DME:              Type of Home Care services:  None    ADLS  Prior functional level: Assistance with the following:, Housework,

## 2023-09-07 NOTE — PROGRESS NOTES
Date: 2023       Patient Name: Quincy Victor  : 1940      MRN: [id number]    PT order received and chart reviewed. PT evaluation held await neurosurgery recommendations and activity order.    Will follow up as appropriate    Paty Mansfield PT, DPT  [id number]

## 2023-09-07 NOTE — ACP (ADVANCE CARE PLANNING)
Advance Care Planning     General Advance Care Planning (ACP) Conversation    Date of Conversation: 9/6/2023  Conducted with: Patient with Decision Making Capacity    Healthcare Decision Maker:    Primary Decision Maker: Johny Hines - Roger - [phone]    Secondary Decision Maker: Nadir Bryantothy - Brother/Sister - [phone]  Click here to complete Healthcare Decision Makers including selection of the Healthcare Decision Maker Relationship (ie \"Primary\"). Content/Action Overview:   Has ACP document(s) on file - reflects the patient's care preferences  Reviewed DNR/DNI and patient elects Full Code (Attempt Resuscitation)        Length of Voluntary ACP Conversation in minutes:      Jennie Mercedes RN

## 2023-09-07 NOTE — PROGRESS NOTES
OCCUPATIONAL THERAPY TREATMENT NOTE    [facility]      OT BEDSIDE TREATMENT NOTE      Date:2023  Patient Name: Abdulkadir Flores  MRN: [id number]  : 1940  Room: [address] [place] [address]-A     OT order received and chart reviewed. OT evaluation held await neurosurgery recommendations and activity order. Will follow up as appropriate     Thank you,  Ngoc Harrison.  MISSY SwannR/L   License #  [id number]

## 2023-09-07 NOTE — H&P
HISTORY AND PHYSICAL             Date: 9/7/2023        Patient Name: Lori Cruz     YOB: 1940      Age:  78 y.o. Chief Complaint     Chief Complaint   Patient presents with    Chest Pain     Patient recent back surgery. Verbalizing multiple complaints, including chest pain and abdominal discomfort. History Obtained From   patient    History of Present Illness   Patient says her back pain at home became terrible. She had some brief shortness of breath and chest pain yesterday which resolved. Past Medical History     Past Medical History:   Diagnosis Date    Cancer [facility]) [address] left    8-18-16 for removal    Familial peripheral neuropathy     Hypertension     Lupus ([facility])     Paroxysmal atrial fibrillation [facility])         Past Surgical History     Past Surgical History:   Procedure Laterality Date    CYSTOSCOPY      MULITPLE    EYE SURGERY  11/29/2012    right eye cataract with lens implant    EYE SURGERY Left 08/18/2016    removal of cataract with insertion of IOL    HYSTERECTOMY (CERVIX STATUS UNKNOWN)      PAIN MANAGEMENT PROCEDURE N/A 8/10/2023    L3-4 EPIDURAL STEROID INJECTION performed by Nolan Macdonald DO at [address] Bilateral 5/3/2023    L1 and L2 KYPHOPLASTY performed by Bayron Sullivan MD at [address] 8/28/2023    T12, L3, L4 KYPHOPLASTY performed by Bayron Sullivan MD at [facility] OR        Medications Prior to Admission     Prior to Admission medications    Medication Sig Start Date End Date Taking?  Authorizing Provider   docusate sodium (COLACE, DULCOLAX) 100 MG CAPS Take 200 mg by mouth daily 8/31/23   Truong Mondragon MD   acetaminophen (TYLENOL) 500 MG tablet Take 1 tablet by mouth every 6 hours as needed for Pain    Historical Provider, MD   apixaban Annamaria Meigs) 2.5 MG TABS tablet Take 1 tablet by mouth 2 times daily 12/28/22   Truong Mondragon MD   metoprolol succinate (TOPROL XL) 25 MG extended release mild superior and inferior inferior compression deformity at the T12 level. Similar appearing superior endplate compression fractures at L3 through L5. DEGENERATIVE CHANGES: Degenerative changes at all levels with up to moderate central canal narrowing at the L3-4 and L4-5 levels. No high-grade neural foraminal narrowing at any level. SOFT TISSUES/RETROPERITONEUM: No paraspinal mass is seen. 1. New mild superior and inferior endplate compression deformity at the T12 level. 2. Similar appearing compression fractures at the L3 through L5 levels. 3. Severe osteopenia. 4. Degenerative disease at all levels. Assessment      Hospital Problems             Last Modified POA    * (Principal) Post-operative pain 9/6/2023 Yes   Chest pain/SOB  Gait instability  Afib  HTN  SLE    Plan   Neurosurgery consulted. Lumbar CT reviewed  CTA chest and labs are stable, transient chest pain ans shortness of breath resolved. Pain control  PT/OT  Continue home meds. Patient feels she may have done to much at home and is agreeable to rehab if needed.     Consultations Ordered:  IP CONSULT TO INTERNAL MEDICINE  IP CONSULT TO NEUROSURGERY  IP CONSULT TO CASE MANAGEMENT

## 2023-09-08 LAB
ANION GAP SERPL CALCULATED.3IONS-SCNC: 7 MMOL/L (ref 7–16)
BASOPHILS # BLD: 0.03 K/UL (ref 0–0.2)
BASOPHILS NFR BLD: 1 % (ref 0–2)
BUN SERPL-MCNC: 6 MG/DL (ref 6–23)
CALCIUM SERPL-MCNC: 8.6 MG/DL (ref 8.6–10.2)
CHLORIDE SERPL-SCNC: 101 MMOL/L (ref 98–107)
CO2 SERPL-SCNC: 28 MMOL/L (ref 22–29)
CREAT SERPL-MCNC: 0.6 MG/DL (ref 0.5–1)
EOSINOPHIL # BLD: 0.04 K/UL (ref 0.05–0.5)
EOSINOPHILS RELATIVE PERCENT: 1 % (ref 0–6)
ERYTHROCYTE [DISTWIDTH] IN BLOOD BY AUTOMATED COUNT: 13.2 % (ref 11.5–15)
GFR SERPL CREATININE-BSD FRML MDRD: >60 ML/MIN/1.73M2
GLUCOSE SERPL-MCNC: 89 MG/DL (ref 74–99)
HCT VFR BLD AUTO: 33.9 % (ref 34–48)
HGB BLD-MCNC: 10.9 G/DL (ref 11.5–15.5)
IMM GRANULOCYTES # BLD AUTO: <0.03 K/UL (ref 0–0.58)
IMM GRANULOCYTES NFR BLD: 1 % (ref 0–5)
LYMPHOCYTES NFR BLD: 1.16 K/UL (ref 1.5–4)
LYMPHOCYTES RELATIVE PERCENT: 27 % (ref 20–42)
MCH RBC QN AUTO: 28.5 PG (ref 26–35)
MCHC RBC AUTO-ENTMCNC: 32.2 G/DL (ref 32–34.5)
MCV RBC AUTO: 88.5 FL (ref 80–99.9)
MONOCYTES NFR BLD: 0.44 K/UL (ref 0.1–0.95)
MONOCYTES NFR BLD: 10 % (ref 2–12)
NEUTROPHILS NFR BLD: 61 % (ref 43–80)
NEUTS SEG NFR BLD: 2.68 K/UL (ref 1.8–7.3)
PLATELET # BLD AUTO: 298 K/UL (ref 130–450)
PMV BLD AUTO: 8.5 FL (ref 7–12)
POTASSIUM SERPL-SCNC: 3.9 MMOL/L (ref 3.5–5)
RBC # BLD AUTO: 3.83 M/UL (ref 3.5–5.5)
SODIUM SERPL-SCNC: 136 MMOL/L (ref 132–146)
WBC OTHER # BLD: 4.4 K/UL (ref 4.5–11.5)

## 2023-09-08 PROCEDURE — 1200000000 HC SEMI PRIVATE

## 2023-09-08 PROCEDURE — 36415 COLL VENOUS BLD VENIPUNCTURE: CPT

## 2023-09-08 PROCEDURE — 85025 COMPLETE CBC W/AUTO DIFF WBC: CPT

## 2023-09-08 PROCEDURE — 80048 BASIC METABOLIC PNL TOTAL CA: CPT

## 2023-09-08 PROCEDURE — 6370000000 HC RX 637 (ALT 250 FOR IP): Performed by: FAMILY MEDICINE

## 2023-09-08 RX ADMIN — Medication 400 MG: at 22:12

## 2023-09-08 RX ADMIN — APIXABAN 2.5 MG: 2.5 TABLET, FILM COATED ORAL at 22:13

## 2023-09-08 RX ADMIN — OXYCODONE HYDROCHLORIDE 5 MG: 5 TABLET ORAL at 22:12

## 2023-09-08 RX ADMIN — METOPROLOL SUCCINATE 25 MG: 25 TABLET, FILM COATED, EXTENDED RELEASE ORAL at 08:26

## 2023-09-08 RX ADMIN — CYANOCOBALAMIN TAB 1000 MCG 500 MCG: 1000 TAB at 12:41

## 2023-09-08 RX ADMIN — DOCUSATE SODIUM 200 MG: 100 CAPSULE, LIQUID FILLED ORAL at 08:26

## 2023-09-08 RX ADMIN — LISINOPRIL 10 MG: 10 TABLET ORAL at 08:26

## 2023-09-08 RX ADMIN — DIAZEPAM 5 MG: 5 TABLET ORAL at 17:37

## 2023-09-08 RX ADMIN — Medication 2000 UNITS: at 12:41

## 2023-09-08 RX ADMIN — OXYCODONE HYDROCHLORIDE 5 MG: 5 TABLET ORAL at 01:35

## 2023-09-08 RX ADMIN — OXYCODONE HYDROCHLORIDE 5 MG: 5 TABLET ORAL at 06:17

## 2023-09-08 ASSESSMENT — PAIN SCALES - GENERAL
PAINLEVEL_OUTOF10: 7
PAINLEVEL_OUTOF10: 8
PAINLEVEL_OUTOF10: 3
PAINLEVEL_OUTOF10: 5

## 2023-09-08 ASSESSMENT — PAIN DESCRIPTION - FREQUENCY: FREQUENCY: INTERMITTENT

## 2023-09-08 ASSESSMENT — PAIN DESCRIPTION - LOCATION
LOCATION: BACK;RIB CAGE
LOCATION: BACK
LOCATION: RIB CAGE;BACK
LOCATION: BACK;RIB CAGE

## 2023-09-08 ASSESSMENT — PAIN DESCRIPTION - ONSET: ONSET: GRADUAL

## 2023-09-08 ASSESSMENT — PAIN DESCRIPTION - ORIENTATION
ORIENTATION: MID;LOWER
ORIENTATION: POSTERIOR
ORIENTATION: LEFT;POSTERIOR
ORIENTATION: LEFT

## 2023-09-08 ASSESSMENT — PAIN DESCRIPTION - DESCRIPTORS
DESCRIPTORS: ACHING;DISCOMFORT;SORE
DESCRIPTORS: ACHING;DISCOMFORT;SORE
DESCRIPTORS: ACHING;DISCOMFORT;THROBBING

## 2023-09-08 ASSESSMENT — ENCOUNTER SYMPTOMS
ABDOMINAL PAIN: 0
BACK PAIN: 1
SHORTNESS OF BREATH: 0

## 2023-09-08 ASSESSMENT — PAIN - FUNCTIONAL ASSESSMENT
PAIN_FUNCTIONAL_ASSESSMENT: ACTIVITIES ARE NOT PREVENTED

## 2023-09-08 ASSESSMENT — PAIN DESCRIPTION - PAIN TYPE: TYPE: SURGICAL PAIN

## 2023-09-08 NOTE — PROGRESS NOTES
Occupational Therapy  Date:2023  Patient Name: Chapito Brandt  MRN: [id number]  : 1940  Room: [facility]     OT order received and appreciated. Chart reviewed. Hold OT evaluation, awaiting MRI and NS rec prior to OOB activity . Will follow.     Primo Ballard OTR/L #[id number]

## 2023-09-08 NOTE — CARE COORDINATION
9/8. Awaiting MRI. Discharge plan is [facility]. PASSR/envelope completed. Facility can transport.  Mere Mixon RN

## 2023-09-08 NOTE — PROGRESS NOTES
Date: 2023       Patient Name: Abdulkadir Flores  : 1940      MRN: [id number]    Await PT evaluation, pending results of MRI and neurosurgery clearance.     Adrián Farrar PT, DPT  [id number]

## 2023-09-09 VITALS
SYSTOLIC BLOOD PRESSURE: 135 MMHG | HEART RATE: 91 BPM | BODY MASS INDEX: 21 KG/M2 | RESPIRATION RATE: 16 BRPM | WEIGHT: 123 LBS | OXYGEN SATURATION: 99 % | TEMPERATURE: 97.2 F | HEIGHT: 64 IN | DIASTOLIC BLOOD PRESSURE: 67 MMHG

## 2023-09-09 LAB
ANION GAP SERPL CALCULATED.3IONS-SCNC: 10 MMOL/L (ref 7–16)
BASOPHILS # BLD: 0.04 K/UL (ref 0–0.2)
BASOPHILS NFR BLD: 1 % (ref 0–2)
BUN SERPL-MCNC: 7 MG/DL (ref 6–23)
CALCIUM SERPL-MCNC: 8.7 MG/DL (ref 8.6–10.2)
CHLORIDE SERPL-SCNC: 96 MMOL/L (ref 98–107)
CO2 SERPL-SCNC: 26 MMOL/L (ref 22–29)
CREAT SERPL-MCNC: 0.6 MG/DL (ref 0.5–1)
EOSINOPHIL # BLD: 0.08 K/UL (ref 0.05–0.5)
EOSINOPHILS RELATIVE PERCENT: 2 % (ref 0–6)
ERYTHROCYTE [DISTWIDTH] IN BLOOD BY AUTOMATED COUNT: 13.3 % (ref 11.5–15)
GFR SERPL CREATININE-BSD FRML MDRD: >60 ML/MIN/1.73M2
GLUCOSE SERPL-MCNC: 98 MG/DL (ref 74–99)
HCT VFR BLD AUTO: 34.5 % (ref 34–48)
HGB BLD-MCNC: 11.4 G/DL (ref 11.5–15.5)
IMM GRANULOCYTES # BLD AUTO: <0.03 K/UL (ref 0–0.58)
IMM GRANULOCYTES NFR BLD: 0 % (ref 0–5)
LYMPHOCYTES NFR BLD: 1.84 K/UL (ref 1.5–4)
LYMPHOCYTES RELATIVE PERCENT: 36 % (ref 20–42)
MCH RBC QN AUTO: 28.9 PG (ref 26–35)
MCHC RBC AUTO-ENTMCNC: 33 G/DL (ref 32–34.5)
MCV RBC AUTO: 87.3 FL (ref 80–99.9)
MONOCYTES NFR BLD: 0.48 K/UL (ref 0.1–0.95)
MONOCYTES NFR BLD: 10 % (ref 2–12)
NEUTROPHILS NFR BLD: 51 % (ref 43–80)
NEUTS SEG NFR BLD: 2.61 K/UL (ref 1.8–7.3)
PLATELET # BLD AUTO: 315 K/UL (ref 130–450)
PMV BLD AUTO: 8.7 FL (ref 7–12)
POTASSIUM SERPL-SCNC: 3.7 MMOL/L (ref 3.5–5)
RBC # BLD AUTO: 3.95 M/UL (ref 3.5–5.5)
SODIUM SERPL-SCNC: 132 MMOL/L (ref 132–146)
WBC OTHER # BLD: 5.1 K/UL (ref 4.5–11.5)

## 2023-09-09 PROCEDURE — 36415 COLL VENOUS BLD VENIPUNCTURE: CPT

## 2023-09-09 PROCEDURE — 80048 BASIC METABOLIC PNL TOTAL CA: CPT

## 2023-09-09 PROCEDURE — 6370000000 HC RX 637 (ALT 250 FOR IP): Performed by: FAMILY MEDICINE

## 2023-09-09 PROCEDURE — 85025 COMPLETE CBC W/AUTO DIFF WBC: CPT

## 2023-09-09 RX ORDER — ACETAMINOPHEN 500 MG
500 TABLET ORAL EVERY 6 HOURS PRN
Status: DISCONTINUED | OUTPATIENT
Start: 2023-09-09 | End: 2023-09-09 | Stop reason: HOSPADM

## 2023-09-09 RX ADMIN — LISINOPRIL 10 MG: 10 TABLET ORAL at 08:09

## 2023-09-09 RX ADMIN — APIXABAN 2.5 MG: 2.5 TABLET, FILM COATED ORAL at 08:09

## 2023-09-09 RX ADMIN — DOCUSATE SODIUM 200 MG: 100 CAPSULE, LIQUID FILLED ORAL at 08:09

## 2023-09-09 RX ADMIN — METOPROLOL SUCCINATE 25 MG: 25 TABLET, FILM COATED, EXTENDED RELEASE ORAL at 08:09

## 2023-09-09 RX ADMIN — CYANOCOBALAMIN TAB 1000 MCG 500 MCG: 1000 TAB at 12:10

## 2023-09-09 RX ADMIN — Medication 2000 UNITS: at 12:10

## 2023-09-09 ASSESSMENT — ENCOUNTER SYMPTOMS
SHORTNESS OF BREATH: 0
ABDOMINAL PAIN: 0
BACK PAIN: 1

## 2023-09-09 NOTE — CARE COORDINATION
Care Coordination: Uncertain by note on chart if prior auth was completed for jadiel. I have left [facility]. In the meantime nursing has notified me pt would like to go home instead. I have placed call to Son, who is in [place]. He states his mom just notified him that she wants to come home. Hx of [facility] years ago, does not feel is needed and if they change mind, they will contact Dr Luz Barksdale. DC plan is home and Alexander Muniz states his cousin will pick her up at discharge.     Electronically signed by Tamia Pierre RN on 9/9/2023 at 11:19 AM     Addendum: Spoke to [employer] Doing is aware of the above, if pt changes mind and wants [facility], uncertain if auth was obtained and still awaiting call back from [employer] signed by Tamia Pierre RN on 9/9/2023 at 11:26 AM

## 2023-09-09 NOTE — PROGRESS NOTES
OCCUPATIONAL THERAPY    Date:2023  Patient Name: Claudeen Levine  MRN: [id number]  : 1940  Room: [facility]/5405-A              Chart reviewed. Awaiting NS recommendations prior to assessment. Will re-attempt at later time. Thank you for consult.     Nadine Bradley, OTR/L [id number]

## 2023-09-09 NOTE — PLAN OF CARE
MRI TECH NOTIFIED THAT PT DOES NOT WANT HER MRI BECAUSE SHE HAS BEEN DISCHARGED AND HER NEPHEW IS ON THE WAY HERE FROM [place] TO PICK HER UP.

## 2023-09-09 NOTE — DISCHARGE SUMMARY
Date: 09/07/2023  Value: 131 (L)     Ref range: 132 - 146 mmol/L   Status: Final  Potassium                                     Date: 09/07/2023  Value: 3.8         Ref range: 3.5 - 5.0 mmol/L   Status: Final  Chloride                                      Date: 09/07/2023  Value: 97 (L)      Ref range: 98 - 107 mmol/L    Status: Final  CO2                                           Date: 09/07/2023  Value: 27          Ref range: 22 - 29 mmol/L     Status: Final  Anion Gap                                     Date: 09/07/2023  Value: 7           Ref range: 7 - 16 mmol/L      Status: Final  Glucose                                       Date: 09/07/2023  Value: 81          Ref range: 74 - 99 mg/dL      Status: Final  BUN                                           Date: 09/07/2023  Value: 6           Ref range: 6 - 23 mg/dL       Status: Final  Creatinine                                    Date: 09/07/2023  Value: 0.5         Ref range: 0.50 - 1.00 mg/dL  Status: Final  Est, Glom Filt Rate                           Date: 09/07/2023  Value: >60         Ref range: >60 mL/min/1.73m2  Status: Final                Comment:       These results are not intended for use in patients <25years of age. eGFR results are calculated without a race factor using the 2021 CKD-EPI equation. Careful clinical correlation is recommended, particularly when comparing to results   calculated using previous equations. The CKD-EPI equation is less accurate in patients with extremes of muscle mass, extra-renal   metabolism of creatine, excessive creatine ingestion, or following therapy that affects   renal tubular secretion.     Calcium                                       Date: 09/07/2023  Value: 8.7         Ref range: 8.6 - 10.2 mg/dL   Status: Final  WBC                                           Date: 09/08/2023  Value: 4.4 (L)     Ref range: 4.5 - 11.5 k/uL    Status: Final  RBC 09/08/2023  Value: 2.68        Ref range: 1.80 - 7.30 k/uL   Status: Final  Lymphocytes Absolute                          Date: 09/08/2023  Value: 1.16 (L)    Ref range: 1.50 - 4.00 k/uL   Status: Final  Monocytes Absolute                            Date: 09/08/2023  Value: 0.44        Ref range: 0.10 - 0.95 k/uL   Status: Final  Eosinophils Absolute                          Date: 09/08/2023  Value: 0.04 (L)    Ref range: 0.05 - 0.50 k/uL   Status: Final  Basophils Absolute                            Date: 09/08/2023  Value: 0.03        Ref range: 0.00 - 0.20 k/uL   Status: Final  Absolute Immature Granulocyte                 Date: 09/08/2023  Value: <0.03       Ref range: 0.00 - 0.58 k/uL   Status: Final  Sodium                                        Date: 09/08/2023  Value: 136         Ref range: 132 - 146 mmol/L   Status: Final  Potassium                                     Date: 09/08/2023  Value: 3.9         Ref range: 3.5 - 5.0 mmol/L   Status: Final  Chloride                                      Date: 09/08/2023  Value: 101         Ref range: 98 - 107 mmol/L    Status: Final  CO2                                           Date: 09/08/2023  Value: 28          Ref range: 22 - 29 mmol/L     Status: Final  Anion Gap                                     Date: 09/08/2023  Value: 7           Ref range: 7 - 16 mmol/L      Status: Final  Glucose                                       Date: 09/08/2023  Value: 89          Ref range: 74 - 99 mg/dL      Status: Final  BUN                                           Date: 09/08/2023  Value: 6           Ref range: 6 - 23 mg/dL       Status: Final  Creatinine                                    Date: 09/08/2023  Value: 0.6         Ref range: 0.50 - 1.00 mg/dL  Status: Final  Est, Glom Filt Rate                           Date: 09/08/2023  Value: >60         Ref range: >60 mL/min/1.73m2  Status: Final                Comment:       These results are not intended for use in patients

## 2023-09-09 NOTE — PROGRESS NOTES
Physical Therapy    Date: 2023       Patient Name: Scott Guerrero  : 1940      MRN: [id number]    Physical therapy order received and chart reviewed. PT evaluation held at this time pending NS clearance. Will follow up as appropriate.      Jazzmine Still PT, DPT  [id number]

## 2023-10-05 ENCOUNTER — APPOINTMENT (OUTPATIENT)
Dept: CT IMAGING | Age: 83
End: 2023-10-05
Payer: MEDICARE

## 2023-10-05 ENCOUNTER — APPOINTMENT (OUTPATIENT)
Dept: GENERAL RADIOLOGY | Age: 83
End: 2023-10-05
Payer: MEDICARE

## 2023-10-05 ENCOUNTER — HOSPITAL ENCOUNTER (EMERGENCY)
Age: 83
Discharge: HOME | End: 2023-10-05
Attending: EMERGENCY MEDICINE
Payer: MEDICARE

## 2023-10-05 VITALS
HEIGHT: 64 IN | BODY MASS INDEX: 21 KG/M2 | WEIGHT: 123 LBS | OXYGEN SATURATION: 99 % | DIASTOLIC BLOOD PRESSURE: 76 MMHG | HEART RATE: 91 BPM | RESPIRATION RATE: 14 BRPM | SYSTOLIC BLOOD PRESSURE: 127 MMHG | TEMPERATURE: 98.3 F

## 2023-10-05 DIAGNOSIS — R07.81 RIB PAIN: ICD-10-CM

## 2023-10-05 DIAGNOSIS — S22.080A COMPRESSION FRACTURE OF T11 VERTEBRA, INITIAL ENCOUNTER (HCC): Primary | ICD-10-CM

## 2023-10-05 LAB
ALBUMIN SERPL-MCNC: 4 G/DL (ref 3.5–5.2)
ALP SERPL-CCNC: 99 U/L (ref 35–104)
ALT SERPL-CCNC: 12 U/L (ref 0–32)
ANION GAP SERPL CALCULATED.3IONS-SCNC: 9 MMOL/L (ref 7–16)
AST SERPL-CCNC: 17 U/L (ref 0–31)
BASOPHILS # BLD: 0.03 K/UL (ref 0–0.2)
BASOPHILS NFR BLD: 1 % (ref 0–2)
BILIRUB SERPL-MCNC: 0.3 MG/DL (ref 0–1.2)
BILIRUB UR QL STRIP: NEGATIVE
BNP SERPL-MCNC: 291 PG/ML (ref 0–450)
BUN SERPL-MCNC: 10 MG/DL (ref 6–23)
CALCIUM SERPL-MCNC: 9.1 MG/DL (ref 8.6–10.2)
CHLORIDE SERPL-SCNC: 99 MMOL/L (ref 98–107)
CLARITY UR: CLEAR
CO2 SERPL-SCNC: 27 MMOL/L (ref 22–29)
COLOR UR: YELLOW
CREAT SERPL-MCNC: 0.6 MG/DL (ref 0.5–1)
EKG ATRIAL RATE: 92 BPM
EKG P AXIS: 96 DEGREES
EKG P-R INTERVAL: 178 MS
EKG Q-T INTERVAL: 346 MS
EKG QRS DURATION: 78 MS
EKG QTC CALCULATION (BAZETT): 427 MS
EKG R AXIS: 33 DEGREES
EKG T AXIS: 49 DEGREES
EKG VENTRICULAR RATE: 92 BPM
EOSINOPHIL # BLD: 0.02 K/UL (ref 0.05–0.5)
EOSINOPHILS RELATIVE PERCENT: 0 % (ref 0–6)
ERYTHROCYTE [DISTWIDTH] IN BLOOD BY AUTOMATED COUNT: 13.7 % (ref 11.5–15)
GFR SERPL CREATININE-BSD FRML MDRD: >60 ML/MIN/1.73M2
GLUCOSE SERPL-MCNC: 110 MG/DL (ref 74–99)
GLUCOSE UR STRIP-MCNC: NEGATIVE MG/DL
HCT VFR BLD AUTO: 35.4 % (ref 34–48)
HGB BLD-MCNC: 11.3 G/DL (ref 11.5–15.5)
HGB UR QL STRIP.AUTO: NEGATIVE
IMM GRANULOCYTES # BLD AUTO: <0.03 K/UL (ref 0–0.58)
IMM GRANULOCYTES NFR BLD: 0 % (ref 0–5)
KETONES UR STRIP-MCNC: NEGATIVE MG/DL
LEUKOCYTE ESTERASE UR QL STRIP: ABNORMAL
LYMPHOCYTES NFR BLD: 0.83 K/UL (ref 1.5–4)
LYMPHOCYTES RELATIVE PERCENT: 17 % (ref 20–42)
MCH RBC QN AUTO: 28.6 PG (ref 26–35)
MCHC RBC AUTO-ENTMCNC: 31.9 G/DL (ref 32–34.5)
MCV RBC AUTO: 89.6 FL (ref 80–99.9)
MONOCYTES NFR BLD: 0.38 K/UL (ref 0.1–0.95)
MONOCYTES NFR BLD: 8 % (ref 2–12)
NEUTROPHILS NFR BLD: 73 % (ref 43–80)
NEUTS SEG NFR BLD: 3.53 K/UL (ref 1.8–7.3)
NITRITE UR QL STRIP: NEGATIVE
PH UR STRIP: 6.5 [PH] (ref 5–9)
PLATELET # BLD AUTO: 287 K/UL (ref 130–450)
PMV BLD AUTO: 8.4 FL (ref 7–12)
POTASSIUM SERPL-SCNC: 4.2 MMOL/L (ref 3.5–5)
PROT SERPL-MCNC: 6.5 G/DL (ref 6.4–8.3)
PROT UR STRIP-MCNC: NEGATIVE MG/DL
RBC # BLD AUTO: 3.95 M/UL (ref 3.5–5.5)
RBC #/AREA URNS HPF: ABNORMAL /HPF
SARS-COV-2 RDRP RESP QL NAA+PROBE: NOT DETECTED
SODIUM SERPL-SCNC: 135 MMOL/L (ref 132–146)
SP GR UR STRIP: <1.005 (ref 1–1.03)
SPECIMEN DESCRIPTION: NORMAL
TROPONIN I SERPL HS-MCNC: 22 NG/L (ref 0–9)
TROPONIN I SERPL HS-MCNC: 22 NG/L (ref 0–9)
UROBILINOGEN UR STRIP-ACNC: 0.2 EU/DL (ref 0–1)
WBC #/AREA URNS HPF: ABNORMAL /HPF
WBC OTHER # BLD: 4.8 K/UL (ref 4.5–11.5)

## 2023-10-05 PROCEDURE — 93005 ELECTROCARDIOGRAM TRACING: CPT

## 2023-10-05 PROCEDURE — 71045 X-RAY EXAM CHEST 1 VIEW: CPT

## 2023-10-05 PROCEDURE — 93010 ELECTROCARDIOGRAM REPORT: CPT | Performed by: INTERNAL MEDICINE

## 2023-10-05 PROCEDURE — 2580000003 HC RX 258

## 2023-10-05 PROCEDURE — 6360000004 HC RX CONTRAST MEDICATION: Performed by: RADIOLOGY

## 2023-10-05 PROCEDURE — 84484 ASSAY OF TROPONIN QUANT: CPT

## 2023-10-05 PROCEDURE — 87635 SARS-COV-2 COVID-19 AMP PRB: CPT

## 2023-10-05 PROCEDURE — 85025 COMPLETE CBC W/AUTO DIFF WBC: CPT

## 2023-10-05 PROCEDURE — 6360000002 HC RX W HCPCS

## 2023-10-05 PROCEDURE — 71275 CT ANGIOGRAPHY CHEST: CPT

## 2023-10-05 PROCEDURE — 99285 EMERGENCY DEPT VISIT HI MDM: CPT

## 2023-10-05 PROCEDURE — 83880 ASSAY OF NATRIURETIC PEPTIDE: CPT

## 2023-10-05 PROCEDURE — 81001 URINALYSIS AUTO W/SCOPE: CPT

## 2023-10-05 PROCEDURE — 80053 COMPREHEN METABOLIC PANEL: CPT

## 2023-10-05 PROCEDURE — 74177 CT ABD & PELVIS W/CONTRAST: CPT

## 2023-10-05 PROCEDURE — 96374 THER/PROPH/DIAG INJ IV PUSH: CPT

## 2023-10-05 RX ORDER — FENTANYL CITRATE 50 UG/ML
25 INJECTION, SOLUTION INTRAMUSCULAR; INTRAVENOUS ONCE
Status: COMPLETED | OUTPATIENT
Start: 2023-10-05 | End: 2023-10-05

## 2023-10-05 RX ORDER — 0.9 % SODIUM CHLORIDE 0.9 %
1000 INTRAVENOUS SOLUTION INTRAVENOUS ONCE
Status: COMPLETED | OUTPATIENT
Start: 2023-10-05 | End: 2023-10-05

## 2023-10-05 RX ADMIN — IOPAMIDOL 75 ML: 755 INJECTION, SOLUTION INTRAVENOUS at 17:00

## 2023-10-05 RX ADMIN — FENTANYL CITRATE 25 MCG: 50 INJECTION, SOLUTION INTRAMUSCULAR; INTRAVENOUS at 17:32

## 2023-10-05 RX ADMIN — SODIUM CHLORIDE 1000 ML: 9 INJECTION, SOLUTION INTRAVENOUS at 15:32

## 2023-10-05 ASSESSMENT — PAIN SCALES - GENERAL
PAINLEVEL_OUTOF10: 8
PAINLEVEL_OUTOF10: 8

## 2023-10-05 ASSESSMENT — PAIN - FUNCTIONAL ASSESSMENT: PAIN_FUNCTIONAL_ASSESSMENT: 0-10

## 2023-10-05 NOTE — ED NOTES
The PT is trial ambulated to the restroom. She ambulates well with a steady gait with minimal assistance from staff.        Ozzie Garland RN  10/05/23 1949

## 2023-10-06 DIAGNOSIS — S22.080A CLOSED WEDGE COMPRESSION FRACTURE OF T11 VERTEBRA, INITIAL ENCOUNTER (HCC): Primary | ICD-10-CM

## 2023-10-10 ENCOUNTER — HOSPITAL ENCOUNTER (INPATIENT)
Age: 83
LOS: 7 days | Discharge: NURSING, REHAB OR LONG-TERM CARE | DRG: 478 | End: 2023-10-17
Attending: STUDENT IN AN ORGANIZED HEALTH CARE EDUCATION/TRAINING PROGRAM | Admitting: INTERNAL MEDICINE
Payer: MEDICARE

## 2023-10-10 ENCOUNTER — APPOINTMENT (OUTPATIENT)
Dept: GENERAL RADIOLOGY | Age: 83
DRG: 478 | End: 2023-10-10
Payer: MEDICARE

## 2023-10-10 ENCOUNTER — APPOINTMENT (OUTPATIENT)
Dept: ULTRASOUND IMAGING | Age: 83
DRG: 478 | End: 2023-10-10
Payer: MEDICARE

## 2023-10-10 DIAGNOSIS — M54.50 CHRONIC BILATERAL LOW BACK PAIN WITHOUT SCIATICA: ICD-10-CM

## 2023-10-10 DIAGNOSIS — G89.29 CHRONIC BILATERAL LOW BACK PAIN WITHOUT SCIATICA: ICD-10-CM

## 2023-10-10 DIAGNOSIS — R10.9 ABDOMINAL PAIN, UNSPECIFIED ABDOMINAL LOCATION: ICD-10-CM

## 2023-10-10 DIAGNOSIS — G89.4 CHRONIC PAIN SYNDROME: ICD-10-CM

## 2023-10-10 DIAGNOSIS — R53.1 GENERAL WEAKNESS: ICD-10-CM

## 2023-10-10 DIAGNOSIS — R10.9 INTRACTABLE ABDOMINAL PAIN: Primary | ICD-10-CM

## 2023-10-10 DIAGNOSIS — R13.10 DYSPHAGIA, UNSPECIFIED TYPE: ICD-10-CM

## 2023-10-10 LAB
ALBUMIN SERPL-MCNC: 4 G/DL (ref 3.5–5.2)
ALP SERPL-CCNC: 101 U/L (ref 35–104)
ALT SERPL-CCNC: 10 U/L (ref 0–32)
ANION GAP SERPL CALCULATED.3IONS-SCNC: 9 MMOL/L (ref 7–16)
AST SERPL-CCNC: 19 U/L (ref 0–31)
BACTERIA URNS QL MICRO: ABNORMAL
BASOPHILS # BLD: 0.02 K/UL (ref 0–0.2)
BASOPHILS NFR BLD: 1 % (ref 0–2)
BILIRUB SERPL-MCNC: 0.3 MG/DL (ref 0–1.2)
BILIRUB UR QL STRIP: NEGATIVE
BUN SERPL-MCNC: 10 MG/DL (ref 6–23)
CALCIUM SERPL-MCNC: 9.2 MG/DL (ref 8.6–10.2)
CHLORIDE SERPL-SCNC: 101 MMOL/L (ref 98–107)
CLARITY UR: CLEAR
CO2 SERPL-SCNC: 27 MMOL/L (ref 22–29)
COLOR UR: YELLOW
CREAT SERPL-MCNC: 0.6 MG/DL (ref 0.5–1)
EOSINOPHIL # BLD: 0.02 K/UL (ref 0.05–0.5)
EOSINOPHILS RELATIVE PERCENT: 1 % (ref 0–6)
ERYTHROCYTE [DISTWIDTH] IN BLOOD BY AUTOMATED COUNT: 13.8 % (ref 11.5–15)
GFR SERPL CREATININE-BSD FRML MDRD: >60 ML/MIN/1.73M2
GLUCOSE SERPL-MCNC: 122 MG/DL (ref 74–99)
GLUCOSE UR STRIP-MCNC: NEGATIVE MG/DL
HCT VFR BLD AUTO: 35.6 % (ref 34–48)
HGB BLD-MCNC: 11.6 G/DL (ref 11.5–15.5)
HGB UR QL STRIP.AUTO: NEGATIVE
IMM GRANULOCYTES # BLD AUTO: <0.03 K/UL (ref 0–0.58)
IMM GRANULOCYTES NFR BLD: 1 % (ref 0–5)
KETONES UR STRIP-MCNC: ABNORMAL MG/DL
LACTATE BLDV-SCNC: 0.8 MMOL/L (ref 0.5–2.2)
LEUKOCYTE ESTERASE UR QL STRIP: ABNORMAL
LIPASE SERPL-CCNC: 27 U/L (ref 13–60)
LYMPHOCYTES NFR BLD: 0.62 K/UL (ref 1.5–4)
LYMPHOCYTES RELATIVE PERCENT: 15 % (ref 20–42)
MAGNESIUM SERPL-MCNC: 2 MG/DL (ref 1.6–2.6)
MCH RBC QN AUTO: 28.9 PG (ref 26–35)
MCHC RBC AUTO-ENTMCNC: 32.6 G/DL (ref 32–34.5)
MCV RBC AUTO: 88.6 FL (ref 80–99.9)
MONOCYTES NFR BLD: 0.29 K/UL (ref 0.1–0.95)
MONOCYTES NFR BLD: 7 % (ref 2–12)
NEUTROPHILS NFR BLD: 76 % (ref 43–80)
NEUTS SEG NFR BLD: 3.14 K/UL (ref 1.8–7.3)
NITRITE UR QL STRIP: NEGATIVE
PH UR STRIP: 7 [PH] (ref 5–9)
PLATELET # BLD AUTO: 241 K/UL (ref 130–450)
PMV BLD AUTO: 8.7 FL (ref 7–12)
POTASSIUM SERPL-SCNC: 3.9 MMOL/L (ref 3.5–5)
PROT SERPL-MCNC: 6.5 G/DL (ref 6.4–8.3)
PROT UR STRIP-MCNC: NEGATIVE MG/DL
RBC # BLD AUTO: 4.02 M/UL (ref 3.5–5.5)
RBC #/AREA URNS HPF: ABNORMAL /HPF
SODIUM SERPL-SCNC: 137 MMOL/L (ref 132–146)
SP GR UR STRIP: 1.01 (ref 1–1.03)
TROPONIN I SERPL HS-MCNC: 25 NG/L (ref 0–9)
TROPONIN I SERPL HS-MCNC: 28 NG/L (ref 0–9)
TSH SERPL DL<=0.05 MIU/L-ACNC: 0.85 UIU/ML (ref 0.27–4.2)
UROBILINOGEN UR STRIP-ACNC: 0.2 EU/DL (ref 0–1)
WBC #/AREA URNS HPF: ABNORMAL /HPF
WBC OTHER # BLD: 4.1 K/UL (ref 4.5–11.5)

## 2023-10-10 PROCEDURE — A4216 STERILE WATER/SALINE, 10 ML: HCPCS

## 2023-10-10 PROCEDURE — 76705 ECHO EXAM OF ABDOMEN: CPT

## 2023-10-10 PROCEDURE — 6370000000 HC RX 637 (ALT 250 FOR IP): Performed by: INTERNAL MEDICINE

## 2023-10-10 PROCEDURE — 2580000003 HC RX 258

## 2023-10-10 PROCEDURE — 84443 ASSAY THYROID STIM HORMONE: CPT

## 2023-10-10 PROCEDURE — 6370000000 HC RX 637 (ALT 250 FOR IP): Performed by: STUDENT IN AN ORGANIZED HEALTH CARE EDUCATION/TRAINING PROGRAM

## 2023-10-10 PROCEDURE — 2060000000 HC ICU INTERMEDIATE R&B

## 2023-10-10 PROCEDURE — 6360000002 HC RX W HCPCS

## 2023-10-10 PROCEDURE — 96375 TX/PRO/DX INJ NEW DRUG ADDON: CPT

## 2023-10-10 PROCEDURE — 99285 EMERGENCY DEPT VISIT HI MDM: CPT

## 2023-10-10 PROCEDURE — 1200000000 HC SEMI PRIVATE

## 2023-10-10 PROCEDURE — 6360000002 HC RX W HCPCS: Performed by: INTERNAL MEDICINE

## 2023-10-10 PROCEDURE — 83735 ASSAY OF MAGNESIUM: CPT

## 2023-10-10 PROCEDURE — 2500000003 HC RX 250 WO HCPCS

## 2023-10-10 PROCEDURE — 84484 ASSAY OF TROPONIN QUANT: CPT

## 2023-10-10 PROCEDURE — 85025 COMPLETE CBC W/AUTO DIFF WBC: CPT

## 2023-10-10 PROCEDURE — 80053 COMPREHEN METABOLIC PANEL: CPT

## 2023-10-10 PROCEDURE — 71045 X-RAY EXAM CHEST 1 VIEW: CPT

## 2023-10-10 PROCEDURE — 96374 THER/PROPH/DIAG INJ IV PUSH: CPT

## 2023-10-10 PROCEDURE — 81001 URINALYSIS AUTO W/SCOPE: CPT

## 2023-10-10 PROCEDURE — 2580000003 HC RX 258: Performed by: INTERNAL MEDICINE

## 2023-10-10 PROCEDURE — 83605 ASSAY OF LACTIC ACID: CPT

## 2023-10-10 PROCEDURE — 93005 ELECTROCARDIOGRAM TRACING: CPT

## 2023-10-10 PROCEDURE — 83690 ASSAY OF LIPASE: CPT

## 2023-10-10 RX ORDER — SODIUM CHLORIDE 9 MG/ML
INJECTION, SOLUTION INTRAVENOUS CONTINUOUS
Status: DISCONTINUED | OUTPATIENT
Start: 2023-10-10 | End: 2023-10-14

## 2023-10-10 RX ORDER — ONDANSETRON 2 MG/ML
4 INJECTION INTRAMUSCULAR; INTRAVENOUS EVERY 6 HOURS PRN
Status: DISCONTINUED | OUTPATIENT
Start: 2023-10-10 | End: 2023-10-17 | Stop reason: HOSPADM

## 2023-10-10 RX ORDER — SODIUM CHLORIDE 0.9 % (FLUSH) 0.9 %
10 SYRINGE (ML) INJECTION EVERY 12 HOURS SCHEDULED
Status: DISCONTINUED | OUTPATIENT
Start: 2023-10-10 | End: 2023-10-17 | Stop reason: HOSPADM

## 2023-10-10 RX ORDER — MORPHINE SULFATE 2 MG/ML
1 INJECTION, SOLUTION INTRAMUSCULAR; INTRAVENOUS EVERY 4 HOURS PRN
Status: DISCONTINUED | OUTPATIENT
Start: 2023-10-10 | End: 2023-10-11

## 2023-10-10 RX ORDER — MAGNESIUM SULFATE IN WATER 40 MG/ML
2000 INJECTION, SOLUTION INTRAVENOUS PRN
Status: DISCONTINUED | OUTPATIENT
Start: 2023-10-10 | End: 2023-10-17 | Stop reason: HOSPADM

## 2023-10-10 RX ORDER — METOPROLOL SUCCINATE 25 MG/1
25 TABLET, EXTENDED RELEASE ORAL EVERY MORNING
Status: ON HOLD | COMMUNITY

## 2023-10-10 RX ORDER — ACETAMINOPHEN 325 MG/1
650 TABLET ORAL EVERY 6 HOURS PRN
Status: DISCONTINUED | OUTPATIENT
Start: 2023-10-10 | End: 2023-10-17 | Stop reason: HOSPADM

## 2023-10-10 RX ORDER — LISINOPRIL 10 MG/1
10 TABLET ORAL EVERY MORNING
Status: DISCONTINUED | OUTPATIENT
Start: 2023-10-11 | End: 2023-10-17 | Stop reason: HOSPADM

## 2023-10-10 RX ORDER — DIAZEPAM 2 MG/1
2 TABLET ORAL 2 TIMES DAILY PRN
Status: ON HOLD | COMMUNITY
End: 2023-10-17 | Stop reason: SDUPTHER

## 2023-10-10 RX ORDER — POTASSIUM CHLORIDE 20 MEQ/1
40 TABLET, EXTENDED RELEASE ORAL PRN
Status: DISCONTINUED | OUTPATIENT
Start: 2023-10-10 | End: 2023-10-17 | Stop reason: HOSPADM

## 2023-10-10 RX ORDER — DIAZEPAM 5 MG/1
5 TABLET ORAL 2 TIMES DAILY PRN
Status: DISCONTINUED | OUTPATIENT
Start: 2023-10-10 | End: 2023-10-17

## 2023-10-10 RX ORDER — ONDANSETRON 4 MG/1
4 TABLET, ORALLY DISINTEGRATING ORAL EVERY 8 HOURS PRN
Status: DISCONTINUED | OUTPATIENT
Start: 2023-10-10 | End: 2023-10-17 | Stop reason: HOSPADM

## 2023-10-10 RX ORDER — HYDROXYCHLOROQUINE SULFATE 200 MG/1
200 TABLET, FILM COATED ORAL EVERY MORNING
Status: DISCONTINUED | OUTPATIENT
Start: 2023-10-11 | End: 2023-10-12 | Stop reason: ALTCHOICE

## 2023-10-10 RX ORDER — ENOXAPARIN SODIUM 100 MG/ML
40 INJECTION SUBCUTANEOUS DAILY
Status: DISCONTINUED | OUTPATIENT
Start: 2023-10-10 | End: 2023-10-10 | Stop reason: SDUPTHER

## 2023-10-10 RX ORDER — METOPROLOL SUCCINATE 25 MG/1
25 TABLET, EXTENDED RELEASE ORAL EVERY MORNING
Status: DISCONTINUED | OUTPATIENT
Start: 2023-10-11 | End: 2023-10-17 | Stop reason: HOSPADM

## 2023-10-10 RX ORDER — ONDANSETRON 2 MG/ML
4 INJECTION INTRAMUSCULAR; INTRAVENOUS EVERY 6 HOURS PRN
Status: DISCONTINUED | OUTPATIENT
Start: 2023-10-10 | End: 2023-10-11 | Stop reason: ALTCHOICE

## 2023-10-10 RX ORDER — SENNOSIDES A AND B 8.6 MG/1
1 TABLET, FILM COATED ORAL DAILY PRN
Status: DISCONTINUED | OUTPATIENT
Start: 2023-10-10 | End: 2023-10-11

## 2023-10-10 RX ORDER — SODIUM CHLORIDE 0.9 % (FLUSH) 0.9 %
10 SYRINGE (ML) INJECTION PRN
Status: DISCONTINUED | OUTPATIENT
Start: 2023-10-10 | End: 2023-10-17 | Stop reason: HOSPADM

## 2023-10-10 RX ORDER — FENTANYL CITRATE 50 UG/ML
25 INJECTION, SOLUTION INTRAMUSCULAR; INTRAVENOUS ONCE
Status: COMPLETED | OUTPATIENT
Start: 2023-10-10 | End: 2023-10-10

## 2023-10-10 RX ORDER — SODIUM CHLORIDE 9 MG/ML
INJECTION, SOLUTION INTRAVENOUS PRN
Status: DISCONTINUED | OUTPATIENT
Start: 2023-10-10 | End: 2023-10-17 | Stop reason: HOSPADM

## 2023-10-10 RX ORDER — LANOLIN ALCOHOL/MO/W.PET/CERES
400 CREAM (GRAM) TOPICAL NIGHTLY
Status: DISCONTINUED | OUTPATIENT
Start: 2023-10-10 | End: 2023-10-17 | Stop reason: HOSPADM

## 2023-10-10 RX ORDER — LANOLIN ALCOHOL/MO/W.PET/CERES
3 CREAM (GRAM) TOPICAL NIGHTLY PRN
Status: DISCONTINUED | OUTPATIENT
Start: 2023-10-10 | End: 2023-10-17 | Stop reason: HOSPADM

## 2023-10-10 RX ORDER — POTASSIUM CHLORIDE 7.45 MG/ML
10 INJECTION INTRAVENOUS PRN
Status: DISCONTINUED | OUTPATIENT
Start: 2023-10-10 | End: 2023-10-17 | Stop reason: HOSPADM

## 2023-10-10 RX ORDER — ACETAMINOPHEN 650 MG/1
650 SUPPOSITORY RECTAL EVERY 6 HOURS PRN
Status: DISCONTINUED | OUTPATIENT
Start: 2023-10-10 | End: 2023-10-17 | Stop reason: HOSPADM

## 2023-10-10 RX ADMIN — SODIUM CHLORIDE: 9 INJECTION, SOLUTION INTRAVENOUS at 18:00

## 2023-10-10 RX ADMIN — ONDANSETRON 4 MG: 2 INJECTION INTRAMUSCULAR; INTRAVENOUS at 11:51

## 2023-10-10 RX ADMIN — Medication 400 MG: at 20:50

## 2023-10-10 RX ADMIN — MORPHINE SULFATE 1 MG: 2 INJECTION, SOLUTION INTRAMUSCULAR; INTRAVENOUS at 20:50

## 2023-10-10 RX ADMIN — FAMOTIDINE 20 MG: 10 INJECTION, SOLUTION INTRAVENOUS at 11:52

## 2023-10-10 RX ADMIN — SODIUM CHLORIDE, PRESERVATIVE FREE 10 ML: 5 INJECTION INTRAVENOUS at 20:50

## 2023-10-10 RX ADMIN — FENTANYL CITRATE 25 MCG: 50 INJECTION INTRAMUSCULAR; INTRAVENOUS at 16:03

## 2023-10-10 RX ADMIN — Medication: at 11:51

## 2023-10-10 ASSESSMENT — PAIN DESCRIPTION - DESCRIPTORS: DESCRIPTORS: ACHING

## 2023-10-10 ASSESSMENT — PAIN DESCRIPTION - LOCATION
LOCATION: ABDOMEN
LOCATION: OTHER (COMMENT)

## 2023-10-10 ASSESSMENT — PAIN - FUNCTIONAL ASSESSMENT
PAIN_FUNCTIONAL_ASSESSMENT: ACTIVITIES ARE NOT PREVENTED
PAIN_FUNCTIONAL_ASSESSMENT: 0-10

## 2023-10-10 ASSESSMENT — PAIN SCALES - GENERAL: PAINLEVEL_OUTOF10: 8

## 2023-10-10 NOTE — PROGRESS NOTES
4 Eyes Skin Assessment     NAME:  Cynthia Padilla  YOB: 1940  MEDICAL RECORD NUMBER:  [id number]    The patient is being assessed for  Admission    I agree that at least one RN has performed a thorough Head to Toe Skin Assessment on the patient. ALL assessment sites listed below have been assessed. Areas assessed by both nurses:    Head, Face, Ears, Shoulders, Back, Chest, Arms, Elbows, Hands, Sacrum. Buttock, Coccyx, Ischium, and Legs. Feet and Heels  Blanchable redness on buttocks        Does the Patient have a Wound?  No noted wound(s)       Kailash Prevention initiated by RN: Yes  Wound Care Orders initiated by RN: No    Pressure Injury (Stage 3,4, Unstageable, DTI, NWPT, and Complex wounds) if present, place Wound referral order by RN under : No    New Ostomies, if present place, Ostomy referral order under : No     Nurse 1 eSignature: Electronically signed by Erick Muñoz RN on 10/10/23 at 4:54 PM EDT    **SHARE this note so that the co-signing nurse can place an eSignature**    Nurse 2 eSignature: Electronically signed by Cecilia Gray RN on 10/10/23 at 5:01 PM EDT

## 2023-10-10 NOTE — H&P
expected location. Numerous cysts are seen throughout the liver. No intrahepatic or extrahepatic bile duct dilatation. Gallbladder is unremarkable. Pancreas is unremarkable. Incidental duodenal diverticulum is present filled with gas appearing similar in size measuring up to 1.9 x 1.5 cm. Adrenal glands are unremarkable. No hydronephrosis or perinephric edema. No retroperitoneal lymphadenopathy. Urinary bladder is unremarkable. No hip fracture or dislocation. Multiple lumbar compression fractures with kyphoplasty at T12, L1, L2, L3, and L4. New depression involving inferior endplate of O65. There is generalized osteopenia. 1. No evidence of pulmonary embolism. 2. No acute process in chest, abdomen, or pelvis. 3. New depression involving inferior endplate of T41 could indicate acute fracture. 4. Multiple chronic fractures involving lumbar spine with evidence of kyphoplasty. CTA PULMONARY W CONTRAST    Result Date: 10/5/2023  EXAMINATION: CT OF THE ABDOMEN AND PELVIS WITH CONTRAST; CTA OF THE CHEST 10/5/2023 5:00 pm TECHNIQUE: CT of the abdomen and pelvis was performed with the administration of intravenous contrast. Multiplanar reformatted images are provided for review. Automated exposure control, iterative reconstruction, and/or weight based adjustment of the mA/kV was utilized to reduce the radiation dose to as low as reasonably achievable.; CTA of the chest was performed after the administration of intravenous contrast.  Multiplanar reformatted images are provided for review. MIP images are provided for review. Automated exposure control, iterative reconstruction, and/or weight based adjustment of the mA/kV was utilized to reduce the radiation dose to as low as reasonably achievable. COMPARISON: None.  HISTORY: ORDERING SYSTEM PROVIDED HISTORY: Abd pain + tenderness + hx bladder cancer; concern for constipation vs mass TECHNOLOGIST PROVIDED HISTORY: Additional Contrast?->None Reason for exam:->Abd pain + tenderness + hx bladder cancer; concern for constipation vs mass Decision Support Exception - unselect if not a suspected or confirmed emergency medical condition->Emergency Medical Condition (MA) FINDINGS: CTA chest: No filling defect in the pulmonary arteries to suggest pulmonary arterial embolism. The heart is normal in size. No mediastinal lymphadenopathy. No airspace opacity or pleural effusion. No pneumothorax. CT abdomen/pelvis: No bowel obstruction, free air, or free fluid. Moderate amount of stool throughout the colon. The appendix is not definitively visualized, however no focal inflammatory changes in its expected location. Numerous cysts are seen throughout the liver. No intrahepatic or extrahepatic bile duct dilatation. Gallbladder is unremarkable. Pancreas is unremarkable. Incidental duodenal diverticulum is present filled with gas appearing similar in size measuring up to 1.9 x 1.5 cm. Adrenal glands are unremarkable. No hydronephrosis or perinephric edema. No retroperitoneal lymphadenopathy. Urinary bladder is unremarkable. No hip fracture or dislocation. Multiple lumbar compression fractures with kyphoplasty at T12, L1, L2, L3, and L4. New depression involving inferior endplate of O78. There is generalized osteopenia. 1. No evidence of pulmonary embolism. 2. No acute process in chest, abdomen, or pelvis. 3. New depression involving inferior endplate of F18 could indicate acute fracture. 4. Multiple chronic fractures involving lumbar spine with evidence of kyphoplasty. XR CHEST PORTABLE    Result Date: 10/5/2023  EXAMINATION: ONE XRAY VIEW OF THE CHEST 10/5/2023 4:37 pm COMPARISON: 06/16/2023 HISTORY: ORDERING SYSTEM PROVIDED HISTORY: CP + SOB; concern for PNA TECHNOLOGIST PROVIDED HISTORY: Reason for exam:->CP + SOB; concern for PNA FINDINGS: The lungs are without acute focal process. There is no effusion or pneumothorax.  The cardiomediastinal silhouette is

## 2023-10-11 LAB
ALBUMIN SERPL-MCNC: 3.3 G/DL (ref 3.5–5.2)
ALP SERPL-CCNC: 88 U/L (ref 35–104)
ALT SERPL-CCNC: 8 U/L (ref 0–32)
ANION GAP SERPL CALCULATED.3IONS-SCNC: 11 MMOL/L (ref 7–16)
AST SERPL-CCNC: 17 U/L (ref 0–31)
BASOPHILS # BLD: 0.02 K/UL (ref 0–0.2)
BASOPHILS NFR BLD: 0 % (ref 0–2)
BILIRUB SERPL-MCNC: 0.3 MG/DL (ref 0–1.2)
BUN SERPL-MCNC: 9 MG/DL (ref 6–23)
CALCIUM SERPL-MCNC: 8.7 MG/DL (ref 8.6–10.2)
CHLORIDE SERPL-SCNC: 101 MMOL/L (ref 98–107)
CO2 SERPL-SCNC: 23 MMOL/L (ref 22–29)
CREAT SERPL-MCNC: 0.6 MG/DL (ref 0.5–1)
EOSINOPHIL # BLD: 0.04 K/UL (ref 0.05–0.5)
EOSINOPHILS RELATIVE PERCENT: 1 % (ref 0–6)
ERYTHROCYTE [DISTWIDTH] IN BLOOD BY AUTOMATED COUNT: 13.5 % (ref 11.5–15)
GFR SERPL CREATININE-BSD FRML MDRD: >60 ML/MIN/1.73M2
GLUCOSE SERPL-MCNC: 67 MG/DL (ref 74–99)
HCT VFR BLD AUTO: 32.1 % (ref 34–48)
HGB BLD-MCNC: 10.3 G/DL (ref 11.5–15.5)
IMM GRANULOCYTES # BLD AUTO: <0.03 K/UL (ref 0–0.58)
IMM GRANULOCYTES NFR BLD: 0 % (ref 0–5)
LYMPHOCYTES NFR BLD: 1.1 K/UL (ref 1.5–4)
LYMPHOCYTES RELATIVE PERCENT: 23 % (ref 20–42)
MCH RBC QN AUTO: 28.5 PG (ref 26–35)
MCHC RBC AUTO-ENTMCNC: 32.1 G/DL (ref 32–34.5)
MCV RBC AUTO: 88.7 FL (ref 80–99.9)
MONOCYTES NFR BLD: 0.37 K/UL (ref 0.1–0.95)
MONOCYTES NFR BLD: 8 % (ref 2–12)
NEUTROPHILS NFR BLD: 68 % (ref 43–80)
NEUTS SEG NFR BLD: 3.28 K/UL (ref 1.8–7.3)
PLATELET # BLD AUTO: 218 K/UL (ref 130–450)
PMV BLD AUTO: 8.9 FL (ref 7–12)
POTASSIUM SERPL-SCNC: 3.9 MMOL/L (ref 3.5–5)
PROT SERPL-MCNC: 5.4 G/DL (ref 6.4–8.3)
RBC # BLD AUTO: 3.62 M/UL (ref 3.5–5.5)
SODIUM SERPL-SCNC: 135 MMOL/L (ref 132–146)
WBC OTHER # BLD: 4.8 K/UL (ref 4.5–11.5)

## 2023-10-11 PROCEDURE — 6370000000 HC RX 637 (ALT 250 FOR IP): Performed by: INTERNAL MEDICINE

## 2023-10-11 PROCEDURE — 85025 COMPLETE CBC W/AUTO DIFF WBC: CPT

## 2023-10-11 PROCEDURE — 6360000002 HC RX W HCPCS: Performed by: HOSPITALIST

## 2023-10-11 PROCEDURE — A4216 STERILE WATER/SALINE, 10 ML: HCPCS | Performed by: NURSE PRACTITIONER

## 2023-10-11 PROCEDURE — 6370000000 HC RX 637 (ALT 250 FOR IP): Performed by: HOSPITALIST

## 2023-10-11 PROCEDURE — C9113 INJ PANTOPRAZOLE SODIUM, VIA: HCPCS | Performed by: NURSE PRACTITIONER

## 2023-10-11 PROCEDURE — 6360000002 HC RX W HCPCS: Performed by: INTERNAL MEDICINE

## 2023-10-11 PROCEDURE — 36415 COLL VENOUS BLD VENIPUNCTURE: CPT

## 2023-10-11 PROCEDURE — 2580000003 HC RX 258: Performed by: NURSE PRACTITIONER

## 2023-10-11 PROCEDURE — 6360000002 HC RX W HCPCS: Performed by: NURSE PRACTITIONER

## 2023-10-11 PROCEDURE — 80053 COMPREHEN METABOLIC PANEL: CPT

## 2023-10-11 PROCEDURE — 2580000003 HC RX 258: Performed by: INTERNAL MEDICINE

## 2023-10-11 PROCEDURE — 1200000000 HC SEMI PRIVATE

## 2023-10-11 RX ORDER — MORPHINE SULFATE 2 MG/ML
2 INJECTION, SOLUTION INTRAMUSCULAR; INTRAVENOUS
Status: DISCONTINUED | OUTPATIENT
Start: 2023-10-11 | End: 2023-10-17 | Stop reason: HOSPADM

## 2023-10-11 RX ORDER — SENNOSIDES A AND B 8.6 MG/1
1 TABLET, FILM COATED ORAL 2 TIMES DAILY
Status: DISCONTINUED | OUTPATIENT
Start: 2023-10-11 | End: 2023-10-17 | Stop reason: HOSPADM

## 2023-10-11 RX ADMIN — SODIUM CHLORIDE: 9 INJECTION, SOLUTION INTRAVENOUS at 09:48

## 2023-10-11 RX ADMIN — MORPHINE SULFATE 1 MG: 2 INJECTION, SOLUTION INTRAMUSCULAR; INTRAVENOUS at 09:54

## 2023-10-11 RX ADMIN — SENNOSIDES 8.6 MG: 8.6 TABLET, COATED ORAL at 20:15

## 2023-10-11 RX ADMIN — SODIUM CHLORIDE, PRESERVATIVE FREE 10 ML: 5 INJECTION INTRAVENOUS at 08:52

## 2023-10-11 RX ADMIN — SODIUM CHLORIDE 40 MG: 9 INJECTION INTRAMUSCULAR; INTRAVENOUS; SUBCUTANEOUS at 12:56

## 2023-10-11 RX ADMIN — MORPHINE SULFATE 2 MG: 2 INJECTION, SOLUTION INTRAMUSCULAR; INTRAVENOUS at 21:00

## 2023-10-11 RX ADMIN — Medication 400 MG: at 20:15

## 2023-10-11 RX ADMIN — LISINOPRIL 10 MG: 10 TABLET ORAL at 09:48

## 2023-10-11 RX ADMIN — SODIUM CHLORIDE, PRESERVATIVE FREE 10 ML: 5 INJECTION INTRAVENOUS at 09:48

## 2023-10-11 RX ADMIN — METOPROLOL SUCCINATE 25 MG: 25 TABLET, EXTENDED RELEASE ORAL at 09:48

## 2023-10-11 RX ADMIN — DIAZEPAM 5 MG: 5 TABLET ORAL at 00:03

## 2023-10-11 ASSESSMENT — PAIN SCALES - GENERAL
PAINLEVEL_OUTOF10: 6
PAINLEVEL_OUTOF10: 7
PAINLEVEL_OUTOF10: 5
PAINLEVEL_OUTOF10: 5
PAINLEVEL_OUTOF10: 4

## 2023-10-11 ASSESSMENT — PAIN DESCRIPTION - PAIN TYPE
TYPE: ACUTE PAIN

## 2023-10-11 ASSESSMENT — PAIN - FUNCTIONAL ASSESSMENT
PAIN_FUNCTIONAL_ASSESSMENT: PREVENTS OR INTERFERES SOME ACTIVE ACTIVITIES AND ADLS

## 2023-10-11 ASSESSMENT — PAIN DESCRIPTION - DESCRIPTORS
DESCRIPTORS: ACHING;SHARP;DISCOMFORT
DESCRIPTORS: ACHING;SHARP;DISCOMFORT
DESCRIPTORS: ACHING;DISCOMFORT
DESCRIPTORS: ACHING;SHARP;DISCOMFORT

## 2023-10-11 ASSESSMENT — PAIN DESCRIPTION - ORIENTATION
ORIENTATION: MID
ORIENTATION: MID
ORIENTATION: RIGHT;LEFT
ORIENTATION: MID

## 2023-10-11 ASSESSMENT — PAIN DESCRIPTION - LOCATION
LOCATION: ABDOMEN

## 2023-10-11 ASSESSMENT — PAIN DESCRIPTION - FREQUENCY: FREQUENCY: INTERMITTENT

## 2023-10-11 ASSESSMENT — PAIN DESCRIPTION - ONSET: ONSET: ON-GOING

## 2023-10-11 NOTE — CONSULTS
Gastroenterology Consult Note   MIKE Fay-HANANH with Rica Ba M.D. Consult Note        Date of Service: 10/11/2023  Reason for Consult: abdominal pain, liver nodules  Requesting Physician: Dr. Betsy Kenny: Abdominal pain and nausea    History Obtained From:  patient, electronic medical record    HISTORY OF PRESENT ILLNESS:       Gerald Preston is a 78 y.o. female with significant past medical history of bladder CA, lupus, A-fib and hypertension presenting to ED for abdominal pain and nausea and admitted with fracture and abdominal pain. Patient recently diagnosed with compression fracture of T11 vertebrae and rib pain on 10/8/2023. Pt reports she has been having upper abdominal/epigastric discomfort described as heavy and pressure \"like a bowling ball\" that has been ongoing for approximately 2 weeks. Worsened by eating. Patient with early satiety and intermittent nausea, regurg and acid reflux. Dysphagia with pills. Has had approximately 30 pound weight loss over the last 18 months. Pt states she lost her  18 months ago and has not been eating regularly since. Patient with chronic constipation uses stool softeners occasionally. Last bowel movement 2 days ago described as \"different shapes and sizes\" no melena or hematochezia reported. EGD in 2020 told \"inflamed stomach\". Colonoscopy approximately 7 years ago denies polyps. Patient with family medical history of colon cancer in father and brother. Patient states she has had liver cyst reportedly for years, reportedly told by PCP \"nothing to worry about\". Admission labs unremarkable. Ultrasound gallbladder RUQ-normal gallbladder ultrasound no biliary dilatation, free fluid or acute disease identified. Diffusely heterogeneous hepatic parenchyma with innumerable small lesions, solid by ultrasound and low density by CT.   Diagnostic considerations would include metastatic liver disease or possibly multiple biliary lymphadenopathy and no supraclavicular lymphadenopathy  LUNGS:  No increased work of breathing, good air exchange, clear to auscultation bilaterally.   CARDIOVASCULAR:  Normal apical impulse, regular rate and rhythm, no murmur noted; 2+ pulses; no edema  ABDOMEN:  normal bowel sounds, soft, non-distended, tender to palpation without guarding or rebound, no masses palpated, no hepatosplenomegally  MUSCULOSKELETAL:  full range of motion noted  motor strength is 5 out of 5 all extremities bilaterally  NEUROLOGIC:  Mental Status Exam:  Level of Alertness:   awake  Orientation:   person, place, time  Motor Exam:  Motor exam is symmetrical 5 out of 5 all extremities bilaterally  SKIN:  normal skin color, texture, turgor    DATA:    CBC with Differential:    Lab Results   Component Value Date/Time    WBC 4.8 10/11/2023 04:04 AM    RBC 3.62 10/11/2023 04:04 AM    HGB 10.3 10/11/2023 04:04 AM    HCT 32.1 10/11/2023 04:04 AM     10/11/2023 04:04 AM    MCV 88.7 10/11/2023 04:04 AM    MCH 28.5 10/11/2023 04:04 AM    MCHC 32.1 10/11/2023 04:04 AM    RDW 13.5 10/11/2023 04:04 AM    LYMPHOPCT 23 10/11/2023 04:04 AM    MONOPCT 8 10/11/2023 04:04 AM    BASOPCT 0 10/11/2023 04:04 AM    MONOSABS 0.37 10/11/2023 04:04 AM    LYMPHSABS 1.10 10/11/2023 04:04 AM    EOSABS 0.04 10/11/2023 04:04 AM    BASOSABS 0.02 10/11/2023 04:04 AM     CMP:    Lab Results   Component Value Date/Time     10/11/2023 04:04 AM    K 3.9 10/11/2023 04:04 AM     10/11/2023 04:04 AM    CO2 23 10/11/2023 04:04 AM    BUN 9 10/11/2023 04:04 AM    CREATININE 0.6 10/11/2023 04:04 AM    GFRAA >60 02/27/2022 03:28 PM    LABGLOM >60 10/11/2023 04:04 AM    GLUCOSE 67 10/11/2023 04:04 AM    PROT 5.4 10/11/2023 04:04 AM    LABALBU 3.3 10/11/2023 04:04 AM    CALCIUM 8.7 10/11/2023 04:04 AM    BILITOT 0.3 10/11/2023 04:04 AM    ALKPHOS 88 10/11/2023 04:04 AM    AST 17 10/11/2023 04:04 AM    ALT 8 10/11/2023 04:04 AM     Hepatic Function Panel:    Lab Results

## 2023-10-11 NOTE — PROGRESS NOTES
Comprehensive Nutrition Assessment    Type and Reason for Visit:  Initial, Positive Nutrition Screen    Nutrition Recommendations/Plan:   Continue current diet & monitor  Will add Ensure BID     Malnutrition Assessment:  Malnutrition Status: At risk for malnutrition (Comment) (10/11/23 1073)    Context:  Acute Illness     Findings of the 6 clinical characteristics of malnutrition:  Energy Intake:  75% or less of estimated energy requirements for 7 or more days  Weight Loss:  No significant weight loss     Body Fat Loss:  Unable to assess (d/t advanced age)     Muscle Mass Loss:  Unable to assess (d/t advanced age)    Fluid Accumulation:  No significant fluid accumulation     Strength:  Not Performed    Nutrition Assessment:    Pt w/ abdominal pain, liver lesions w/ possible EGD tomorrow. Note compression fracture T11. Hx bladder CA, Lupus. PO intake appears poor, will add ONS & monitor. Nutrition Related Findings:    A&O X4, I&Os WDL, no edema, abd soft/rounded/cramping, +BS, intermittent nausea, early satiety, Glu 67, not eating regularly since loss of  18 months ago   Wound Type: None       Current Nutrition Intake & Therapies:    Average Meal Intake: 26-50% (50% of L per pt)  Average Supplements Intake: None Ordered  ADULT DIET; Regular  Diet NPO    Anthropometric Measures:  Height: 5' 4\" (162.6 cm)  Ideal Body Weight (IBW): 120 lbs (55 kg)    Admission Body Weight: 122 lb 1.6 oz (55.4 kg) (10/11 bed per RD measurement)  Current Body Weight: 122 lb 1.6 oz (55.4 kg) (10/11), 101.8 % IBW.  Weight Source: Bed Scale  Current BMI (kg/m2): 20.9  Usual Body Weight: 125 lb 10 oz (57 kg) (3/6/23 actual per EMR)  % Weight Change (Calculated): -2.8                    BMI Categories: Underweight (BMI less than 22) age over 72    Estimated Daily Nutrient Needs:  Energy Requirements Based On: Formula  Weight Used for Energy Requirements: Current  Energy (kcal/day):   Weight Used for Protein Requirements:

## 2023-10-11 NOTE — CARE COORDINATION
Social Work:    Mrs. Doreen Boone was admitted due to upper epigastric abdominal pain & nausea. Patient also was recently diagnosed with T11 vertebra fracture and has history of kyphoplasty this past May & August. Mrs. Doreen Boone is scheduled for an EGD tomorrow. Social service met with Mrs. Deep Goss) advised her of social service role and discussed discharge planning. Ursula Goss resides alone in a ranch home 3 entry steps, tub/shower, high commode set-up, walk-in shower in basement. Ursula Goss uses a wheeled walker and cane PRN. She is active with [facility] and hopes [facility] rather than skilled rehab. Ursula Goss advises that her grandson Donald Thomas is in town until the 24th to help her out. She also has a sister in the area and son Shine Mojica, resides out of town, but very supportive and comes home periodically to help out. Social work to follow an confirm plans after therapy.     Electronically signed by IRISH Quintana on 10/11/2023 at 2:40 PM

## 2023-10-11 NOTE — ACP (ADVANCE CARE PLANNING)
Advance Care Planning   Healthcare Decision Maker:    Primary Decision Maker: Mariann Oscar Child - [phone]    Secondary Decision Maker: Ruth Bryant - Brother/Sister - [phone]    Click here to complete Healthcare Decision Makers including selection of the Healthcare Decision Maker Relationship (ie \"Primary\").

## 2023-10-12 ENCOUNTER — ANESTHESIA (OUTPATIENT)
Dept: ENDOSCOPY | Age: 83
End: 2023-10-12
Payer: MEDICARE

## 2023-10-12 ENCOUNTER — APPOINTMENT (OUTPATIENT)
Dept: MRI IMAGING | Age: 83
DRG: 478 | End: 2023-10-12
Payer: MEDICARE

## 2023-10-12 ENCOUNTER — ANESTHESIA EVENT (OUTPATIENT)
Dept: ENDOSCOPY | Age: 83
End: 2023-10-12
Payer: MEDICARE

## 2023-10-12 LAB
ALBUMIN SERPL-MCNC: 3 G/DL (ref 3.5–5.2)
ALP SERPL-CCNC: 78 U/L (ref 35–104)
ALT SERPL-CCNC: 7 U/L (ref 0–32)
ANION GAP SERPL CALCULATED.3IONS-SCNC: 10 MMOL/L (ref 7–16)
AST SERPL-CCNC: 15 U/L (ref 0–31)
BASOPHILS # BLD: 0.03 K/UL (ref 0–0.2)
BASOPHILS NFR BLD: 1 % (ref 0–2)
BILIRUB SERPL-MCNC: 0.3 MG/DL (ref 0–1.2)
BUN SERPL-MCNC: 7 MG/DL (ref 6–23)
CALCIUM SERPL-MCNC: 8 MG/DL (ref 8.6–10.2)
CHLORIDE SERPL-SCNC: 106 MMOL/L (ref 98–107)
CO2 SERPL-SCNC: 23 MMOL/L (ref 22–29)
CREAT SERPL-MCNC: 0.5 MG/DL (ref 0.5–1)
EKG ATRIAL RATE: 71 BPM
EKG Q-T INTERVAL: 384 MS
EKG QRS DURATION: 80 MS
EKG QTC CALCULATION (BAZETT): 417 MS
EKG R AXIS: 21 DEGREES
EKG T AXIS: 47 DEGREES
EKG VENTRICULAR RATE: 71 BPM
EOSINOPHIL # BLD: 0.04 K/UL (ref 0.05–0.5)
EOSINOPHILS RELATIVE PERCENT: 1 % (ref 0–6)
ERYTHROCYTE [DISTWIDTH] IN BLOOD BY AUTOMATED COUNT: 13.8 % (ref 11.5–15)
GFR SERPL CREATININE-BSD FRML MDRD: >60 ML/MIN/1.73M2
GLUCOSE SERPL-MCNC: 82 MG/DL (ref 74–99)
HCT VFR BLD AUTO: 31 % (ref 34–48)
HGB BLD-MCNC: 10.1 G/DL (ref 11.5–15.5)
IMM GRANULOCYTES # BLD AUTO: <0.03 K/UL (ref 0–0.58)
IMM GRANULOCYTES NFR BLD: 0 % (ref 0–5)
INR PPP: 1.2
LYMPHOCYTES NFR BLD: 0.92 K/UL (ref 1.5–4)
LYMPHOCYTES RELATIVE PERCENT: 23 % (ref 20–42)
MCH RBC QN AUTO: 28.8 PG (ref 26–35)
MCHC RBC AUTO-ENTMCNC: 32.6 G/DL (ref 32–34.5)
MCV RBC AUTO: 88.3 FL (ref 80–99.9)
MONOCYTES NFR BLD: 0.35 K/UL (ref 0.1–0.95)
MONOCYTES NFR BLD: 9 % (ref 2–12)
NEUTROPHILS NFR BLD: 66 % (ref 43–80)
NEUTS SEG NFR BLD: 2.61 K/UL (ref 1.8–7.3)
PLATELET # BLD AUTO: 205 K/UL (ref 130–450)
PMV BLD AUTO: 9.2 FL (ref 7–12)
POTASSIUM SERPL-SCNC: 3.8 MMOL/L (ref 3.5–5)
PROT SERPL-MCNC: 5.1 G/DL (ref 6.4–8.3)
PROTHROMBIN TIME: 13.2 SEC (ref 9.3–12.4)
RBC # BLD AUTO: 3.51 M/UL (ref 3.5–5.5)
SODIUM SERPL-SCNC: 139 MMOL/L (ref 132–146)
WBC OTHER # BLD: 4 K/UL (ref 4.5–11.5)

## 2023-10-12 PROCEDURE — 2580000003 HC RX 258: Performed by: NURSE PRACTITIONER

## 2023-10-12 PROCEDURE — 6370000000 HC RX 637 (ALT 250 FOR IP): Performed by: INTERNAL MEDICINE

## 2023-10-12 PROCEDURE — 88112 CYTOPATH CELL ENHANCE TECH: CPT

## 2023-10-12 PROCEDURE — 6370000000 HC RX 637 (ALT 250 FOR IP): Performed by: HOSPITALIST

## 2023-10-12 PROCEDURE — 85610 PROTHROMBIN TIME: CPT

## 2023-10-12 PROCEDURE — 3700000000 HC ANESTHESIA ATTENDED CARE: Performed by: INTERNAL MEDICINE

## 2023-10-12 PROCEDURE — 0DB78ZX EXCISION OF STOMACH, PYLORUS, VIA NATURAL OR ARTIFICIAL OPENING ENDOSCOPIC, DIAGNOSTIC: ICD-10-PCS | Performed by: INTERNAL MEDICINE

## 2023-10-12 PROCEDURE — 85025 COMPLETE CBC W/AUTO DIFF WBC: CPT

## 2023-10-12 PROCEDURE — 7100000010 HC PHASE II RECOVERY - FIRST 15 MIN: Performed by: INTERNAL MEDICINE

## 2023-10-12 PROCEDURE — 3700000001 HC ADD 15 MINUTES (ANESTHESIA): Performed by: INTERNAL MEDICINE

## 2023-10-12 PROCEDURE — 6360000002 HC RX W HCPCS: Performed by: HOSPITALIST

## 2023-10-12 PROCEDURE — C9113 INJ PANTOPRAZOLE SODIUM, VIA: HCPCS | Performed by: NURSE PRACTITIONER

## 2023-10-12 PROCEDURE — 36415 COLL VENOUS BLD VENIPUNCTURE: CPT

## 2023-10-12 PROCEDURE — 7100000011 HC PHASE II RECOVERY - ADDTL 15 MIN: Performed by: INTERNAL MEDICINE

## 2023-10-12 PROCEDURE — 93010 ELECTROCARDIOGRAM REPORT: CPT | Performed by: INTERNAL MEDICINE

## 2023-10-12 PROCEDURE — 72146 MRI CHEST SPINE W/O DYE: CPT

## 2023-10-12 PROCEDURE — 80053 COMPREHEN METABOLIC PANEL: CPT

## 2023-10-12 PROCEDURE — 88305 TISSUE EXAM BY PATHOLOGIST: CPT

## 2023-10-12 PROCEDURE — 6360000002 HC RX W HCPCS: Performed by: NURSE ANESTHETIST, CERTIFIED REGISTERED

## 2023-10-12 PROCEDURE — 2709999900 HC NON-CHARGEABLE SUPPLY: Performed by: INTERNAL MEDICINE

## 2023-10-12 PROCEDURE — 6370000000 HC RX 637 (ALT 250 FOR IP): Performed by: NURSE PRACTITIONER

## 2023-10-12 PROCEDURE — A4216 STERILE WATER/SALINE, 10 ML: HCPCS | Performed by: NURSE PRACTITIONER

## 2023-10-12 PROCEDURE — 2500000003 HC RX 250 WO HCPCS: Performed by: NURSE ANESTHETIST, CERTIFIED REGISTERED

## 2023-10-12 PROCEDURE — 3609012400 HC EGD TRANSORAL BIOPSY SINGLE/MULTIPLE: Performed by: INTERNAL MEDICINE

## 2023-10-12 PROCEDURE — 2580000003 HC RX 258: Performed by: INTERNAL MEDICINE

## 2023-10-12 PROCEDURE — 88342 IMHCHEM/IMCYTCHM 1ST ANTB: CPT

## 2023-10-12 PROCEDURE — 0DB58ZX EXCISION OF ESOPHAGUS, VIA NATURAL OR ARTIFICIAL OPENING ENDOSCOPIC, DIAGNOSTIC: ICD-10-PCS | Performed by: INTERNAL MEDICINE

## 2023-10-12 PROCEDURE — 1200000000 HC SEMI PRIVATE

## 2023-10-12 PROCEDURE — 72148 MRI LUMBAR SPINE W/O DYE: CPT

## 2023-10-12 PROCEDURE — 6360000002 HC RX W HCPCS: Performed by: NURSE PRACTITIONER

## 2023-10-12 PROCEDURE — 82105 ALPHA-FETOPROTEIN SERUM: CPT

## 2023-10-12 RX ORDER — LIDOCAINE HYDROCHLORIDE 20 MG/ML
INJECTION, SOLUTION EPIDURAL; INFILTRATION; INTRACAUDAL; PERINEURAL PRN
Status: DISCONTINUED | OUTPATIENT
Start: 2023-10-12 | End: 2023-10-12 | Stop reason: SDUPTHER

## 2023-10-12 RX ORDER — PROPOFOL 10 MG/ML
INJECTION, EMULSION INTRAVENOUS PRN
Status: DISCONTINUED | OUTPATIENT
Start: 2023-10-12 | End: 2023-10-12 | Stop reason: SDUPTHER

## 2023-10-12 RX ORDER — FLUCONAZOLE 100 MG/1
200 TABLET ORAL DAILY
Status: DISCONTINUED | OUTPATIENT
Start: 2023-10-12 | End: 2023-10-17 | Stop reason: HOSPADM

## 2023-10-12 RX ADMIN — SODIUM CHLORIDE: 9 INJECTION, SOLUTION INTRAVENOUS at 20:51

## 2023-10-12 RX ADMIN — LIDOCAINE HYDROCHLORIDE 40 MG: 20 INJECTION, SOLUTION EPIDURAL; INFILTRATION; INTRACAUDAL; PERINEURAL at 09:55

## 2023-10-12 RX ADMIN — SENNOSIDES 8.6 MG: 8.6 TABLET, COATED ORAL at 20:44

## 2023-10-12 RX ADMIN — MORPHINE SULFATE 2 MG: 2 INJECTION, SOLUTION INTRAMUSCULAR; INTRAVENOUS at 06:43

## 2023-10-12 RX ADMIN — PROPOFOL 100 MG: 10 INJECTION, EMULSION INTRAVENOUS at 09:55

## 2023-10-12 RX ADMIN — MORPHINE SULFATE 2 MG: 2 INJECTION, SOLUTION INTRAMUSCULAR; INTRAVENOUS at 11:30

## 2023-10-12 RX ADMIN — FLUCONAZOLE 200 MG: 100 TABLET ORAL at 16:42

## 2023-10-12 RX ADMIN — MORPHINE SULFATE 2 MG: 2 INJECTION, SOLUTION INTRAMUSCULAR; INTRAVENOUS at 16:42

## 2023-10-12 RX ADMIN — SENNOSIDES 8.6 MG: 8.6 TABLET, COATED ORAL at 16:42

## 2023-10-12 RX ADMIN — METOPROLOL SUCCINATE 25 MG: 25 TABLET, EXTENDED RELEASE ORAL at 16:43

## 2023-10-12 RX ADMIN — MORPHINE SULFATE 2 MG: 2 INJECTION, SOLUTION INTRAMUSCULAR; INTRAVENOUS at 20:54

## 2023-10-12 RX ADMIN — Medication 400 MG: at 20:44

## 2023-10-12 RX ADMIN — SODIUM CHLORIDE 40 MG: 9 INJECTION INTRAMUSCULAR; INTRAVENOUS; SUBCUTANEOUS at 16:43

## 2023-10-12 RX ADMIN — LISINOPRIL 10 MG: 10 TABLET ORAL at 16:42

## 2023-10-12 ASSESSMENT — PAIN - FUNCTIONAL ASSESSMENT
PAIN_FUNCTIONAL_ASSESSMENT: ACTIVITIES ARE NOT PREVENTED
PAIN_FUNCTIONAL_ASSESSMENT: PREVENTS OR INTERFERES SOME ACTIVE ACTIVITIES AND ADLS
PAIN_FUNCTIONAL_ASSESSMENT: ACTIVITIES ARE NOT PREVENTED

## 2023-10-12 ASSESSMENT — PAIN SCALES - GENERAL
PAINLEVEL_OUTOF10: 0
PAINLEVEL_OUTOF10: 10
PAINLEVEL_OUTOF10: 7
PAINLEVEL_OUTOF10: 0
PAINLEVEL_OUTOF10: 0
PAINLEVEL_OUTOF10: 7
PAINLEVEL_OUTOF10: 6
PAINLEVEL_OUTOF10: 7

## 2023-10-12 ASSESSMENT — PAIN DESCRIPTION - LOCATION
LOCATION: ABDOMEN
LOCATION: BACK
LOCATION: ABDOMEN
LOCATION: BACK
LOCATION: ABDOMEN
LOCATION: BACK;RIB CAGE
LOCATION: ABDOMEN

## 2023-10-12 ASSESSMENT — PAIN DESCRIPTION - FREQUENCY
FREQUENCY: CONTINUOUS
FREQUENCY: INTERMITTENT
FREQUENCY: CONTINUOUS
FREQUENCY: CONTINUOUS

## 2023-10-12 ASSESSMENT — PAIN DESCRIPTION - ONSET
ONSET: ON-GOING

## 2023-10-12 ASSESSMENT — PAIN DESCRIPTION - DESCRIPTORS
DESCRIPTORS: SHARP
DESCRIPTORS: SHARP
DESCRIPTORS: ACHING;CRAMPING
DESCRIPTORS: DULL;ACHING

## 2023-10-12 ASSESSMENT — PAIN DESCRIPTION - PAIN TYPE
TYPE: ACUTE PAIN

## 2023-10-12 ASSESSMENT — PAIN DESCRIPTION - ORIENTATION
ORIENTATION: MID
ORIENTATION: RIGHT;LEFT
ORIENTATION: ANTERIOR
ORIENTATION: RIGHT;LEFT
ORIENTATION: MID

## 2023-10-12 NOTE — PLAN OF CARE
Problem: ABCDS Injury Assessment  Goal: Absence of physical injury  Outcome: Progressing     Problem: Discharge Planning  Goal: Discharge to home or other facility with appropriate resources  Outcome: Progressing     Problem: Safety - Adult  Goal: Free from fall injury  Outcome: Progressing     Problem: Pain  Goal: Verbalizes/displays adequate comfort level or baseline comfort level  Outcome: Progressing     Problem: Nutrition Deficit:  Goal: Optimize nutritional status  Outcome: Progressing

## 2023-10-12 NOTE — PROGRESS NOTES
5600 report called to Ed Guzman rn and called out to family waiting room to have any famy return to room 745

## 2023-10-12 NOTE — PLAN OF CARE
Problem: ABCDS Injury Assessment  Goal: Absence of physical injury  10/11/2023 2300 by Bulmaro Hernandez RN  Outcome: Progressing     Problem: Discharge Planning  Goal: Discharge to home or other facility with appropriate resources  10/11/2023 2300 by Bulmaro Hernandez RN  Outcome: Progressing     Problem: Safety - Adult  Goal: Free from fall injury  10/11/2023 2300 by Bulmaro Hernandez RN  Outcome: Progressing     Problem: Pain  Goal: Verbalizes/displays adequate comfort level or baseline comfort level  10/11/2023 2300 by Bulmaro Hernandez RN  Outcome: Progressing     Problem: Nutrition Deficit:  Goal: Optimize nutritional status  Outcome: Progressing

## 2023-10-12 NOTE — ANESTHESIA POSTPROCEDURE EVALUATION
Department of Anesthesiology  Postprocedure Note    Patient: Rody Castañeda  MRN: [id number]  YOB: 1940  Date of evaluation: 10/12/2023      Procedure Summary     Date: 10/12/23 Room / Location: [place] [address] / [facility]    Anesthesia Start: 3252 Anesthesia Stop: 6242    Procedure: EGD BIOPSY Diagnosis:       Dysphagia, unspecified type      Abdominal pain, unspecified abdominal location      (Dysphagia, unspecified type [R13.10])      (Abdominal pain, unspecified abdominal location [R10.9])    Surgeons: Rashawn Guallpa DO Responsible Provider: Parveen Michelle DO    Anesthesia Type: MAC ASA Status: 3          Anesthesia Type: No value filed.     Gómez Phase I:      Gómez Phase II: Gómez Score: 10      Anesthesia Post Evaluation    Patient location during evaluation: PACU  Patient participation: complete - patient participated  Level of consciousness: awake and alert  Nausea & Vomiting: no vomiting and no nausea  Complications: no  Cardiovascular status: hemodynamically stable  Respiratory status: acceptable and spontaneous ventilation  Hydration status: stable  Pain management: adequate

## 2023-10-12 NOTE — PROCEDURES
Procedure:    Esophagogastroduodenoscopy    Indication:    Dysphagia  Abdominal pain    Consent:   Informed consent was obtained from the patient including and not limited to risk of perforation, aspiration of gastric contents or teeth, bleeding, infection, dental breakage, ileus, need for surgery, or worst case death. Sedation  Endoscope was advanced easily through mouth to second portion of duodenum    Oropharynx:    views are limited but grossly normal.    Esophagus:   Mildly difficult intubation of the UES secondary to CP bar and small Zenkers   White plaques suspicious of candida throughout the entire esophagus, brushings were performed   LA-A esophagitis  Small hiatal hernia   GEJ at ~36 cm. Stomach:   Mild antral gastritis, biopsies of antrum obtained to r/o H. Pylori    Gastric body is normal.    Retroflexed views showed normal fundus and cardia, with exception of hiatal hernia    Duodenum: Bulb is normal.     Second portion of duodenum is normal.  No fresh of old blood. EBL - minimal     IMPRESSION AND PLAN:     1. Crycopharngeal bar with small Zenkers  2. White plaques suspicious of candida, brushings were performed   3. LA-A esophagitis with small hiatal hernia   4. Mild antral gastritis    Etiology of occasional pill dysphagia likely multifactorial with the small zenker/CP bar and likely candida esophagitis. Antifungal to be ordered and continued upon discharge. As for abdominal pain there is mild antral gastritis. CT images were reviewed and there was a moderate amount of stool burden throughout the colon and this could be a potential etiology. I do recommend Qd mirlax which is titratable, she is ok to continue with the Senna. We may consider an outpt colonoscopy and will discuss further at her follow up as outpatient in office post discharge.  She is ok for d/c from a GI POV but we will continue to follow while in the hospital.     Brandon Dao DO  10/13/2023  7:35 AM

## 2023-10-12 NOTE — PROGRESS NOTES
Internal Medicine Progress Note    Patient's name: Rody Castañeda  : 1940  Chief complaints (on day of admission): Nausea  Admission date: 10/10/2023  Date of service: 10/12/2023   Room: [facility]  Primary care physician: Nino Heard MD  Reason for visit: Follow-up for abdominal pain and discomfort    Subjective  Whitney Elliott was seen and examined. Patient had EGD today. Tolerated procedure. Laying in bed in the dark. No confusion or fevers, chills. Still not very hungry. Complaining of some pain in the back still. Complaining of sore throat today after having procedure today. Review of Systems  There are no new complaints of chest pain, shortness of breath, abdominal pain, nausea, vomiting, diarrhea, constipation.     Hospital Medications  Current Facility-Administered Medications   Medication Dose Route Frequency Provider Last Rate Last Admin    pantoprazole (PROTONIX) 40 mg in sodium chloride (PF) 0.9 % 10 mL injection  40 mg IntraVENous Daily VARSHA Zarate - CNP   40 mg at 10/11/23 1256    morphine (PF) injection 2 mg  2 mg IntraVENous Q3H PRN Stas Cisneros MD   2 mg at 10/12/23 1214    senna (SENOKOT) tablet 8.6 mg  1 tablet Oral BID Stas Cisneros MD   8.6 mg at 10/11/23 2015    melatonin tablet 3 mg  3 mg Oral Nightly PRN Abdirashid Yung, DO        sodium chloride flush 0.9 % injection 10 mL  10 mL IntraVENous 2 times per day Abdirashid Yung, DO   10 mL at 10/11/23 0948    sodium chloride flush 0.9 % injection 10 mL  10 mL IntraVENous PRN Abdirashid Yung, DO   10 mL at 10/11/23 0852    0.9 % sodium chloride infusion   IntraVENous PRN Abdirashid Yung E, DO        potassium chloride (KLOR-CON M) extended release tablet 40 mEq  40 mEq Oral PRN Abdirashid Yung E, DO        Or    potassium bicarb-citric acid (EFFER-K) effervescent tablet 40 mEq  40 mEq Oral PRN Abdirahsid Yung, DO        Or    potassium chloride 10 mEq/100 mL IVPB (Peripheral Line)  10 mEq IntraVENous labored breathing  Eyes: conjunctivae/corneas clear, sclera non icteric  Skin: color/texture/turgor normal, no rashes or lesions  Lungs: CTAB, no retractions/use of accessory muscles, no vocal fremitus, no rhonchi, no crackle, no rales  Heart: regular rate, regular rhythm, no murmur  Abdomen: soft, NT, bowel sounds normal  Extremities: atraumatic, no edema  Neurologic: cranial nerves 2-12 grossly intact, no slurred speech    Most Recent Labs  Lab Results   Component Value Date    WBC 4.0 (L) 10/12/2023    HGB 10.1 (L) 10/12/2023    HCT 31.0 (L) 10/12/2023     10/12/2023     10/12/2023    K 3.8 10/12/2023     10/12/2023    CREATININE 0.5 10/12/2023    BUN 7 10/12/2023    CO2 23 10/12/2023    GLUCOSE 82 10/12/2023    ALT 7 10/12/2023    AST 15 10/12/2023    INR 1.2 10/12/2023    TSH 0.85 10/10/2023       XR CHEST PORTABLE   Final Result   No acute process. US GALLBLADDER RUQ   Final Result   1. Normal gallbladder ultrasound. No biliary dilatation, free fluid, or   acute disease identified. 2.  Diffusely heterogenous hepatic parenchyma with innumerable small lesions,   solid by ultrasound and low-density by CT. Diagnostic considerations would   include metastatic liver disease or possibly multiple biliary hamartomas.          MRI THORACIC SPINE WO CONTRAST    (Results Pending)   MRI LUMBAR SPINE WO CONTRAST    (Results Pending)           Assessment   Active Hospital Problems    Diagnosis     A-fib [facility]) [I48.91]      Priority: Medium    Primary hypertension [I10]      Priority: Medium    Intractable abdominal pain [R10.9]     Chronic bilateral low back pain without sciatica [M54.50, G89.29]     Chronic pain syndrome [G89.4 (ICD-10-CM)] [G89.4]     Intractable back pain [M54.9]     Closed compression fracture of third lumbar vertebra ([address]) [S32.030A]          Plan  Intractable abdominal pain:  CT abd/pel noted  GB US noted  GI consult-- liver nodules noted  Patient tolerated EGD

## 2023-10-12 NOTE — PROGRESS NOTES
P Quality Flow/Interdisciplinary Rounds Progress Note        Quality Flow Rounds held on October 12, 2023    Disciplines Attending:  Bedside Nurse, , , and Nursing Unit Leadership    Ted Keepandie was admitted on 10/10/2023 10:08 AM    Anticipated Discharge Date:  Expected Discharge Date: 10/13/23    Disposition:    Kailash Score:  Kailash Scale Score: 20    Readmission Risk              Risk of Unplanned Readmission:  25           Discussed patient goal for the day, patient clinical progression, and barriers to discharge.   The following Goal(s) of the Day/Commitment(s) have been identified:   Discharge Linda Mensah RN  October 12, 2023

## 2023-10-13 ENCOUNTER — ANESTHESIA EVENT (OUTPATIENT)
Dept: OPERATING ROOM | Age: 83
End: 2023-10-13
Payer: MEDICARE

## 2023-10-13 LAB
AFP SERPL-MCNC: 2.5 UG/L
ALBUMIN SERPL-MCNC: 3.6 G/DL (ref 3.5–5.2)
ALP SERPL-CCNC: 92 U/L (ref 35–104)
ALT SERPL-CCNC: 9 U/L (ref 0–32)
ANION GAP SERPL CALCULATED.3IONS-SCNC: 8 MMOL/L (ref 7–16)
AST SERPL-CCNC: 19 U/L (ref 0–31)
BASOPHILS # BLD: 0.03 K/UL (ref 0–0.2)
BASOPHILS NFR BLD: 1 % (ref 0–2)
BILIRUB SERPL-MCNC: 0.4 MG/DL (ref 0–1.2)
BUN SERPL-MCNC: 5 MG/DL (ref 6–23)
CALCIUM SERPL-MCNC: 8.7 MG/DL (ref 8.6–10.2)
CHLORIDE SERPL-SCNC: 104 MMOL/L (ref 98–107)
CO2 SERPL-SCNC: 25 MMOL/L (ref 22–29)
CREAT SERPL-MCNC: 0.5 MG/DL (ref 0.5–1)
EOSINOPHIL # BLD: 0.06 K/UL (ref 0.05–0.5)
EOSINOPHILS RELATIVE PERCENT: 1 % (ref 0–6)
ERYTHROCYTE [DISTWIDTH] IN BLOOD BY AUTOMATED COUNT: 13.8 % (ref 11.5–15)
GFR SERPL CREATININE-BSD FRML MDRD: >60 ML/MIN/1.73M2
GLUCOSE SERPL-MCNC: 93 MG/DL (ref 74–99)
HCT VFR BLD AUTO: 34.7 % (ref 34–48)
HGB BLD-MCNC: 11.1 G/DL (ref 11.5–15.5)
IMM GRANULOCYTES # BLD AUTO: <0.03 K/UL (ref 0–0.58)
IMM GRANULOCYTES NFR BLD: 0 % (ref 0–5)
LYMPHOCYTES NFR BLD: 1.37 K/UL (ref 1.5–4)
LYMPHOCYTES RELATIVE PERCENT: 25 % (ref 20–42)
MCH RBC QN AUTO: 28.8 PG (ref 26–35)
MCHC RBC AUTO-ENTMCNC: 32 G/DL (ref 32–34.5)
MCV RBC AUTO: 90.1 FL (ref 80–99.9)
MONOCYTES NFR BLD: 0.43 K/UL (ref 0.1–0.95)
MONOCYTES NFR BLD: 8 % (ref 2–12)
NEUTROPHILS NFR BLD: 65 % (ref 43–80)
NEUTS SEG NFR BLD: 3.57 K/UL (ref 1.8–7.3)
PLATELET # BLD AUTO: 219 K/UL (ref 130–450)
PMV BLD AUTO: 9.2 FL (ref 7–12)
POTASSIUM SERPL-SCNC: 3.7 MMOL/L (ref 3.5–5)
PROT SERPL-MCNC: 5.9 G/DL (ref 6.4–8.3)
RBC # BLD AUTO: 3.85 M/UL (ref 3.5–5.5)
SODIUM SERPL-SCNC: 137 MMOL/L (ref 132–146)
WBC OTHER # BLD: 5.5 K/UL (ref 4.5–11.5)

## 2023-10-13 PROCEDURE — 1200000000 HC SEMI PRIVATE

## 2023-10-13 PROCEDURE — 6370000000 HC RX 637 (ALT 250 FOR IP): Performed by: NURSE PRACTITIONER

## 2023-10-13 PROCEDURE — C9113 INJ PANTOPRAZOLE SODIUM, VIA: HCPCS | Performed by: NURSE PRACTITIONER

## 2023-10-13 PROCEDURE — 6370000000 HC RX 637 (ALT 250 FOR IP): Performed by: INTERNAL MEDICINE

## 2023-10-13 PROCEDURE — A4216 STERILE WATER/SALINE, 10 ML: HCPCS | Performed by: NURSE PRACTITIONER

## 2023-10-13 PROCEDURE — 80053 COMPREHEN METABOLIC PANEL: CPT

## 2023-10-13 PROCEDURE — 85025 COMPLETE CBC W/AUTO DIFF WBC: CPT

## 2023-10-13 PROCEDURE — 6360000002 HC RX W HCPCS: Performed by: HOSPITALIST

## 2023-10-13 PROCEDURE — 6360000002 HC RX W HCPCS: Performed by: NURSE PRACTITIONER

## 2023-10-13 PROCEDURE — 2580000003 HC RX 258: Performed by: NURSE PRACTITIONER

## 2023-10-13 PROCEDURE — 6370000000 HC RX 637 (ALT 250 FOR IP): Performed by: HOSPITALIST

## 2023-10-13 RX ORDER — POLYETHYLENE GLYCOL 3350 17 G/17G
17 POWDER, FOR SOLUTION ORAL DAILY
Status: DISCONTINUED | OUTPATIENT
Start: 2023-10-13 | End: 2023-10-17 | Stop reason: HOSPADM

## 2023-10-13 RX ADMIN — METOPROLOL SUCCINATE 25 MG: 25 TABLET, EXTENDED RELEASE ORAL at 10:50

## 2023-10-13 RX ADMIN — MORPHINE SULFATE 2 MG: 2 INJECTION, SOLUTION INTRAMUSCULAR; INTRAVENOUS at 19:14

## 2023-10-13 RX ADMIN — Medication 400 MG: at 20:37

## 2023-10-13 RX ADMIN — SODIUM CHLORIDE 40 MG: 9 INJECTION INTRAMUSCULAR; INTRAVENOUS; SUBCUTANEOUS at 10:50

## 2023-10-13 RX ADMIN — POLYETHYLENE GLYCOL 3350 17 G: 17 POWDER, FOR SOLUTION ORAL at 10:51

## 2023-10-13 RX ADMIN — LISINOPRIL 10 MG: 10 TABLET ORAL at 11:32

## 2023-10-13 RX ADMIN — MORPHINE SULFATE 2 MG: 2 INJECTION, SOLUTION INTRAMUSCULAR; INTRAVENOUS at 14:54

## 2023-10-13 RX ADMIN — SENNOSIDES 8.6 MG: 8.6 TABLET, COATED ORAL at 10:49

## 2023-10-13 RX ADMIN — FLUCONAZOLE 200 MG: 100 TABLET ORAL at 10:50

## 2023-10-13 ASSESSMENT — PAIN - FUNCTIONAL ASSESSMENT: PAIN_FUNCTIONAL_ASSESSMENT: PREVENTS OR INTERFERES SOME ACTIVE ACTIVITIES AND ADLS

## 2023-10-13 ASSESSMENT — PAIN DESCRIPTION - LOCATION
LOCATION: BACK
LOCATION: BACK

## 2023-10-13 ASSESSMENT — PAIN DESCRIPTION - ONSET: ONSET: ON-GOING

## 2023-10-13 ASSESSMENT — PAIN DESCRIPTION - FREQUENCY: FREQUENCY: INTERMITTENT

## 2023-10-13 ASSESSMENT — PAIN SCALES - GENERAL
PAINLEVEL_OUTOF10: 5
PAINLEVEL_OUTOF10: 7
PAINLEVEL_OUTOF10: 8

## 2023-10-13 ASSESSMENT — PAIN DESCRIPTION - ORIENTATION
ORIENTATION: MID
ORIENTATION: MID;OUTER

## 2023-10-13 ASSESSMENT — PAIN DESCRIPTION - DESCRIPTORS
DESCRIPTORS: DULL;SHARP
DESCRIPTORS: ACHING;DISCOMFORT;DULL

## 2023-10-13 ASSESSMENT — PAIN DESCRIPTION - PAIN TYPE: TYPE: ACUTE PAIN

## 2023-10-13 NOTE — PROGRESS NOTES
P Quality Flow/Interdisciplinary Rounds Progress Note        Quality Flow Rounds held on October 13, 2023    Disciplines Attending:  Bedside Nurse, , , and Nursing Unit Leadership    Quincy Victor was admitted on 10/10/2023 10:08 AM    Anticipated Discharge Date:  Expected Discharge Date: 10/13/23    Disposition:    Kailash Score:  Kailash Scale Score: 20    Readmission Risk              Risk of Unplanned Readmission:  23           Discussed patient goal for the day, patient clinical progression, and barriers to discharge.   The following Goal(s) of the Day/Commitment(s) have been identified:   Discharge planning      Nate St RN  October 13, 2023

## 2023-10-13 NOTE — CARE COORDINATION
Social Work:    Follow-up visit made with Mrs. Adame and her grandson Corky Livingston today. Mrs. Elizabeth Castillo is scheduled for a kyphoplasty tomorrow. She still plans to return home with [address] [place] [address] at this point.     exercise induced asthma

## 2023-10-13 NOTE — PLAN OF CARE
Problem: ABCDS Injury Assessment  Goal: Absence of physical injury  10/13/2023 0206 by Rena Mahoney RN  Outcome: Progressing     Problem: Discharge Planning  Goal: Discharge to home or other facility with appropriate resources  10/13/2023 0206 by Rena Mahoney RN  Outcome: Progressing     Problem: Safety - Adult  Goal: Free from fall injury  10/13/2023 0206 by Rena Mahoney RN  Outcome: Progressing     Problem: Pain  Goal: Verbalizes/displays adequate comfort level or baseline comfort level  10/13/2023 0206 by Rena Mahoney RN  Outcome: Progressing     Problem: Nutrition Deficit:  Goal: Optimize nutritional status  10/13/2023 0206 by Rena Mahoney RN  Outcome: Progressing

## 2023-10-13 NOTE — PROGRESS NOTES
Discussed MRI findings with patient  She continues with severe back pain radiating around lower rib area. Denies LE pain  We discussed kyphoplasty vs bracing. Patient had multi-level kyphoplasties twice within past 6 months. She had significant improvement in pain following these procedures. She would like to undergo Kyphoplasty T11. We reviewed surgical risks. All questions answered. We discussed alternative treatments in depth.   Patient wants to proceed with surgery    Plan for Kyphoplasty tomorrow (10/14)   Npo after ASHLEY talley

## 2023-10-13 NOTE — PROGRESS NOTES
MRI Thoracic/Lumbar reviewed.  + Acute compression fracture. Will plan for kyphoplasty, possibly tomorrow (10/14) morning if patient medically clear.     Will be in this afternoon to discuss options with patient

## 2023-10-13 NOTE — PLAN OF CARE
Problem: ABCDS Injury Assessment  Goal: Absence of physical injury  10/13/2023 1443 by Sofiya Arthur RN  Outcome: Progressing  10/13/2023 0206 by Yinka Rocha RN  Outcome: Progressing     Problem: Discharge Planning  Goal: Discharge to home or other facility with appropriate resources  10/13/2023 1443 by Sofiya Arthur RN  Outcome: Progressing  10/13/2023 0206 by Yinka Rocha RN  Outcome: Progressing     Problem: Safety - Adult  Goal: Free from fall injury  10/13/2023 1443 by Sofiya Arthur RN  Outcome: Progressing  10/13/2023 0206 by Yinka Rocha RN  Outcome: Progressing     Problem: Pain  Goal: Verbalizes/displays adequate comfort level or baseline comfort level  10/13/2023 1443 by Sofiya Arthur RN  Outcome: Progressing  10/13/2023 0206 by Yinka Rocha RN  Outcome: Progressing     Problem: Nutrition Deficit:  Goal: Optimize nutritional status  10/13/2023 1443 by Sofiya Arthur RN  Outcome: Progressing  10/13/2023 0206 by Yinka Rocha RN  Outcome: Progressing

## 2023-10-13 NOTE — PROGRESS NOTES
Internal Medicine Progress Note    Patient's name: Gerald Preston  : 1940  Chief complaints (on day of admission): Nausea  Admission date: 10/10/2023  Date of service: 10/13/2023   Room: [facility] SURG  Primary care physician: Claude Shah MD  Reason for visit: Follow-up for abdominal pain and discomfort    Subjective  Ross Strickland was seen and examined. Patient had EGD 10/12. Tolerated procedure. . starting to take  Laying in bed. No confusion or fevers, chills. Still not very hungry. Complaining of some pain in the back still. Plan for dc after kyphoplasty. Review of Systems  There are no new complaints of chest pain, shortness of breath, abdominal pain, nausea, vomiting, diarrhea, constipation.     Hospital Medications  Current Facility-Administered Medications   Medication Dose Route Frequency Provider Last Rate Last Admin    polyethylene glycol (GLYCOLAX) packet 17 g  17 g Oral Daily Rica Ba R, DO   17 g at 10/13/23 1051    fluconazole (DIFLUCAN) tablet 200 mg  200 mg Oral Daily Ezekiel Hamilton APRN - CNP   200 mg at 10/13/23 1050    pantoprazole (PROTONIX) 40 mg in sodium chloride (PF) 0.9 % 10 mL injection  40 mg IntraVENous Daily Jory Ram APRANANTH - CNP   40 mg at 10/13/23 1050    morphine (PF) injection 2 mg  2 mg IntraVENous Q3H PRN Roberto Bello MD   2 mg at 10/12/23 205    senna (SENOKOT) tablet 8.6 mg  1 tablet Oral BID Roberto Bello MD   8.6 mg at 10/13/23 1049    melatonin tablet 3 mg  3 mg Oral Nightly PRN Abdirashid Yung E, DO        sodium chloride flush 0.9 % injection 10 mL  10 mL IntraVENous 2 times per day Abdirashid Yung, DO   10 mL at 10/11/23 0948    sodium chloride flush 0.9 % injection 10 mL  10 mL IntraVENous PRN Abdirashid Yung, DO   10 mL at 10/11/23 0852    0.9 % sodium chloride infusion   IntraVENous PRN Abdirashid Yung E, DO        potassium chloride (KLOR-CON M) extended release tablet 40 mEq  40 mEq Oral PRN Abdirashid Yung, DO

## 2023-10-13 NOTE — PROGRESS NOTES
Physical Therapy  Facility/Department: [facility] MED SURG    Name: Alexia Wong  : 1940  MRN: [id number]  Date of Service: 10/13/2023    PT eval was attempted and after reviewing pt's chart MRI Thoracic/Lumbar revealed + Acute compression fracture at T11. Per ortho notes plan is for kyphoplasty, possibly tomorrow (10/14/23) in the morning. Will re-attempt PT eval another time post-op. Radha Barksdale., P.T.   License Number: [id number]

## 2023-10-14 ENCOUNTER — ANESTHESIA (OUTPATIENT)
Dept: OPERATING ROOM | Age: 83
End: 2023-10-14
Payer: MEDICARE

## 2023-10-14 ENCOUNTER — APPOINTMENT (OUTPATIENT)
Dept: GENERAL RADIOLOGY | Age: 83
DRG: 478 | End: 2023-10-14
Payer: MEDICARE

## 2023-10-14 LAB
ALBUMIN SERPL-MCNC: 3.4 G/DL (ref 3.5–5.2)
ALP SERPL-CCNC: 83 U/L (ref 35–104)
ALT SERPL-CCNC: 10 U/L (ref 0–32)
ANION GAP SERPL CALCULATED.3IONS-SCNC: 8 MMOL/L (ref 7–16)
AST SERPL-CCNC: 19 U/L (ref 0–31)
BASOPHILS # BLD: 0.02 K/UL (ref 0–0.2)
BASOPHILS NFR BLD: 1 % (ref 0–2)
BILIRUB SERPL-MCNC: 0.4 MG/DL (ref 0–1.2)
BUN SERPL-MCNC: 4 MG/DL (ref 6–23)
CALCIUM SERPL-MCNC: 8.5 MG/DL (ref 8.6–10.2)
CHLORIDE SERPL-SCNC: 103 MMOL/L (ref 98–107)
CO2 SERPL-SCNC: 24 MMOL/L (ref 22–29)
CREAT SERPL-MCNC: 0.5 MG/DL (ref 0.5–1)
EOSINOPHIL # BLD: 0.06 K/UL (ref 0.05–0.5)
EOSINOPHILS RELATIVE PERCENT: 2 % (ref 0–6)
ERYTHROCYTE [DISTWIDTH] IN BLOOD BY AUTOMATED COUNT: 13.7 % (ref 11.5–15)
GFR SERPL CREATININE-BSD FRML MDRD: >60 ML/MIN/1.73M2
GLUCOSE SERPL-MCNC: 97 MG/DL (ref 74–99)
HCT VFR BLD AUTO: 30.7 % (ref 34–48)
HGB BLD-MCNC: 10.1 G/DL (ref 11.5–15.5)
IMM GRANULOCYTES # BLD AUTO: <0.03 K/UL (ref 0–0.58)
IMM GRANULOCYTES NFR BLD: 1 % (ref 0–5)
LYMPHOCYTES NFR BLD: 0.93 K/UL (ref 1.5–4)
LYMPHOCYTES RELATIVE PERCENT: 24 % (ref 20–42)
MAGNESIUM SERPL-MCNC: 1.9 MG/DL (ref 1.6–2.6)
MCH RBC QN AUTO: 28.9 PG (ref 26–35)
MCHC RBC AUTO-ENTMCNC: 32.9 G/DL (ref 32–34.5)
MCV RBC AUTO: 87.7 FL (ref 80–99.9)
MONOCYTES NFR BLD: 0.39 K/UL (ref 0.1–0.95)
MONOCYTES NFR BLD: 10 % (ref 2–12)
NEUTROPHILS NFR BLD: 63 % (ref 43–80)
NEUTS SEG NFR BLD: 2.39 K/UL (ref 1.8–7.3)
PLATELET # BLD AUTO: 187 K/UL (ref 130–450)
PMV BLD AUTO: 8.7 FL (ref 7–12)
POTASSIUM SERPL-SCNC: 3.4 MMOL/L (ref 3.5–5)
PROT SERPL-MCNC: 5.1 G/DL (ref 6.4–8.3)
RBC # BLD AUTO: 3.5 M/UL (ref 3.5–5.5)
SODIUM SERPL-SCNC: 135 MMOL/L (ref 132–146)
WBC OTHER # BLD: 3.8 K/UL (ref 4.5–11.5)

## 2023-10-14 PROCEDURE — 2500000003 HC RX 250 WO HCPCS: Performed by: NURSE ANESTHETIST, CERTIFIED REGISTERED

## 2023-10-14 PROCEDURE — 87081 CULTURE SCREEN ONLY: CPT

## 2023-10-14 PROCEDURE — 3600000003 HC SURGERY LEVEL 3 BASE: Performed by: ORTHOPAEDIC SURGERY

## 2023-10-14 PROCEDURE — 2580000003 HC RX 258: Performed by: NURSE PRACTITIONER

## 2023-10-14 PROCEDURE — C1894 INTRO/SHEATH, NON-LASER: HCPCS | Performed by: ORTHOPAEDIC SURGERY

## 2023-10-14 PROCEDURE — 6370000000 HC RX 637 (ALT 250 FOR IP): Performed by: NURSE PRACTITIONER

## 2023-10-14 PROCEDURE — 6360000002 HC RX W HCPCS: Performed by: ORTHOPAEDIC SURGERY

## 2023-10-14 PROCEDURE — 6360000002 HC RX W HCPCS: Performed by: STUDENT IN AN ORGANIZED HEALTH CARE EDUCATION/TRAINING PROGRAM

## 2023-10-14 PROCEDURE — 88342 IMHCHEM/IMCYTCHM 1ST ANTB: CPT

## 2023-10-14 PROCEDURE — 80053 COMPREHEN METABOLIC PANEL: CPT

## 2023-10-14 PROCEDURE — 0PB43ZX EXCISION OF THORACIC VERTEBRA, PERCUTANEOUS APPROACH, DIAGNOSTIC: ICD-10-PCS | Performed by: ORTHOPAEDIC SURGERY

## 2023-10-14 PROCEDURE — 85025 COMPLETE CBC W/AUTO DIFF WBC: CPT

## 2023-10-14 PROCEDURE — 2580000003 HC RX 258: Performed by: NURSE ANESTHETIST, CERTIFIED REGISTERED

## 2023-10-14 PROCEDURE — 6360000002 HC RX W HCPCS: Performed by: NURSE PRACTITIONER

## 2023-10-14 PROCEDURE — 6360000002 HC RX W HCPCS: Performed by: HOSPITALIST

## 2023-10-14 PROCEDURE — 2720000010 HC SURG SUPPLY STERILE: Performed by: ORTHOPAEDIC SURGERY

## 2023-10-14 PROCEDURE — 2580000003 HC RX 258: Performed by: ORTHOPAEDIC SURGERY

## 2023-10-14 PROCEDURE — 3700000000 HC ANESTHESIA ATTENDED CARE: Performed by: ORTHOPAEDIC SURGERY

## 2023-10-14 PROCEDURE — 2709999900 HC NON-CHARGEABLE SUPPLY: Performed by: ORTHOPAEDIC SURGERY

## 2023-10-14 PROCEDURE — 6370000000 HC RX 637 (ALT 250 FOR IP): Performed by: INTERNAL MEDICINE

## 2023-10-14 PROCEDURE — 3600000013 HC SURGERY LEVEL 3 ADDTL 15MIN: Performed by: ORTHOPAEDIC SURGERY

## 2023-10-14 PROCEDURE — 0PU43JZ SUPPLEMENT THORACIC VERTEBRA WITH SYNTHETIC SUBSTITUTE, PERCUTANEOUS APPROACH: ICD-10-PCS | Performed by: ORTHOPAEDIC SURGERY

## 2023-10-14 PROCEDURE — 7100000010 HC PHASE II RECOVERY - FIRST 15 MIN: Performed by: ORTHOPAEDIC SURGERY

## 2023-10-14 PROCEDURE — 2500000003 HC RX 250 WO HCPCS: Performed by: ORTHOPAEDIC SURGERY

## 2023-10-14 PROCEDURE — 88341 IMHCHEM/IMCYTCHM EA ADD ANTB: CPT

## 2023-10-14 PROCEDURE — 6360000002 HC RX W HCPCS: Performed by: NURSE ANESTHETIST, CERTIFIED REGISTERED

## 2023-10-14 PROCEDURE — 83735 ASSAY OF MAGNESIUM: CPT

## 2023-10-14 PROCEDURE — 1200000000 HC SEMI PRIVATE

## 2023-10-14 PROCEDURE — 6370000000 HC RX 637 (ALT 250 FOR IP): Performed by: ORTHOPAEDIC SURGERY

## 2023-10-14 PROCEDURE — 0PS43ZZ REPOSITION THORACIC VERTEBRA, PERCUTANEOUS APPROACH: ICD-10-PCS | Performed by: ORTHOPAEDIC SURGERY

## 2023-10-14 PROCEDURE — 3700000001 HC ADD 15 MINUTES (ANESTHESIA): Performed by: ORTHOPAEDIC SURGERY

## 2023-10-14 PROCEDURE — A4216 STERILE WATER/SALINE, 10 ML: HCPCS | Performed by: NURSE PRACTITIONER

## 2023-10-14 PROCEDURE — C1713 ANCHOR/SCREW BN/BN,TIS/BN: HCPCS | Performed by: ORTHOPAEDIC SURGERY

## 2023-10-14 PROCEDURE — C9113 INJ PANTOPRAZOLE SODIUM, VIA: HCPCS | Performed by: NURSE PRACTITIONER

## 2023-10-14 PROCEDURE — 7100000011 HC PHASE II RECOVERY - ADDTL 15 MIN: Performed by: ORTHOPAEDIC SURGERY

## 2023-10-14 PROCEDURE — 88307 TISSUE EXAM BY PATHOLOGIST: CPT

## 2023-10-14 DEVICE — CEMENT C01A KYPHX HV-R BONE CEMENT EN
Type: IMPLANTABLE DEVICE | Site: BACK | Status: FUNCTIONAL
Brand: KYPHON® HV-R® BONE CEMENT

## 2023-10-14 RX ORDER — ACETAMINOPHEN 325 MG/1
650 TABLET ORAL
Status: DISCONTINUED | OUTPATIENT
Start: 2023-10-14 | End: 2023-10-14 | Stop reason: HOSPADM

## 2023-10-14 RX ORDER — OXYCODONE HYDROCHLORIDE 5 MG/1
5 TABLET ORAL
Status: DISCONTINUED | OUTPATIENT
Start: 2023-10-14 | End: 2023-10-14 | Stop reason: HOSPADM

## 2023-10-14 RX ORDER — LABETALOL HYDROCHLORIDE 5 MG/ML
10 INJECTION, SOLUTION INTRAVENOUS
Status: DISCONTINUED | OUTPATIENT
Start: 2023-10-14 | End: 2023-10-14 | Stop reason: HOSPADM

## 2023-10-14 RX ORDER — DIPHENHYDRAMINE HYDROCHLORIDE 50 MG/ML
12.5 INJECTION INTRAMUSCULAR; INTRAVENOUS
Status: DISCONTINUED | OUTPATIENT
Start: 2023-10-14 | End: 2023-10-14 | Stop reason: HOSPADM

## 2023-10-14 RX ORDER — DROPERIDOL 2.5 MG/ML
0.62 INJECTION, SOLUTION INTRAMUSCULAR; INTRAVENOUS
Status: DISCONTINUED | OUTPATIENT
Start: 2023-10-14 | End: 2023-10-14 | Stop reason: HOSPADM

## 2023-10-14 RX ORDER — SODIUM CHLORIDE, SODIUM LACTATE, POTASSIUM CHLORIDE, CALCIUM CHLORIDE 600; 310; 30; 20 MG/100ML; MG/100ML; MG/100ML; MG/100ML
INJECTION, SOLUTION INTRAVENOUS CONTINUOUS PRN
Status: DISCONTINUED | OUTPATIENT
Start: 2023-10-14 | End: 2023-10-14 | Stop reason: SDUPTHER

## 2023-10-14 RX ORDER — HYDRALAZINE HYDROCHLORIDE 20 MG/ML
10 INJECTION INTRAMUSCULAR; INTRAVENOUS
Status: DISCONTINUED | OUTPATIENT
Start: 2023-10-14 | End: 2023-10-14 | Stop reason: HOSPADM

## 2023-10-14 RX ORDER — TRAMADOL HYDROCHLORIDE 50 MG/1
100 TABLET ORAL EVERY 6 HOURS PRN
Status: DISCONTINUED | OUTPATIENT
Start: 2023-10-14 | End: 2023-10-15

## 2023-10-14 RX ORDER — SODIUM CHLORIDE 0.9 % (FLUSH) 0.9 %
5-40 SYRINGE (ML) INJECTION PRN
Status: DISCONTINUED | OUTPATIENT
Start: 2023-10-14 | End: 2023-10-14 | Stop reason: HOSPADM

## 2023-10-14 RX ORDER — PROPOFOL 10 MG/ML
INJECTION, EMULSION INTRAVENOUS PRN
Status: DISCONTINUED | OUTPATIENT
Start: 2023-10-14 | End: 2023-10-14 | Stop reason: SDUPTHER

## 2023-10-14 RX ORDER — SODIUM CHLORIDE 9 MG/ML
INJECTION, SOLUTION INTRAVENOUS PRN
Status: DISCONTINUED | OUTPATIENT
Start: 2023-10-14 | End: 2023-10-14 | Stop reason: HOSPADM

## 2023-10-14 RX ORDER — DEXTROSE MONOHYDRATE 100 MG/ML
INJECTION, SOLUTION INTRAVENOUS CONTINUOUS PRN
Status: DISCONTINUED | OUTPATIENT
Start: 2023-10-14 | End: 2023-10-14 | Stop reason: HOSPADM

## 2023-10-14 RX ORDER — ONDANSETRON 2 MG/ML
4 INJECTION INTRAMUSCULAR; INTRAVENOUS
Status: DISCONTINUED | OUTPATIENT
Start: 2023-10-14 | End: 2023-10-14 | Stop reason: HOSPADM

## 2023-10-14 RX ORDER — SODIUM CHLORIDE, SODIUM LACTATE, POTASSIUM CHLORIDE, CALCIUM CHLORIDE 600; 310; 30; 20 MG/100ML; MG/100ML; MG/100ML; MG/100ML
INJECTION, SOLUTION INTRAVENOUS CONTINUOUS
Status: DISCONTINUED | OUTPATIENT
Start: 2023-10-14 | End: 2023-10-14

## 2023-10-14 RX ORDER — LIDOCAINE HYDROCHLORIDE 20 MG/ML
INJECTION, SOLUTION EPIDURAL; INFILTRATION; INTRACAUDAL; PERINEURAL PRN
Status: DISCONTINUED | OUTPATIENT
Start: 2023-10-14 | End: 2023-10-14 | Stop reason: SDUPTHER

## 2023-10-14 RX ORDER — TRAMADOL HYDROCHLORIDE 50 MG/1
50 TABLET ORAL EVERY 6 HOURS PRN
Status: DISCONTINUED | OUTPATIENT
Start: 2023-10-14 | End: 2023-10-15

## 2023-10-14 RX ORDER — SODIUM CHLORIDE 0.9 % (FLUSH) 0.9 %
5-40 SYRINGE (ML) INJECTION EVERY 12 HOURS SCHEDULED
Status: DISCONTINUED | OUTPATIENT
Start: 2023-10-14 | End: 2023-10-14 | Stop reason: HOSPADM

## 2023-10-14 RX ADMIN — WATER 1000 MG: 1 INJECTION INTRAMUSCULAR; INTRAVENOUS; SUBCUTANEOUS at 08:26

## 2023-10-14 RX ADMIN — LIDOCAINE HYDROCHLORIDE 40 MG: 20 INJECTION, SOLUTION EPIDURAL; INFILTRATION; INTRACAUDAL; PERINEURAL at 08:42

## 2023-10-14 RX ADMIN — SODIUM CHLORIDE, PRESERVATIVE FREE 10 ML: 5 INJECTION INTRAVENOUS at 19:59

## 2023-10-14 RX ADMIN — SODIUM CHLORIDE, POTASSIUM CHLORIDE, SODIUM LACTATE AND CALCIUM CHLORIDE: 600; 310; 30; 20 INJECTION, SOLUTION INTRAVENOUS at 08:15

## 2023-10-14 RX ADMIN — MORPHINE SULFATE 2 MG: 2 INJECTION, SOLUTION INTRAMUSCULAR; INTRAVENOUS at 02:37

## 2023-10-14 RX ADMIN — PROPOFOL 50 MCG/KG/MIN: 10 INJECTION, EMULSION INTRAVENOUS at 08:43

## 2023-10-14 RX ADMIN — HYDROMORPHONE HYDROCHLORIDE 0.5 MG: 0.5 INJECTION, SOLUTION INTRAMUSCULAR; INTRAVENOUS; SUBCUTANEOUS at 09:35

## 2023-10-14 RX ADMIN — MORPHINE SULFATE 2 MG: 2 INJECTION, SOLUTION INTRAMUSCULAR; INTRAVENOUS at 13:04

## 2023-10-14 RX ADMIN — LISINOPRIL 10 MG: 10 TABLET ORAL at 12:57

## 2023-10-14 RX ADMIN — PROPOFOL 50 MG: 10 INJECTION, EMULSION INTRAVENOUS at 08:42

## 2023-10-14 RX ADMIN — HYDROMORPHONE HYDROCHLORIDE 0.5 MG: 0.5 INJECTION, SOLUTION INTRAMUSCULAR; INTRAVENOUS; SUBCUTANEOUS at 09:28

## 2023-10-14 RX ADMIN — Medication 400 MG: at 19:59

## 2023-10-14 RX ADMIN — SODIUM CHLORIDE 40 MG: 9 INJECTION INTRAMUSCULAR; INTRAVENOUS; SUBCUTANEOUS at 12:57

## 2023-10-14 RX ADMIN — MORPHINE SULFATE 2 MG: 2 INJECTION, SOLUTION INTRAMUSCULAR; INTRAVENOUS at 19:59

## 2023-10-14 RX ADMIN — METOPROLOL SUCCINATE 25 MG: 25 TABLET, EXTENDED RELEASE ORAL at 12:57

## 2023-10-14 RX ADMIN — FLUCONAZOLE 200 MG: 100 TABLET ORAL at 12:57

## 2023-10-14 ASSESSMENT — PAIN DESCRIPTION - DESCRIPTORS
DESCRIPTORS: ACHING
DESCRIPTORS: SHARP
DESCRIPTORS: DULL;ACHING
DESCRIPTORS: ACHING
DESCRIPTORS: SHARP

## 2023-10-14 ASSESSMENT — PAIN SCALES - GENERAL
PAINLEVEL_OUTOF10: 4
PAINLEVEL_OUTOF10: 8
PAINLEVEL_OUTOF10: 9
PAINLEVEL_OUTOF10: 8
PAINLEVEL_OUTOF10: 4
PAINLEVEL_OUTOF10: 8

## 2023-10-14 ASSESSMENT — PAIN DESCRIPTION - PAIN TYPE: TYPE: SURGICAL PAIN

## 2023-10-14 ASSESSMENT — PAIN DESCRIPTION - LOCATION
LOCATION: BACK
LOCATION: BACK;RIB CAGE

## 2023-10-14 ASSESSMENT — PAIN DESCRIPTION - ORIENTATION
ORIENTATION: MID
ORIENTATION: LOWER;MID
ORIENTATION: MID

## 2023-10-14 ASSESSMENT — PAIN - FUNCTIONAL ASSESSMENT
PAIN_FUNCTIONAL_ASSESSMENT: ACTIVITIES ARE NOT PREVENTED
PAIN_FUNCTIONAL_ASSESSMENT: PREVENTS OR INTERFERES SOME ACTIVE ACTIVITIES AND ADLS
PAIN_FUNCTIONAL_ASSESSMENT: 0-10

## 2023-10-14 NOTE — ANESTHESIA PRE PROCEDURE
for input(s): \"POCGLU\", \"POCNA\", \"POCK\", \"POCCL\", \"POCBUN\", \"POCHEMO\", \"POCHCT\" in the last 72 hours. Coags:   Lab Results   Component Value Date/Time    PROTIME 13.2 10/12/2023 04:33 AM    INR 1.2 10/12/2023 04:33 AM       HCG (If Applicable): No results found for: \"PREGTESTUR\", \"PREGSERUM\", \"HCG\", \"HCGQUANT\"     ABGs: No results found for: \"PHART\", \"PO2ART\", \"WOD7PVE\", \"NJA8XNN\", \"BEART\", \"T6DJNDQZ\"     Type & Screen (If Applicable):  No results found for: \"LABABO\", \"LABRH\"    Drug/Infectious Status (If Applicable):  No results found for: \"HIV\", \"HEPCAB\"    COVID-19 Screening (If Applicable):   Lab Results   Component Value Date/Time    COVID19 Not Detected 10/05/2023 03:28 PM    COVID19 Not Detected 03/23/2023 05:13 PM     EKG 6/16/23:  Narrative & Impression    Normal sinus rhythm  Septal infarct , age undetermined  Abnormal ECG  When compared with ECG of 23-MAR-2023 16:33,  Significant changes have occurred     ECHO 12/9/22:   Summary   Normal left ventricular chamber size. Normal left ventricular systolic function. Visually estimated LVEF is 55-60 %. No wall motion abnormalities. Normal diastolic function. Normal left atrial pressure. Normal right ventricle structure and function. Normal left atrial size. Normal right atrial size   Mild tricuspid regurgitation. Likely normal estimated PA pressure. No comparison study available. CXR 6/16/23:  FINDINGS:  The lungs are without acute focal process. There is no effusion or  pneumothorax. The cardiomediastinal silhouette is without acute process. The  osseous structures are without acute process. Anesthesia Evaluation  Patient summary reviewed and Nursing notes reviewed no history of anesthetic complications:   Airway: Mallampati: III  TM distance: >3 FB   Neck ROM: full  Mouth opening: < 3 FB   Dental:      Comment: Per patient, no teeth are loose, chipped, or cracked.     Pulmonary:Negative Pulmonary ROS and normal exam  breath sounds

## 2023-10-14 NOTE — DISCHARGE INSTRUCTIONS
CALL DR. Adolph Seymour OFFICE FOR FOLLOW UP APPOINTMENT.   [phone]  [address] [facility] [address] [facility]

## 2023-10-14 NOTE — ANESTHESIA POSTPROCEDURE EVALUATION
Department of Anesthesiology  Postprocedure Note    Patient: Carlos Garcia  MRN: [id number]  YOB: 1940  Date of evaluation: 10/14/2023      Procedure Summary     Date: 10/14/23 Room / Location: [facility] OR 05 / [facility]    Anesthesia Start: 2445 Anesthesia Stop:     Procedure: KYPHOPLASTY T11      ++MEDTRONIC++ (Back) Diagnosis:       Intractable abdominal pain      (Intractable abdominal pain [R10.9])    Surgeons: Vincent Eldridge DO Responsible Provider: Sada Ureña MD    Anesthesia Type: MAC ASA Status: 3          Anesthesia Type: No value filed.     Gómez Phase I:      Gómez Phase II: Gómez Score: 10      Anesthesia Post Evaluation    Patient location during evaluation: PACU  Patient participation: complete - patient participated  Level of consciousness: awake  Airway patency: patent  Nausea & Vomiting: no nausea and no vomiting  Complications: no  Cardiovascular status: hemodynamically stable  Respiratory status: acceptable  Hydration status: euvolemic  Pain management: adequate

## 2023-10-14 NOTE — BRIEF OP NOTE
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

## 2023-10-14 NOTE — INTERVAL H&P NOTE
Update History & Physical    The patient's History and Physical of October 14, The chart was reviewed with the patient and I examined the patient. There was no change. The surgical site was confirmed by the patient and me. Plan: The risks, benefits, expected outcome, and alternative to the recommended procedure have been discussed with the patient. Patient understands and wants to proceed with the procedure.      Electronically signed by Delmis Alejandra DO on 10/14/2023 at 8:22 AM

## 2023-10-14 NOTE — PROGRESS NOTES
Internal Medicine Progress Note    Patient's name: Claudine Starr  : 1940  Chief complaints (on day of admission): Nausea  Admission date: 10/10/2023  Date of service: 10/14/2023   Room: [facility] SURG  Primary care physician: Yony Albarran MD  Reason for visit: Follow-up for abdominal pain and discomfort    Subjective  Karen Sanchez was seen and examined. She was lying in bed. She just got back from a kyphoplasty when I saw her. She still is having some back pain. She has some lower extremity edema. She denies any abdominal pain. She does not feel comfortable leaving the hospital today. Review of Systems   There are no new complaints of chest pain, shortness of breath, abdominal pain, nausea, vomiting, diarrhea, constipation.     Hospital Medications  Current Facility-Administered Medications   Medication Dose Route Frequency Provider Last Rate Last Admin    Glucose (TRUEPLUS) oral gel 15 g  15 g Oral PRN Adriana Tong MD        polyethylene glycol (GLYCOLAX) packet 17 g  17 g Oral Daily Trish Ward R, DO   17 g at 10/13/23 1051    fluconazole (DIFLUCAN) tablet 200 mg  200 mg Oral Daily Gordon Hamilton, APRN - CNP   200 mg at 10/13/23 1050    pantoprazole (PROTONIX) 40 mg in sodium chloride (PF) 0.9 % 10 mL injection  40 mg IntraVENous Daily Brian Saxena, APRN - CNP   40 mg at 10/13/23 1050    morphine (PF) injection 2 mg  2 mg IntraVENous Q3H PRN Kae Avilez MD   2 mg at 10/14/23 7134    senna (SENOKOT) tablet 8.6 mg  1 tablet Oral BID Kae Avilez MD   8.6 mg at 10/13/23 1049    melatonin tablet 3 mg  3 mg Oral Nightly PRN Abdirashid Yung,         sodium chloride flush 0.9 % injection 10 mL  10 mL IntraVENous 2 times per day Abdirashid Yung DO   10 mL at 10/11/23 0948    sodium chloride flush 0.9 % injection 10 mL  10 mL IntraVENous PRN Abdirashid Yung DO   10 mL at 10/11/23 0852    0.9 % sodium chloride infusion   IntraVENous PRN Abdirashid Yung DO [I.V.:500]  Out: -     Physical Exam:   General: Awake, alert, oriented, appeared comfortable and in no acute distress  Eyes: conjunctivae/corneas clear, sclera non icteric  Skin: color/texture/turgor normal, no rashes or lesions  Lungs: CTAB, no retractions/use of accessory muscles, no vocal fremitus, no rhonchi, no crackle, no rales  Back: SP kyphoplasty  Heart: regular rate, regular rhythm, no murmur  Abdomen: soft, NT, bowel sounds normal  Extremities: atraumatic, trace lower extremity edema  Neurologic: cranial nerves 2-12 grossly intact, no slurred speech    Most Recent Labs  Lab Results   Component Value Date    WBC 3.8 (L) 10/14/2023    HGB 10.1 (L) 10/14/2023    HCT 30.7 (L) 10/14/2023     10/14/2023     10/14/2023    K 3.4 (L) 10/14/2023     10/14/2023    CREATININE 0.5 10/14/2023    BUN 4 (L) 10/14/2023    CO2 24 10/14/2023    GLUCOSE 97 10/14/2023    ALT 10 10/14/2023    AST 19 10/14/2023    INR 1.2 10/12/2023    TSH 0.85 10/10/2023       FLUORO FOR SURGICAL PROCEDURES   Final Result   Intraprocedural fluoroscopic spot images as above. See separate procedure   report for more information. MRI LUMBAR SPINE WO CONTRAST   Final Result   Acute compression fracture of T11. Acute on subacute to chronic compression fracture of T12, status post   vertebroplasty. MRI THORACIC SPINE WO CONTRAST   Final Result   Acute compression fracture of T11. Acute on subacute to chronic compression fracture of T12, status post   vertebroplasty. XR CHEST PORTABLE   Final Result   No acute process. US GALLBLADDER RUQ   Final Result   1. Normal gallbladder ultrasound. No biliary dilatation, free fluid, or   acute disease identified. 2.  Diffusely heterogenous hepatic parenchyma with innumerable small lesions,   solid by ultrasound and low-density by CT. Diagnostic considerations would   include metastatic liver disease or possibly multiple biliary hamartomas.

## 2023-10-14 NOTE — OP NOTE
Operative Note      Patient: Tamiko Meng  YOB: 1940  MRN: [id number]    Date of Procedure: 10/14/2023    Pre-Op Diagnosis Codes:     * Intractable abdominal pain [R10.9]    Post-Op Diagnosis: Same       Procedure(s):  KYPHOPLASTY T11      ++MEDTRONIC++    Surgeon(s):  Josue Mandel DO    Assistant:   * No surgical staff found *    Anesthesia: Monitor Anesthesia Care    Estimated Blood Loss (mL): less than 50     Complications: None    Specimens:   ID Type Source Tests Collected by Time Destination   A : BONE BIOPSY T -11 Bone Back SURGICAL PATHOLOGY Josue Mandel DO 10/14/2023 1257          Detailed Description of Procedure:   Patient seen and examined preoperatively area. Surgery was described in detail as well as alternatives. We had in-depth discussion of risks and all questions were answered. Patient wishes to proceed with Plasty procedure. Surgical site was marked. Patient was transported the operative suite. Went in operative suite, patient underwent sedation with anesthesia. Preoperative timeout was performed. Patient received preoperative prophylactic antibiotics. Patient was positioned in the prone position on Safety harbor table on pillows. All bony prominences were well-padded and protected. Patient was in prepped and draped in standard sterile fashion. Fluoroscopy was utilized to plan surgical incisions. 2 small focal incisions were made just lateral to the T11 pedicle bilaterally. Trocar needle was then inserted on the left side and fluoroscopy was utilized to ensure good placement within the left T11 pedicle. This was then repeated on the right side with trocar. Once satisfied with positioning, biopsy was taken of the vertebral body. Balloons were then inserted. These were inflated under fluoroscopy until good inflation was seen. The leads were then deflated and removed.   Methylmethacrylate was then injected into the T11 vertebral body through the bilateral trocars. There was approximately 4.5 cc injected on each side. This was observed under fluoroscopy during injection to ensure  Assessment. There was good cement fill of the T11 vertebral body. There is no concerning extravasation of cement. Trocars were then removed and final fluoroscopic images were taken to ensure good placement of cement and no concerning extravasation. Incisions were then closed with Steri-Strips. Dry sterile dressing was applied. Patient then able be transferred back to hospital bed. Patient was then taken to recovery room in stable condition.       Postop:  Activity and weightbearing as tolerated from spine standpoint  Okay to restart anticoagulants tomorrow, 10/15/2023  Plan for follow-up in office approximately 10 to 14 days  We will follow patient while in house    Electronically signed by Angelica Cruz DO on 10/14/2023 at 9:18 AM

## 2023-10-14 NOTE — PLAN OF CARE
Problem: ABCDS Injury Assessment  Goal: Absence of physical injury  10/14/2023 0027 by Bulmaro Hernandez RN  Outcome: Progressing     Problem: Discharge Planning  Goal: Discharge to home or other facility with appropriate resources  10/14/2023 0027 by Bulmaro Hernandez RN  Outcome: Progressing     Problem: Safety - Adult  Goal: Free from fall injury  10/14/2023 0027 by Bulmaro Hernandez RN  Outcome: Progressing  Flowsheets (Taken 10/14/2023 0027)  Free From Fall Injury: Instruct family/caregiver on patient safety     Problem: Pain  Goal: Verbalizes/displays adequate comfort level or baseline comfort level  10/14/2023 0027 by Bulmaro Hernandez RN  Outcome: Progressing     Problem: Nutrition Deficit:  Goal: Optimize nutritional status  10/14/2023 0027 by Bulmaro Hernandez RN  Outcome: Progressing

## 2023-10-14 NOTE — PLAN OF CARE
Problem: ABCDS Injury Assessment  Goal: Absence of physical injury  10/14/2023 0103 by Quynh Nichole  Outcome: Progressing  10/14/2023 0027 by Porter Mann RN  Outcome: Progressing  10/13/2023 1443 by Sigrid Dandy, RN  Outcome: Progressing     Problem: Discharge Planning  Goal: Discharge to home or other facility with appropriate resources  10/14/2023 0103 by Quynh iNchole  Outcome: Progressing  10/14/2023 0027 by Porter Mann RN  Outcome: Progressing  10/13/2023 1443 by Sigrid Dandy, RN  Outcome: Progressing     Problem: Safety - Adult  Goal: Free from fall injury  10/14/2023 0103 by Quynh Nichole  Outcome: Progressing  10/14/2023 0027 by Porter Mann RN  Outcome: Progressing  Flowsheets (Taken 10/14/2023 0027)  Free From Fall Injury: Instruct family/caregiver on patient safety  10/13/2023 1443 by Sigrid Dandy, RN  Outcome: Progressing     Problem: Pain  Goal: Verbalizes/displays adequate comfort level or baseline comfort level  10/14/2023 0103 by Quynh Nichole  Outcome: Progressing  10/14/2023 0027 by Porter Mann RN  Outcome: Progressing  10/13/2023 1443 by Sigrid Dandy, RN  Outcome: Progressing     Problem: Nutrition Deficit:  Goal: Optimize nutritional status  10/14/2023 0103 by Quynh Nichole  Outcome: Progressing  10/14/2023 0027 by Porter Mann RN  Outcome: Progressing  10/13/2023 1443 by Sigrid Dandy, RN  Outcome: Progressing

## 2023-10-14 NOTE — PLAN OF CARE
Problem: ABCDS Injury Assessment  Goal: Absence of physical injury  10/14/2023 0154 by Saleem Frost  Outcome: Progressing  10/14/2023 0103 by Saleem Frost  Outcome: Progressing  10/14/2023 0027 by Rupinder Thompson RN  Outcome: Progressing  10/13/2023 1443 by Jaylon Haley RN  Outcome: Progressing     Problem: Discharge Planning  Goal: Discharge to home or other facility with appropriate resources  10/14/2023 0154 by Saleem Frost  Outcome: Progressing  10/14/2023 0103 by Saleem Frost  Outcome: Progressing  10/14/2023 0027 by Rupinder Thompson RN  Outcome: Progressing  10/13/2023 1443 by Jaylon Haley RN  Outcome: Progressing     Problem: Safety - Adult  Goal: Free from fall injury  10/14/2023 0154 by Saleem Frost  Outcome: Progressing  10/14/2023 0103 by Saleem Frost  Outcome: Progressing  10/14/2023 0027 by Rupinder Thompson RN  Outcome: Progressing  Flowsheets (Taken 10/14/2023 0027)  Free From Fall Injury: Instruct family/caregiver on patient safety  10/13/2023 1443 by Jaylon Haley RN  Outcome: Progressing     Problem: Pain  Goal: Verbalizes/displays adequate comfort level or baseline comfort level  10/14/2023 0154 by Saleem Frost  Outcome: Progressing  10/14/2023 0103 by Saleem Frost  Outcome: Progressing  10/14/2023 0027 by Rupinder Thompson RN  Outcome: Progressing  10/13/2023 1443 by Jaylon Haley RN  Outcome: Progressing     Problem: Nutrition Deficit:  Goal: Optimize nutritional status  10/14/2023 0154 by Saleem Frost  Outcome: Progressing  10/14/2023 0103 by Saleem Millheim  Outcome: Progressing  10/14/2023 0027 by Rupinder Thompson RN  Outcome: Progressing  10/13/2023 1443 by Jaylon Haley RN  Outcome: Progressing

## 2023-10-15 LAB
ALBUMIN SERPL-MCNC: 2.9 G/DL (ref 3.5–5.2)
ALP SERPL-CCNC: 84 U/L (ref 35–104)
ALT SERPL-CCNC: 8 U/L (ref 0–32)
ANION GAP SERPL CALCULATED.3IONS-SCNC: 8 MMOL/L (ref 7–16)
AST SERPL-CCNC: 16 U/L (ref 0–31)
BASOPHILS # BLD: 0.01 K/UL (ref 0–0.2)
BASOPHILS NFR BLD: 0 % (ref 0–2)
BILIRUB SERPL-MCNC: 0.3 MG/DL (ref 0–1.2)
BUN SERPL-MCNC: 4 MG/DL (ref 6–23)
CALCIUM SERPL-MCNC: 8.3 MG/DL (ref 8.6–10.2)
CHLORIDE SERPL-SCNC: 103 MMOL/L (ref 98–107)
CO2 SERPL-SCNC: 25 MMOL/L (ref 22–29)
CREAT SERPL-MCNC: 0.5 MG/DL (ref 0.5–1)
EOSINOPHIL # BLD: 0.03 K/UL (ref 0.05–0.5)
EOSINOPHILS RELATIVE PERCENT: 1 % (ref 0–6)
ERYTHROCYTE [DISTWIDTH] IN BLOOD BY AUTOMATED COUNT: 13.7 % (ref 11.5–15)
GFR SERPL CREATININE-BSD FRML MDRD: >60 ML/MIN/1.73M2
GLUCOSE SERPL-MCNC: 100 MG/DL (ref 74–99)
HCT VFR BLD AUTO: 29.5 % (ref 34–48)
HGB BLD-MCNC: 9.6 G/DL (ref 11.5–15.5)
IMM GRANULOCYTES # BLD AUTO: 0.03 K/UL (ref 0–0.58)
IMM GRANULOCYTES NFR BLD: 1 % (ref 0–5)
LYMPHOCYTES NFR BLD: 0.83 K/UL (ref 1.5–4)
LYMPHOCYTES RELATIVE PERCENT: 20 % (ref 20–42)
MCH RBC QN AUTO: 28.4 PG (ref 26–35)
MCHC RBC AUTO-ENTMCNC: 32.5 G/DL (ref 32–34.5)
MCV RBC AUTO: 87.3 FL (ref 80–99.9)
MONOCYTES NFR BLD: 0.41 K/UL (ref 0.1–0.95)
MONOCYTES NFR BLD: 10 % (ref 2–12)
NEUTROPHILS NFR BLD: 69 % (ref 43–80)
NEUTS SEG NFR BLD: 2.83 K/UL (ref 1.8–7.3)
PLATELET # BLD AUTO: 173 K/UL (ref 130–450)
PMV BLD AUTO: 9.3 FL (ref 7–12)
POTASSIUM SERPL-SCNC: 3.6 MMOL/L (ref 3.5–5)
PROT SERPL-MCNC: 4.8 G/DL (ref 6.4–8.3)
RBC # BLD AUTO: 3.38 M/UL (ref 3.5–5.5)
SODIUM SERPL-SCNC: 136 MMOL/L (ref 132–146)
WBC OTHER # BLD: 4.1 K/UL (ref 4.5–11.5)

## 2023-10-15 PROCEDURE — 2580000003 HC RX 258: Performed by: ORTHOPAEDIC SURGERY

## 2023-10-15 PROCEDURE — 1200000000 HC SEMI PRIVATE

## 2023-10-15 PROCEDURE — 6360000002 HC RX W HCPCS: Performed by: ORTHOPAEDIC SURGERY

## 2023-10-15 PROCEDURE — A4216 STERILE WATER/SALINE, 10 ML: HCPCS | Performed by: ORTHOPAEDIC SURGERY

## 2023-10-15 PROCEDURE — 85025 COMPLETE CBC W/AUTO DIFF WBC: CPT

## 2023-10-15 PROCEDURE — 97161 PT EVAL LOW COMPLEX 20 MIN: CPT

## 2023-10-15 PROCEDURE — 6370000000 HC RX 637 (ALT 250 FOR IP): Performed by: ORTHOPAEDIC SURGERY

## 2023-10-15 PROCEDURE — 97530 THERAPEUTIC ACTIVITIES: CPT

## 2023-10-15 PROCEDURE — 80053 COMPREHEN METABOLIC PANEL: CPT

## 2023-10-15 PROCEDURE — C9113 INJ PANTOPRAZOLE SODIUM, VIA: HCPCS | Performed by: ORTHOPAEDIC SURGERY

## 2023-10-15 PROCEDURE — 6360000002 HC RX W HCPCS: Performed by: INTERNAL MEDICINE

## 2023-10-15 RX ORDER — FUROSEMIDE 10 MG/ML
20 INJECTION INTRAMUSCULAR; INTRAVENOUS ONCE
Status: COMPLETED | OUTPATIENT
Start: 2023-10-15 | End: 2023-10-15

## 2023-10-15 RX ORDER — TRAMADOL HYDROCHLORIDE 50 MG/1
50 TABLET ORAL EVERY 6 HOURS PRN
Status: DISCONTINUED | OUTPATIENT
Start: 2023-10-15 | End: 2023-10-17

## 2023-10-15 RX ORDER — TRAMADOL HYDROCHLORIDE 50 MG/1
100 TABLET ORAL EVERY 6 HOURS PRN
Status: DISCONTINUED | OUTPATIENT
Start: 2023-10-15 | End: 2023-10-17

## 2023-10-15 RX ORDER — OXYCODONE HYDROCHLORIDE 5 MG/1
5 TABLET ORAL EVERY 4 HOURS PRN
Status: DISCONTINUED | OUTPATIENT
Start: 2023-10-15 | End: 2023-10-15

## 2023-10-15 RX ADMIN — SODIUM CHLORIDE 40 MG: 9 INJECTION INTRAMUSCULAR; INTRAVENOUS; SUBCUTANEOUS at 09:00

## 2023-10-15 RX ADMIN — FLUCONAZOLE 200 MG: 100 TABLET ORAL at 08:59

## 2023-10-15 RX ADMIN — Medication 400 MG: at 21:32

## 2023-10-15 RX ADMIN — APIXABAN 2.5 MG: 2.5 TABLET, FILM COATED ORAL at 09:00

## 2023-10-15 RX ADMIN — METOPROLOL SUCCINATE 25 MG: 25 TABLET, EXTENDED RELEASE ORAL at 09:00

## 2023-10-15 RX ADMIN — SENNOSIDES 8.6 MG: 8.6 TABLET, COATED ORAL at 21:32

## 2023-10-15 RX ADMIN — SENNOSIDES 8.6 MG: 8.6 TABLET, COATED ORAL at 08:59

## 2023-10-15 RX ADMIN — TRAMADOL HYDROCHLORIDE 100 MG: 50 TABLET, COATED ORAL at 09:04

## 2023-10-15 RX ADMIN — LISINOPRIL 10 MG: 10 TABLET ORAL at 09:00

## 2023-10-15 RX ADMIN — SODIUM CHLORIDE, PRESERVATIVE FREE 10 ML: 5 INJECTION INTRAVENOUS at 09:01

## 2023-10-15 RX ADMIN — SODIUM CHLORIDE, PRESERVATIVE FREE 10 ML: 5 INJECTION INTRAVENOUS at 21:32

## 2023-10-15 RX ADMIN — MORPHINE SULFATE 2 MG: 2 INJECTION, SOLUTION INTRAMUSCULAR; INTRAVENOUS at 00:40

## 2023-10-15 RX ADMIN — APIXABAN 2.5 MG: 2.5 TABLET, FILM COATED ORAL at 21:32

## 2023-10-15 RX ADMIN — FUROSEMIDE 20 MG: 10 INJECTION, SOLUTION INTRAVENOUS at 08:59

## 2023-10-15 ASSESSMENT — PAIN - FUNCTIONAL ASSESSMENT: PAIN_FUNCTIONAL_ASSESSMENT: ACTIVITIES ARE NOT PREVENTED

## 2023-10-15 ASSESSMENT — PAIN SCALES - GENERAL
PAINLEVEL_OUTOF10: 5
PAINLEVEL_OUTOF10: 7

## 2023-10-15 ASSESSMENT — PAIN DESCRIPTION - DESCRIPTORS: DESCRIPTORS: ACHING;DULL

## 2023-10-15 ASSESSMENT — PAIN DESCRIPTION - LOCATION: LOCATION: BACK;RIB CAGE

## 2023-10-15 ASSESSMENT — PAIN DESCRIPTION - ORIENTATION: ORIENTATION: MID;LOWER

## 2023-10-15 NOTE — PLAN OF CARE
Problem: ABCDS Injury Assessment  Goal: Absence of physical injury  10/15/2023 1125 by Juan Andrade RN  Outcome: Progressing  10/15/2023 0425 by Alexis Phelps RN  Outcome: Progressing     Problem: Discharge Planning  Goal: Discharge to home or other facility with appropriate resources  10/15/2023 1125 by Juan Andrade RN  Outcome: Progressing  10/15/2023 0425 by Alexis Phelps RN  Outcome: Progressing     Problem: Safety - Adult  Goal: Free from fall injury  10/15/2023 1125 by Juan Andrade RN  Outcome: Progressing  10/15/2023 0425 by Alexis Phelps RN  Outcome: Progressing     Problem: Pain  Goal: Verbalizes/displays adequate comfort level or baseline comfort level  10/15/2023 1125 by Juan Andrade RN  Outcome: Progressing  10/15/2023 0425 by Alexis Phelps RN  Outcome: Progressing     Problem: Nutrition Deficit:  Goal: Optimize nutritional status  10/15/2023 1125 by Juan Andrade RN  Outcome: Progressing  10/15/2023 0425 by Alexis Phelps RN  Outcome: Progressing

## 2023-10-15 NOTE — PROGRESS NOTES
P Quality Flow/Interdisciplinary Rounds Progress Note        Quality Flow Rounds held on October 15, 2023    Disciplines Attending:  Bedside Nurse and Nursing Leadership    Cynthia Padilla was admitted on 10/10/2023 10:08 AM    Anticipated Discharge Date:  Expected Discharge Date: 10/15/23    Disposition: Home with home health     Kailash Score:  Kailash Scale Score: 20    Readmission Risk              Risk of Unplanned Readmission:  27           Discussed patient goal for the day, patient clinical progression, and barriers to discharge.   The following Goal(s) of the Day/Commitment(s) have been identified:  Pain Management  Request in change of current pain regimen      Wei Beavers RN  October 15, 2023

## 2023-10-15 NOTE — PROGRESS NOTES
Internal Medicine Progress Note    Patient's name: Louisa Szymanski  : 1940  Chief complaints (on day of admission): Nausea  Admission date: 10/10/2023  Date of service: 10/15/2023   Room: [facility] SURG  Primary care physician: Donny Perez MD  Reason for visit: Follow-up for abdominal pain and discomfort    Subjective  Nitin Simeon was seen and examined. She was lying in bed and eating breakfast when I saw her. She told me that she is still having a lot of pain. She told me that she also has a \"full sensation when she eats\". She wants to go to rehab on discharge. Review of Systems   There are no new complaints of chest pain, shortness of breath, vomiting, diarrhea, constipation.     Hospital Medications  Current Facility-Administered Medications   Medication Dose Route Frequency Provider Last Rate Last Admin    furosemide (LASIX) injection 20 mg  20 mg IntraVENous Once Abdirashid Yung E, DO        Glucose (TRUEPLUS) oral gel 15 g  15 g Oral PRN Eduarda Lozano MD        traMADol Yojana Langton) tablet 50 mg  50 mg Oral Q6H PRN Mercy Parisian, DO        Or    traMADol Yojana Langton) tablet 100 mg  100 mg Oral Q6H PRN Mercy Parisian, DO        polyethylene glycol (GLYCOLAX) packet 17 g  17 g Oral Daily Mercy Parisian, DO   17 g at 10/13/23 1051    fluconazole (DIFLUCAN) tablet 200 mg  200 mg Oral Daily Mercy Parisian, DO   200 mg at 10/14/23 1257    pantoprazole (PROTONIX) 40 mg in sodium chloride (PF) 0.9 % 10 mL injection  40 mg IntraVENous Daily Mercy Parisian, DO   40 mg at 10/14/23 1257    morphine (PF) injection 2 mg  2 mg IntraVENous Q3H PRN Mercy Parisian, DO   2 mg at 10/15/23 0040    senna (SENOKOT) tablet 8.6 mg  1 tablet Oral BID Mercy Parisian, DO   8.6 mg at 10/13/23 1049    melatonin tablet 3 mg  3 mg Oral Nightly PRN Mercy Parisian, DO        sodium chloride flush 0.9 % injection 10 mL  10 mL IntraVENous 2 times per day Mercy Parisian, DO   10 mL at 10/14/23 1959    sodium chloride flush 0.9 % injection 10 mL  10 mL IntraVENous PRN Pepper Basset, DO   10 mL at 10/11/23 0852    0.9 % sodium chloride infusion   IntraVENous PRN Pepper Basset, DO        potassium chloride (KLOR-CON M) extended release tablet 40 mEq  40 mEq Oral PRN Pepper Basset, DO        Or    potassium bicarb-citric acid (EFFER-K) effervescent tablet 40 mEq  40 mEq Oral PRN Pepper Basset, DO        Or    potassium chloride 10 mEq/100 mL IVPB (Peripheral Line)  10 mEq IntraVENous PRN Pepper Basset, DO        magnesium sulfate 2000 mg in 50 mL IVPB premix  2,000 mg IntraVENous PRN Pepper Basset, DO        ondansetron (ZOFRAN-ODT) disintegrating tablet 4 mg  4 mg Oral Q8H PRN Pepper Basset, DO        Or    ondansetron (ZOFRAN) injection 4 mg  4 mg IntraVENous Q6H PRN Pepper Basset, DO        acetaminophen (TYLENOL) tablet 650 mg  650 mg Oral Q6H PRN Pepper Basset, DO        Or    acetaminophen (TYLENOL) suppository 650 mg  650 mg Rectal Q6H PRN Pepper Basset, DO        magnesium oxide (MAG-OX) tablet 400 mg  400 mg Oral Nightly Pepper Basset, DO   400 mg at 10/14/23 1959    lisinopril (PRINIVIL;ZESTRIL) tablet 10 mg  10 mg Oral QAM Pepper Basset, DO   10 mg at 10/14/23 1257    [Held by provider] apixaban (ELIQUIS) tablet 2.5 mg  2.5 mg Oral BID Pepper Basset, DO        metoprolol succinate (TOPROL XL) extended release tablet 25 mg  25 mg Oral QAM Pepper Basset, DO   25 mg at 10/14/23 1257    diazePAM (VALIUM) tablet 5 mg  5 mg Oral BID PRN Pepper Basset, DO   5 mg at 10/11/23 0003       PRN Medications  Glucose, traMADol **OR** traMADol, morphine, melatonin, sodium chloride flush, sodium chloride, potassium chloride **OR** potassium alternative oral replacement **OR** potassium chloride, magnesium sulfate, ondansetron **OR** ondansetron, acetaminophen **OR** acetaminophen, diazePAM    Objective  Most Recent Recorded Vitals  BP (!) 124/51

## 2023-10-15 NOTE — PLAN OF CARE
Problem: ABCDS Injury Assessment  Goal: Absence of physical injury  10/15/2023 0425 by Rachelle Vences RN  Outcome: Progressing  10/14/2023 1728 by Shay Maravilla RN  Outcome: Progressing     Problem: Discharge Planning  Goal: Discharge to home or other facility with appropriate resources  10/15/2023 0425 by Rachelle Vences RN  Outcome: Progressing  10/14/2023 1728 by Shay Maravilla RN  Outcome: Progressing     Problem: Safety - Adult  Goal: Free from fall injury  10/15/2023 0425 by Rachelle Vences RN  Outcome: Progressing  10/14/2023 1728 by Shay Maravilla RN  Outcome: Progressing     Problem: Pain  Goal: Verbalizes/displays adequate comfort level or baseline comfort level  10/15/2023 0425 by Rachelle Vences RN  Outcome: Progressing  10/14/2023 1728 by Shay Maravilla RN  Outcome: Progressing     Problem: Nutrition Deficit:  Goal: Optimize nutritional status  10/15/2023 0425 by Rachelle Vences, RN  Outcome: Progressing  10/14/2023 1728 by Shay Maravilla RN  Outcome: Progressing

## 2023-10-15 NOTE — PROGRESS NOTES
Doing well  Continues with back soreness especially when turning in bed or getting up/down from chair  Back pain when ambulating is much improved  Continue to feel early satiety when eating  Denies LE pain or numbness at present      Drsg intact  Motor/Sensory intact  No calf tend  1+ DP bilateral    A/P  S/P kyphoplasty T11  Mobilize  Pain control per primary secondary to bowel issues. Will handle if needed  Activity as tolerates  Okay for discharge from spine standpoint  Follow up in office 7-10 days for Xrays and wound check  Daily dressing changes with band aids. Okay to shower. Do not submerge incisions.   Okay to leav open to air if no drainage after post op day 3  Okay for any anticoagulation starting today

## 2023-10-15 NOTE — PROGRESS NOTES
feet with w/w Supervision  100 feet with w/w modified independent    Stair negotiation: ascended and descended  NT   3 steps with 1 rail modified independent   ROM BLE:  WFL     Strength BLE:  grossly 4/5  Grossly 4+/5   Balance Sitting EOB:  independent  Dynamic Standing:  SBA with w/w  Sitting EOB:  independent  Dynamic Standing:  modified independent with w/w   AM-PAC 6 Clicks 37/56       Pt is A & O x 3  Sensation:  Pt denies numbness and tingling to extremities    Patient education  Pt educated on PT objectives during eval and while in the hospital, spinal precautions, hand placement during transfers, walker usage and safety. Patient response to education:   Pt verbalized understanding Pt demonstrated skill Pt requires further education in this area   yes With cueing yes     ASSESSMENT:    Conditions Requiring Skilled Therapeutic Intervention:    [x]Decreased strength     []Decreased ROM  [x]Decreased functional mobility  [x]Decreased balance   [x]Decreased endurance   [x]Decreased posture  []Decreased sensation  []Decreased coordination   []Decreased vision  []Decreased safety awareness   [x]Increased pain       Comments:  Pt found ambulating out of the restroom. At end of eval, pt left in bed with call light in reach. Treatment:  Patient practiced and was instructed in the following treatment:    Functional mobility performed as documented above. No report of dizziness during functional mobility. Pt ambulates with slow gait speed. No LOB. Pt with kyphotic posture. Educated pt about spinal precautions which pt verbalized understanding. Educated pt about walker safety and usage. Pt used the controls of the bed to assist herself into bed. Educated pt about practicing bed mobility as her bed at home. Pt's/ family goals   1.  pain    Prognosis is good for reaching above PT goals. Patient and or family understand(s) diagnosis, prognosis, and plan of care.   yes    PHYSICAL THERAPY PLAN OF CARE:    PT POC is established based on physician order and patient diagnosis     Diagnosis:  General weakness [R53.1]  Intractable abdominal pain [R10.9]    Current Treatment Recommendations:     [x] Strengthening to improve independence with functional mobility   [] ROM to improve independence with functional mobility   [x] Balance Training to improve static/dynamic balance and to reduce fall risk  [x] Endurance Training to improve activity tolerance during functional mobility   [x] Transfer Training to improve safety and independence with all functional transfers   [x] Gait Training to improve gait mechanics, endurance and assess need for appropriate assistive device  [x] Stair Training in preparation for safe discharge home and/or into the community   [] Positioning to prevent skin breakdown and contractures  [x] Safety and Education Training   [x] Patient/Caregiver Education   [] HEP  [] Other     PT long term treatment goals are located in above grid    Frequency of treatments: 2-5x/week x 1-2 weeks. Time in  1231  Time out  1255    Total Treatment Time  10 minutes     Evaluation Time includes thorough review of current medical information, gathering information on past medical history/social history and prior level of function, completion of standardized testing/informal observation of tasks, assessment of data and education on plan of care and goals.     CPT codes:  [x] Low Complexity PT evaluation 81861  [] Moderate Complexity PT evaluation 47870  [] High Complexity PT evaluation 43043  [] PT Re-evaluation 50939  [x] Therapeutic activities 03351  10  minutes  [] Therapeutic exercises 21562 __ minutes      Mani Conte, PT, DPT  PT [id number]

## 2023-10-16 LAB
ALBUMIN SERPL-MCNC: 3 G/DL (ref 3.5–5.2)
ALP SERPL-CCNC: 87 U/L (ref 35–104)
ALT SERPL-CCNC: 8 U/L (ref 0–32)
ANION GAP SERPL CALCULATED.3IONS-SCNC: 9 MMOL/L (ref 7–16)
AST SERPL-CCNC: 15 U/L (ref 0–31)
BASOPHILS # BLD: 0.03 K/UL (ref 0–0.2)
BASOPHILS NFR BLD: 1 % (ref 0–2)
BILIRUB SERPL-MCNC: 0.3 MG/DL (ref 0–1.2)
BUN SERPL-MCNC: 5 MG/DL (ref 6–23)
CALCIUM SERPL-MCNC: 8.4 MG/DL (ref 8.6–10.2)
CHLORIDE SERPL-SCNC: 98 MMOL/L (ref 98–107)
CO2 SERPL-SCNC: 27 MMOL/L (ref 22–29)
CREAT SERPL-MCNC: 0.6 MG/DL (ref 0.5–1)
EOSINOPHIL # BLD: 0.07 K/UL (ref 0.05–0.5)
EOSINOPHILS RELATIVE PERCENT: 2 % (ref 0–6)
ERYTHROCYTE [DISTWIDTH] IN BLOOD BY AUTOMATED COUNT: 13.7 % (ref 11.5–15)
GFR SERPL CREATININE-BSD FRML MDRD: >60 ML/MIN/1.73M2
GLUCOSE SERPL-MCNC: 98 MG/DL (ref 74–99)
HCT VFR BLD AUTO: 31.1 % (ref 34–48)
HGB BLD-MCNC: 10 G/DL (ref 11.5–15.5)
IMM GRANULOCYTES # BLD AUTO: <0.03 K/UL (ref 0–0.58)
IMM GRANULOCYTES NFR BLD: 0 % (ref 0–5)
LYMPHOCYTES NFR BLD: 1.14 K/UL (ref 1.5–4)
LYMPHOCYTES RELATIVE PERCENT: 31 % (ref 20–42)
MAGNESIUM SERPL-MCNC: 1.9 MG/DL (ref 1.6–2.6)
MCH RBC QN AUTO: 28.2 PG (ref 26–35)
MCHC RBC AUTO-ENTMCNC: 32.2 G/DL (ref 32–34.5)
MCV RBC AUTO: 87.9 FL (ref 80–99.9)
MICROORGANISM SPEC CULT: NORMAL
MONOCYTES NFR BLD: 0.38 K/UL (ref 0.1–0.95)
MONOCYTES NFR BLD: 10 % (ref 2–12)
NEUTROPHILS NFR BLD: 55 % (ref 43–80)
NEUTS SEG NFR BLD: 2.01 K/UL (ref 1.8–7.3)
NON-GYN CYTOLOGY REPORT: NORMAL
PLATELET # BLD AUTO: 169 K/UL (ref 130–450)
PMV BLD AUTO: 8.8 FL (ref 7–12)
POTASSIUM SERPL-SCNC: 3.2 MMOL/L (ref 3.5–5)
PROT SERPL-MCNC: 5.2 G/DL (ref 6.4–8.3)
RBC # BLD AUTO: 3.54 M/UL (ref 3.5–5.5)
SODIUM SERPL-SCNC: 134 MMOL/L (ref 132–146)
SPECIMEN DESCRIPTION: NORMAL
SURGICAL PATHOLOGY REPORT: NORMAL
WBC OTHER # BLD: 3.6 K/UL (ref 4.5–11.5)

## 2023-10-16 PROCEDURE — 80053 COMPREHEN METABOLIC PANEL: CPT

## 2023-10-16 PROCEDURE — 2580000003 HC RX 258: Performed by: ORTHOPAEDIC SURGERY

## 2023-10-16 PROCEDURE — 85025 COMPLETE CBC W/AUTO DIFF WBC: CPT

## 2023-10-16 PROCEDURE — 6360000002 HC RX W HCPCS: Performed by: ORTHOPAEDIC SURGERY

## 2023-10-16 PROCEDURE — 6370000000 HC RX 637 (ALT 250 FOR IP): Performed by: ORTHOPAEDIC SURGERY

## 2023-10-16 PROCEDURE — 83735 ASSAY OF MAGNESIUM: CPT

## 2023-10-16 PROCEDURE — 36415 COLL VENOUS BLD VENIPUNCTURE: CPT

## 2023-10-16 PROCEDURE — 97530 THERAPEUTIC ACTIVITIES: CPT

## 2023-10-16 PROCEDURE — 97165 OT EVAL LOW COMPLEX 30 MIN: CPT

## 2023-10-16 PROCEDURE — C9113 INJ PANTOPRAZOLE SODIUM, VIA: HCPCS | Performed by: ORTHOPAEDIC SURGERY

## 2023-10-16 PROCEDURE — 1200000000 HC SEMI PRIVATE

## 2023-10-16 PROCEDURE — A4216 STERILE WATER/SALINE, 10 ML: HCPCS | Performed by: ORTHOPAEDIC SURGERY

## 2023-10-16 RX ADMIN — APIXABAN 2.5 MG: 2.5 TABLET, FILM COATED ORAL at 20:14

## 2023-10-16 RX ADMIN — APIXABAN 2.5 MG: 2.5 TABLET, FILM COATED ORAL at 10:29

## 2023-10-16 RX ADMIN — LISINOPRIL 10 MG: 10 TABLET ORAL at 10:29

## 2023-10-16 RX ADMIN — SODIUM CHLORIDE 40 MG: 9 INJECTION INTRAMUSCULAR; INTRAVENOUS; SUBCUTANEOUS at 10:30

## 2023-10-16 RX ADMIN — Medication 400 MG: at 20:14

## 2023-10-16 RX ADMIN — METOPROLOL SUCCINATE 25 MG: 25 TABLET, EXTENDED RELEASE ORAL at 10:29

## 2023-10-16 RX ADMIN — FLUCONAZOLE 200 MG: 100 TABLET ORAL at 10:29

## 2023-10-16 RX ADMIN — POTASSIUM CHLORIDE 40 MEQ: 1500 TABLET, EXTENDED RELEASE ORAL at 10:29

## 2023-10-16 RX ADMIN — SENNOSIDES 8.6 MG: 8.6 TABLET, COATED ORAL at 10:29

## 2023-10-16 RX ADMIN — TRAMADOL HYDROCHLORIDE 50 MG: 50 TABLET, COATED ORAL at 00:27

## 2023-10-16 RX ADMIN — TRAMADOL HYDROCHLORIDE 50 MG: 50 TABLET, COATED ORAL at 20:14

## 2023-10-16 RX ADMIN — SENNOSIDES 8.6 MG: 8.6 TABLET, COATED ORAL at 20:14

## 2023-10-16 RX ADMIN — SODIUM CHLORIDE, PRESERVATIVE FREE 10 ML: 5 INJECTION INTRAVENOUS at 10:30

## 2023-10-16 RX ADMIN — TRAMADOL HYDROCHLORIDE 50 MG: 50 TABLET, COATED ORAL at 10:27

## 2023-10-16 ASSESSMENT — PAIN DESCRIPTION - LOCATION
LOCATION: BACK
LOCATION: BACK;RIB CAGE
LOCATION: RIB CAGE;BACK

## 2023-10-16 ASSESSMENT — PAIN SCALES - GENERAL
PAINLEVEL_OUTOF10: 4
PAINLEVEL_OUTOF10: 6
PAINLEVEL_OUTOF10: 7
PAINLEVEL_OUTOF10: 6

## 2023-10-16 ASSESSMENT — PAIN DESCRIPTION - DESCRIPTORS
DESCRIPTORS: ACHING;SORE;DULL
DESCRIPTORS: ACHING;DULL
DESCRIPTORS: DULL

## 2023-10-16 ASSESSMENT — PAIN DESCRIPTION - ORIENTATION
ORIENTATION: MID;LOWER
ORIENTATION: MID

## 2023-10-16 NOTE — PROGRESS NOTES
Physical Therapy  Facility/Department: [facility] MED SURG  Physical Therapy Treatment Note    Name: Cynthia Padilla  : 1940  MRN: [id number]  Date of Service: 10/16/2023    Patient Diagnosis(es): The primary encounter diagnosis was Intractable abdominal pain. Diagnoses of General weakness, Dysphagia, unspecified type, and Abdominal pain, unspecified abdominal location were also pertinent to this visit. Past Medical History:  has a past medical history of Cancer ([address]), Cataract, Familial peripheral neuropathy, Hypertension, Lupus ([address]), and Paroxysmal atrial fibrillation ([address]). Past Surgical History:  has a past surgical history that includes Hysterectomy; Cystoscopy; Eye surgery (2012); eye surgery (Left, 2016); Spine surgery (Bilateral, 5/3/2023); Pain management procedure (N/A, 8/10/2023); Spine surgery (N/A, 2023); Upper gastrointestinal endoscopy (N/A, 10/12/2023); and Spine surgery (N/A, 10/14/2023). Referring provider:  Jag Madison MD    PT Order:  PT eval and treat     Evaluating PT:  Chay Patel PT, DPT PT [id number]    Room #:  2826/8766-E  Diagnosis:  General weakness [R53.1]  Intractable abdominal pain [R10.9]  Procedure/Surgery:  KYPHOPLASTY T11      (10/14/23)   Precautions:  fall risk, spinal precautions  Equipment Needs:  none. Pt reported owing a rollator. SUBJECTIVE:    Pt lives alone in a 1 story home with 3 stairs to enter. Bed and bath is on first floor. Pt reported she also has a shower in the basement. Pt ambulated with rollator PTA. OBJECTIVE:   Initial Evaluation  Date: 10/15 Treatment  10/16/2023 Short Term/ Long Term   Goals   Was pt agreeable to Eval/treatment? yes yes    Does pt have pain? Back pain. Back/rib pain    Bed Mobility  Rolling: NT  Supine to sit: NT  Sit to supine: Supervision  Scooting: supervision along the side of the bed   Rolling; Min A rolling to R side log rolling  Supine to sit; Mod A  Scooting;  Mod A seated to EOB

## 2023-10-16 NOTE — PROGRESS NOTES
Occupational Therapy    OCCUPATIONAL THERAPY INITIAL EVALUATION    [facility] [address]         LOBL:                                                  Patient Name: Dorene Mars    MRN: [id number]    : 1940    Room: 0088/0311-Y      Evaluating OT: Nicolas Gutierrez OTR/L   [id number]      Referring Sherian Males, DO    Specific Provider Orders/Date:OT eval and treat 10/10/2023      Diagnosis:  General weakness [R53.1]  Intractable abdominal pain [R10.9]    Surgery :  KYPHOPLASTY T11      (10/14/23)     Pertinent Medical History: lupus, A fib, neuropathy, limited vision, back surgery      Precautions:  Fall Risk,      Assessment of current deficits    [x] Functional mobility  [x]ADLs  [x] Strength               []Cognition    [x] Functional transfers   [x] IADLs         [x] Safety Awareness   [x]Endurance    [] Fine Coordination              [x] Balance      [] Vision/perception   []Sensation     []Gross Motor Coordination  [] ROM  [] Delirium                   [] Motor Control     OT PLAN OF CARE   OT POC based on physician orders, patient diagnosis and results of clinical assessment    Frequency/Duration  2-4 days/wk for 2 weeks PRN   Specific OT Treatment Interventions to include:   ADL retraining/adapted techniques and AE recommendations to increase functional independence within precautions                    Energy conservation techniques to improve tolerance for selfcare routine   Functional transfer/mobility training/DME recommendations for increased independence, safety and fall prevention         Patient/family education to increase safety and functional independence             Environmental modifications for safe mobility and completion of ADLs                             Therapeutic activity to improve functional performance during ADLs.                                          Therapeutic exercise to *ALARM ON     Overall patient demonstrated  decreased independence and safety during completion of ADL/functional transfer/mobility tasks. Pt would benefit from continued skilled OT to increase safety and independence with completion of ADL/IADL tasks for functional independence and quality of life. Comments/Treatment:      Patient practiced and was instructed on following during evaluation and OT session:          -Bed mobility - technique and safety - continue to encourage log rolling tech         -Tranfers - hand placement, tech and safety         -Mobility - safety, and tech with  a device         -ADLs - tech, AE if appropriate/needed  - patient may benefit from lower body dressing AE - briefly educated - continue to educate and instruct as patient activity tolerance increases          -Education:, importance of OOB activity and participating in ADLs, safety awareness             Rehab Potential: good for established goals     Patient / Family Goal: none stated       Patient and/or family were instructed on functional diagnosis, prognosis/goals and OT plan of care. Demonstrated good  understanding. Eval Complexity: Low    Time In: 1232  Time Out: 1300  Total Treatment Time: 10    Min Units   OT Eval Low 97165 x  1   OT Eval Medium 49629      OT Eval High 72180      OT Re-Eval [id number]       Therapeutic Ex 58111      Therapeutic Activities 24534  10  1   ADL/Self Care 70053       Orthotic Management 47436       Manual 32303     Neuro Re-Ed 21912       Non-Billable Time      OT Re eval 02321        Evaluation Time additionally includes thorough review of current medical information, gathering information on past medical history/social history and prior level of function, interpretation of standardized testing/informal observation of tasks, assessment of data and development of plan of care and goals.             Brooke Cummings  OTR/L  OT [id number]

## 2023-10-16 NOTE — PROGRESS NOTES
Contacted Dr. Silva Pozo regarding patient's request for different oral pain medication. See new order.

## 2023-10-16 NOTE — PROGRESS NOTES
Seen and examined  States back pain improved  Continues with radiating pain lower ribs  No new complaints  Planning for discharge to [facility] tomorrow    Drsg/Inc C/D/I  Motor/Sensory intact  No calf tend  2+ DP bilateral    A/P:  S/P kyphoplasty T11    Activity as tolerates  Okay for anticoagulation  Okay for discharge from spine standpoint.   Folloq up in office 7-10 days - [phone]

## 2023-10-16 NOTE — PLAN OF CARE
Problem: ABCDS Injury Assessment  Goal: Absence of physical injury  10/16/2023 0006 by Jennifer Pritchett RN  Outcome: Progressing  10/15/2023 1125 by America Toscano RN  Outcome: Progressing     Problem: Discharge Planning  Goal: Discharge to home or other facility with appropriate resources  10/16/2023 0006 by Jennifer Pritchett RN  Outcome: Progressing  10/15/2023 1125 by America Toscano RN  Outcome: Progressing     Problem: Safety - Adult  Goal: Free from fall injury  10/16/2023 0006 by Jennifer Pritchett RN  Outcome: Progressing  10/15/2023 1125 by America Toscano RN  Outcome: Progressing     Problem: Pain  Goal: Verbalizes/displays adequate comfort level or baseline comfort level  10/16/2023 0006 by Jennifer Pritchett RN  Outcome: Progressing  10/15/2023 1125 by America Toscano RN  Outcome: Progressing     Problem: Nutrition Deficit:  Goal: Optimize nutritional status  10/16/2023 0006 by Jennifer Pritchett RN  Outcome: Progressing  10/15/2023 1125 by America Toscano RN  Outcome: Progressing

## 2023-10-16 NOTE — CARE COORDINATION
Pt s/p kyphoplasty; lives alone; [facility] 16/24; requesting rehab; sandi arguelles #1 [facility] ( referral to joe, maggie)#2 [address] [place] [address]. [facility] 16/24; transport forms [place]. [place] [id number]. Advised joe to initiate precert if accepted. Sidney López. Per joe, pt accepted approved for admission 10/17; facility can transport , [place]. [place] [id number]. Pt aware. Sidney López.

## 2023-10-16 NOTE — PROGRESS NOTES
Internal Medicine Progress Note    Patient's name: Tamiko Meng  : 1940  Chief complaints (on day of admission): Nausea  Admission date: 10/10/2023  Date of service: 10/16/2023   Room: [facility]  Primary care physician: Amador Patino MD  Reason for visit: Follow-up for abdominal pain and discomfort    Subjective  Mikael Turner was seen and examined. She told me that she is having less pain with mildly improved appetite. She told me that she also has a \"full sensation when she eats\" still. Review of Systems   There are no new complaints of chest pain, shortness of breath, vomiting, diarrhea, constipation.     Hospital Medications  Current Facility-Administered Medications   Medication Dose Route Frequency Provider Last Rate Last Admin    traMADol (ULTRAM) tablet 50 mg  50 mg Oral Q6H PRN Josue Mandel, DO   50 mg at 10/16/23 1027    Or    traMADol (ULTRAM) tablet 100 mg  100 mg Oral Q6H PRN Josue Mandel, DO        Glucose (TRUEPLUS) oral gel 15 g  15 g Oral PRN Callum Ospina MD        polyethylene glycol (GLYCOLAX) packet 17 g  17 g Oral Daily Josue Mandel, DO   17 g at 10/13/23 1051    fluconazole (DIFLUCAN) tablet 200 mg  200 mg Oral Daily Josue Mandel, DO   200 mg at 10/16/23 1029    pantoprazole (PROTONIX) 40 mg in sodium chloride (PF) 0.9 % 10 mL injection  40 mg IntraVENous Daily Josue Mandel, DO   40 mg at 10/16/23 1030    morphine (PF) injection 2 mg  2 mg IntraVENous Q3H PRN Josue Mandel, DO   2 mg at 10/15/23 0040    senna (SENOKOT) tablet 8.6 mg  1 tablet Oral BID Josue Mandel, DO   8.6 mg at 10/16/23 1029    melatonin tablet 3 mg  3 mg Oral Nightly PRN Josue Mandel, DO        sodium chloride flush 0.9 % injection 10 mL  10 mL IntraVENous 2 times per day Josue Mandel, DO   10 mL at 10/16/23 1030    sodium chloride flush 0.9 % injection 10 mL  10 mL IntraVENous PRN Josue Mandel, DO   10 mL at 10/11/23 0852    0.9 %

## 2023-10-16 NOTE — PROGRESS NOTES
P Quality Flow/Interdisciplinary Rounds Progress Note        Quality Flow Rounds held on October 16, 2023    Disciplines Attending:  Bedside Nurse, , , and Nursing Unit Leadership    Velasquez Palmer was admitted on 10/10/2023 10:08 AM    Anticipated Discharge Date:  Expected Discharge Date: 10/15/23    Disposition:    Kailash Score:  Kailash Scale Score: 20    Readmission Risk              Risk of Unplanned Readmission:  27           Discussed patient goal for the day, patient clinical progression, and barriers to discharge.   The following Goal(s) of the Day/Commitment(s) have been identified:   PT/OT, discharge [address] [facility], RN  October 16, 2023

## 2023-10-17 VITALS
DIASTOLIC BLOOD PRESSURE: 55 MMHG | OXYGEN SATURATION: 98 % | RESPIRATION RATE: 16 BRPM | HEART RATE: 76 BPM | WEIGHT: 122.1 LBS | SYSTOLIC BLOOD PRESSURE: 146 MMHG | BODY MASS INDEX: 20.84 KG/M2 | TEMPERATURE: 98.6 F | HEIGHT: 64 IN

## 2023-10-17 LAB
ALBUMIN SERPL-MCNC: 2.8 G/DL (ref 3.5–5.2)
ALP SERPL-CCNC: 78 U/L (ref 35–104)
ALT SERPL-CCNC: 7 U/L (ref 0–32)
ANION GAP SERPL CALCULATED.3IONS-SCNC: 7 MMOL/L (ref 7–16)
AST SERPL-CCNC: 15 U/L (ref 0–31)
BASOPHILS # BLD: 0.02 K/UL (ref 0–0.2)
BASOPHILS NFR BLD: 1 % (ref 0–2)
BILIRUB SERPL-MCNC: 0.3 MG/DL (ref 0–1.2)
BUN SERPL-MCNC: 4 MG/DL (ref 6–23)
CALCIUM SERPL-MCNC: 8.3 MG/DL (ref 8.6–10.2)
CHLORIDE SERPL-SCNC: 102 MMOL/L (ref 98–107)
CO2 SERPL-SCNC: 26 MMOL/L (ref 22–29)
CREAT SERPL-MCNC: 0.5 MG/DL (ref 0.5–1)
EKG ATRIAL RATE: 65 BPM
EKG P AXIS: 62 DEGREES
EKG P-R INTERVAL: 154 MS
EKG Q-T INTERVAL: 376 MS
EKG QRS DURATION: 78 MS
EKG QTC CALCULATION (BAZETT): 391 MS
EKG R AXIS: 14 DEGREES
EKG T AXIS: 18 DEGREES
EKG VENTRICULAR RATE: 65 BPM
EOSINOPHIL # BLD: 0.05 K/UL (ref 0.05–0.5)
EOSINOPHILS RELATIVE PERCENT: 2 % (ref 0–6)
ERYTHROCYTE [DISTWIDTH] IN BLOOD BY AUTOMATED COUNT: 13.8 % (ref 11.5–15)
GFR SERPL CREATININE-BSD FRML MDRD: >60 ML/MIN/1.73M2
GLUCOSE BLD-MCNC: 112 MG/DL (ref 74–99)
GLUCOSE SERPL-MCNC: 88 MG/DL (ref 74–99)
HCT VFR BLD AUTO: 29 % (ref 34–48)
HGB BLD-MCNC: 9.4 G/DL (ref 11.5–15.5)
IMM GRANULOCYTES # BLD AUTO: <0.03 K/UL (ref 0–0.58)
IMM GRANULOCYTES NFR BLD: 0 % (ref 0–5)
LYMPHOCYTES NFR BLD: 0.88 K/UL (ref 1.5–4)
LYMPHOCYTES RELATIVE PERCENT: 26 % (ref 20–42)
MCH RBC QN AUTO: 28.8 PG (ref 26–35)
MCHC RBC AUTO-ENTMCNC: 32.4 G/DL (ref 32–34.5)
MCV RBC AUTO: 89 FL (ref 80–99.9)
MONOCYTES NFR BLD: 0.37 K/UL (ref 0.1–0.95)
MONOCYTES NFR BLD: 11 % (ref 2–12)
NEUTROPHILS NFR BLD: 61 % (ref 43–80)
NEUTS SEG NFR BLD: 2.04 K/UL (ref 1.8–7.3)
PLATELET # BLD AUTO: 165 K/UL (ref 130–450)
PMV BLD AUTO: 9.4 FL (ref 7–12)
POTASSIUM SERPL-SCNC: 3.8 MMOL/L (ref 3.5–5)
PROT SERPL-MCNC: 4.9 G/DL (ref 6.4–8.3)
RBC # BLD AUTO: 3.26 M/UL (ref 3.5–5.5)
SODIUM SERPL-SCNC: 135 MMOL/L (ref 132–146)
WBC OTHER # BLD: 3.4 K/UL (ref 4.5–11.5)

## 2023-10-17 PROCEDURE — 36415 COLL VENOUS BLD VENIPUNCTURE: CPT

## 2023-10-17 PROCEDURE — 93010 ELECTROCARDIOGRAM REPORT: CPT | Performed by: INTERNAL MEDICINE

## 2023-10-17 PROCEDURE — 85025 COMPLETE CBC W/AUTO DIFF WBC: CPT

## 2023-10-17 PROCEDURE — 93005 ELECTROCARDIOGRAM TRACING: CPT | Performed by: HOSPITALIST

## 2023-10-17 PROCEDURE — 80053 COMPREHEN METABOLIC PANEL: CPT

## 2023-10-17 PROCEDURE — 6370000000 HC RX 637 (ALT 250 FOR IP): Performed by: ORTHOPAEDIC SURGERY

## 2023-10-17 PROCEDURE — 82962 GLUCOSE BLOOD TEST: CPT

## 2023-10-17 PROCEDURE — 6360000002 HC RX W HCPCS: Performed by: HOSPITALIST

## 2023-10-17 RX ORDER — DIAZEPAM 2 MG/1
2 TABLET ORAL 2 TIMES DAILY PRN
Status: DISCONTINUED | OUTPATIENT
Start: 2023-10-17 | End: 2023-10-17 | Stop reason: HOSPADM

## 2023-10-17 RX ORDER — FLUCONAZOLE 200 MG/1
200 TABLET ORAL DAILY
Qty: 5 TABLET | Refills: 0 | DISCHARGE
Start: 2023-10-18 | End: 2023-10-23

## 2023-10-17 RX ORDER — SENNOSIDES A AND B 8.6 MG/1
1 TABLET, FILM COATED ORAL 2 TIMES DAILY
Qty: 60 TABLET | Refills: 0 | Status: ON HOLD | DISCHARGE
Start: 2023-10-17 | End: 2023-11-16

## 2023-10-17 RX ORDER — PROMETHAZINE HYDROCHLORIDE 25 MG/ML
12.5 INJECTION, SOLUTION INTRAMUSCULAR; INTRAVENOUS ONCE
Status: COMPLETED | OUTPATIENT
Start: 2023-10-17 | End: 2023-10-17

## 2023-10-17 RX ORDER — ONDANSETRON 4 MG/1
4 TABLET, FILM COATED ORAL 3 TIMES DAILY PRN
Qty: 15 TABLET | Refills: 0 | Status: ON HOLD | DISCHARGE
Start: 2023-10-17

## 2023-10-17 RX ORDER — DIAZEPAM 2 MG/1
2 TABLET ORAL 2 TIMES DAILY PRN
Status: ON HOLD | DISCHARGE
Start: 2023-10-17 | End: 2023-10-25

## 2023-10-17 RX ORDER — HYDROCODONE BITARTRATE AND ACETAMINOPHEN 5; 325 MG/1; MG/1
1 TABLET ORAL EVERY 8 HOURS PRN
Qty: 10 TABLET | Refills: 0 | Status: SHIPPED | DISCHARGE
Start: 2023-10-17 | End: 2023-10-20

## 2023-10-17 RX ORDER — HYDROCODONE BITARTRATE AND ACETAMINOPHEN 5; 325 MG/1; MG/1
1 TABLET ORAL EVERY 8 HOURS PRN
Status: DISCONTINUED | OUTPATIENT
Start: 2023-10-17 | End: 2023-10-17 | Stop reason: HOSPADM

## 2023-10-17 RX ADMIN — ONDANSETRON 4 MG: 4 TABLET, ORALLY DISINTEGRATING ORAL at 07:51

## 2023-10-17 RX ADMIN — PROMETHAZINE HYDROCHLORIDE 12.5 MG: 25 INJECTION INTRAMUSCULAR; INTRAVENOUS at 11:19

## 2023-10-17 RX ADMIN — DIAZEPAM 5 MG: 5 TABLET ORAL at 11:19

## 2023-10-17 RX ADMIN — TRAMADOL HYDROCHLORIDE 50 MG: 50 TABLET, COATED ORAL at 02:55

## 2023-10-17 ASSESSMENT — PAIN SCALES - GENERAL: PAINLEVEL_OUTOF10: 7

## 2023-10-17 ASSESSMENT — PAIN DESCRIPTION - ORIENTATION: ORIENTATION: RIGHT;LEFT

## 2023-10-17 ASSESSMENT — PAIN - FUNCTIONAL ASSESSMENT: PAIN_FUNCTIONAL_ASSESSMENT: ACTIVITIES ARE NOT PREVENTED

## 2023-10-17 ASSESSMENT — PAIN DESCRIPTION - LOCATION: LOCATION: RIB CAGE;BACK

## 2023-10-17 ASSESSMENT — PAIN DESCRIPTION - DESCRIPTORS: DESCRIPTORS: ACHING;SORE;TENDER

## 2023-10-17 NOTE — PLAN OF CARE
Problem: ABCDS Injury Assessment  Goal: Absence of physical injury  10/17/2023 0142 by Serg Gaytan RN  Outcome: Progressing  10/16/2023 2012 by Theresa Herron RN  Outcome: Progressing     Problem: Discharge Planning  Goal: Discharge to home or other facility with appropriate resources  10/17/2023 0142 by Serg Gaytan RN  Outcome: Progressing  10/16/2023 2012 by Theresa Herron RN  Outcome: Progressing     Problem: Safety - Adult  Goal: Free from fall injury  10/17/2023 0142 by Serg Gaytan RN  Outcome: Progressing  10/16/2023 2012 by Theresa Herron RN  Outcome: Progressing     Problem: Pain  Goal: Verbalizes/displays adequate comfort level or baseline comfort level  10/17/2023 0142 by Serg Gaytan RN  Outcome: Progressing  10/16/2023 2012 by Theresa Herron RN  Outcome: Progressing     Problem: Nutrition Deficit:  Goal: Optimize nutritional status  10/17/2023 0142 by Serg Gaytan RN  Outcome: Progressing  10/16/2023 2012 by Theresa Herron RN  Outcome: Progressing

## 2023-10-17 NOTE — CARE COORDINATION
Updated plan of care. Pending auth for [address]. RAVI initiated, 7000 completed. Transport form on chart.  Facility can transport pt-mjo    ADDEND: auth received, facility to  pt at 4 pm. Staff and pt aware-mjo

## 2023-10-17 NOTE — PROGRESS NOTES
Physician Progress Note      PATIENT:               Syed Love  CSN #:                  [id number]  :                       1940  ADMIT DATE:       10/10/2023 10:08 AM  DISCH DATE:  RESPONDING  PROVIDER #:        Nilesh Thornton MD          QUERY TEXT:    Internal Medicine,    Pt admitted with intractable abdominal pain and has moderate to severe   malnutrition documented by Internal Medicine. The Dietitian documents patient   as at risk for malnutrition. If possible, clarify if malnutrition was   confirmed or ruled out. The medical record reflects the following:  Risk Factors: chronic illness  Clinical Indicators: Internal Med documents moderate to severe malnutrition   with unintentional weight loss of 30 lbs. The  Dietitian documents patient at risk for malnutrition with 75% or less of   estimated energy requirements for 7 or more days, no significant weight loss   and notes that assessment of muscle and fat loss was not completed due to   patient's advanced age. Treatment: labs, ONS, I&O, weights, supportive care    ASPEN Criteria:    https://aspenjournals. onlinelibrary. peck. com/doi/full/10.1177/037532857172439  5    Thank you,  Sarah Farias RN CDS  [phone]  Options provided:  -- Moderate Malnutrition  -- Severe Malnutrition  -- Other - I will add my own diagnosis  -- Disagree - Not applicable / Not valid  -- Disagree - Clinically unable to determine / Unknown  -- Refer to Clinical Documentation Reviewer    PROVIDER RESPONSE TEXT:    This patient has moderate malnutrition.     Query created by: Sarah Farias on 10/13/2023 4:12 PM      Electronically signed by:  Nilesh Thornton MD 10/17/2023 1:04 PM

## 2023-10-17 NOTE — DISCHARGE SUMMARY
Med Rec: [id number]   [address] [place] [address],  [address]   [phone]   Fax:      Recent Labs     10/16/23  0624   MG 1.9       No results for input(s): \"CKTOTAL\", \"CKMB\", \"TROPONINI\" in the last 72 hours. No results for input(s): \"LACTA\" in the last 72 hours. IMAGING:  FLUORO FOR SURGICAL PROCEDURES    Result Date: 10/14/2023  EXAMINATION: SPOT FLUOROSCOPIC IMAGES 10/14/2023 9:40 am TECHNIQUE: Fluoroscopy was provided by the radiology department for procedure. Radiologist was not present during examination. FLUOROSCOPY DOSE AND TYPE: Radiation Exposure Index: Fluoroscopy time equals 61.1 seconds. Total dose equals 19.79 mGy, COMPARISON: None HISTORY: ORDERING SYSTEM PROVIDED HISTORY: kypho TECHNOLOGIST PROVIDED HISTORY: Reason for exam:->kypho Intraprocedural imaging. FINDINGS: 2 intraoperative images of the thoracic and lumbar spine were obtained. .     Intraprocedural fluoroscopic spot images as above. See separate procedure report for more information. MRI THORACIC SPINE WO CONTRAST    Result Date: 10/12/2023  EXAMINATION: MRI OF THE THORACIC SPINE WITHOUT CONTRAST; MRI OF THE LUMBAR SPINE WITHOUT CONTRAST  10/12/2023 1:42 pm; 10/12/2023 2:10 pm TECHNIQUE: Multiplanar multisequence MRI of the thoracic spine was performed without the administration of intravenous contrast.; Multiplanar multisequence MRI of the lumbar spine was performed without the administration of intravenous contrast. COMPARISON: MRI lumbar spine from August 27, 2023 HISTORY: ORDERING SYSTEM PROVIDED HISTORY: compression fracture, intractable back pain TECHNOLOGIST PROVIDED HISTORY: Reason for exam:->compression fracture, intractable back pain FINDINGS: MRI thoracic spine: There is an acute compression fracture of T11 resulting in 25% loss of vertebral body height. There may also be an acute on chronic fracture of T12, status post vertebroplasty.  A chronic pulmonary arteries to suggest pulmonary arterial embolism. The heart is normal in size. No mediastinal lymphadenopathy. No airspace opacity or pleural effusion. No pneumothorax. CT abdomen/pelvis: No bowel obstruction, free air, or free fluid. Moderate amount of stool throughout the colon. The appendix is not definitively visualized, however no focal inflammatory changes in its expected location. Numerous cysts are seen throughout the liver. No intrahepatic or extrahepatic bile duct dilatation. Gallbladder is unremarkable. Pancreas is unremarkable. Incidental duodenal diverticulum is present filled with gas appearing similar in size measuring up to 1.9 x 1.5 cm. Adrenal glands are unremarkable. No hydronephrosis or perinephric edema. No retroperitoneal lymphadenopathy. Urinary bladder is unremarkable. No hip fracture or dislocation. Multiple lumbar compression fractures with kyphoplasty at T12, L1, L2, L3, and L4. New depression involving inferior endplate of H51. There is generalized osteopenia. 1. No evidence of pulmonary embolism. 2. No acute process in chest, abdomen, or pelvis. 3. New depression involving inferior endplate of G25 could indicate acute fracture. 4. Multiple chronic fractures involving lumbar spine with evidence of kyphoplasty. CTA PULMONARY W CONTRAST    Result Date: 10/5/2023  EXAMINATION: CT OF THE ABDOMEN AND PELVIS WITH CONTRAST; CTA OF THE CHEST 10/5/2023 5:00 pm TECHNIQUE: CT of the abdomen and pelvis was performed with the administration of intravenous contrast. Multiplanar reformatted images are provided for review. Automated exposure control, iterative reconstruction, and/or weight based adjustment of the mA/kV was utilized to reduce the radiation dose to as low as reasonably achievable.; CTA of the chest was performed after the administration of intravenous contrast.  Multiplanar reformatted images are provided for review. MIP images are provided for review.

## 2023-10-17 NOTE — PROGRESS NOTES
Occupational Therapy  Patient treatment attempted this AM.  Patient not available d/t not feeling well, will continue OT POC as able and appropriate.     Vivian CARDENAS/TANIA [id number]

## 2023-10-17 NOTE — PROGRESS NOTES
Comprehensive Nutrition Assessment    Type and Reason for Visit:  Reassess    Nutrition Recommendations/Plan:   Continue current diet as tolerated & monitor  Will modify ONS      Malnutrition Assessment:  Malnutrition Status: At risk for malnutrition (Comment) (10/11/23 1513)    Context:  Acute Illness     Findings of the 6 clinical characteristics of malnutrition:  Energy Intake:  75% or less of estimated energy requirements for 7 or more days  Weight Loss:  No significant weight loss     Body Fat Loss:  Unable to assess (d/t advanced age)     Muscle Mass Loss:  Unable to assess (d/t advanced age)    Fluid Accumulation:  No significant fluid accumulation     Strength:  Not Performed    Nutrition Assessment:    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 Note T11 compression fracture s/p kyphoplasty 10/14. PO intake appears variable, will modify ONS. SNF discharge planning in progress. Nutrition Related Findings:    A&O X4, +2.5 L, +1 edema, abd soft, +BS, early satiety   Wound Type: Surgical Incision       Current Nutrition Intake & Therapies:    Average Meal Intake: 1-25%, %  Average Supplements Intake: Unable to assess  ADULT DIET; Dysphagia - Soft and Bite Sized  ADULT ORAL NUTRITION SUPPLEMENT; Lunch; Standard High Calorie/High Protein Oral Supplement    Anthropometric Measures:  Height: 5' 4\" (162.6 cm)  Ideal Body Weight (IBW): 120 lbs (55 kg)    Admission Body Weight: 122 lb 1.6 oz (55.4 kg) (10/11 bed per RD measurement)  Current Body Weight: 122 lb 1.6 oz (55.4 kg) (10/11), 101.8 % IBW.  Weight Source: Bed Scale  Current BMI (kg/m2): 20.9  Usual Body Weight: 125 lb 10 oz (57 kg) (3/6/23 actual per EMR)  % Weight Change (Calculated): -2.8                    BMI Categories: Underweight (BMI less than 22) age over 72    Estimated Daily Nutrient Needs:  Energy Requirements Based On: Formula  Weight Used for Energy Requirements: Current  Energy (kcal/day):   Weight Used for Protein Requirements: Current  Protein (g/day): 55-65 (1-1.2)  Method Used for Fluid Requirements: 1 ml/kcal  Fluid (ml/day):     Nutrition Diagnosis:   Inadequate oral intake related to early satiety as evidenced by poor intake prior to admission, other (comment) (variable intake)    Nutrition Interventions:   Food and/or Nutrient Delivery: Continue Current Diet, Modify Oral Nutrition Supplement  Nutrition Education/Counseling: No recommendation at this time  Coordination of Nutrition Care: Continue to monitor while inpatient       Goals:  Previous Goal Met: Progressing toward Goal(s)  Goals: PO intake 50% or greater, by next RD assessment       Nutrition Monitoring and Evaluation:      Food/Nutrient Intake Outcomes: Food and Nutrient Intake, Supplement Intake  Physical Signs/Symptoms Outcomes: Biochemical Data, GI Status, Fluid Status or Edema, Nutrition Focused Physical Findings, Skin, Weight    Discharge Planning:    Continue current diet, Continue Oral Nutrition Supplement     Radha Taylor, RD, LD  Contact: [phone]

## 2023-10-17 NOTE — PROGRESS NOTES
Physical Therapy    Pt ill, with nausea, NA for Rx. Will follow on another date/time.   Papo Ibarra PTA [id number]

## 2023-10-17 NOTE — PROGRESS NOTES
Pt not feeling well this morning. This RN came onto shift and pt did not have IV access. Pt told night shift nurse she did not want another one. This RN reeducated pt on the importance of an IV while in the hospital. Pt accepted information, but would like to wait to put in an IV at this time.

## 2023-10-17 NOTE — DISCHARGE INSTR - COC
Continuity of Care Form    Patient Name: Carlos Garcia   :  1940  MRN:  [id number]    Admit date:  10/10/2023  Discharge date:  10/17/2023    Code Status Order: Full Code   Advance Directives:   [facility] Sneed Documentation       Date/Time Healthcare Directive Type of Healthcare Directive Copy in [facility] Agent's Name Healthcare Agent's Phone Number    10/14/23 0206 Yes, patient has an advance directive for healthcare treatment Health care treatment directive Other (Comment)  hospital has copy per patient -- -- --    10/12/23 0907 Yes, patient has an advance directive for healthcare treatment Health care treatment directive Other (Comment)  hospital has copy per patient -- -- --            Admitting Physician:  Claire De Dios DO  PCP: Sherrell Ayala MD    Discharging Nurse: [facility] Unit/Room#: 4199/2213-U  Discharging Unit Phone Number: [phone]    Emergency Contact:   Extended Emergency Contact Information  Primary Emergency Contact: Ruth Bryant  Address: [address] [place] [address] of [address] [place] Phone: [phone]  Mobile Phone: [phone]  Relation: Brother/Sister  Secondary Emergency Contact: [address] [place] [address] Phone: [phone]  Relation: Child    Past Surgical History:  Past Surgical History:   Procedure Laterality Date    CYSTOSCOPY      MULITPLE    EYE SURGERY  2012    right eye cataract with lens implant    EYE SURGERY Left 2016    removal of cataract with insertion of IOL    HYSTERECTOMY (CERVIX STATUS UNKNOWN)      PAIN MANAGEMENT PROCEDURE N/A 8/10/2023    L3-4 EPIDURAL STEROID INJECTION performed by Federica Cavazos DO at [address] Bilateral 5/3/2023    L1 and L2 KYPHOPLASTY performed by Jessi Martins MD at [address] 2023    T12, L3, L4 KYPHOPLASTY performed by Jessi Martins MD at [address] 10/14/2023 10/16/23 2014   Dressing/Treatment Steri-strips 10/16/23 2014   Closure Other (Comment) 10/16/23 2014   Margins Other (Comment) 10/16/23 2014   Incision Assessment Other (Comment) 10/16/23 2014   Drainage Amount Scant (moist but unmeasurable) 10/16/23 2014   Drainage Description Sanguinous 10/16/23 2014   Odor None 10/16/23 2014   Gema-incision Assessment Other (Comment) 10/16/23 2014   Number of days: 2        Elimination:  Continence: Bowel: Yes  Bladder: Yes  Urinary Catheter: None   Colostomy/Ileostomy/Ileal Conduit: No       Date of Last BM: 10/16/2023    Intake/Output Summary (Last 24 hours) at 10/17/2023 0809  Last data filed at 10/17/2023 0248  Gross per 24 hour   Intake --   Output 550 ml   Net -550 ml     I/O last 3 completed shifts:  In: -   Out: 850 [Urine:850]    Safety Concerns: At Risk for Falls    Impairments/Disabilities:      None    Nutrition Therapy:  Current Nutrition Therapy:   - Oral Diet:  Dysphagia - Soft and Bite Sized    Routes of Feeding: Oral  Liquids: No Restrictions  Daily Fluid Restriction: no  Last Modified Barium Swallow with Video (Video Swallowing Test): not done    Treatments at the Time of Hospital Discharge:   Respiratory Treatments:   Oxygen Therapy:  is not on home oxygen therapy.   Ventilator:    - No ventilator support    Rehab Therapies: Physical Therapy and Occupational Therapy  Weight Bearing Status/Restrictions: No weight bearing restrictions  Other Medical Equipment (for information only, NOT a DME order):  walker  Other Treatments:     Patient's personal belongings (please select all that are sent with patient):  {[facility] DME Belongings:[id number]}    RN SIGNATURE:  Electronically signed by Tobias Anglin RN on 10/17/23 at 1:47 PM EDT    CASE MANAGEMENT/SOCIAL WORK SECTION    Inpatient Status Date: ***    Readmission Risk Assessment Score:  Readmission Risk              Risk of Unplanned Readmission:  27           Discharging to Facility/ Agency   Name: [facility]

## 2023-10-17 NOTE — PROGRESS NOTES
Per RN, patient reports feeling clammy, nauseous, and dizzy this AM. Also complaining of chest heaviness, EKG performed and NSR in 60s. Dr. Urbano Bella updated, see new order for Phenergan.

## 2023-10-19 LAB — SURGICAL PATHOLOGY REPORT: NORMAL

## 2023-10-20 NOTE — PROGRESS NOTES
Physician Progress Note      PATIENT:               Carlos Garcia  CSN #:                  [id number]  :                       1940  ADMIT DATE:       10/10/2023 10:08 AM  DISCH DATE:        10/17/2023 4:18 PM  RESPONDING  PROVIDER #:        Claire De Dios DO          QUERY TEXT:    Internal Medicine,    Pt admitted with abdominal pain due to compression fx and has pathologic   fracture documented by Ortho. If possible, please document in progress notes   and discharge summary:    The medical record reflects the following:  Risk Factors: osteopenia  Clinical Indicators: Age related Osteoporosis with pathologic compression   fracture T11 documented by Ortho, generalize osteopenia on imaging  Treatment: labs, imaging, Kyphoplasty    Thank you,  Izabella Bull RN [facility]  [phone]  Options provided:  -- Age related Osteoporosis with pathologic compression fracture T11)   confirmed  -- Age related Osteoporosis with pathologic compression fracture T1 ruled out  -- Defer to Orthopedic consultant documentation regarding fracture type  -- Other - I will add my own diagnosis  -- Disagree - Not applicable / Not valid  -- Disagree - Clinically unable to determine / Unknown  -- Refer to Clinical Documentation Reviewer    PROVIDER RESPONSE TEXT:    The diagnosis of 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     Query created by: Izabella Bull on 10/19/2023 4:17 PM      Electronically signed by:  Claire De Dios DO 10/20/2023 7:18 AM

## 2023-10-23 ENCOUNTER — APPOINTMENT (OUTPATIENT)
Dept: CT IMAGING | Age: 83
DRG: 871 | End: 2023-10-23
Payer: MEDICARE

## 2023-10-23 ENCOUNTER — HOSPITAL ENCOUNTER (EMERGENCY)
Age: 83
Discharge: HOME | DRG: 871 | End: 2023-10-23
Attending: EMERGENCY MEDICINE
Payer: MEDICARE

## 2023-10-23 ENCOUNTER — APPOINTMENT (OUTPATIENT)
Dept: GENERAL RADIOLOGY | Age: 83
DRG: 871 | End: 2023-10-23
Payer: MEDICARE

## 2023-10-23 VITALS
OXYGEN SATURATION: 98 % | DIASTOLIC BLOOD PRESSURE: 62 MMHG | WEIGHT: 118 LBS | RESPIRATION RATE: 16 BRPM | SYSTOLIC BLOOD PRESSURE: 145 MMHG | BODY MASS INDEX: 20.14 KG/M2 | TEMPERATURE: 97.3 F | HEART RATE: 77 BPM | HEIGHT: 64 IN
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
## 2023-10-23 DIAGNOSIS — R10.13 ABDOMINAL PAIN, EPIGASTRIC: ICD-10-CM

## 2023-10-23 DIAGNOSIS — J18.9 HOSPITAL-ACQUIRED PNEUMONIA: Primary | ICD-10-CM

## 2023-10-23 LAB
ALBUMIN SERPL-MCNC: 3.9 G/DL (ref 3.5–5.2)
ALP SERPL-CCNC: 104 U/L (ref 35–104)
ALT SERPL-CCNC: 14 U/L (ref 0–32)
ANION GAP SERPL CALCULATED.3IONS-SCNC: 9 MMOL/L (ref 7–16)
AST SERPL-CCNC: 26 U/L (ref 0–31)
BACTERIA URNS QL MICRO: ABNORMAL
BASOPHILS # BLD: 0.03 K/UL (ref 0–0.2)
BASOPHILS NFR BLD: 1 % (ref 0–2)
BILIRUB SERPL-MCNC: 0.4 MG/DL (ref 0–1.2)
BILIRUB UR QL STRIP: NEGATIVE
BUN SERPL-MCNC: 7 MG/DL (ref 6–23)
CALCIUM SERPL-MCNC: 9.2 MG/DL (ref 8.6–10.2)
CHLORIDE SERPL-SCNC: 98 MMOL/L (ref 98–107)
CLARITY UR: CLEAR
CO2 SERPL-SCNC: 28 MMOL/L (ref 22–29)
COLOR UR: YELLOW
CREAT SERPL-MCNC: 0.5 MG/DL (ref 0.5–1)
EOSINOPHIL # BLD: 0.05 K/UL (ref 0.05–0.5)
EOSINOPHILS RELATIVE PERCENT: 1 % (ref 0–6)
EPI CELLS #/AREA URNS HPF: ABNORMAL /HPF
ERYTHROCYTE [DISTWIDTH] IN BLOOD BY AUTOMATED COUNT: 13.8 % (ref 11.5–15)
GFR SERPL CREATININE-BSD FRML MDRD: >60 ML/MIN/1.73M2
GLUCOSE SERPL-MCNC: 111 MG/DL (ref 74–99)
GLUCOSE UR STRIP-MCNC: NEGATIVE MG/DL
HCT VFR BLD AUTO: 35.2 % (ref 34–48)
HGB BLD-MCNC: 11.4 G/DL (ref 11.5–15.5)
HGB UR QL STRIP.AUTO: NEGATIVE
IMM GRANULOCYTES # BLD AUTO: <0.03 K/UL (ref 0–0.58)
IMM GRANULOCYTES NFR BLD: 0 % (ref 0–5)
KETONES UR STRIP-MCNC: NEGATIVE MG/DL
LACTATE BLDV-SCNC: 0.8 MMOL/L (ref 0.5–2.2)
LACTATE BLDV-SCNC: 1.1 MMOL/L (ref 0.5–1.9)
LEUKOCYTE ESTERASE UR QL STRIP: ABNORMAL
LIPASE SERPL-CCNC: 28 U/L (ref 13–60)
LYMPHOCYTES NFR BLD: 1.35 K/UL (ref 1.5–4)
LYMPHOCYTES RELATIVE PERCENT: 29 % (ref 20–42)
MCH RBC QN AUTO: 28.2 PG (ref 26–35)
MCHC RBC AUTO-ENTMCNC: 32.4 G/DL (ref 32–34.5)
MCV RBC AUTO: 87.1 FL (ref 80–99.9)
MONOCYTES NFR BLD: 0.43 K/UL (ref 0.1–0.95)
MONOCYTES NFR BLD: 9 % (ref 2–12)
NEUTROPHILS NFR BLD: 60 % (ref 43–80)
NEUTS SEG NFR BLD: 2.76 K/UL (ref 1.8–7.3)
NITRITE UR QL STRIP: NEGATIVE
PH UR STRIP: 7.5 [PH] (ref 5–9)
PLATELET # BLD AUTO: 276 K/UL (ref 130–450)
PMV BLD AUTO: 8.7 FL (ref 7–12)
POTASSIUM SERPL-SCNC: 4.1 MMOL/L (ref 3.5–5)
PROT SERPL-MCNC: 6.2 G/DL (ref 6.4–8.3)
PROT UR STRIP-MCNC: NEGATIVE MG/DL
RBC # BLD AUTO: 4.04 M/UL (ref 3.5–5.5)
RBC #/AREA URNS HPF: ABNORMAL /HPF
SODIUM SERPL-SCNC: 135 MMOL/L (ref 132–146)
SP GR UR STRIP: 1.01 (ref 1–1.03)
TROPONIN I SERPL HS-MCNC: 24 NG/L (ref 0–9)
TROPONIN I SERPL HS-MCNC: 24 NG/L (ref 0–9)
UROBILINOGEN UR STRIP-ACNC: 0.2 EU/DL (ref 0–1)
WBC #/AREA URNS HPF: ABNORMAL /HPF
WBC OTHER # BLD: 4.6 K/UL (ref 4.5–11.5)

## 2023-10-23 PROCEDURE — 6360000004 HC RX CONTRAST MEDICATION: Performed by: RADIOLOGY

## 2023-10-23 PROCEDURE — 85025 COMPLETE CBC W/AUTO DIFF WBC: CPT

## 2023-10-23 PROCEDURE — 84484 ASSAY OF TROPONIN QUANT: CPT

## 2023-10-23 PROCEDURE — 74177 CT ABD & PELVIS W/CONTRAST: CPT

## 2023-10-23 PROCEDURE — 96375 TX/PRO/DX INJ NEW DRUG ADDON: CPT

## 2023-10-23 PROCEDURE — 93005 ELECTROCARDIOGRAM TRACING: CPT | Performed by: EMERGENCY MEDICINE

## 2023-10-23 PROCEDURE — 96365 THER/PROPH/DIAG IV INF INIT: CPT

## 2023-10-23 PROCEDURE — 6360000002 HC RX W HCPCS

## 2023-10-23 PROCEDURE — 87154 CUL TYP ID BLD PTHGN 6+ TRGT: CPT

## 2023-10-23 PROCEDURE — 96367 TX/PROPH/DG ADDL SEQ IV INF: CPT

## 2023-10-23 PROCEDURE — 96376 TX/PRO/DX INJ SAME DRUG ADON: CPT

## 2023-10-23 PROCEDURE — 2580000003 HC RX 258: Performed by: EMERGENCY MEDICINE

## 2023-10-23 PROCEDURE — 87040 BLOOD CULTURE FOR BACTERIA: CPT

## 2023-10-23 PROCEDURE — 81001 URINALYSIS AUTO W/SCOPE: CPT

## 2023-10-23 PROCEDURE — 96366 THER/PROPH/DIAG IV INF ADDON: CPT

## 2023-10-23 PROCEDURE — 83690 ASSAY OF LIPASE: CPT

## 2023-10-23 PROCEDURE — 6360000002 HC RX W HCPCS: Performed by: EMERGENCY MEDICINE

## 2023-10-23 PROCEDURE — 80053 COMPREHEN METABOLIC PANEL: CPT

## 2023-10-23 PROCEDURE — 2580000003 HC RX 258: Performed by: RADIOLOGY

## 2023-10-23 PROCEDURE — 71045 X-RAY EXAM CHEST 1 VIEW: CPT

## 2023-10-23 PROCEDURE — 87077 CULTURE AEROBIC IDENTIFY: CPT

## 2023-10-23 PROCEDURE — 83605 ASSAY OF LACTIC ACID: CPT

## 2023-10-23 PROCEDURE — 99285 EMERGENCY DEPT VISIT HI MDM: CPT

## 2023-10-23 RX ORDER — LEVOFLOXACIN 500 MG/1
750 TABLET, FILM COATED ORAL DAILY
Qty: 8 TABLET | Refills: 0 | Status: ON HOLD | OUTPATIENT
Start: 2023-10-23 | End: 2023-11-01 | Stop reason: HOSPADM

## 2023-10-23 RX ORDER — MORPHINE SULFATE 4 MG/ML
4 INJECTION, SOLUTION INTRAMUSCULAR; INTRAVENOUS
Status: COMPLETED | OUTPATIENT
Start: 2023-10-23 | End: 2023-10-23

## 2023-10-23 RX ORDER — ONDANSETRON 2 MG/ML
4 INJECTION INTRAMUSCULAR; INTRAVENOUS ONCE
Status: COMPLETED | OUTPATIENT
Start: 2023-10-23 | End: 2023-10-23

## 2023-10-23 RX ORDER — SODIUM CHLORIDE 0.9 % (FLUSH) 0.9 %
10 SYRINGE (ML) INJECTION PRN
Status: COMPLETED | OUTPATIENT
Start: 2023-10-23 | End: 2023-10-23

## 2023-10-23 RX ORDER — LEVOFLOXACIN 500 MG/1
750 TABLET, FILM COATED ORAL DAILY
Qty: 8 TABLET | Refills: 0 | Status: SHIPPED | OUTPATIENT
Start: 2023-10-23 | End: 2023-10-23 | Stop reason: SDUPTHER

## 2023-10-23 RX ADMIN — CEFEPIME 2000 MG: 2 INJECTION, POWDER, FOR SOLUTION INTRAVENOUS at 06:43

## 2023-10-23 RX ADMIN — MORPHINE SULFATE 4 MG: 4 INJECTION, SOLUTION INTRAMUSCULAR; INTRAVENOUS at 03:27

## 2023-10-23 RX ADMIN — IOPAMIDOL 75 ML: 755 INJECTION, SOLUTION INTRAVENOUS at 05:41

## 2023-10-23 RX ADMIN — ONDANSETRON 4 MG: 2 INJECTION INTRAMUSCULAR; INTRAVENOUS at 03:26

## 2023-10-23 RX ADMIN — VANCOMYCIN HYDROCHLORIDE 1000 MG: 1 INJECTION, POWDER, LYOPHILIZED, FOR SOLUTION INTRAVENOUS at 07:45

## 2023-10-23 RX ADMIN — SODIUM CHLORIDE, PRESERVATIVE FREE 10 ML: 5 INJECTION INTRAVENOUS at 05:35

## 2023-10-23 RX ADMIN — MORPHINE SULFATE 4 MG: 4 INJECTION, SOLUTION INTRAMUSCULAR; INTRAVENOUS at 09:57

## 2023-10-23 ASSESSMENT — PAIN SCALES - GENERAL
PAINLEVEL_OUTOF10: 7
PAINLEVEL_OUTOF10: 7

## 2023-10-23 ASSESSMENT — PAIN DESCRIPTION - LOCATION: LOCATION: ABDOMEN

## 2023-10-23 ASSESSMENT — PAIN - FUNCTIONAL ASSESSMENT: PAIN_FUNCTIONAL_ASSESSMENT: 0-10

## 2023-10-23 NOTE — ED NOTES
Patient ambulated to restroom with walker. Pulse oximetry maintained 99% room air during ambulation. Dr. Anabel Graves notified.       Viktor Carmen RN  10/23/23 2288

## 2023-10-23 NOTE — ED PROVIDER NOTES
Department of Emergency Medicine     Written by: Chica Aguiar DO  Patient Name: Garima Khan  Admit Date: 10/23/2023  2:24 AM  MRN: [id number]                   : 1940      HPI  Chief Complaint   Patient presents with    Abdominal Pain     Sent by [facility] [place] for stomach ache. No emesis, mild nausea. Patient was seen 5 days ago for the same thing, stomach ache has been going on now for a few days. A/Ox4. Had scopes done on 10/10/23. Pt states they believe it showed \"cysts on their liver. \"      This is an 75-year-old female with past medical history of atrial fibrillation, hypertension, patient vertebral fractures who presents to the emergency department today complaining of abdominal pain. Patient states she was recently admitted to this hospital for similar complaints. She was sent to [address] [place] [address] and states for 5 since she has been back there, she has been having some nausea and that same abdominal pain. The pain is located in the epigastric region. She had some scopes done when she was in the hospital previously. States she has not had much of an appetite at all. Patient denies any lightheadedness or dizziness, fever or chills, nausea, chest pain, shortness of breath, hematuria or dysuria, constipation or diarrhea. Nursing notes were all reviewed and agreed with or any disagreements were addressed in the HPI. Review of systems:    Pertinent positives and negatives mentioned in the HPI/MDM. Physical Exam  Constitutional:       General: She is not in acute distress. HENT:      Head: Normocephalic and atraumatic. Eyes:      Extraocular Movements: Extraocular movements intact. Pupils: Pupils are equal, round, and reactive to light. Cardiovascular:      Rate and Rhythm: Normal rate and regular rhythm. Pulmonary:      Effort: Pulmonary effort is normal.      Breath sounds: Normal breath sounds. No stridor. No wheezing, rhonchi or rales. 1000 mg IVPB in 250 mL NS addavial (has no administration in time range)   morphine sulfate (PF) injection 4 mg (4 mg IntraVENous Given 10/23/23 9487)   ondansetron (ZOFRAN) injection 4 mg (4 mg IntraVENous Given 10/23/23 2516)   iopamidol (ISOVUE-370) 76 % injection 75 mL (75 mLs IntraVENous Given 10/23/23 4341)   sodium chloride flush 0.9 % injection 10 mL (10 mLs IntraVENous Given 10/23/23 3691)       CONSULTS: discussion with bolded \"IP consult\", otherwise consult was likely placed by admitting service  None    MDM   History is obtained from patient for patient's acute onset of mild abdominal pain. EKG is ordered to evaluate patient's current cardiac rhythm, and to interpret QT interval prior to administering medications. CBC is ordered to evaluate for any signs of infection or inflammation by obtaining a WBC count, or any signs of acute anemia by interpreting hemoglobin. CMP was ordered to evaluate for any electrolyte imbalances, kidney function, hepatic injury or any elevations in anion gap. Troponin ordered to evaluate for possible cardiac etiology of symptoms including but not limited to STEMI or NSTEMI. Urinalysis ordered to evaluate for a UTI and/or hematuria. Lipase level was ordered to evaluate for possible elevations which suggest pancreatic etiology of symptoms. Lactic acid level obtained to evaluate for signs of organ hypoperfusion or ischemia. Blood cultures are ordered to evaluate, rule out and, if present, treat bacteremia with antibiotics narrowed down to the found organism. Chest x-ray is ordered to evaluate for any possible signs of, but without limitation to, pneumonia, pleural effusions, cardiomegaly, pneumothorax, atelectasis, rib or sternal abnormalities including fractures.  A CT abdomen with IV contrast was ordered to evaluate for, but without limitation to, ureterolithiasis, nephrolithiasis, constipation, hollow organ perforation, small bowel obstruction, bowel ischemia,

## 2023-10-23 NOTE — CARE COORDINATION
Social Work/Transition of Care:     Pt in need of transportation back to [address] contacted [facility] they are agreeable to transport ETA ASAP. BOBBY updated patient and ED RN.      Electronically signed by Ron Pritchett on 16/76/0135 at 11:14 AM

## 2023-10-23 NOTE — ED NOTES
Patient signed out by Dr Lona Valle    79 yo lady presented with nausea and abdominal pain. She was recently discharged on 10/17 after admission for abdominal pain. CT ABDOMEN PELVIS W IV CONTRAST Additional Contrast? None   Final Result   There is been interval kyphoplasty of T11. Development of small bilateral   pleural effusions and consolidation at the lung bases bilaterally suggesting   either atelectatic change or infiltrate. The abdomen and pelvis is otherwise   stable and unremarkable. Some fluid-filled small bowel loops to suggest   possibly a mild enteritis in which clinical correlation is needed. XR CHEST PORTABLE   Final Result   New right pleural effusion with adjacent atelectasis and or infiltrate. CT scan shows new lung infiltrates. IV Cefepime and Vancomycin started for suspicion of Hospital acquired Pneumonia. However, normal WBC, Lactate and stable vitals. CURB 65 at low risk. Planned for discharge to the Nursing home on oral antibiotics. Also discussed the case with Hospitalist Dr Robert Kemp. Reassessment at the time of discharge, she is comfortable in bed in room air. Abdomen soft with mild epigastric tenderness. She will continue Norco for pain.       Gabriele Blanco MD  Resident  10/23/23 8527

## 2023-10-24 ENCOUNTER — HOSPITAL ENCOUNTER (INPATIENT)
Age: 83
LOS: 9 days | Discharge: OTHER INSTITUTION | DRG: 871 | End: 2023-11-02
Attending: EMERGENCY MEDICINE | Admitting: INTERNAL MEDICINE
Payer: MEDICARE

## 2023-10-24 DIAGNOSIS — S22.000A COMPRESSION FRACTURE OF BODY OF THORACIC VERTEBRA (HCC): ICD-10-CM

## 2023-10-24 DIAGNOSIS — R78.81 BACTEREMIA: Primary | ICD-10-CM

## 2023-10-24 LAB
ALBUMIN SERPL-MCNC: 3.8 G/DL (ref 3.5–5.2)
ALP SERPL-CCNC: 103 U/L (ref 35–104)
ALT SERPL-CCNC: 14 U/L (ref 0–32)
ANION GAP SERPL CALCULATED.3IONS-SCNC: 8 MMOL/L (ref 7–16)
AST SERPL-CCNC: 24 U/L (ref 0–31)
BASOPHILS # BLD: 0.03 K/UL (ref 0–0.2)
BASOPHILS NFR BLD: 1 % (ref 0–2)
BILIRUB SERPL-MCNC: 0.3 MG/DL (ref 0–1.2)
BUN SERPL-MCNC: 10 MG/DL (ref 6–23)
CALCIUM SERPL-MCNC: 8.9 MG/DL (ref 8.6–10.2)
CHLORIDE SERPL-SCNC: 98 MMOL/L (ref 98–107)
CO2 SERPL-SCNC: 28 MMOL/L (ref 22–29)
CREAT SERPL-MCNC: 0.6 MG/DL (ref 0.5–1)
EKG ATRIAL RATE: 72 BPM
EKG P AXIS: 65 DEGREES
EKG P-R INTERVAL: 158 MS
EKG Q-T INTERVAL: 388 MS
EKG QRS DURATION: 78 MS
EKG QTC CALCULATION (BAZETT): 424 MS
EKG R AXIS: 46 DEGREES
EKG T AXIS: 69 DEGREES
EKG VENTRICULAR RATE: 72 BPM
EOSINOPHIL # BLD: 0.03 K/UL (ref 0.05–0.5)
EOSINOPHILS RELATIVE PERCENT: 1 % (ref 0–6)
ERYTHROCYTE [DISTWIDTH] IN BLOOD BY AUTOMATED COUNT: 14.1 % (ref 11.5–15)
GFR SERPL CREATININE-BSD FRML MDRD: >60 ML/MIN/1.73M2
GLUCOSE SERPL-MCNC: 130 MG/DL (ref 74–99)
HCT VFR BLD AUTO: 34.9 % (ref 34–48)
HGB BLD-MCNC: 11.1 G/DL (ref 11.5–15.5)
IMM GRANULOCYTES # BLD AUTO: <0.03 K/UL (ref 0–0.58)
IMM GRANULOCYTES NFR BLD: 0 % (ref 0–5)
LACTATE BLDV-SCNC: 1 MMOL/L (ref 0.5–2.2)
LYMPHOCYTES NFR BLD: 0.74 K/UL (ref 1.5–4)
LYMPHOCYTES RELATIVE PERCENT: 15 % (ref 20–42)
MCH RBC QN AUTO: 28.5 PG (ref 26–35)
MCHC RBC AUTO-ENTMCNC: 31.8 G/DL (ref 32–34.5)
MCV RBC AUTO: 89.5 FL (ref 80–99.9)
MONOCYTES NFR BLD: 0.36 K/UL (ref 0.1–0.95)
MONOCYTES NFR BLD: 7 % (ref 2–12)
NEUTROPHILS NFR BLD: 76 % (ref 43–80)
NEUTS SEG NFR BLD: 3.68 K/UL (ref 1.8–7.3)
PLATELET # BLD AUTO: 298 K/UL (ref 130–450)
PMV BLD AUTO: 8.7 FL (ref 7–12)
POTASSIUM SERPL-SCNC: 4.4 MMOL/L (ref 3.5–5)
PROCALCITONIN SERPL-MCNC: 0.08 NG/ML (ref 0–0.08)
PROT SERPL-MCNC: 6.4 G/DL (ref 6.4–8.3)
RBC # BLD AUTO: 3.9 M/UL (ref 3.5–5.5)
SODIUM SERPL-SCNC: 134 MMOL/L (ref 132–146)
WBC OTHER # BLD: 4.9 K/UL (ref 4.5–11.5)

## 2023-10-24 PROCEDURE — 2580000003 HC RX 258: Performed by: INTERNAL MEDICINE

## 2023-10-24 PROCEDURE — 85025 COMPLETE CBC W/AUTO DIFF WBC: CPT

## 2023-10-24 PROCEDURE — 80053 COMPREHEN METABOLIC PANEL: CPT

## 2023-10-24 PROCEDURE — 93005 ELECTROCARDIOGRAM TRACING: CPT | Performed by: NURSE PRACTITIONER

## 2023-10-24 PROCEDURE — 93010 ELECTROCARDIOGRAM REPORT: CPT | Performed by: INTERNAL MEDICINE

## 2023-10-24 PROCEDURE — 87040 BLOOD CULTURE FOR BACTERIA: CPT

## 2023-10-24 PROCEDURE — 6370000000 HC RX 637 (ALT 250 FOR IP): Performed by: INTERNAL MEDICINE

## 2023-10-24 PROCEDURE — 6360000002 HC RX W HCPCS: Performed by: NURSE PRACTITIONER

## 2023-10-24 PROCEDURE — 6360000002 HC RX W HCPCS: Performed by: EMERGENCY MEDICINE

## 2023-10-24 PROCEDURE — 1200000000 HC SEMI PRIVATE

## 2023-10-24 PROCEDURE — 83605 ASSAY OF LACTIC ACID: CPT

## 2023-10-24 PROCEDURE — 96375 TX/PRO/DX INJ NEW DRUG ADDON: CPT

## 2023-10-24 PROCEDURE — 2580000003 HC RX 258: Performed by: EMERGENCY MEDICINE

## 2023-10-24 PROCEDURE — 96374 THER/PROPH/DIAG INJ IV PUSH: CPT

## 2023-10-24 PROCEDURE — 99285 EMERGENCY DEPT VISIT HI MDM: CPT

## 2023-10-24 PROCEDURE — 84145 PROCALCITONIN (PCT): CPT

## 2023-10-24 RX ORDER — POTASSIUM CHLORIDE 20 MEQ/1
40 TABLET, EXTENDED RELEASE ORAL PRN
Status: DISCONTINUED | OUTPATIENT
Start: 2023-10-24 | End: 2023-11-02 | Stop reason: HOSPADM

## 2023-10-24 RX ORDER — SODIUM CHLORIDE 0.9 % (FLUSH) 0.9 %
10 SYRINGE (ML) INJECTION EVERY 12 HOURS SCHEDULED
Status: DISCONTINUED | OUTPATIENT
Start: 2023-10-24 | End: 2023-11-02 | Stop reason: HOSPADM

## 2023-10-24 RX ORDER — TRAMADOL HYDROCHLORIDE 50 MG/1
50 TABLET ORAL EVERY 6 HOURS PRN
Status: ON HOLD | COMMUNITY
End: 2023-11-01 | Stop reason: SDUPTHER

## 2023-10-24 RX ORDER — ACETAMINOPHEN 325 MG/1
650 TABLET ORAL EVERY 6 HOURS PRN
Status: DISCONTINUED | OUTPATIENT
Start: 2023-10-24 | End: 2023-11-02 | Stop reason: HOSPADM

## 2023-10-24 RX ORDER — SODIUM CHLORIDE 0.9 % (FLUSH) 0.9 %
10 SYRINGE (ML) INJECTION PRN
Status: DISCONTINUED | OUTPATIENT
Start: 2023-10-24 | End: 2023-11-02 | Stop reason: HOSPADM

## 2023-10-24 RX ORDER — ACETAMINOPHEN 325 MG/1
650 TABLET ORAL EVERY 4 HOURS PRN
Status: DISCONTINUED | OUTPATIENT
Start: 2023-10-24 | End: 2023-10-24

## 2023-10-24 RX ORDER — DIAZEPAM 2 MG/1
2 TABLET ORAL 2 TIMES DAILY PRN
Status: DISCONTINUED | OUTPATIENT
Start: 2023-10-24 | End: 2023-11-02 | Stop reason: HOSPADM

## 2023-10-24 RX ORDER — MAGNESIUM SULFATE IN WATER 40 MG/ML
2000 INJECTION, SOLUTION INTRAVENOUS PRN
Status: DISCONTINUED | OUTPATIENT
Start: 2023-10-24 | End: 2023-11-02 | Stop reason: HOSPADM

## 2023-10-24 RX ORDER — FENTANYL CITRATE 50 UG/ML
50 INJECTION, SOLUTION INTRAMUSCULAR; INTRAVENOUS ONCE
Status: COMPLETED | OUTPATIENT
Start: 2023-10-24 | End: 2023-10-24

## 2023-10-24 RX ORDER — ACETAMINOPHEN 650 MG/1
650 SUPPOSITORY RECTAL EVERY 6 HOURS PRN
Status: DISCONTINUED | OUTPATIENT
Start: 2023-10-24 | End: 2023-11-02 | Stop reason: HOSPADM

## 2023-10-24 RX ORDER — TRAMADOL HYDROCHLORIDE 50 MG/1
50 TABLET ORAL EVERY 6 HOURS PRN
Status: DISCONTINUED | OUTPATIENT
Start: 2023-10-24 | End: 2023-11-02 | Stop reason: HOSPADM

## 2023-10-24 RX ORDER — LANOLIN ALCOHOL/MO/W.PET/CERES
400 CREAM (GRAM) TOPICAL NIGHTLY
Status: DISCONTINUED | OUTPATIENT
Start: 2023-10-24 | End: 2023-11-02 | Stop reason: HOSPADM

## 2023-10-24 RX ORDER — SODIUM CHLORIDE 9 MG/ML
INJECTION, SOLUTION INTRAVENOUS PRN
Status: DISCONTINUED | OUTPATIENT
Start: 2023-10-24 | End: 2023-11-02 | Stop reason: HOSPADM

## 2023-10-24 RX ORDER — ENOXAPARIN SODIUM 100 MG/ML
40 INJECTION SUBCUTANEOUS DAILY
Status: DISCONTINUED | OUTPATIENT
Start: 2023-10-25 | End: 2023-10-24

## 2023-10-24 RX ORDER — SODIUM CHLORIDE 0.9 % (FLUSH) 0.9 %
5-40 SYRINGE (ML) INJECTION EVERY 12 HOURS SCHEDULED
Status: DISCONTINUED | OUTPATIENT
Start: 2023-10-24 | End: 2023-10-24 | Stop reason: SDUPTHER

## 2023-10-24 RX ORDER — ONDANSETRON 4 MG/1
4 TABLET, ORALLY DISINTEGRATING ORAL EVERY 8 HOURS PRN
Status: DISCONTINUED | OUTPATIENT
Start: 2023-10-24 | End: 2023-11-02 | Stop reason: HOSPADM

## 2023-10-24 RX ORDER — MORPHINE SULFATE 2 MG/ML
2 INJECTION, SOLUTION INTRAMUSCULAR; INTRAVENOUS EVERY 4 HOURS PRN
Status: DISCONTINUED | OUTPATIENT
Start: 2023-10-24 | End: 2023-11-02 | Stop reason: HOSPADM

## 2023-10-24 RX ORDER — LANOLIN ALCOHOL/MO/W.PET/CERES
3 CREAM (GRAM) TOPICAL NIGHTLY PRN
Status: DISCONTINUED | OUTPATIENT
Start: 2023-10-24 | End: 2023-11-02 | Stop reason: HOSPADM

## 2023-10-24 RX ORDER — METOPROLOL SUCCINATE 25 MG/1
25 TABLET, EXTENDED RELEASE ORAL EVERY MORNING
Status: DISCONTINUED | OUTPATIENT
Start: 2023-10-25 | End: 2023-11-02 | Stop reason: HOSPADM

## 2023-10-24 RX ORDER — LISINOPRIL 10 MG/1
10 TABLET ORAL EVERY MORNING
Status: DISCONTINUED | OUTPATIENT
Start: 2023-10-25 | End: 2023-11-02 | Stop reason: HOSPADM

## 2023-10-24 RX ORDER — ONDANSETRON 2 MG/ML
4 INJECTION INTRAMUSCULAR; INTRAVENOUS EVERY 6 HOURS PRN
Status: DISCONTINUED | OUTPATIENT
Start: 2023-10-24 | End: 2023-10-24

## 2023-10-24 RX ORDER — POTASSIUM CHLORIDE 7.45 MG/ML
10 INJECTION INTRAVENOUS PRN
Status: DISCONTINUED | OUTPATIENT
Start: 2023-10-24 | End: 2023-11-02 | Stop reason: HOSPADM

## 2023-10-24 RX ORDER — SENNOSIDES A AND B 8.6 MG/1
1 TABLET, FILM COATED ORAL DAILY PRN
Status: DISCONTINUED | OUTPATIENT
Start: 2023-10-24 | End: 2023-11-02 | Stop reason: HOSPADM

## 2023-10-24 RX ORDER — SODIUM CHLORIDE 9 MG/ML
INJECTION, SOLUTION INTRAVENOUS PRN
Status: DISCONTINUED | OUTPATIENT
Start: 2023-10-24 | End: 2023-10-24 | Stop reason: SDUPTHER

## 2023-10-24 RX ORDER — ONDANSETRON 4 MG/1
4 TABLET, ORALLY DISINTEGRATING ORAL EVERY 8 HOURS PRN
Status: DISCONTINUED | OUTPATIENT
Start: 2023-10-24 | End: 2023-10-24

## 2023-10-24 RX ORDER — BISACODYL 10 MG
10 SUPPOSITORY, RECTAL RECTAL DAILY PRN
Status: DISCONTINUED | OUTPATIENT
Start: 2023-10-24 | End: 2023-11-02 | Stop reason: HOSPADM

## 2023-10-24 RX ORDER — SODIUM CHLORIDE 0.9 % (FLUSH) 0.9 %
5-40 SYRINGE (ML) INJECTION PRN
Status: DISCONTINUED | OUTPATIENT
Start: 2023-10-24 | End: 2023-10-24 | Stop reason: SDUPTHER

## 2023-10-24 RX ORDER — SENNOSIDES A AND B 8.6 MG/1
1 TABLET, FILM COATED ORAL 2 TIMES DAILY
Status: DISCONTINUED | OUTPATIENT
Start: 2023-10-24 | End: 2023-11-02 | Stop reason: HOSPADM

## 2023-10-24 RX ORDER — ONDANSETRON 2 MG/ML
4 INJECTION INTRAMUSCULAR; INTRAVENOUS EVERY 6 HOURS PRN
Status: DISCONTINUED | OUTPATIENT
Start: 2023-10-24 | End: 2023-11-02 | Stop reason: HOSPADM

## 2023-10-24 RX ORDER — BISACODYL 10 MG
10 SUPPOSITORY, RECTAL RECTAL DAILY PRN
COMMUNITY

## 2023-10-24 RX ADMIN — STANDARDIZED SENNA CONCENTRATE 8.6 MG: 8.6 TABLET ORAL at 23:02

## 2023-10-24 RX ADMIN — MORPHINE SULFATE 2 MG: 2 INJECTION, SOLUTION INTRAMUSCULAR; INTRAVENOUS at 23:02

## 2023-10-24 RX ADMIN — Medication 400 MG: at 23:02

## 2023-10-24 RX ADMIN — FENTANYL CITRATE 50 MCG: 50 INJECTION, SOLUTION INTRAMUSCULAR; INTRAVENOUS at 17:18

## 2023-10-24 RX ADMIN — VANCOMYCIN HYDROCHLORIDE 1000 MG: 1 INJECTION, POWDER, LYOPHILIZED, FOR SOLUTION INTRAVENOUS at 18:42

## 2023-10-24 RX ADMIN — APIXABAN 2.5 MG: 2.5 TABLET, FILM COATED ORAL at 23:02

## 2023-10-24 RX ADMIN — Medication 10 ML: at 23:02

## 2023-10-24 RX ADMIN — AMPICILLIN SODIUM AND SULBACTAM SODIUM 3000 MG: 2; 1 INJECTION, POWDER, FOR SOLUTION INTRAMUSCULAR; INTRAVENOUS at 17:59

## 2023-10-24 ASSESSMENT — PAIN SCALES - GENERAL
PAINLEVEL_OUTOF10: 10
PAINLEVEL_OUTOF10: 8

## 2023-10-24 ASSESSMENT — PAIN DESCRIPTION - LOCATION: LOCATION: ABDOMEN;BACK

## 2023-10-24 ASSESSMENT — PAIN DESCRIPTION - ORIENTATION: ORIENTATION: MID

## 2023-10-24 ASSESSMENT — PAIN DESCRIPTION - DESCRIPTORS: DESCRIPTORS: ACHING;DISCOMFORT

## 2023-10-24 ASSESSMENT — PAIN - FUNCTIONAL ASSESSMENT: PAIN_FUNCTIONAL_ASSESSMENT: NONE - DENIES PAIN

## 2023-10-24 NOTE — PROGRESS NOTES
Database initiated. Patient comes in from [facility]. She uses a walker and is RA at baseline. She has fallen recently. Medications and full code status verified using facility paperwork.

## 2023-10-24 NOTE — ED PROVIDER NOTES
obstructing lesion. The appendix is not well seen. No inflammatory changes identified of the right lower quadrant. Diverticulosis of the colon. No evidence of diverticulitis. Pelvis: There is distension of the bladder. No wall thickening. The uterus has been surgically removed. Peritoneum/Retroperitoneum: No abdominal or retroperitoneal lymphadenopathy. No free fluid or free air. Vascular calcifications seen diffusely throughout the abdominal aorta and iliac vessels. There is no pelvic adenopathy. No abnormal mass or fluid collections identified. Bones/Soft Tissues: The bony structures reveal multilevel degenerative changes identified within the spine. There is kyphoplasty involving the lower thoracic and lumbar vertebral body levels. New kyphoplasty of T11 in the interval.     There is been interval kyphoplasty of T11. Development of small bilateral pleural effusions and consolidation at the lung bases bilaterally suggesting either atelectatic change or infiltrate. The abdomen and pelvis is otherwise stable and unremarkable. Some fluid-filled small bowel loops to suggest possibly a mild enteritis in which clinical correlation is needed. XR CHEST PORTABLE    Result Date: 10/23/2023  EXAMINATION: ONE XRAY VIEW OF THE CHEST 10/23/2023 2:25 am COMPARISON: 10 October 2023. HISTORY: ORDERING SYSTEM PROVIDED HISTORY: cp TECHNOLOGIST PROVIDED HISTORY: Reason for exam:->cp FINDINGS: There is a new right pleural effusion with adjacent atelectasis and or infiltrate. Normal heart and pulmonary vascularity. New right pleural effusion with adjacent atelectasis and or infiltrate. No results found.     PROCEDURES   Unless otherwise noted below, none     CRITICAL CARE TIME (.cct)       PAST MEDICAL HISTORY/Chronic Conditions Affecting Care    has a past medical history of Cancer ([address]) ([facility]), Cataract (left), Familial peripheral neuropathy, Hypertension, Lupus ([address]), and Paroxysmal atrial Source Dr. Jacek Gallegos note from 10/10    CC/HPI Summary, DDx, ED Course, and Reassessment: CBC is ordered to evaluate for any signs of infection or inflammation by obtaining a WBC count, or any signs of acute anemia by interpreting hemoglobin. CMP was ordered to evaluate for any electrolyte imbalances, kidney function, hepatic injury or any elevations in anion gap. Lactic acid level obtained to evaluate for signs of organ hypoperfusion or ischemia. Blood cultures are ordered to evaluate, rule out and, if present, treat bacteremia with antibiotics narrowed down to the found organism. Disposition Considerations (Tests not ordered but considered, Shared Decision Making, Pt Expectation of Test or Tx.): This is an 78year old female who was called by our staff at her care facility after she was found to have enterococcus f on her blood cultures from about 2 days ago and was diagnosed with pneumonia. Shea Fox was very well appearing and had stable vital signs. Patient had blood cultures redrawn and I was able to speak with ID who gave me their ABx recommendations and I also spoke with Dr. Gerda Heimlich who agreed to admit the patient for further evaluation and monitoring. Patient was agreeable with plan    FINAL IMPRESSION      1. Bacteremia          DISPOSITION/PLAN     DISPOSITION Admitted 10/24/2023 06:23:48 PM    PATIENT REFERRED TO:  No follow-up provider specified.     DISCHARGE MEDICATIONS:  New Prescriptions    No medications on file       DISCONTINUED MEDICATIONS:  Discontinued Medications    CHOLECALCIFEROL (VITAMIN D3) 50 MCG (2000 UT) CAPS    Take 1,000 Units by mouth Daily with lunch            (Please note that portions of this note were completed with a voice recognition program.  Efforts were made to edit the dictations but occasionally words are mis-transcribed.)    Lei Bill DO (electronically signed)        Lei Bill DO  10/27/23 9008

## 2023-10-25 LAB
ALBUMIN SERPL-MCNC: 3.6 G/DL (ref 3.5–5.2)
ALP SERPL-CCNC: 95 U/L (ref 35–104)
ALT SERPL-CCNC: 13 U/L (ref 0–32)
ANION GAP SERPL CALCULATED.3IONS-SCNC: 8 MMOL/L (ref 7–16)
AST SERPL-CCNC: 19 U/L (ref 0–31)
BASOPHILS # BLD: 0.03 K/UL (ref 0–0.2)
BASOPHILS NFR BLD: 1 % (ref 0–2)
BILIRUB SERPL-MCNC: 0.3 MG/DL (ref 0–1.2)
BUN SERPL-MCNC: 7 MG/DL (ref 6–23)
CALCIUM SERPL-MCNC: 8.8 MG/DL (ref 8.6–10.2)
CHLORIDE SERPL-SCNC: 99 MMOL/L (ref 98–107)
CO2 SERPL-SCNC: 28 MMOL/L (ref 22–29)
CREAT SERPL-MCNC: 0.5 MG/DL (ref 0.5–1)
EOSINOPHIL # BLD: 0.03 K/UL (ref 0.05–0.5)
EOSINOPHILS RELATIVE PERCENT: 1 % (ref 0–6)
ERYTHROCYTE [DISTWIDTH] IN BLOOD BY AUTOMATED COUNT: 13.8 % (ref 11.5–15)
GFR SERPL CREATININE-BSD FRML MDRD: >60 ML/MIN/1.73M2
GLUCOSE SERPL-MCNC: 106 MG/DL (ref 74–99)
HCT VFR BLD AUTO: 33.2 % (ref 34–48)
HGB BLD-MCNC: 10.4 G/DL (ref 11.5–15.5)
IMM GRANULOCYTES # BLD AUTO: 0.03 K/UL (ref 0–0.58)
IMM GRANULOCYTES NFR BLD: 1 % (ref 0–5)
LYMPHOCYTES NFR BLD: 1.14 K/UL (ref 1.5–4)
LYMPHOCYTES RELATIVE PERCENT: 20 % (ref 20–42)
MCH RBC QN AUTO: 28 PG (ref 26–35)
MCHC RBC AUTO-ENTMCNC: 31.3 G/DL (ref 32–34.5)
MCV RBC AUTO: 89.5 FL (ref 80–99.9)
MONOCYTES NFR BLD: 0.43 K/UL (ref 0.1–0.95)
MONOCYTES NFR BLD: 8 % (ref 2–12)
NEUTROPHILS NFR BLD: 71 % (ref 43–80)
NEUTS SEG NFR BLD: 4.09 K/UL (ref 1.8–7.3)
PLATELET # BLD AUTO: 274 K/UL (ref 130–450)
PMV BLD AUTO: 8.6 FL (ref 7–12)
POTASSIUM SERPL-SCNC: 3.9 MMOL/L (ref 3.5–5)
PROT SERPL-MCNC: 5.7 G/DL (ref 6.4–8.3)
RBC # BLD AUTO: 3.71 M/UL (ref 3.5–5.5)
SODIUM SERPL-SCNC: 135 MMOL/L (ref 132–146)
WBC OTHER # BLD: 5.8 K/UL (ref 4.5–11.5)

## 2023-10-25 PROCEDURE — 97535 SELF CARE MNGMENT TRAINING: CPT

## 2023-10-25 PROCEDURE — 97165 OT EVAL LOW COMPLEX 30 MIN: CPT

## 2023-10-25 PROCEDURE — 6370000000 HC RX 637 (ALT 250 FOR IP): Performed by: INTERNAL MEDICINE

## 2023-10-25 PROCEDURE — 1200000000 HC SEMI PRIVATE

## 2023-10-25 PROCEDURE — 2580000003 HC RX 258: Performed by: REGISTERED NURSE

## 2023-10-25 PROCEDURE — 97161 PT EVAL LOW COMPLEX 20 MIN: CPT

## 2023-10-25 PROCEDURE — 97530 THERAPEUTIC ACTIVITIES: CPT

## 2023-10-25 PROCEDURE — 51798 US URINE CAPACITY MEASURE: CPT

## 2023-10-25 PROCEDURE — 6370000000 HC RX 637 (ALT 250 FOR IP): Performed by: NURSE PRACTITIONER

## 2023-10-25 PROCEDURE — 85025 COMPLETE CBC W/AUTO DIFF WBC: CPT

## 2023-10-25 PROCEDURE — 2580000003 HC RX 258: Performed by: INTERNAL MEDICINE

## 2023-10-25 PROCEDURE — 80053 COMPREHEN METABOLIC PANEL: CPT

## 2023-10-25 PROCEDURE — 6360000002 HC RX W HCPCS: Performed by: NURSE PRACTITIONER

## 2023-10-25 PROCEDURE — 6360000002 HC RX W HCPCS: Performed by: REGISTERED NURSE

## 2023-10-25 PROCEDURE — 51702 INSERT TEMP BLADDER CATH: CPT

## 2023-10-25 RX ORDER — LIDOCAINE 4 G/G
1 PATCH TOPICAL DAILY
Status: DISCONTINUED | OUTPATIENT
Start: 2023-10-25 | End: 2023-11-02 | Stop reason: HOSPADM

## 2023-10-25 RX ADMIN — AMPICILLIN SODIUM AND SULBACTAM SODIUM 3000 MG: 2; 1 INJECTION, POWDER, FOR SOLUTION INTRAMUSCULAR; INTRAVENOUS at 13:25

## 2023-10-25 RX ADMIN — METOPROLOL SUCCINATE 25 MG: 25 TABLET, EXTENDED RELEASE ORAL at 08:45

## 2023-10-25 RX ADMIN — Medication 10 ML: at 08:45

## 2023-10-25 RX ADMIN — TRAMADOL HYDROCHLORIDE 50 MG: 50 TABLET, COATED ORAL at 21:38

## 2023-10-25 RX ADMIN — STANDARDIZED SENNA CONCENTRATE 8.6 MG: 8.6 TABLET ORAL at 08:45

## 2023-10-25 RX ADMIN — TRAMADOL HYDROCHLORIDE 50 MG: 50 TABLET, COATED ORAL at 16:07

## 2023-10-25 RX ADMIN — STANDARDIZED SENNA CONCENTRATE 8.6 MG: 8.6 TABLET ORAL at 21:38

## 2023-10-25 RX ADMIN — AMPICILLIN SODIUM AND SULBACTAM SODIUM 3000 MG: 2; 1 INJECTION, POWDER, FOR SOLUTION INTRAMUSCULAR; INTRAVENOUS at 18:34

## 2023-10-25 RX ADMIN — Medication 400 MG: at 21:38

## 2023-10-25 RX ADMIN — APIXABAN 2.5 MG: 2.5 TABLET, FILM COATED ORAL at 08:45

## 2023-10-25 RX ADMIN — APIXABAN 2.5 MG: 2.5 TABLET, FILM COATED ORAL at 21:38

## 2023-10-25 RX ADMIN — LISINOPRIL 10 MG: 10 TABLET ORAL at 08:45

## 2023-10-25 RX ADMIN — DIAZEPAM 2 MG: 2 TABLET ORAL at 00:15

## 2023-10-25 RX ADMIN — MORPHINE SULFATE 2 MG: 2 INJECTION, SOLUTION INTRAMUSCULAR; INTRAVENOUS at 01:54

## 2023-10-25 RX ADMIN — Medication 10 ML: at 21:56

## 2023-10-25 RX ADMIN — TRAMADOL HYDROCHLORIDE 50 MG: 50 TABLET, COATED ORAL at 01:06

## 2023-10-25 RX ADMIN — MORPHINE SULFATE 2 MG: 2 INJECTION, SOLUTION INTRAMUSCULAR; INTRAVENOUS at 05:49

## 2023-10-25 ASSESSMENT — PAIN DESCRIPTION - FREQUENCY: FREQUENCY: CONTINUOUS

## 2023-10-25 ASSESSMENT — PAIN DESCRIPTION - LOCATION
LOCATION: RIB CAGE
LOCATION: ABDOMEN;BACK
LOCATION: RIB CAGE

## 2023-10-25 ASSESSMENT — PAIN DESCRIPTION - DESCRIPTORS
DESCRIPTORS: ACHING;DISCOMFORT
DESCRIPTORS: ACHING

## 2023-10-25 ASSESSMENT — PAIN DESCRIPTION - ORIENTATION
ORIENTATION: RIGHT;LEFT

## 2023-10-25 ASSESSMENT — PAIN SCALES - GENERAL
PAINLEVEL_OUTOF10: 7
PAINLEVEL_OUTOF10: 8
PAINLEVEL_OUTOF10: 10

## 2023-10-25 ASSESSMENT — PAIN - FUNCTIONAL ASSESSMENT: PAIN_FUNCTIONAL_ASSESSMENT: ACTIVITIES ARE NOT PREVENTED

## 2023-10-25 ASSESSMENT — PAIN DESCRIPTION - ONSET: ONSET: ON-GOING

## 2023-10-25 ASSESSMENT — PAIN DESCRIPTION - PAIN TYPE: TYPE: CHRONIC PAIN

## 2023-10-25 NOTE — CARE COORDINATION
[facility]; ok to return per Osbaldo Renteria. Will need PT/OT ; precert. patient  agreeable to return. Room air. Transport forms on chart. ID following await input from medical team. IV atb's x1 dose. Will follow. Hx recent kyphoplasty. Killian Lopez.

## 2023-10-25 NOTE — ACP (ADVANCE CARE PLANNING)
Advance Care Planning   Healthcare Decision Maker:    Primary Decision Maker: Thi Dial Child - [phone]    Secondary Decision Maker: Ruth Bryant - Brother/Sister - [phone]    Bobbi Ruelas.

## 2023-10-25 NOTE — CONSULTS
[facility]  [facility]  Consultation Note     Admit Date: 10/24/2023  2:26 PM    Reason for Consult:   positive blood cultures     Attending Physician:  Blanche Brooks DO    HISTORY OF PRESENT ILLNESS:             The history is obtained from extensive review of available past medical records. The patient is a 78 y.o. female who is not previously known to the ID service. She presented to the ED on 10/23 from [facility] with complaints of nausea and abdominal pain. She was discharged from this hospital on 10/17 after being admitted for similar complaints. During that time work up was unremarkable - had CT abdomen/pelvis, RUQ ultrasound, EGD (with biopsies), and a liver nodule was noted and was to be worked up as an outpatient- described as cysts. The CT of the abdomen 10/23 showed some enteritis, no obstruction or fluid collections. There was small right lower lobe infiltrate, she was diagnosed with HCAP and because her labs and vitals were unremarkable she was sent back to the [facility] with a script for Levofloxacin 500mg for 5 days. She was called back to the ED on 10/24 due to positive blood cultures. Cultures grew Staph epidermidis and Enterococcus faecium. She has been afebrile but hypertensive. She has chronic back pain. She is complaining of being weak and fatigued. She was having abdominal pain last evening, it is somewhat improved today. She is denying nausea today. She was given Vancomycin and Unasyn in the ED. Blood cultures have been repeated.  ID was asked to see     Past Medical History:        Diagnosis Date    Cancer [facility]) [address] left    8-18-16 for removal    Familial peripheral neuropathy     Hypertension     Lupus ([facility])     Paroxysmal atrial fibrillation [facility])      Past Surgical History:        Procedure Laterality Date    CYSTOSCOPY      MULITPLE    EYE SURGERY  11/29/2012    right eye cataract with lens implant    EYE SURGERY Left 08/18/2016 removal of cataract with insertion of IOL    HYSTERECTOMY (CERVIX STATUS UNKNOWN)      PAIN MANAGEMENT PROCEDURE N/A 8/10/2023    L3-4 EPIDURAL STEROID INJECTION performed by Kaya Pickett DO at [address] Bilateral 5/3/2023    L1 and L2 KYPHOPLASTY performed by Topher Stokes MD at [address] 8/28/2023    T12, L3, L4 KYPHOPLASTY performed by Topher Stokes MD at [address] 10/14/2023    KYPHOPLASTY T11 performed by Tammi Lopez DO at [facility] N/A 10/12/2023    EGD BIOPSY performed by Raina Saleh DO at [facility] ENDOSCOPY     Current Medications:   Scheduled Meds:   lidocaine  1 patch TransDERmal Daily    sodium chloride flush  10 mL IntraVENous 2 times per day    magnesium oxide  400 mg Oral Nightly    lisinopril  10 mg Oral QAM    apixaban  2.5 mg Oral BID    metoprolol succinate  25 mg Oral QAM    senna  1 tablet Oral BID     Continuous Infusions:   sodium chloride       PRN Meds:melatonin, sodium chloride flush, sodium chloride, potassium chloride **OR** potassium alternative oral replacement **OR** potassium chloride, magnesium sulfate, ondansetron **OR** ondansetron, senna, acetaminophen **OR** acetaminophen, diazePAM, bisacodyl, magnesium hydroxide, traMADol, morphine    Allergies:  Acetaminophen and Sulfa antibiotics    Social History:   Social History     Socioeconomic History    Marital status:       Spouse name: None    Number of children: None    Years of education: None    Highest education level: None   Tobacco Use    Smoking status: Never    Smokeless tobacco: Never   Vaping Use    Vaping Use: Never used   Substance and Sexual Activity    Alcohol use: Yes     Comment: rare    Drug use: Never   Social History Narrative    Drinks decaf tea    '  Nursing home resident     Family History:       Problem Relation Age of Onset    Other Mother         multiple myeloma    Pacemaker Mother     Colon Cancer Father

## 2023-10-25 NOTE — PROGRESS NOTES
Physical Therapy  Facility/Department: [facility] SURG/TELE  Physical Therapy Initial Assessment    Name: Brayan Fonseca  : 1940  MRN: [id number]  Date of Service: 10/25/2023    Attending Provider:  Tavon Garcia DO    Evaluating PT:  Marilee German P.T. Room #:  4751/7795-D  Diagnosis:  Bacteremia [R78.81]  Pertinent PMHx/PSHx:  kyphoplasty T11 10/14/23  Precautions:  Falls, bed/chair alarm    SUBJECTIVE:    Pt lives alone in a 1 story home with 1+1+1 steps to enter. Pt ambulated with a rollator ww PTA. Pt came in from [address] [place] [address]. OBJECTIVE:   Initial Evaluation  Date: 10/25/23 Treatment Short Term/ Long Term   Goals   Was pt agreeable to Eval/treatment? yes     Does pt have pain? C/o pain in her ribs and stomach     Bed Mobility  Rolling: supervision  Supine to sit: supervision  Sit to supine: supervision  Scooting: supervision  Independent    Transfers Sit to stand: SBA  Stand to sit: SBA  Stand pivot: SBA with ww  Independent    Ambulation   15 feet + 40 feet x2 with ww SBA  200 feet with ww supervision   Stair negotiation: ascended and descended NA, pt c/o fatigue and weakness with amb  1 step 3x with ww SBA or 3 steps with 1 rail SBA   AM-PAC 6 Clicks 66/06       BLE ROM is WFL. BLE strength is grossly 4-/5 to 4/5. Sensation:  Pt reports she has neuropathy B feet and hands. Balance: sitting is supervision and standing with ww is SBA  Endurance: fair    ASSESSMENT:    Conditions Requiring Skilled Therapeutic Intervention:    [x]Decreased strength     []Decreased ROM  [x]Decreased functional mobility  [x]Decreased balance   [x]Decreased endurance   [x]Decreased posture  []Decreased sensation  []Decreased coordination   []Decreased vision  []Decreased safety awareness   [x]Increased pain       Comments:  Pt was in bed and was agreeable to PT. She has flexed forward kyphotic posture with sitting and standing. Pt sat up to EOB and asked to use BR.  She walked with ww into the BR and 13:20  Time out  13:45    Total Treatment Time 12 minutes     Evaluation Time includes thorough review of current medical information, gathering information on past medical history/social history and prior level of function, completion of standardized testing/informal observation of tasks, assessment of data and education on plan of care and goals. CPT codes:  [x] Low Complexity PT evaluation 04007  [] Moderate Complexity PT evaluation 77585  [] High Complexity PT evaluation 79926  [] PT Re-evaluation 37811  [] Gait training 30713 ** minutes  [] Manual therapy 40719 ** minutes  [x] Therapeutic activities 72597 12 minutes  [] Therapeutic exercises 25037 ** minutes  [] Neuromuscular reeducation 94905 ** minutes     Damián Albarran., P.T.   License Number: [id number]

## 2023-10-25 NOTE — PROGRESS NOTES
Consult completed to Dr. Apollo Perez completed to Dr. Dewey Kovacs completed to Urology at this time

## 2023-10-25 NOTE — PROGRESS NOTES
OCCUPATIONAL THERAPY INITIAL EVALUATION    [address]   [facility], [address] [place] [address] Drive        EZWP:                                                  Patient Name: Cynthia Padilla    MRN: [id number]    : 1940    Room: [address] [place] [address]     Evaluating OT: Bibi Samuels OTR/L #[id number]    Referring Provider:  Cee Carr DO     Specific Provider Orders/Date:  OT Eval and Treat, 10/24/23     Diagnosis:   1.  Bacteremia         Surgery: None      Pertinent Medical History: Cataract, lupus, HTN, kyphoplasty 2023       Precautions:  Fall Risk, alarm      Assessment of current deficits    [x] Functional mobility  [x]ADLs  [x] Strength               []Cognition    [x] Functional transfers   [x] IADLs         [x] Safety Awareness   [x]Endurance    [] Fine Coordination              [x] Balance      [] Vision/perception   []Sensation     []Gross Motor Coordination  [] ROM  [] Delirium                   [] Motor Control     OT PLAN OF CARE   OT POC based on physician orders, patient diagnosis and results of clinical assessment    Frequency/Duration 2-5 days/wk for 2 weeks PRN     Specific OT Treatment Interventions to include:   * Instruction/training on adapted ADL techniques and AE recommendations to increase functional independence within precautions       * Training on energy conservation strategies, correct breathing pattern and techniques to improve independence/tolerance for self-care routine  * Functional transfer/mobility training/DME recommendations for increased independence, safety, and fall prevention  * Patient/Family education to increase follow through with safety techniques and functional independence  * Recommendation of environmental modifications for increased safety with functional transfers/mobility and ADLs  * Therapeutic exercise to improve motor endurance, ROM, and functional strength for ADLs/functional transfers  * Therapeutic activities to facilitate/challenge dynamic balance, stand tolerance for increased safety and independence with ADLs  * Positioning to improve skin integrity, interaction with environment and functional independence    Recommended Adaptive Equipment: TBD      Home Living: Lives alone, single family home, 1 story, 1+1+1 step to enter. Bathroom set-up: Shower in basement, tub only on 1st floor         Equipment owned: Rollator     Prior Level of Function: Modified [place] with ADLs , has assist with IADLs; ambulated with rollator at baseline. Pt presented from [address] [place] [address]. Pain Level: Pt c/o rib pain     Cognition: A&O: 4/4; Follows 3 step directions   Memory: good    Sequencing: good    Problem solving: good    Judgement/safety: fair +     Functional Assessment: AM-PAC Daily Activity Raw Score: 16/24   Initial Eval Status  Date: 10/25/23   Treatment Status  Date:  STGs = LTGs  Time frame: 10-14 days   Feeding Supervision     Independent    Grooming Stand by Assist    Modified [place]    UB Dressing Minimal Assist    Modified [place]    LB Dressing Moderate Assist     Modified [place]    Bathing Moderate Assist     Modified [place]    Toileting Minimal Assist     Modified [place]    Bed Mobility  Supine to sit: Stand by Assist   Sit to supine:  Stand by Assist     Supine to sit: Independent   Sit to supine: Independent    Functional Transfers Sit to stand: Stand by Assist   Stand to sit: Stand by Assist     Transfer training with verbal cues for hand placement to improve safety. Independent    Functional Mobility CGA with wheeled walker to improve balance to/from bathroom, verbal cues for walker sequence and safety.      Modified [place] with use of wheeled walker    Balance Sitting:     Static: fair plus     Dynamic: fair plus   Standing: fair/fair plus with walker     Sitting:     Static: good    Dynamic: good  Standing: good with walker    Activity Tolerance fair

## 2023-10-25 NOTE — H&P
Internal Medicine History & Physical     Name: Garima Khan  : 1940  Chief Complaint: Other (Positive BC)  Primary Care Physician: Rosa Elena Rao MD  Admission date: 10/24/2023  Date of service: 10/25/2023   Unit: [facility] MED SURG/TELE     History of Present Illness  Sammy Brown is a 78y.o. year old female that presents to the ED with chief complaint of abdominal pain. The patient states that she was just in the ED yesterday and sent home with the same abdominal pain. She endorses this abdominal pain for multiple years. The pain is bilateral in the ribs and upper abdomen. The pain is constant 10 out of 10 with no relieving factors. The patient states that her   21 months ago and that she has no appetite and feels very weak. She also states she has had 3 kyphoplasties with the last one in 2023 with Doctor Philip Nash. She is at [facility] for rehab. The patient presents with abdominal pain that has been going on for multiple years. These symptoms are moderate in severity. Symptoms are made better by nothing. Symptoms are made worse by nothing. Associated symptoms include none. There are no family or friends at bedside. The history is provided by patient. She is felt to be a good historian. ED course:   Initial blood work and imaging studies performed. Admission recommended by ED physician. I discussed the case with the ED provider.  Meds in the ED consisted of the following:   sodium chloride flush 0.9 % injection 5-40 mL (has no administration in time range)   sodium chloride flush 0.9 % injection 5-40 mL (has no administration in time range)   0.9 % sodium chloride infusion (has no administration in time range)   acetaminophen (TYLENOL) tablet 650 mg (has no administration in time range)   ondansetron (ZOFRAN-ODT) disintegrating tablet 4 mg (has no administration in time range)     Or   ondansetron (ZOFRAN) injection 4 mg (has no administration in time range)   fentaNYL rashes, no lesions  Neurologic:5/5 muscle strength throughout, normal muscle tone throughout, face symmetric, hearing intact, tongue midline, speech appropriate without slurring, sensation to fine touch intact in upper and lower extremities    Labs-   Lab Results   Component Value Date    WBC 5.8 10/25/2023    HGB 10.4 (L) 10/25/2023    HCT 33.2 (L) 10/25/2023     10/25/2023     10/25/2023    K 3.9 10/25/2023    CL 99 10/25/2023    CREATININE 0.5 10/25/2023    BUN 7 10/25/2023    CO2 28 10/25/2023    GLUCOSE 106 (H) 10/25/2023    ALT 13 10/25/2023    AST 19 10/25/2023    INR 1.2 10/12/2023       Lab Results   Component Value Date    TROPHS 24 (H) 10/23/2023    TROPHS 24 (H) 10/23/2023    TROPHS 25 (H) 10/10/2023        Lab Results   Component Value Date    TSH 0.85 10/10/2023       Lab Results   Component Value Date    MG 1.9 10/16/2023           Recent Radiological Studies:  CT ABDOMEN PELVIS W IV CONTRAST Additional Contrast? None  Result Date: 10/23/2023  There is been interval kyphoplasty of T11. Development of small bilateral pleural effusions and consolidation at the lung bases bilaterally suggesting either atelectatic change or infiltrate. The abdomen and pelvis is otherwise stable and unremarkable. Some fluid-filled small bowel loops to suggest possibly a mild enteritis in which clinical correlation is needed. MRI THORACIC SPINE WO CONTRAST  Result Date: 10/12/2023  Acute compression fracture of T11. Acute on subacute to chronic compression fracture of T12, status post vertebroplasty. MRI LUMBAR SPINE WO CONTRAST  Result Date: 10/12/2023  Acute compression fracture of T11. Acute on subacute to chronic compression fracture of T12, status post vertebroplasty. US GALLBLADDER RUQ  Result Date: 10/10/2023  1. Normal gallbladder ultrasound. No biliary dilatation, free fluid, or acute disease identified.  2.  Diffusely heterogenous hepatic parenchyma with innumerable small lesions,

## 2023-10-25 NOTE — CARE COORDINATION
I was called by nursing because patient is complaining of back and rib pain. Hx kyphosis. Nursing also states patient afib RVR on monitor but manual pulse in the 90s per nursing. Order given to obain EKG. Patient was given fentanyl for pain in ED, order given for IV morphine prn.

## 2023-10-25 NOTE — PROGRESS NOTES
Internal Medicine Progress Note    Patient's name: Gerhardt Codding  : 1940  Chief complaints (on day of admission): Other (Positive BC)  Admission date: 10/24/2023  Date of service: 10/26/2023   Room: [address] MED SURG/TELE  Primary care physician: Hetal House MD  Reason for visit: Follow-up for bacteremia     Subjective  Adri Bob was seen and examined. She was resting in bed. Her abdominal pain is better. She is tolerating her antibiotics. She has a Davidson catheter and this seemed to improve some of her abdominal symptoms. Review of Systems  There are no new complaints of chest pain, shortness of breath, nausea, vomiting, diarrhea, constipation.     Hospital Medications  Current Facility-Administered Medications   Medication Dose Route Frequency Provider Last Rate Last Admin    lidocaine 4 % external patch 1 patch  1 patch TransDERmal Daily Samantha Rogers APRN - CNP   1 patch at 10/25/23 0227    ampicillin-sulbactam (UNASYN) 3,000 mg in sodium chloride 0.9 % 100 mL IVPB  3,000 mg IntraVENous Q6H VARSHA Montanez CNP   Stopped at 10/26/23 0155    melatonin tablet 3 mg  3 mg Oral Nightly PRN Abdirashid Yung E, DO        sodium chloride flush 0.9 % injection 10 mL  10 mL IntraVENous 2 times per day Abdirashid Yung, DO   10 mL at 10/25/23 2156    sodium chloride flush 0.9 % injection 10 mL  10 mL IntraVENous PRN Abdirashid Yung, DO        0.9 % sodium chloride infusion   IntraVENous PRN Abdirashid Yung, DO        potassium chloride (KLOR-CON M) extended release tablet 40 mEq  40 mEq Oral PRN Abdirashid Yung E, DO        Or    potassium bicarb-citric acid (EFFER-K) effervescent tablet 40 mEq  40 mEq Oral PRN Abdirashid Yung E, DO        Or    potassium chloride 10 mEq/100 mL IVPB (Peripheral Line)  10 mEq IntraVENous PRN YovaniinoAbdirashid E, DO        magnesium sulfate 2000 mg in 50 mL IVPB premix  2,000 mg IntraVENous PRN Abdirashid Yung E, DO        ondansetron (ZOFRAN-ODT) disintegrating tablet 4 mg  4 compression fracture of T12, status post vertebroplasty. US GALLBLADDER RUQ  Result Date: 10/10/2023  1. Normal gallbladder ultrasound. No biliary dilatation, free fluid, or acute disease identified. 2.  Diffusely heterogenous hepatic parenchyma with innumerable small lesions, solid by ultrasound and low-density by CT. Diagnostic considerations would include metastatic liver disease or possibly multiple biliary hamartomas. CT ABDOMEN PELVIS W IV CONTRAST Additional Contrast? None  Result Date: 10/5/2023  1. No evidence of pulmonary embolism. 2. No acute process in chest, abdomen, or pelvis. 3. New depression involving inferior endplate of T21 could indicate acute fracture. 4. Multiple chronic fractures involving lumbar spine with evidence of kyphoplasty. CTA PULMONARY W CONTRAST  Result Date: 10/5/2023  1. No evidence of pulmonary embolism. 2. No acute process in chest, abdomen, or pelvis. 3. New depression involving inferior endplate of C17 could indicate acute fracture. 4. Multiple chronic fractures involving lumbar spine with evidence of kyphoplasty.      Assessment   Active Hospital Problems    Diagnosis     Bacteremia [R78.81]      Priority: High    Chronic bilateral low back pain without sciatica [M54.50, G89.29]     Chronic pain syndrome [G89.4 (ICD-10-CM)] [G89.4]     Closed compression fracture of third lumbar vertebra (HCC) [S32.030A]     Sinus node dysfunction (HCC) [I49.5]     A-fib (HCC) [I48.91]     Primary hypertension [I10]          Plan  Enterococcus bacteremia-GI source?:  BC's noted-- awaiting final sensitivities   ABX per ID-Unasyn IV  Defer need for CIRILO to ID     Pleural effusion:  CT noted  Pulmonology consult     Persistent abdominal pain-- radiation from the thoracic compression fracture?:  CT abd/pel noted  GB US noted  sp EGD 10/12-- 2 biopsies taken, results reviewed with patient showing no signs of malignancy  IV morphine prn pain  PRN Zofran for nausea  GI consult

## 2023-10-26 LAB
ACB COMPLEX DNA BLD POS QL NAA+NON-PROBE: NOT DETECTED
ALBUMIN SERPL-MCNC: 3.3 G/DL (ref 3.5–5.2)
ALP SERPL-CCNC: 87 U/L (ref 35–104)
ALT SERPL-CCNC: 11 U/L (ref 0–32)
ANION GAP SERPL CALCULATED.3IONS-SCNC: 12 MMOL/L (ref 7–16)
AST SERPL-CCNC: 18 U/L (ref 0–31)
B FRAGILIS DNA BLD POS QL NAA+NON-PROBE: NOT DETECTED
BASOPHILS # BLD: 0.03 K/UL (ref 0–0.2)
BASOPHILS NFR BLD: 1 % (ref 0–2)
BILIRUB SERPL-MCNC: 0.3 MG/DL (ref 0–1.2)
BIOFIRE TEST COMMENT: ABNORMAL
BUN SERPL-MCNC: 7 MG/DL (ref 6–23)
C ALBICANS DNA BLD POS QL NAA+NON-PROBE: NOT DETECTED
C AURIS DNA BLD POS QL NAA+NON-PROBE: NOT DETECTED
C GATTII+NEOFOR DNA BLD POS QL NAA+N-PRB: NOT DETECTED
C GLABRATA DNA BLD POS QL NAA+NON-PROBE: NOT DETECTED
C KRUSEI DNA BLD POS QL NAA+NON-PROBE: NOT DETECTED
C PARAP DNA BLD POS QL NAA+NON-PROBE: NOT DETECTED
C TROPICLS DNA BLD POS QL NAA+NON-PROBE: NOT DETECTED
CALCIUM SERPL-MCNC: 8.7 MG/DL (ref 8.6–10.2)
CHLORIDE SERPL-SCNC: 100 MMOL/L (ref 98–107)
CO2 SERPL-SCNC: 25 MMOL/L (ref 22–29)
CREAT SERPL-MCNC: 0.5 MG/DL (ref 0.5–1)
E CLOAC COMP DNA BLD POS NAA+NON-PROBE: NOT DETECTED
E COLI DNA BLD POS QL NAA+NON-PROBE: NOT DETECTED
E FAECALIS DNA BLD POS QL NAA+NON-PROBE: NOT DETECTED
E FAECIUM DNA BLD POS QL NAA+NON-PROBE: DETECTED
EKG ATRIAL RATE: 82 BPM
EKG P AXIS: 28 DEGREES
EKG P-R INTERVAL: 154 MS
EKG Q-T INTERVAL: 360 MS
EKG QRS DURATION: 74 MS
EKG QTC CALCULATION (BAZETT): 420 MS
EKG R AXIS: 11 DEGREES
EKG T AXIS: 41 DEGREES
EKG VENTRICULAR RATE: 82 BPM
ENTEROBACTERALES DNA BLD POS NAA+N-PRB: NOT DETECTED
EOSINOPHIL # BLD: 0.07 K/UL (ref 0.05–0.5)
EOSINOPHILS RELATIVE PERCENT: 2 % (ref 0–6)
ERYTHROCYTE [DISTWIDTH] IN BLOOD BY AUTOMATED COUNT: 14 % (ref 11.5–15)
GFR SERPL CREATININE-BSD FRML MDRD: >60 ML/MIN/1.73M2
GLUCOSE SERPL-MCNC: 90 MG/DL (ref 74–99)
GP B STREP DNA BLD POS QL NAA+NON-PROBE: NOT DETECTED
HAEM INFLU DNA BLD POS QL NAA+NON-PROBE: NOT DETECTED
HCT VFR BLD AUTO: 30.9 % (ref 34–48)
HGB BLD-MCNC: 9.8 G/DL (ref 11.5–15.5)
IMM GRANULOCYTES # BLD AUTO: <0.03 K/UL (ref 0–0.58)
IMM GRANULOCYTES NFR BLD: 0 % (ref 0–5)
K OXYTOCA DNA BLD POS QL NAA+NON-PROBE: NOT DETECTED
KLEBSIELLA SP DNA BLD POS QL NAA+NON-PRB: NOT DETECTED
KLEBSIELLA SP DNA BLD POS QL NAA+NON-PRB: NOT DETECTED
L MONOCYTOG DNA BLD POS QL NAA+NON-PROBE: NOT DETECTED
LYMPHOCYTES NFR BLD: 1.02 K/UL (ref 1.5–4)
LYMPHOCYTES RELATIVE PERCENT: 23 % (ref 20–42)
MCH RBC QN AUTO: 28.4 PG (ref 26–35)
MCHC RBC AUTO-ENTMCNC: 31.7 G/DL (ref 32–34.5)
MCV RBC AUTO: 89.6 FL (ref 80–99.9)
MECA+MECC ISLT/SPM QL: NOT DETECTED
MICROORGANISM SPEC CULT: ABNORMAL
MICROORGANISM SPEC CULT: ABNORMAL
MICROORGANISM/AGENT SPEC: ABNORMAL
MONOCYTES NFR BLD: 0.42 K/UL (ref 0.1–0.95)
MONOCYTES NFR BLD: 9 % (ref 2–12)
N MEN DNA BLD POS QL NAA+NON-PROBE: NOT DETECTED
NEUTROPHILS NFR BLD: 65 % (ref 43–80)
NEUTS SEG NFR BLD: 2.89 K/UL (ref 1.8–7.3)
P AERUGINOSA DNA BLD POS NAA+NON-PROBE: NOT DETECTED
PLATELET # BLD AUTO: 258 K/UL (ref 130–450)
PMV BLD AUTO: 8.7 FL (ref 7–12)
POTASSIUM SERPL-SCNC: 3.6 MMOL/L (ref 3.5–5)
PROT SERPL-MCNC: 5.4 G/DL (ref 6.4–8.3)
PROTEUS SP DNA BLD POS QL NAA+NON-PROBE: NOT DETECTED
RBC # BLD AUTO: 3.45 M/UL (ref 3.5–5.5)
S AUREUS DNA BLD POS QL NAA+NON-PROBE: NOT DETECTED
S AUREUS+CONS DNA BLD POS NAA+NON-PROBE: DETECTED
S EPIDERMIDIS DNA BLD POS QL NAA+NON-PRB: DETECTED
S LUGDUNENSIS DNA BLD POS QL NAA+NON-PRB: NOT DETECTED
S MALTOPHILIA DNA BLD POS QL NAA+NON-PRB: NOT DETECTED
S MARCESCENS DNA BLD POS NAA+NON-PROBE: NOT DETECTED
S PNEUM DNA BLD POS QL NAA+NON-PROBE: NOT DETECTED
S PYO DNA BLD POS QL NAA+NON-PROBE: NOT DETECTED
SALMONELLA DNA BLD POS QL NAA+NON-PROBE: NOT DETECTED
SERVICE CMNT-IMP: ABNORMAL
SODIUM SERPL-SCNC: 137 MMOL/L (ref 132–146)
SPECIMEN DESCRIPTION: ABNORMAL
STREPTOCOCCUS DNA BLD POS NAA+NON-PROBE: NOT DETECTED
VANA+VANB ISLT/SPM QL: NOT DETECTED
WBC OTHER # BLD: 4.5 K/UL (ref 4.5–11.5)

## 2023-10-26 PROCEDURE — 2580000003 HC RX 258: Performed by: REGISTERED NURSE

## 2023-10-26 PROCEDURE — 2580000003 HC RX 258: Performed by: INTERNAL MEDICINE

## 2023-10-26 PROCEDURE — 85025 COMPLETE CBC W/AUTO DIFF WBC: CPT

## 2023-10-26 PROCEDURE — 88112 CYTOPATH CELL ENHANCE TECH: CPT

## 2023-10-26 PROCEDURE — 6370000000 HC RX 637 (ALT 250 FOR IP): Performed by: NURSE PRACTITIONER

## 2023-10-26 PROCEDURE — 1200000000 HC SEMI PRIVATE

## 2023-10-26 PROCEDURE — 80053 COMPREHEN METABOLIC PANEL: CPT

## 2023-10-26 PROCEDURE — 6360000002 HC RX W HCPCS: Performed by: REGISTERED NURSE

## 2023-10-26 PROCEDURE — 6370000000 HC RX 637 (ALT 250 FOR IP): Performed by: INTERNAL MEDICINE

## 2023-10-26 PROCEDURE — 93010 ELECTROCARDIOGRAM REPORT: CPT | Performed by: INTERNAL MEDICINE

## 2023-10-26 RX ORDER — PANTOPRAZOLE SODIUM 40 MG/1
40 TABLET, DELAYED RELEASE ORAL
Status: DISCONTINUED | OUTPATIENT
Start: 2023-10-27 | End: 2023-11-02 | Stop reason: HOSPADM

## 2023-10-26 RX ORDER — POLYVINYL ALCOHOL 14 MG/ML
1 SOLUTION/ DROPS OPHTHALMIC 2 TIMES DAILY PRN
Status: DISCONTINUED | OUTPATIENT
Start: 2023-10-26 | End: 2023-11-02 | Stop reason: HOSPADM

## 2023-10-26 RX ORDER — BISACODYL 5 MG/1
5 TABLET, DELAYED RELEASE ORAL ONCE
Status: COMPLETED | OUTPATIENT
Start: 2023-10-26 | End: 2023-10-26

## 2023-10-26 RX ORDER — LACTOBACILLUS RHAMNOSUS GG 10B CELL
1 CAPSULE ORAL DAILY
Status: DISCONTINUED | OUTPATIENT
Start: 2023-10-26 | End: 2023-11-02 | Stop reason: HOSPADM

## 2023-10-26 RX ADMIN — METOPROLOL SUCCINATE 25 MG: 25 TABLET, EXTENDED RELEASE ORAL at 09:07

## 2023-10-26 RX ADMIN — TRAMADOL HYDROCHLORIDE 50 MG: 50 TABLET, COATED ORAL at 21:51

## 2023-10-26 RX ADMIN — STANDARDIZED SENNA CONCENTRATE 8.6 MG: 8.6 TABLET ORAL at 20:50

## 2023-10-26 RX ADMIN — APIXABAN 2.5 MG: 2.5 TABLET, FILM COATED ORAL at 09:07

## 2023-10-26 RX ADMIN — Medication 10 ML: at 09:08

## 2023-10-26 RX ADMIN — AMPICILLIN SODIUM AND SULBACTAM SODIUM 3000 MG: 2; 1 INJECTION, POWDER, FOR SOLUTION INTRAMUSCULAR; INTRAVENOUS at 06:06

## 2023-10-26 RX ADMIN — AMPICILLIN SODIUM AND SULBACTAM SODIUM 3000 MG: 2; 1 INJECTION, POWDER, FOR SOLUTION INTRAMUSCULAR; INTRAVENOUS at 13:19

## 2023-10-26 RX ADMIN — LISINOPRIL 10 MG: 10 TABLET ORAL at 09:07

## 2023-10-26 RX ADMIN — Medication 400 MG: at 20:50

## 2023-10-26 RX ADMIN — STANDARDIZED SENNA CONCENTRATE 8.6 MG: 8.6 TABLET ORAL at 09:07

## 2023-10-26 RX ADMIN — AMPICILLIN SODIUM AND SULBACTAM SODIUM 3000 MG: 2; 1 INJECTION, POWDER, FOR SOLUTION INTRAMUSCULAR; INTRAVENOUS at 01:18

## 2023-10-26 RX ADMIN — POLYVINYL ALCOHOL 1 DROP: 14 SOLUTION/ DROPS OPHTHALMIC at 18:39

## 2023-10-26 RX ADMIN — Medication 10 ML: at 20:51

## 2023-10-26 RX ADMIN — AMPICILLIN SODIUM AND SULBACTAM SODIUM 3000 MG: 2; 1 INJECTION, POWDER, FOR SOLUTION INTRAMUSCULAR; INTRAVENOUS at 18:39

## 2023-10-26 RX ADMIN — BISACODYL 5 MG: 5 TABLET, COATED ORAL at 13:18

## 2023-10-26 RX ADMIN — Medication 1 CAPSULE: at 20:51

## 2023-10-26 RX ADMIN — DIAZEPAM 2 MG: 2 TABLET ORAL at 13:18

## 2023-10-26 RX ADMIN — TRAMADOL HYDROCHLORIDE 50 MG: 50 TABLET, COATED ORAL at 16:30

## 2023-10-26 ASSESSMENT — PAIN SCALES - GENERAL
PAINLEVEL_OUTOF10: 8
PAINLEVEL_OUTOF10: 8

## 2023-10-26 ASSESSMENT — PAIN DESCRIPTION - LOCATION: LOCATION: RIB CAGE

## 2023-10-26 NOTE — PROGRESS NOTES
4 Eyes Skin Assessment     NAME:  Natali Jones  YOB: 1940  MEDICAL RECORD NUMBER:  [id number]    The patient is being assessed for  Admission    I agree that at least one RN has performed a thorough Head to Toe Skin Assessment on the patient. ALL assessment sites listed below have been assessed. Areas assessed by both nurses:    Head, Face, Ears, Shoulders, Back, Chest, Arms, Elbows, Hands, Sacrum. Buttock, Coccyx, Ischium, Legs. Feet and Heels, and Under Medical Devices         Does the Patient have a Wound?  No noted wound(s)       Kailash Prevention initiated by RN: No  Wound Care Orders initiated by RN: No    Pressure Injury (Stage 3,4, Unstageable, DTI, NWPT, and Complex wounds) if present, place Wound referral order by RN under : No    New Ostomies, if present place, Ostomy referral order under : No     Nurse 1 eSignature: Electronically signed by Tony Styles RN on 10/26/23 at 3:14 AM EDT    **SHARE this note so that the co-signing nurse can place an eSignature**    Nurse 2 eSignature: Electronically signed by Ninfa Marroquin on 10/26/23 at 3:15 AM EDT

## 2023-10-26 NOTE — CARE COORDINATION
Precert initiated for return to [facility];  approved by insurance  per joe; 0 [facility] 10/28; IF NOT DISCHARGED, WILL NEED TO RE- [address] [place] [address]. Await consultant input. Will follow. July Rivera.

## 2023-10-26 NOTE — CONSULTS
Pulmonary Consultation    Admit Date: 10/24/2023  Requesting Physician: Melo Martines MD        Reason for consultation:  Pleural effusion      HPI:  Patient is an 78year old female who presents to emergency room with nausea and abdominal pain. She was seen in emergency room on 10/23 for abdominal pain and sent back to nursing facility with hospital acquired pneumonia placed on levaquin after finding a right lower lobe infiltrate on a CT of the abdomen. During that ER visit blood cultures were obtained and came back positive with enterococcus faecium. She was previously admitted prior to that on 10/10/23 with abdominal pain. She had a workup completed with an EGD and an liver nodule. Patient was started on intravenous Unasyn with infectious disease following. Patient has no smoking history or occupational exposures. She denies any underlying lung disease. Patient is seen in bed on room air. She denies wheezing, chest pain, tightness, or discomfort. Patient denies any fevers or any difficulty swallowing.        PMH:    Past Medical History:   Diagnosis Date    Cancer ([address]) [address] left    8-18-16 for removal    Familial peripheral neuropathy     Hypertension     Lupus ([address])     Paroxysmal atrial fibrillation (HCC)      PSH:   Past Surgical History:   Procedure Laterality Date    CYSTOSCOPY      MULITPLE    EYE SURGERY  11/29/2012    right eye cataract with lens implant    EYE SURGERY Left 08/18/2016    removal of cataract with insertion of IOL    HYSTERECTOMY (CERVIX STATUS UNKNOWN)      PAIN MANAGEMENT PROCEDURE N/A 8/10/2023    L3-4 EPIDURAL STEROID INJECTION performed by Yaz Carpenter DO at [address] Bilateral 5/3/2023    L1 and L2 KYPHOPLASTY performed by James Ivory MD at [address] 8/28/2023    T12, L3, L4 KYPHOPLASTY performed by James Ivory MD at [address] 10/14/2023    KYPHOPLASTY T11 performed by Xavi Watkins

## 2023-10-26 NOTE — CONSULTS
10/26/2023 2:56 PM  Louisa Szymanski  [id number]     Chief Complaint:    Urinary retention      History of Present Illness: The patient is a 78 y.o. female patient who presented to the hospital with complaints of  abdominal pain. She is being evaluated for bacteremia with unclear etiology. Urology was asked to evaluate for urinary retention. She was bladder scanned yesterday for a post void residual of 750ml. A oneill was inserted. She is known to our practice and is a patient of Dr. Everet Mortimer. She has a history of bladder cancer many years ago which was resected. She reports she had surveillance cystoscopies following this with no evidence of any recurrence. She reports 3 back surgeries this year since April. No family is present.  She denies incontinence prior to arrival.     Past Medical History:   Diagnosis Date    Cancer [facility]) [address] [facility] left    8-18-16 for removal    Familial peripheral neuropathy     Hypertension     Lupus ([address] [facility])     Paroxysmal atrial fibrillation [facility])          Past Surgical History:   Procedure Laterality Date    CYSTOSCOPY      MULITPLE    EYE SURGERY  11/29/2012    right eye cataract with lens implant    EYE SURGERY Left 08/18/2016    removal of cataract with insertion of IOL    HYSTERECTOMY (CERVIX STATUS UNKNOWN)      PAIN MANAGEMENT PROCEDURE N/A 8/10/2023    L3-4 EPIDURAL STEROID INJECTION performed by Jose Melton DO at [address] Bilateral 5/3/2023    L1 and L2 KYPHOPLASTY performed by Elida Sotelo MD at [address] 8/28/2023    T12, L3, L4 KYPHOPLASTY performed by Elida Sotelo MD at [address] 10/14/2023    KYPHOPLASTY T11 performed by Jamila Carreon DO at [address] [facility] [address] 10/12/2023    EGD BIOPSY performed by Arslan Lopez DO at [facility] ENDOSCOPY       Medications Prior to Admission:    Medications Prior to Admission: bisacodyl (DULCOLAX) 10 MG suppository,

## 2023-10-27 ENCOUNTER — ANESTHESIA EVENT (OUTPATIENT)
Dept: NON INVASIVE DIAGNOSTICS | Age: 83
End: 2023-10-27
Payer: MEDICARE

## 2023-10-27 ENCOUNTER — APPOINTMENT (OUTPATIENT)
Dept: NON INVASIVE DIAGNOSTICS | Age: 83
DRG: 871 | End: 2023-10-27
Payer: MEDICARE

## 2023-10-27 ENCOUNTER — ANESTHESIA (OUTPATIENT)
Dept: NON INVASIVE DIAGNOSTICS | Age: 83
End: 2023-10-27
Payer: MEDICARE

## 2023-10-27 LAB
ALBUMIN SERPL-MCNC: 3.1 G/DL (ref 3.5–5.2)
ALP SERPL-CCNC: 77 U/L (ref 35–104)
ALT SERPL-CCNC: 10 U/L (ref 0–32)
ANION GAP SERPL CALCULATED.3IONS-SCNC: 9 MMOL/L (ref 7–16)
AST SERPL-CCNC: 17 U/L (ref 0–31)
BASOPHILS # BLD: 0.03 K/UL (ref 0–0.2)
BASOPHILS NFR BLD: 1 % (ref 0–2)
BILIRUB SERPL-MCNC: 0.3 MG/DL (ref 0–1.2)
BUN SERPL-MCNC: 9 MG/DL (ref 6–23)
CALCIUM SERPL-MCNC: 8.4 MG/DL (ref 8.6–10.2)
CHLORIDE SERPL-SCNC: 102 MMOL/L (ref 98–107)
CO2 SERPL-SCNC: 27 MMOL/L (ref 22–29)
CREAT SERPL-MCNC: 0.5 MG/DL (ref 0.5–1)
EOSINOPHIL # BLD: 0.06 K/UL (ref 0.05–0.5)
EOSINOPHILS RELATIVE PERCENT: 2 % (ref 0–6)
ERYTHROCYTE [DISTWIDTH] IN BLOOD BY AUTOMATED COUNT: 14 % (ref 11.5–15)
GFR SERPL CREATININE-BSD FRML MDRD: >60 ML/MIN/1.73M2
GLUCOSE SERPL-MCNC: 87 MG/DL (ref 74–99)
HCT VFR BLD AUTO: 29.3 % (ref 34–48)
HGB BLD-MCNC: 9.4 G/DL (ref 11.5–15.5)
IMM GRANULOCYTES # BLD AUTO: <0.03 K/UL (ref 0–0.58)
IMM GRANULOCYTES NFR BLD: 0 % (ref 0–5)
LYMPHOCYTES NFR BLD: 1.06 K/UL (ref 1.5–4)
LYMPHOCYTES RELATIVE PERCENT: 30 % (ref 20–42)
MAGNESIUM SERPL-MCNC: 2 MG/DL (ref 1.6–2.6)
MCH RBC QN AUTO: 28.7 PG (ref 26–35)
MCHC RBC AUTO-ENTMCNC: 32.1 G/DL (ref 32–34.5)
MCV RBC AUTO: 89.3 FL (ref 80–99.9)
MICROORGANISM SPEC CULT: ABNORMAL
MICROORGANISM/AGENT SPEC: ABNORMAL
MONOCYTES NFR BLD: 0.32 K/UL (ref 0.1–0.95)
MONOCYTES NFR BLD: 9 % (ref 2–12)
NEUTROPHILS NFR BLD: 58 % (ref 43–80)
NEUTS SEG NFR BLD: 2.02 K/UL (ref 1.8–7.3)
PLATELET # BLD AUTO: 216 K/UL (ref 130–450)
PMV BLD AUTO: 8.8 FL (ref 7–12)
POTASSIUM SERPL-SCNC: 3.4 MMOL/L (ref 3.5–5)
PROT SERPL-MCNC: 5.1 G/DL (ref 6.4–8.3)
RBC # BLD AUTO: 3.28 M/UL (ref 3.5–5.5)
SERVICE CMNT-IMP: ABNORMAL
SODIUM SERPL-SCNC: 138 MMOL/L (ref 132–146)
SPECIMEN DESCRIPTION: ABNORMAL
WBC OTHER # BLD: 3.5 K/UL (ref 4.5–11.5)

## 2023-10-27 PROCEDURE — 1200000000 HC SEMI PRIVATE

## 2023-10-27 PROCEDURE — 2580000003 HC RX 258: Performed by: INTERNAL MEDICINE

## 2023-10-27 PROCEDURE — 2580000003 HC RX 258: Performed by: NURSE ANESTHETIST, CERTIFIED REGISTERED

## 2023-10-27 PROCEDURE — 97530 THERAPEUTIC ACTIVITIES: CPT

## 2023-10-27 PROCEDURE — 7100000011 HC PHASE II RECOVERY - ADDTL 15 MIN: Performed by: STUDENT IN AN ORGANIZED HEALTH CARE EDUCATION/TRAINING PROGRAM

## 2023-10-27 PROCEDURE — 83735 ASSAY OF MAGNESIUM: CPT

## 2023-10-27 PROCEDURE — 7100000010 HC PHASE II RECOVERY - FIRST 15 MIN: Performed by: STUDENT IN AN ORGANIZED HEALTH CARE EDUCATION/TRAINING PROGRAM

## 2023-10-27 PROCEDURE — 93312 ECHO TRANSESOPHAGEAL: CPT

## 2023-10-27 PROCEDURE — 6370000000 HC RX 637 (ALT 250 FOR IP): Performed by: NURSE PRACTITIONER

## 2023-10-27 PROCEDURE — 80053 COMPREHEN METABOLIC PANEL: CPT

## 2023-10-27 PROCEDURE — 2580000003 HC RX 258: Performed by: STUDENT IN AN ORGANIZED HEALTH CARE EDUCATION/TRAINING PROGRAM

## 2023-10-27 PROCEDURE — B24BZZ4 ULTRASONOGRAPHY OF HEART WITH AORTA, TRANSESOPHAGEAL: ICD-10-PCS | Performed by: INTERNAL MEDICINE

## 2023-10-27 PROCEDURE — 85025 COMPLETE CBC W/AUTO DIFF WBC: CPT

## 2023-10-27 PROCEDURE — 6360000002 HC RX W HCPCS: Performed by: NURSE ANESTHETIST, CERTIFIED REGISTERED

## 2023-10-27 PROCEDURE — 93320 DOPPLER ECHO COMPLETE: CPT

## 2023-10-27 PROCEDURE — 6360000002 HC RX W HCPCS: Performed by: NURSE PRACTITIONER

## 2023-10-27 PROCEDURE — 6370000000 HC RX 637 (ALT 250 FOR IP): Performed by: INTERNAL MEDICINE

## 2023-10-27 PROCEDURE — 3700000000 HC ANESTHESIA ATTENDED CARE: Performed by: STUDENT IN AN ORGANIZED HEALTH CARE EDUCATION/TRAINING PROGRAM

## 2023-10-27 PROCEDURE — 93325 DOPPLER ECHO COLOR FLOW MAPG: CPT

## 2023-10-27 PROCEDURE — 2580000003 HC RX 258: Performed by: REGISTERED NURSE

## 2023-10-27 PROCEDURE — 3700000001 HC ADD 15 MINUTES (ANESTHESIA): Performed by: STUDENT IN AN ORGANIZED HEALTH CARE EDUCATION/TRAINING PROGRAM

## 2023-10-27 PROCEDURE — 6360000002 HC RX W HCPCS: Performed by: REGISTERED NURSE

## 2023-10-27 RX ORDER — DEXTROSE MONOHYDRATE 100 MG/ML
INJECTION, SOLUTION INTRAVENOUS CONTINUOUS PRN
Status: DISCONTINUED | OUTPATIENT
Start: 2023-10-27 | End: 2023-11-02 | Stop reason: HOSPADM

## 2023-10-27 RX ORDER — SODIUM CHLORIDE 0.9 % (FLUSH) 0.9 %
5-40 SYRINGE (ML) INJECTION EVERY 12 HOURS SCHEDULED
Status: DISCONTINUED | OUTPATIENT
Start: 2023-10-27 | End: 2023-11-02 | Stop reason: ALTCHOICE

## 2023-10-27 RX ORDER — SODIUM CHLORIDE 9 MG/ML
INJECTION, SOLUTION INTRAVENOUS PRN
Status: DISCONTINUED | OUTPATIENT
Start: 2023-10-27 | End: 2023-11-02 | Stop reason: ALTCHOICE

## 2023-10-27 RX ORDER — HYDRALAZINE HYDROCHLORIDE 20 MG/ML
10 INJECTION INTRAMUSCULAR; INTRAVENOUS
Status: DISCONTINUED | OUTPATIENT
Start: 2023-10-27 | End: 2023-11-02 | Stop reason: ALTCHOICE

## 2023-10-27 RX ORDER — DIPHENHYDRAMINE HYDROCHLORIDE 50 MG/ML
12.5 INJECTION INTRAMUSCULAR; INTRAVENOUS
Status: ACTIVE | OUTPATIENT
Start: 2023-10-27 | End: 2023-10-28

## 2023-10-27 RX ORDER — SODIUM CHLORIDE, SODIUM LACTATE, POTASSIUM CHLORIDE, CALCIUM CHLORIDE 600; 310; 30; 20 MG/100ML; MG/100ML; MG/100ML; MG/100ML
INJECTION, SOLUTION INTRAVENOUS CONTINUOUS
Status: DISCONTINUED | OUTPATIENT
Start: 2023-10-27 | End: 2023-10-29

## 2023-10-27 RX ORDER — SODIUM CHLORIDE 9 MG/ML
INJECTION, SOLUTION INTRAVENOUS CONTINUOUS PRN
Status: DISCONTINUED | OUTPATIENT
Start: 2023-10-27 | End: 2023-10-27 | Stop reason: SDUPTHER

## 2023-10-27 RX ORDER — ACETAMINOPHEN 325 MG/1
650 TABLET ORAL
Status: ACTIVE | OUTPATIENT
Start: 2023-10-27 | End: 2023-10-28

## 2023-10-27 RX ORDER — PROPOFOL 10 MG/ML
INJECTION, EMULSION INTRAVENOUS PRN
Status: DISCONTINUED | OUTPATIENT
Start: 2023-10-27 | End: 2023-10-27 | Stop reason: SDUPTHER

## 2023-10-27 RX ORDER — SODIUM CHLORIDE 0.9 % (FLUSH) 0.9 %
5-40 SYRINGE (ML) INJECTION PRN
Status: DISCONTINUED | OUTPATIENT
Start: 2023-10-27 | End: 2023-11-02 | Stop reason: ALTCHOICE

## 2023-10-27 RX ORDER — OXYCODONE HYDROCHLORIDE 5 MG/1
5 TABLET ORAL
Status: ACTIVE | OUTPATIENT
Start: 2023-10-27 | End: 2023-10-28

## 2023-10-27 RX ORDER — LABETALOL HYDROCHLORIDE 5 MG/ML
10 INJECTION, SOLUTION INTRAVENOUS
Status: DISCONTINUED | OUTPATIENT
Start: 2023-10-27 | End: 2023-11-02 | Stop reason: ALTCHOICE

## 2023-10-27 RX ORDER — ONDANSETRON 2 MG/ML
4 INJECTION INTRAMUSCULAR; INTRAVENOUS
Status: ACTIVE | OUTPATIENT
Start: 2023-10-27 | End: 2023-10-28

## 2023-10-27 RX ORDER — DROPERIDOL 2.5 MG/ML
0.62 INJECTION, SOLUTION INTRAMUSCULAR; INTRAVENOUS
Status: ACTIVE | OUTPATIENT
Start: 2023-10-27 | End: 2023-10-28

## 2023-10-27 RX ORDER — BISACODYL 10 MG
10 SUPPOSITORY, RECTAL RECTAL ONCE
Status: COMPLETED | OUTPATIENT
Start: 2023-10-27 | End: 2023-10-27

## 2023-10-27 RX ADMIN — Medication 1 CAPSULE: at 21:07

## 2023-10-27 RX ADMIN — AMPICILLIN SODIUM AND SULBACTAM SODIUM 3000 MG: 2; 1 INJECTION, POWDER, FOR SOLUTION INTRAMUSCULAR; INTRAVENOUS at 00:49

## 2023-10-27 RX ADMIN — MORPHINE SULFATE 2 MG: 2 INJECTION, SOLUTION INTRAMUSCULAR; INTRAVENOUS at 06:39

## 2023-10-27 RX ADMIN — AMPICILLIN SODIUM AND SULBACTAM SODIUM 3000 MG: 2; 1 INJECTION, POWDER, FOR SOLUTION INTRAMUSCULAR; INTRAVENOUS at 13:27

## 2023-10-27 RX ADMIN — BISACODYL 10 MG: 10 SUPPOSITORY RECTAL at 13:28

## 2023-10-27 RX ADMIN — AMPICILLIN SODIUM AND SULBACTAM SODIUM 3000 MG: 2; 1 INJECTION, POWDER, FOR SOLUTION INTRAMUSCULAR; INTRAVENOUS at 18:15

## 2023-10-27 RX ADMIN — LISINOPRIL 10 MG: 10 TABLET ORAL at 08:43

## 2023-10-27 RX ADMIN — SODIUM CHLORIDE, PRESERVATIVE FREE 10 ML: 5 INJECTION INTRAVENOUS at 21:08

## 2023-10-27 RX ADMIN — AMPICILLIN SODIUM AND SULBACTAM SODIUM 3000 MG: 2; 1 INJECTION, POWDER, FOR SOLUTION INTRAMUSCULAR; INTRAVENOUS at 05:52

## 2023-10-27 RX ADMIN — METOPROLOL SUCCINATE 25 MG: 25 TABLET, EXTENDED RELEASE ORAL at 08:43

## 2023-10-27 RX ADMIN — POLYVINYL ALCOHOL 1 DROP: 14 SOLUTION/ DROPS OPHTHALMIC at 08:42

## 2023-10-27 RX ADMIN — Medication 10 ML: at 08:43

## 2023-10-27 RX ADMIN — STANDARDIZED SENNA CONCENTRATE 8.6 MG: 8.6 TABLET ORAL at 21:07

## 2023-10-27 RX ADMIN — MORPHINE SULFATE 2 MG: 2 INJECTION, SOLUTION INTRAMUSCULAR; INTRAVENOUS at 13:27

## 2023-10-27 RX ADMIN — TRAMADOL HYDROCHLORIDE 50 MG: 50 TABLET, COATED ORAL at 21:12

## 2023-10-27 RX ADMIN — PROPOFOL 150 MG: 10 INJECTION, EMULSION INTRAVENOUS at 12:05

## 2023-10-27 RX ADMIN — DIAZEPAM 2 MG: 2 TABLET ORAL at 21:12

## 2023-10-27 RX ADMIN — SODIUM CHLORIDE: 9 INJECTION, SOLUTION INTRAVENOUS at 11:56

## 2023-10-27 RX ADMIN — STANDARDIZED SENNA CONCENTRATE 8.6 MG: 8.6 TABLET ORAL at 08:43

## 2023-10-27 RX ADMIN — Medication 10 ML: at 21:08

## 2023-10-27 RX ADMIN — APIXABAN 2.5 MG: 2.5 TABLET, FILM COATED ORAL at 21:07

## 2023-10-27 RX ADMIN — Medication 400 MG: at 21:07

## 2023-10-27 ASSESSMENT — PAIN SCALES - GENERAL
PAINLEVEL_OUTOF10: 5
PAINLEVEL_OUTOF10: 5
PAINLEVEL_OUTOF10: 8
PAINLEVEL_OUTOF10: 7
PAINLEVEL_OUTOF10: 9

## 2023-10-27 ASSESSMENT — PAIN DESCRIPTION - LOCATION
LOCATION: BACK;RIB CAGE
LOCATION: BACK;RIB CAGE

## 2023-10-27 ASSESSMENT — PAIN DESCRIPTION - ORIENTATION: ORIENTATION: LEFT;RIGHT

## 2023-10-27 ASSESSMENT — PAIN - FUNCTIONAL ASSESSMENT
PAIN_FUNCTIONAL_ASSESSMENT: 0-10
PAIN_FUNCTIONAL_ASSESSMENT: ACTIVITIES ARE NOT PREVENTED

## 2023-10-27 ASSESSMENT — PAIN DESCRIPTION - PAIN TYPE: TYPE: CHRONIC PAIN

## 2023-10-27 ASSESSMENT — PAIN DESCRIPTION - DESCRIPTORS
DESCRIPTORS: ACHING;DISCOMFORT;SORE
DESCRIPTORS: ACHING;DISCOMFORT

## 2023-10-27 ASSESSMENT — PAIN DESCRIPTION - FREQUENCY: FREQUENCY: CONTINUOUS

## 2023-10-27 NOTE — PLAN OF CARE
Problem: ABCDS Injury Assessment  Goal: Absence of physical injury  Outcome: Progressing     Problem: Skin/Tissue Integrity  Goal: Absence of new skin breakdown  Description: 1. Monitor for areas of redness and/or skin breakdown  2. Assess vascular access sites hourly  3. Every 4-6 hours minimum:  Change oxygen saturation probe site  4. Every 4-6 hours:  If on nasal continuous positive airway pressure, respiratory therapy assess nares and determine need for appliance change or resting period.   Outcome: Progressing     Problem: Discharge Planning  Goal: Discharge to home or other facility with appropriate resources  Outcome: Progressing  Flowsheets (Taken 10/27/2023 0014)  Discharge to home or other facility with appropriate resources: Refer to discharge planning if patient needs post-hospital services based on physician order or complex needs related to functional status, cognitive ability or social support system     Problem: Safety - Adult  Goal: Free from fall injury  Outcome: Progressing     Problem: Pain  Goal: Verbalizes/displays adequate comfort level or baseline comfort level  Outcome: Progressing

## 2023-10-27 NOTE — ANESTHESIA POSTPROCEDURE EVALUATION
Department of Anesthesiology  Postprocedure Note    Patient: Claudeen Levine  MRN: [id number]  YOB: 1940  Date of evaluation: 10/27/2023      Procedure Summary     Date: 10/27/23 Room / Location: [facility] Echocardiography    Anesthesia Start: 1156 Anesthesia Stop: 1604    Procedure: TRANSESOPHAGEAL ECHO Diagnosis:     Scheduled Providers:  Responsible Provider: Carley Ramirez MD    Anesthesia Type: MAC ASA Status: 3          Anesthesia Type: No value filed.     Gómez Phase I:      Gómez Phase II:        Anesthesia Post Evaluation    Patient location during evaluation: bedside  Patient participation: complete - patient participated  Level of consciousness: awake and alert  Pain score: 0  Airway patency: patent  Nausea & Vomiting: no nausea and no vomiting  Complications: no  Cardiovascular status: blood pressure returned to baseline  Respiratory status: acceptable  Hydration status: euvolemic  Pain management: adequate

## 2023-10-27 NOTE — PROGRESS NOTES
10/27/2023 8:55 AM  Service: Urology  Group: [facility] (Artemio/Cory)    Reji Robertson  [id number]    Subjective: Afebrile    Review of Systems  Respiratory: negative  Cardiovascular: negative  Gastrointestinal: negative  Hematologic/lymphatic: negative  Musculoskeletal:negative  Neurological: negative  Endocrine: negative    Scheduled Meds:   lactobacillus  1 capsule Oral Daily    pantoprazole  40 mg Oral QAM AC    lidocaine  1 patch TransDERmal Daily    ampicillin-sulbactam  3,000 mg IntraVENous Q6H    sodium chloride flush  10 mL IntraVENous 2 times per day    magnesium oxide  400 mg Oral Nightly    lisinopril  10 mg Oral QAM    apixaban  2.5 mg Oral BID    metoprolol succinate  25 mg Oral QAM    senna  1 tablet Oral BID       Objective:  Vitals:    10/27/23 0639   BP:    Pulse:    Resp: 16   Temp:    SpO2:          Allergies: Acetaminophen and Sulfa antibiotics      Labs:     Recent Labs     10/27/23  0344      K 3.4*      CO2 27   BUN 9   CREATININE 0.5   GLUCOSE 87   CALCIUM 8.4*       Lab Results   Component Value Date/Time    HGB 9.4 10/27/2023 03:44 AM    HCT 29.3 10/27/2023 03:44 AM         Assessment:  Reji Robertson 78 y.o. female     Principal Problem:    Bacteremia  Active Problems:    Primary hypertension    A-fib (HCC)    Sinus node dysfunction (HCC)    Closed compression fracture of third lumbar vertebra (HCC)    Chronic pain syndrome [G89.4 (ICD-10-CM)]    Chronic bilateral low back pain without sciatica  Resolved Problems:    * No resolved hospital problems.  *          Plan:  Leave with indwelling Davidson the patient was not in the room today to be examined    MD [facility]  Urology

## 2023-10-27 NOTE — PROGRESS NOTES
Physical Therapy  Facility/Department: [address] MED SURG/TELE  Physical Therapy Treatment Note    Name: Claudine Starr  : 1940  MRN: [id number]  Date of Service: 10/27/2023    Attending Provider:  Gisel Jacobo DO    Evaluating PT:  Vinny Wiggins, P.T. Room #:  5676/7358-H  Diagnosis:  Bacteremia [R78.81]  Pertinent PMHx/PSHx:  kyphoplasty T11 10/14/23  Precautions:  Falls, bed/chair alarm    SUBJECTIVE:    Pt lives alone in a 1 story home with 1+1+1 steps to enter. Pt ambulated with a rollator ww PTA. Pt came in from [address] [place] [address]. OBJECTIVE:   Initial Evaluation  Date: 10/25/23 Treatment  10/27/2023 Short Term/ Long Term   Goals   Was pt agreeable to Eval/treatment? yes yes    Does pt have pain? C/o pain in her ribs and stomach Back pain    Bed Mobility  Rolling: supervision  Supine to sit: supervision  Sit to supine: supervision  Scooting: supervision Rolling: Min A  Supine to sit: Min A  Sit to supine: Min A to bed, Mod A onto a gurney  Scooting: SBA seated to EOB Independent    Transfers Sit to stand: SBA  Stand to sit: SBA  Stand pivot: SBA with ww Sit <> stand: Min A Independent    Ambulation   15 feet + 40 feet x2 with ww SBA 15 + 50 + 25 feet with [employer] CG/ feet with ww supervision   Stair negotiation: ascended and descended NA, pt c/o fatigue and weakness with amb NT 1 step 3x with ww SBA or 3 steps with 1 rail SBA   AM-PAC 6 Clicks  81/78      Pt is alert, following instruction  Balance: fair/fair minus dynamic with [employer]    Pt performed therapeutic exercise of the following: NT    Patient education/treatment  Pt was educated on UE usage for transfer safety, gait mechanics for posture/staying within [employer] base of support    Patient response to education:   Pt verbalized understanding Pt demonstrated skill Pt requires further education in this area   yes With instruction yes     ASSESSMENT:   Comments: Nurse ok with Rx. Pt assisted to EOB, sat EOB SBA.  Gait slow and consistent, Pt at

## 2023-10-27 NOTE — PROCEDURES
PRELIMINARY TRANSESOPHAGEAL ECHOCARDIOGRAPHY REPORT    Indications for study:  Bacteremia    Study performed using (Sedation): Per anesthesia    Complications: None    Preliminary findings: Normal LV function, normal RV function, no hemodynamically significant valve disease, no evidence of vegetations, no left atrial or left atrial appendage thrombus (no evidence of spontaneous echo contrast), no intraatrial shunting on bubble study, no significant atherosclerosis of the aorta. For patient status during procedure, refer to intra-procedure sedation flowsheet. The complete CIRILO report will follow - see \"CARDIOLOGY\" Section in [employer]. Crescencio Mathew.  Naomie Syed, [address] [facility]

## 2023-10-27 NOTE — PROGRESS NOTES
PROGRESS NOTE    Patient Presents with/Seen in Consultation For      Reason for Consult: positive BC     CHIEF COMPLAINT:  positive BC    Subjective:     Patient off unit for CIRILO. Labs, notes reviewed. No BM charted. Review of Systems  Aside from what was mentioned in the PMH and HPI, essentially unremarkable, all others negative. Objective:     No data found. Assessment deferred.  Off unit for CIRILO    lactobacillus (CULTURELLE) capsule 1 capsule, Daily  [START ON 10/27/2023] pantoprazole (PROTONIX) tablet 40 mg, QAM AC  polyvinyl alcohol (LIQUIFILM TEARS) 1.4 % ophthalmic solution 1 drop, BID PRN  lidocaine 4 % external patch 1 patch, Daily  ampicillin-sulbactam (UNASYN) 3,000 mg in sodium chloride 0.9 % 100 mL IVPB, Q6H  melatonin tablet 3 mg, Nightly PRN  sodium chloride flush 0.9 % injection 10 mL, 2 times per day  sodium chloride flush 0.9 % injection 10 mL, PRN  0.9 % sodium chloride infusion, PRN  potassium chloride (KLOR-CON M) extended release tablet 40 mEq, PRN   Or  potassium bicarb-citric acid (EFFER-K) effervescent tablet 40 mEq, PRN   Or  potassium chloride 10 mEq/100 mL IVPB (Peripheral Line), PRN  magnesium sulfate 2000 mg in 50 mL IVPB premix, PRN  ondansetron (ZOFRAN-ODT) disintegrating tablet 4 mg, Q8H PRN   Or  ondansetron (ZOFRAN) injection 4 mg, Q6H PRN  senna (SENOKOT) tablet 8.6 mg, Daily PRN  acetaminophen (TYLENOL) tablet 650 mg, Q6H PRN   Or  acetaminophen (TYLENOL) suppository 650 mg, Q6H PRN  magnesium oxide (MAG-OX) tablet 400 mg, Nightly  lisinopril (PRINIVIL;ZESTRIL) tablet 10 mg, QAM  apixaban (ELIQUIS) tablet 2.5 mg, BID  metoprolol succinate (TOPROL XL) extended release tablet 25 mg, QAM  diazePAM (VALIUM) tablet 2 mg, BID PRN  senna (SENOKOT) tablet 8.6 mg, BID  bisacodyl (DULCOLAX) suppository 10 mg, Daily PRN  magnesium hydroxide (MILK OF MAGNESIA) 400 MG/5ML suspension 5 mL, Daily PRN  traMADol (ULTRAM) tablet 50 mg, Q6H PRN  morphine (PF) injection 2 mg, Q4H PRN

## 2023-10-27 NOTE — CARE COORDINATION
Pt [address] AUTH GOOD THROUGH SATURDAY 10/28; IF NOT DISCHARGED; WILL NEED TO RE- [address] [facility]. . Transport forms on chart. Iv unasyn; for CIRILO r/o endocarditis; if neg, per ID can d/c on oral atb's. Await pulm input; pt may need pigtail cath for pleural effusion. Await consultant  input; course of treatment. Spenser Chaudhary.

## 2023-10-27 NOTE — PROGRESS NOTES
Occupational Therapy  OT BEDSIDE TREATMENT NOTE      Date:10/27/2023  Patient Name: Bethel Ferraro  MRN: [id number]  : 1940  Room: [address] [place] [address]     Evaluating OT: Maryan Mathew OTR/L #[id number]     Referring Provider:  Mar Hammer DO      Specific Provider Orders/Date:  OT Eval and Treat, 10/24/23      Diagnosis:   1.  Bacteremia         Surgery: None       Pertinent Medical History: Cataract, lupus, HTN, kyphoplasty 2023       Precautions:  Fall Risk, alarm       Assessment of current deficits    [x] Functional mobility            [x]ADLs           [x] Strength                   []Cognition    [x] Functional transfers          [x] IADLs          [x] Safety Awareness   [x]Endurance    [] Fine Coordination              [x] Balance      [] Vision/perception    []Sensation      []Gross Motor Coordination  [] ROM           [] Delirium                   [] Motor Control      OT PLAN OF CARE   OT POC based on physician orders, patient diagnosis and results of clinical assessment     Frequency/Duration 2-5 days/wk for 2 weeks PRN      Specific OT Treatment Interventions to include:   * Instruction/training on adapted ADL techniques and AE recommendations to increase functional independence within precautions       * Training on energy conservation strategies, correct breathing pattern and techniques to improve independence/tolerance for self-care routine  * Functional transfer/mobility training/DME recommendations for increased independence, safety, and fall prevention  * Patient/Family education to increase follow through with safety techniques and functional independence  * Recommendation of environmental modifications for increased safety with functional transfers/mobility and ADLs  * Therapeutic exercise to improve motor endurance, ROM, and functional strength for ADLs/functional transfers  * Therapeutic activities to facilitate/challenge dynamic balance, stand tolerance for increased safety and independence with ADLs  * Positioning to improve skin integrity, interaction with environment and functional independence     Recommended Adaptive Equipment: TBD       Home Living: Lives alone, single family home, 1 story, 1+1+1 step to enter. Bathroom set-up: Shower in basement, tub only on 1st floor          Equipment owned: Rollator      Prior Level of Function: Modified [place] with ADLs , has assist with IADLs; ambulated with rollator at baseline. Pt presented from [address] [place] [address]. Pain Level: Pt c/o back pain          Cognition: A&O: 4/4; Follows 3 step directions              Memory: good               Sequencing: good               Problem solving: good               Judgement/safety: fair +                Functional Assessment: AM-PAC Daily Activity Raw Score: 16/24    Initial Eval Status  Date: 10/25/23    Treatment Status  Date: 10/27/23 STGs = LTGs  Time frame: 10-14 days   Feeding Supervision       Independent    Grooming Stand by Assist    CGA standing at sink in bathroom  Modified [place]    UB Dressing Minimal Assist    Min A  Modified [place]    LB Dressing Moderate Assist     Mod A  Modified [place]    Bathing Moderate Assist       Modified [place]    Toileting Minimal Assist       Modified [place]    Bed Mobility  Supine to sit: Stand by Assist   Sit to supine:  Stand by Assist      supine <> sit min A Supine to sit: Independent   Sit to supine: Independent    Functional Transfers Sit to stand: Stand by Assist   Stand to sit: Stand by Assist      Transfer training with verbal cues for hand placement to improve safety. sit <> stand min A Independent    Functional Mobility CGA with wheeled walker to improve balance to/from bathroom, verbal cues for walker sequence and safety.      CGA with WW in room  Modified [place] with use of wheeled walker    Balance Sitting:     Static: fair plus     Dynamic: fair plus   Standing: fair/fair plus with walker     Standing

## 2023-10-28 PROBLEM — E43 SEVERE PROTEIN-CALORIE MALNUTRITION (HCC): Status: ACTIVE | Noted: 2023-10-28

## 2023-10-28 LAB
ALBUMIN SERPL-MCNC: 3.5 G/DL (ref 3.5–5.2)
ALP SERPL-CCNC: 86 U/L (ref 35–104)
ALT SERPL-CCNC: 12 U/L (ref 0–32)
ANION GAP SERPL CALCULATED.3IONS-SCNC: 7 MMOL/L (ref 7–16)
AST SERPL-CCNC: 22 U/L (ref 0–31)
BASOPHILS # BLD: 0.03 K/UL (ref 0–0.2)
BASOPHILS NFR BLD: 1 % (ref 0–2)
BILIRUB SERPL-MCNC: 0.3 MG/DL (ref 0–1.2)
BUN SERPL-MCNC: 11 MG/DL (ref 6–23)
CALCIUM SERPL-MCNC: 8.9 MG/DL (ref 8.6–10.2)
CHLORIDE SERPL-SCNC: 103 MMOL/L (ref 98–107)
CO2 SERPL-SCNC: 31 MMOL/L (ref 22–29)
CREAT SERPL-MCNC: 0.6 MG/DL (ref 0.5–1)
EOSINOPHIL # BLD: 0.07 K/UL (ref 0.05–0.5)
EOSINOPHILS RELATIVE PERCENT: 2 % (ref 0–6)
ERYTHROCYTE [DISTWIDTH] IN BLOOD BY AUTOMATED COUNT: 13.9 % (ref 11.5–15)
GFR SERPL CREATININE-BSD FRML MDRD: >60 ML/MIN/1.73M2
GLUCOSE SERPL-MCNC: 91 MG/DL (ref 74–99)
HCT VFR BLD AUTO: 32.3 % (ref 34–48)
HGB BLD-MCNC: 10.3 G/DL (ref 11.5–15.5)
IMM GRANULOCYTES # BLD AUTO: <0.03 K/UL (ref 0–0.58)
IMM GRANULOCYTES NFR BLD: 0 % (ref 0–5)
LYMPHOCYTES NFR BLD: 1.12 K/UL (ref 1.5–4)
LYMPHOCYTES RELATIVE PERCENT: 27 % (ref 20–42)
MCH RBC QN AUTO: 28.5 PG (ref 26–35)
MCHC RBC AUTO-ENTMCNC: 31.9 G/DL (ref 32–34.5)
MCV RBC AUTO: 89.2 FL (ref 80–99.9)
MONOCYTES NFR BLD: 0.4 K/UL (ref 0.1–0.95)
MONOCYTES NFR BLD: 10 % (ref 2–12)
NEUTROPHILS NFR BLD: 61 % (ref 43–80)
NEUTS SEG NFR BLD: 2.6 K/UL (ref 1.8–7.3)
PLATELET # BLD AUTO: 280 K/UL (ref 130–450)
PMV BLD AUTO: 9 FL (ref 7–12)
POTASSIUM SERPL-SCNC: 4.2 MMOL/L (ref 3.5–5)
PROT SERPL-MCNC: 5.6 G/DL (ref 6.4–8.3)
RBC # BLD AUTO: 3.62 M/UL (ref 3.5–5.5)
SODIUM SERPL-SCNC: 141 MMOL/L (ref 132–146)
WBC OTHER # BLD: 4.2 K/UL (ref 4.5–11.5)

## 2023-10-28 PROCEDURE — 2580000003 HC RX 258: Performed by: STUDENT IN AN ORGANIZED HEALTH CARE EDUCATION/TRAINING PROGRAM

## 2023-10-28 PROCEDURE — 2580000003 HC RX 258: Performed by: INTERNAL MEDICINE

## 2023-10-28 PROCEDURE — 80053 COMPREHEN METABOLIC PANEL: CPT

## 2023-10-28 PROCEDURE — 6370000000 HC RX 637 (ALT 250 FOR IP): Performed by: INTERNAL MEDICINE

## 2023-10-28 PROCEDURE — 6370000000 HC RX 637 (ALT 250 FOR IP): Performed by: NURSE PRACTITIONER

## 2023-10-28 PROCEDURE — 6360000002 HC RX W HCPCS: Performed by: REGISTERED NURSE

## 2023-10-28 PROCEDURE — 1200000000 HC SEMI PRIVATE

## 2023-10-28 PROCEDURE — 6360000002 HC RX W HCPCS: Performed by: NURSE PRACTITIONER

## 2023-10-28 PROCEDURE — 2580000003 HC RX 258: Performed by: REGISTERED NURSE

## 2023-10-28 PROCEDURE — 85025 COMPLETE CBC W/AUTO DIFF WBC: CPT

## 2023-10-28 RX ORDER — BISACODYL 10 MG
10 SUPPOSITORY, RECTAL RECTAL ONCE
Status: COMPLETED | OUTPATIENT
Start: 2023-10-28 | End: 2023-10-28

## 2023-10-28 RX ADMIN — BISACODYL 10 MG: 10 SUPPOSITORY RECTAL at 11:59

## 2023-10-28 RX ADMIN — AMPICILLIN SODIUM AND SULBACTAM SODIUM 3000 MG: 2; 1 INJECTION, POWDER, FOR SOLUTION INTRAMUSCULAR; INTRAVENOUS at 06:56

## 2023-10-28 RX ADMIN — SODIUM CHLORIDE, PRESERVATIVE FREE 10 ML: 5 INJECTION INTRAVENOUS at 21:23

## 2023-10-28 RX ADMIN — TRAMADOL HYDROCHLORIDE 50 MG: 50 TABLET, COATED ORAL at 04:25

## 2023-10-28 RX ADMIN — STANDARDIZED SENNA CONCENTRATE 8.6 MG: 8.6 TABLET ORAL at 21:21

## 2023-10-28 RX ADMIN — LISINOPRIL 10 MG: 10 TABLET ORAL at 09:27

## 2023-10-28 RX ADMIN — SODIUM CHLORIDE, PRESERVATIVE FREE 10 ML: 5 INJECTION INTRAVENOUS at 18:18

## 2023-10-28 RX ADMIN — Medication 10 ML: at 21:18

## 2023-10-28 RX ADMIN — AMPICILLIN SODIUM AND SULBACTAM SODIUM 3000 MG: 2; 1 INJECTION, POWDER, FOR SOLUTION INTRAMUSCULAR; INTRAVENOUS at 00:02

## 2023-10-28 RX ADMIN — MORPHINE SULFATE 2 MG: 2 INJECTION, SOLUTION INTRAMUSCULAR; INTRAVENOUS at 01:22

## 2023-10-28 RX ADMIN — DIAZEPAM 2 MG: 2 TABLET ORAL at 16:14

## 2023-10-28 RX ADMIN — AMPICILLIN SODIUM AND SULBACTAM SODIUM 3000 MG: 2; 1 INJECTION, POWDER, FOR SOLUTION INTRAMUSCULAR; INTRAVENOUS at 13:00

## 2023-10-28 RX ADMIN — Medication 1 CAPSULE: at 21:19

## 2023-10-28 RX ADMIN — STANDARDIZED SENNA CONCENTRATE 8.6 MG: 8.6 TABLET ORAL at 09:27

## 2023-10-28 RX ADMIN — Medication 3 MG: at 21:21

## 2023-10-28 RX ADMIN — APIXABAN 2.5 MG: 2.5 TABLET, FILM COATED ORAL at 09:27

## 2023-10-28 RX ADMIN — Medication 10 ML: at 11:18

## 2023-10-28 RX ADMIN — PANTOPRAZOLE SODIUM 40 MG: 40 TABLET, DELAYED RELEASE ORAL at 06:52

## 2023-10-28 RX ADMIN — Medication 400 MG: at 21:21

## 2023-10-28 RX ADMIN — METOPROLOL SUCCINATE 25 MG: 25 TABLET, EXTENDED RELEASE ORAL at 09:27

## 2023-10-28 RX ADMIN — TRAMADOL HYDROCHLORIDE 50 MG: 50 TABLET, COATED ORAL at 21:21

## 2023-10-28 RX ADMIN — APIXABAN 2.5 MG: 2.5 TABLET, FILM COATED ORAL at 21:21

## 2023-10-28 RX ADMIN — AMPICILLIN SODIUM AND SULBACTAM SODIUM 3000 MG: 2; 1 INJECTION, POWDER, FOR SOLUTION INTRAMUSCULAR; INTRAVENOUS at 18:17

## 2023-10-28 ASSESSMENT — PAIN SCALES - GENERAL
PAINLEVEL_OUTOF10: 5
PAINLEVEL_OUTOF10: 3
PAINLEVEL_OUTOF10: 8

## 2023-10-28 NOTE — PROGRESS NOTES
10/28/2023 02:39 AM    K 4.2 10/28/2023 02:39 AM     10/28/2023 02:39 AM    CO2 31 10/28/2023 02:39 AM    BUN 11 10/28/2023 02:39 AM    CREATININE 0.6 10/28/2023 02:39 AM    GFRAA >60 02/27/2022 03:28 PM    LABGLOM >60 10/28/2023 02:39 AM    GLUCOSE 91 10/28/2023 02:39 AM    PROT 5.6 10/28/2023 02:39 AM    LABALBU 3.5 10/28/2023 02:39 AM    CALCIUM 8.9 10/28/2023 02:39 AM    BILITOT 0.3 10/28/2023 02:39 AM    ALKPHOS 86 10/28/2023 02:39 AM    AST 22 10/28/2023 02:39 AM    ALT 12 10/28/2023 02:39 AM     Hepatic Function Panel:    Lab Results   Component Value Date/Time    ALKPHOS 86 10/28/2023 02:39 AM    ALT 12 10/28/2023 02:39 AM    AST 22 10/28/2023 02:39 AM    PROT 5.6 10/28/2023 02:39 AM    BILITOT 0.3 10/28/2023 02:39 AM    LABALBU 3.5 10/28/2023 02:39 AM     No components found for: \"CHLPL\"    Lab Results   Component Value Date    TRIG 104 03/07/2023       Lab Results   Component Value Date    HDL 79 03/07/2023       Lab Results   Component Value Date    LDLCALC 117 (H) 03/07/2023       Lab Results   Component Value Date    LABVLDL 21 03/07/2023      PT/INR:    Lab Results   Component Value Date/Time    PROTIME 13.2 10/12/2023 04:33 AM    INR 1.2 10/12/2023 04:33 AM     IRON:  No results found for: \"IRON\"  Iron Saturation:  No components found for: \"PERCENTFE\"  FERRITIN:  No results found for: \"FERRITIN\"      Assessment:     Enteritis  Bacteremia- ID following  Liver lesions  EGD 10/12/23 with Dr. Monahan Bran: Crycopharngeal bar with small Zenkers. White plaques suspicious of candida, brushings were performedl ESOPHAGEAL, BRUSHING:  NEGATIVE FOR MALIGNANCY. Benign/reactive squamous cells, bacteria, and neutrophils present. Fungal organisms morphologically consistent with Candida sp. present. Cellblock is similar. LA-A esophagitis with small hiatal hernia.  Mild antral gastritis  Chronic constipation   [address] colon CA    Plan:   Dulcolax suppository once   Continue Probiotics   Diet as tolerated  Medical management per primary  Antibiotics per ID   Pantoprazole 40 mg daily  Continue stool softeners  Supportive care  Continue to monitor       Note: This report was completed utilizing computer voice recognition software. Every effort has been made to ensure accuracy, however; inadvertent computerized transcription errors may be present.      Discussed with Dr. Danika Doe per Dr. Prabhu Piña APRN-NP-C 10/28/2023  10:03 AM For Dr. Andrew Avalos attending addendum:  The patient case was reviewed and discussed with the GI midlevel team.     Luz Richards DO

## 2023-10-28 NOTE — PROGRESS NOTES
Comprehensive Nutrition Assessment    Type and Reason for Visit:  Initial, Positive Nutrition Screen    Nutrition Recommendations/Plan:   Continue current diet & monitor  Will modify current ONS to daily per pt request     Malnutrition Assessment:  Malnutrition Status:  Severe malnutrition (10/28/23 1623)    Context:  Chronic Illness     Findings of the 6 clinical characteristics of malnutrition:  Energy Intake:  75% or less estimated energy requirements for 1 month or longer  Weight Loss:  Greater than 5% over 1 month (-6.2% X ~1 month)     Body Fat Loss:  Unable to assess (d/t pt room dark & advanced age)     Muscle Mass Loss:  Unable to assess (d/t pt room dark & advanced age)    Fluid Accumulation:  Unable to assess (d/t edema likely multifactorial)     Strength:  Not Performed    Nutrition Assessment:    Pt w/ Enterococcus bacteremia etiology unclear (possible translocation from GI tract) and right pleural effusion w/ plan for pigtail drain 10/30. Hx bladder CA, Lupus. Will modify ONS per discussion w/ pt & monitor. Nutrition Related Findings:    A&O X4, -1.4 L, BLE +1 edema, abd soft, +BS, early satiety, loss of  18 months ago   Wound Type: None       Current Nutrition Intake & Therapies:    Average Meal Intake: 26-50%, 51-75%, %  Average Supplements Intake:  (3 unopened bottles on bedside table)  ADULT DIET; Regular  ADULT ORAL NUTRITION SUPPLEMENT; Breakfast, Lunch, Dinner; Standard High Calorie/High Protein Oral Supplement    Anthropometric Measures:  Height: 162.6 cm (5' 4\")  Ideal Body Weight (IBW): 120 lbs (55 kg)    Admission Body Weight: 51.9 kg (114 lb 8 oz) (10/26 bed)  Current Body Weight: 54 kg (119 lb 1.6 oz) (10/28 elevated (edema)), 99.3 % IBW.  Weight Source: Bed Scale  Current BMI (kg/m2): 20.4  Usual Body Weight: 55.4 kg (122 lb 2 oz) (10/11 actual per EMR, 3/6/23 125#)  % Weight Change (Calculated): -2.5                    BMI Categories: Underweight (BMI less than 22) age

## 2023-10-28 NOTE — PROGRESS NOTES
Physician Progress Note      PATIENT:               Carlos Garcia  CSN #:                  [id number]  :                       1940  ADMIT DATE:       10/24/2023 2:26 PM  [facility] DATE:  RESPONDING  PROVIDER #:        Claire De Dios DO          QUERY TEXT:    Patient admitted with bacteremia. Noted documentation of moderate/severe   malnutrition per IM notes. In order to support the diagnosis of malnutrition,   please include additional clinical indicators in your documentation. Or please   document if the diagnosis of malnutrition has been ruled out after further   study. The medical record reflects the following:  Risk Factors: advanced age  Clinical Indicators: BMI 19, per IM \". Foye Carlos Foye Carlos Moderate to severe protein calorie   malnutrition. Foye Carlos Foye Carlos \"  Treatment: regular diet    Thank you,  Destiny Rodriguez RN, BSN, CDIS  Clinical Documentation Integrity  [email or web address]  Options provided:  -- Moderate/severe malnutrition present as evidenced by, Please document   evidence. -- Moderate/severe malnutrition was ruled out  -- Other - I will add my own diagnosis  -- Disagree - Not applicable / Not valid  -- Disagree - Clinically unable to determine / Unknown  -- Refer to Clinical Documentation Reviewer    PROVIDER RESPONSE TEXT:    Moderate/severe malnutrition was ruled out after study. Query created by: Elizabeth Vital on 10/27/2023 11:46 AM      QUERY TEXT:    Patient admitted with bacteremia. Documentation reflects questionable   Pneumonia per pulmonary consult. If possible, please document in the progress   notes and discharge summary if Pneumonia was: The medical record reflects the following:  Risk Factors: advanced age  Clinical Indicators: CXR 10/23 shows new effusion with infiltrate or   atelectasis, per pulmonary consult \". ..seen in emergency room on 10/23 for   abdominal pain and sent back to nursing facility with hospital acquired   pneumonia placed on levaquin after finding a right lower lobe infiltrate on a   CT of the abdomen. During that ER visit blood cultures were obtained and came   back positive with enterococcus faecium. Orv[place] Saira Orvilla Saira \", per ID \". ..possible loculated   effusion on the right lung. Orv[place] Saira Orv[place] Saira \"  Treatment: IV Unasyn    Thank you,  April Amor Meade RN, BSN, CDIS  Clinical Documentation Integrity  [email or web address]  Options provided:  -- Pneumonia confirmed after study being treated from previous admission  -- Pneumonia ruled out after study  -- Other - I will add my own diagnosis  -- Disagree - Not applicable / Not valid  -- Disagree - Clinically unable to determine / Unknown  -- Refer to Clinical Documentation Reviewer    PROVIDER RESPONSE TEXT:    Pneumonia ruled out after study.     Query created by: Erica Klein on 10/27/2023 11:51 AM      Electronically signed by:  Blanquita Tellez DO 10/28/2023 8:22 AM

## 2023-10-28 NOTE — PROGRESS NOTES
Internal Medicine Progress Note    Patient's name: Reji Robertson  : 1940  Chief complaints (on day of admission): Other (Positive BC)  Admission date: 10/24/2023  Date of service: 10/28/2023   Room: [address] MED SURG/TELE  Primary care physician: Margi Mathis MD  Reason for visit: Follow-up for bacteremia, pleural effusion    Subjective  Asiya Diss was seen and examined. She was resting comfortably in bed. She denies new complaints or issues. She is going to stay in the hospital through the weekend so she can get a pigtail drain in her pleural effusion. She is on antibiotics per ID. She denied abdominal pain today. Review of Systems  There are no new complaints of chest pain, shortness of breath, nausea, vomiting, diarrhea, constipation.     Hospital Medications  Current Facility-Administered Medications   Medication Dose Route Frequency Provider Last Rate Last Admin    lactated ringers IV soln infusion   IntraVENous Continuous Brooke Cross MD        sodium chloride flush 0.9 % injection 5-40 mL  5-40 mL IntraVENous 2 times per day Brooke Crsos MD   10 mL at 10/27/23 2108    sodium chloride flush 0.9 % injection 5-40 mL  5-40 mL IntraVENous PRN Brooke Cross MD        0.9 % sodium chloride infusion   IntraVENous PRN Brooke Cross MD        acetaminophen (TYLENOL) tablet 650 mg  650 mg Oral Once PRN Brooke Cross MD        HYDROmorphone (DILAUDID) injection 0.25 mg  0.25 mg IntraVENous Q5 Min PRN Brooke Cross MD        HYDROmorphone (DILAUDID) injection 0.5 mg  0.5 mg IntraVENous Q5 Min PRN Brooke Cross MD        oxyCODONE (ROXICODONE) immediate release tablet 5 mg  5 mg Oral Once PRN Brooke Cross MD        droperidol (INAPSINE) injection 0.625 mg  0.625 mg IntraVENous Once PRN Brooke Cross MD        ondansetron [facility]) injection 4 mg  4 mg IntraVENous Once PRN Brooke Cross MD        diphenhydrAMINE 40 mEq Oral PRN Yovaniino, Abdirashid E, DO        Or    potassium chloride 10 mEq/100 mL IVPB (Peripheral Line)  10 mEq IntraVENous PRN Yovaniino, Abdirashid E, DO        magnesium sulfate 2000 mg in 50 mL IVPB premix  2,000 mg IntraVENous PRN Yovaniino, Abdirashid E, DO        ondansetron (ZOFRAN-ODT) disintegrating tablet 4 mg  4 mg Oral Q8H PRN Yovaniino, Abdirashid E, DO        Or    ondansetron (ZOFRAN) injection 4 mg  4 mg IntraVENous Q6H PRN Yovaniino, Abdirashid E, DO        senna (SENOKOT) tablet 8.6 mg  1 tablet Oral Daily PRN Yovaniino, Abdirashid E, DO        acetaminophen (TYLENOL) tablet 650 mg  650 mg Oral Q6H PRN Aga, Abdirashid E, DO        Or    acetaminophen (TYLENOL) suppository 650 mg  650 mg Rectal Q6H PRN Yovaniino, Abdirashid E, DO        magnesium oxide (MAG-OX) tablet 400 mg  400 mg Oral Nightly Abdirashid Yung E, DO   400 mg at 10/27/23 2107    lisinopril (PRINIVIL;ZESTRIL) tablet 10 mg  10 mg Oral QAM Abdirashid Yung E, DO   10 mg at 10/27/23 0843    apixaban (ELIQUIS) tablet 2.5 mg  2.5 mg Oral BID Abdirashid Yung E, DO   2.5 mg at 10/27/23 2107    metoprolol succinate (TOPROL XL) extended release tablet 25 mg  25 mg Oral QAM Yovaniino, Abdirashid E, DO   25 mg at 10/27/23 0843    diazePAM (VALIUM) tablet 2 mg  2 mg Oral BID PRN Abdirashid Yung E, DO   2 mg at 10/27/23 2112    senna (SENOKOT) tablet 8.6 mg  1 tablet Oral BID Yovaniino, Abdirashid E, DO   8.6 mg at 10/27/23 2107    bisacodyl (DULCOLAX) suppository 10 mg  10 mg Rectal Daily PRN Abdirashid Yung E, DO        magnesium hydroxide (MILK OF MAGNESIA) 400 MG/5ML suspension 5 mL  5 mL Oral Daily PRN Aga, Abdirashid E, DO        traMADol (ULTRAM) tablet 50 mg  50 mg Oral Q6H PRN Abdirashid Yung E, DO   50 mg at 10/28/23 0425    morphine (PF) injection 2 mg  2 mg IntraVENous Q4H PRN VARSHA Alonso - CNP   2 mg at 10/28/23 0122       PRN Medications  sodium chloride flush, sodium chloride, acetaminophen, HYDROmorphone, HYDROmorphone, oxyCODONE, droperidol, ondansetron, diphenhydrAMINE, labetalol **OR**

## 2023-10-28 NOTE — PLAN OF CARE
Problem: ABCDS Injury Assessment  Goal: Absence of physical injury  Outcome: Progressing     Problem: Skin/Tissue Integrity  Goal: Absence of new skin breakdown  Description: 1. Monitor for areas of redness and/or skin breakdown  2. Assess vascular access sites hourly  3. Every 4-6 hours minimum:  Change oxygen saturation probe site  4. Every 4-6 hours:  If on nasal continuous positive airway pressure, respiratory therapy assess nares and determine need for appliance change or resting period.   Outcome: Progressing     Problem: Discharge Planning  Goal: Discharge to home or other facility with appropriate resources  Outcome: Progressing  Flowsheets (Taken 10/27/2023 1600 by Aracely Shields RN)  Discharge to home or other facility with appropriate resources: Refer to discharge planning if patient needs post-hospital services based on physician order or complex needs related to functional status, cognitive ability or social support system     Problem: Safety - Adult  Goal: Free from fall injury  Outcome: Progressing     Problem: Pain  Goal: Verbalizes/displays adequate comfort level or baseline comfort level  Outcome: Progressing  Flowsheets (Taken 10/27/2023 1600 by Aracely Shields RN)  Verbalizes/displays adequate comfort level or baseline comfort level: Encourage patient to monitor pain and request assistance

## 2023-10-29 LAB
ALBUMIN SERPL-MCNC: 3 G/DL (ref 3.5–5.2)
ALP SERPL-CCNC: 80 U/L (ref 35–104)
ALT SERPL-CCNC: 9 U/L (ref 0–32)
ANION GAP SERPL CALCULATED.3IONS-SCNC: 7 MMOL/L (ref 7–16)
AST SERPL-CCNC: 17 U/L (ref 0–31)
BASOPHILS # BLD: 0.02 K/UL (ref 0–0.2)
BASOPHILS NFR BLD: 1 % (ref 0–2)
BILIRUB SERPL-MCNC: 0.3 MG/DL (ref 0–1.2)
BUN SERPL-MCNC: 11 MG/DL (ref 6–23)
CALCIUM SERPL-MCNC: 8.6 MG/DL (ref 8.6–10.2)
CHLORIDE SERPL-SCNC: 102 MMOL/L (ref 98–107)
CO2 SERPL-SCNC: 30 MMOL/L (ref 22–29)
CREAT SERPL-MCNC: 0.5 MG/DL (ref 0.5–1)
EOSINOPHIL # BLD: 0.07 K/UL (ref 0.05–0.5)
EOSINOPHILS RELATIVE PERCENT: 2 % (ref 0–6)
ERYTHROCYTE [DISTWIDTH] IN BLOOD BY AUTOMATED COUNT: 13.7 % (ref 11.5–15)
GFR SERPL CREATININE-BSD FRML MDRD: >60 ML/MIN/1.73M2
GLUCOSE SERPL-MCNC: 97 MG/DL (ref 74–99)
HCT VFR BLD AUTO: 29.7 % (ref 34–48)
HGB BLD-MCNC: 9.5 G/DL (ref 11.5–15.5)
IMM GRANULOCYTES # BLD AUTO: <0.03 K/UL (ref 0–0.58)
IMM GRANULOCYTES NFR BLD: 0 % (ref 0–5)
LYMPHOCYTES NFR BLD: 0.96 K/UL (ref 1.5–4)
LYMPHOCYTES RELATIVE PERCENT: 29 % (ref 20–42)
MCH RBC QN AUTO: 28.9 PG (ref 26–35)
MCHC RBC AUTO-ENTMCNC: 32 G/DL (ref 32–34.5)
MCV RBC AUTO: 90.3 FL (ref 80–99.9)
MICROORGANISM SPEC CULT: NORMAL
MICROORGANISM SPEC CULT: NORMAL
MONOCYTES NFR BLD: 0.31 K/UL (ref 0.1–0.95)
MONOCYTES NFR BLD: 10 % (ref 2–12)
NEUTROPHILS NFR BLD: 58 % (ref 43–80)
NEUTS SEG NFR BLD: 1.91 K/UL (ref 1.8–7.3)
PLATELET # BLD AUTO: 226 K/UL (ref 130–450)
PMV BLD AUTO: 8.8 FL (ref 7–12)
POTASSIUM SERPL-SCNC: 3.7 MMOL/L (ref 3.5–5)
PROT SERPL-MCNC: 5.1 G/DL (ref 6.4–8.3)
RBC # BLD AUTO: 3.29 M/UL (ref 3.5–5.5)
SERVICE CMNT-IMP: NORMAL
SERVICE CMNT-IMP: NORMAL
SODIUM SERPL-SCNC: 139 MMOL/L (ref 132–146)
SPECIMEN DESCRIPTION: NORMAL
SPECIMEN DESCRIPTION: NORMAL
WBC OTHER # BLD: 3.3 K/UL (ref 4.5–11.5)

## 2023-10-29 PROCEDURE — 6370000000 HC RX 637 (ALT 250 FOR IP): Performed by: NURSE PRACTITIONER

## 2023-10-29 PROCEDURE — 85025 COMPLETE CBC W/AUTO DIFF WBC: CPT

## 2023-10-29 PROCEDURE — 1200000000 HC SEMI PRIVATE

## 2023-10-29 PROCEDURE — 6370000000 HC RX 637 (ALT 250 FOR IP): Performed by: INTERNAL MEDICINE

## 2023-10-29 PROCEDURE — 6360000002 HC RX W HCPCS: Performed by: NURSE PRACTITIONER

## 2023-10-29 PROCEDURE — 2580000003 HC RX 258: Performed by: STUDENT IN AN ORGANIZED HEALTH CARE EDUCATION/TRAINING PROGRAM

## 2023-10-29 PROCEDURE — 2580000003 HC RX 258: Performed by: REGISTERED NURSE

## 2023-10-29 PROCEDURE — 80053 COMPREHEN METABOLIC PANEL: CPT

## 2023-10-29 PROCEDURE — 6360000002 HC RX W HCPCS: Performed by: REGISTERED NURSE

## 2023-10-29 PROCEDURE — 2580000003 HC RX 258: Performed by: INTERNAL MEDICINE

## 2023-10-29 RX ADMIN — LISINOPRIL 10 MG: 10 TABLET ORAL at 08:45

## 2023-10-29 RX ADMIN — STANDARDIZED SENNA CONCENTRATE 8.6 MG: 8.6 TABLET ORAL at 08:45

## 2023-10-29 RX ADMIN — AMPICILLIN SODIUM AND SULBACTAM SODIUM 3000 MG: 2; 1 INJECTION, POWDER, FOR SOLUTION INTRAMUSCULAR; INTRAVENOUS at 05:56

## 2023-10-29 RX ADMIN — APIXABAN 2.5 MG: 2.5 TABLET, FILM COATED ORAL at 08:45

## 2023-10-29 RX ADMIN — STANDARDIZED SENNA CONCENTRATE 8.6 MG: 8.6 TABLET ORAL at 20:00

## 2023-10-29 RX ADMIN — AMPICILLIN SODIUM AND SULBACTAM SODIUM 3000 MG: 2; 1 INJECTION, POWDER, FOR SOLUTION INTRAMUSCULAR; INTRAVENOUS at 00:15

## 2023-10-29 RX ADMIN — SODIUM CHLORIDE: 9 INJECTION, SOLUTION INTRAVENOUS at 12:34

## 2023-10-29 RX ADMIN — Medication 1 CAPSULE: at 20:00

## 2023-10-29 RX ADMIN — AMPICILLIN SODIUM AND SULBACTAM SODIUM 3000 MG: 2; 1 INJECTION, POWDER, FOR SOLUTION INTRAMUSCULAR; INTRAVENOUS at 12:35

## 2023-10-29 RX ADMIN — MORPHINE SULFATE 2 MG: 2 INJECTION, SOLUTION INTRAMUSCULAR; INTRAVENOUS at 00:19

## 2023-10-29 RX ADMIN — Medication 400 MG: at 20:02

## 2023-10-29 RX ADMIN — MORPHINE SULFATE 2 MG: 2 INJECTION, SOLUTION INTRAMUSCULAR; INTRAVENOUS at 23:49

## 2023-10-29 RX ADMIN — Medication 10 ML: at 20:00

## 2023-10-29 RX ADMIN — Medication 10 ML: at 08:47

## 2023-10-29 RX ADMIN — TRAMADOL HYDROCHLORIDE 50 MG: 50 TABLET, COATED ORAL at 05:50

## 2023-10-29 RX ADMIN — SODIUM CHLORIDE: 9 INJECTION, SOLUTION INTRAVENOUS at 18:27

## 2023-10-29 RX ADMIN — AMPICILLIN SODIUM AND SULBACTAM SODIUM 3000 MG: 2; 1 INJECTION, POWDER, FOR SOLUTION INTRAMUSCULAR; INTRAVENOUS at 23:49

## 2023-10-29 RX ADMIN — DIAZEPAM 2 MG: 2 TABLET ORAL at 20:01

## 2023-10-29 RX ADMIN — AMPICILLIN SODIUM AND SULBACTAM SODIUM 3000 MG: 2; 1 INJECTION, POWDER, FOR SOLUTION INTRAMUSCULAR; INTRAVENOUS at 18:27

## 2023-10-29 RX ADMIN — METOPROLOL SUCCINATE 25 MG: 25 TABLET, EXTENDED RELEASE ORAL at 08:45

## 2023-10-29 RX ADMIN — PANTOPRAZOLE SODIUM 40 MG: 40 TABLET, DELAYED RELEASE ORAL at 05:42

## 2023-10-29 RX ADMIN — TRAMADOL HYDROCHLORIDE 50 MG: 50 TABLET, COATED ORAL at 20:01

## 2023-10-29 ASSESSMENT — PAIN DESCRIPTION - DESCRIPTORS
DESCRIPTORS: DISCOMFORT;ACHING
DESCRIPTORS: DISCOMFORT;SORE

## 2023-10-29 ASSESSMENT — PAIN SCALES - GENERAL
PAINLEVEL_OUTOF10: 8
PAINLEVEL_OUTOF10: 8
PAINLEVEL_OUTOF10: 10
PAINLEVEL_OUTOF10: 8
PAINLEVEL_OUTOF10: 0

## 2023-10-29 ASSESSMENT — PAIN DESCRIPTION - LOCATION
LOCATION: RIB CAGE;BACK
LOCATION: BACK;ABDOMEN

## 2023-10-29 ASSESSMENT — PAIN - FUNCTIONAL ASSESSMENT
PAIN_FUNCTIONAL_ASSESSMENT: ACTIVITIES ARE NOT PREVENTED
PAIN_FUNCTIONAL_ASSESSMENT: PREVENTS OR INTERFERES WITH MANY ACTIVE NOT PASSIVE ACTIVITIES

## 2023-10-29 ASSESSMENT — PAIN DESCRIPTION - ORIENTATION: ORIENTATION: MID

## 2023-10-29 NOTE — PLAN OF CARE

## 2023-10-29 NOTE — PROGRESS NOTES
Internal Medicine Progress Note    Patient's name: Carlos Garcia  : 1940  Chief complaints (on day of admission): Other (Positive BC)  Admission date: 10/24/2023  Date of service: 10/29/2023   Room: [address] MED SURG/TELE  Primary care physician: Sherrell Ayala MD  Reason for visit: Follow-up for bacteremia, pleural effusion    Subjective  Cuellarnacny Childers was seen and examined. She was resting comfortably in bed. Nursing staff is present at bedside. She reports she just got up to go to the bathroom. She is concerned about some swelling around her ankles. Plan is for a pigtail drain tomorrow for her pleural effusion. She is seen on room air and in NAD. She denies any shortness of breath or difficulty breathing. She is tolerating her antibiotics per ID. She denies any needs today. No further concerns from nursing. Review of Systems  There are no new complaints of chest pain, shortness of breath, nausea, vomiting, diarrhea, constipation.     Hospital Medications  Current Facility-Administered Medications   Medication Dose Route Frequency Provider Last Rate Last Admin    lactated ringers IV soln infusion   IntraVENous Continuous Sada Ureña MD        sodium chloride flush 0.9 % injection 5-40 mL  5-40 mL IntraVENous 2 times per day Sada Ureña MD   10 mL at 10/28/23 2123    sodium chloride flush 0.9 % injection 5-40 mL  5-40 mL IntraVENous PRN Sada Ureña MD        0.9 % sodium chloride infusion   IntraVENous PRN Sada Ureña MD        HYDROmorphone (DILAUDID) injection 0.25 mg  0.25 mg IntraVENous Q5 Min PRN Sada Ureña MD        HYDROmorphone (DILAUDID) injection 0.5 mg  0.5 mg IntraVENous Q5 Min PRN Sada Ureña MD        labetalol (NORMODYNE;TRANDATE) injection 10 mg  10 mg IntraVENous Q15 Min PRN Sada Ureña MD        Or    hydrALAZINE (APRESOLINE) injection 10 mg  10 mg IntraVENous Q15 Min PRN Sada Ureña MD

## 2023-10-30 ENCOUNTER — APPOINTMENT (OUTPATIENT)
Dept: GENERAL RADIOLOGY | Age: 83
DRG: 871 | End: 2023-10-30
Payer: MEDICARE

## 2023-10-30 LAB
ALBUMIN SERPL-MCNC: 3.1 G/DL (ref 3.5–5.2)
ALP SERPL-CCNC: 77 U/L (ref 35–104)
ALT SERPL-CCNC: 9 U/L (ref 0–32)
ANION GAP SERPL CALCULATED.3IONS-SCNC: 6 MMOL/L (ref 7–16)
AST SERPL-CCNC: 15 U/L (ref 0–31)
BASOPHILS # BLD: 0.03 K/UL (ref 0–0.2)
BASOPHILS NFR BLD: 1 % (ref 0–2)
BILIRUB SERPL-MCNC: 0.2 MG/DL (ref 0–1.2)
BUN SERPL-MCNC: 12 MG/DL (ref 6–23)
CALCIUM SERPL-MCNC: 8.4 MG/DL (ref 8.6–10.2)
CHLORIDE SERPL-SCNC: 102 MMOL/L (ref 98–107)
CO2 SERPL-SCNC: 30 MMOL/L (ref 22–29)
CREAT SERPL-MCNC: 0.5 MG/DL (ref 0.5–1)
EOSINOPHIL # BLD: 0.07 K/UL (ref 0.05–0.5)
EOSINOPHILS RELATIVE PERCENT: 2 % (ref 0–6)
ERYTHROCYTE [DISTWIDTH] IN BLOOD BY AUTOMATED COUNT: 13.8 % (ref 11.5–15)
GFR SERPL CREATININE-BSD FRML MDRD: >60 ML/MIN/1.73M2
GLUCOSE SERPL-MCNC: 96 MG/DL (ref 74–99)
HCT VFR BLD AUTO: 29.7 % (ref 34–48)
HGB BLD-MCNC: 9.4 G/DL (ref 11.5–15.5)
IMM GRANULOCYTES # BLD AUTO: <0.03 K/UL (ref 0–0.58)
IMM GRANULOCYTES NFR BLD: 0 % (ref 0–5)
LYMPHOCYTES NFR BLD: 1.09 K/UL (ref 1.5–4)
LYMPHOCYTES RELATIVE PERCENT: 38 % (ref 20–42)
MCH RBC QN AUTO: 28.3 PG (ref 26–35)
MCHC RBC AUTO-ENTMCNC: 31.6 G/DL (ref 32–34.5)
MCV RBC AUTO: 89.5 FL (ref 80–99.9)
MONOCYTES NFR BLD: 0.24 K/UL (ref 0.1–0.95)
MONOCYTES NFR BLD: 8 % (ref 2–12)
NEUTROPHILS NFR BLD: 50 % (ref 43–80)
NEUTS SEG NFR BLD: 1.45 K/UL (ref 1.8–7.3)
NON-GYN CYTOLOGY REPORT: NORMAL
PLATELET # BLD AUTO: 219 K/UL (ref 130–450)
PMV BLD AUTO: 8.8 FL (ref 7–12)
POTASSIUM SERPL-SCNC: 3.6 MMOL/L (ref 3.5–5)
PROT SERPL-MCNC: 5 G/DL (ref 6.4–8.3)
RBC # BLD AUTO: 3.32 M/UL (ref 3.5–5.5)
SODIUM SERPL-SCNC: 138 MMOL/L (ref 132–146)
WBC OTHER # BLD: 2.9 K/UL (ref 4.5–11.5)

## 2023-10-30 PROCEDURE — 6360000002 HC RX W HCPCS: Performed by: REGISTERED NURSE

## 2023-10-30 PROCEDURE — 2580000003 HC RX 258: Performed by: STUDENT IN AN ORGANIZED HEALTH CARE EDUCATION/TRAINING PROGRAM

## 2023-10-30 PROCEDURE — 6370000000 HC RX 637 (ALT 250 FOR IP): Performed by: NURSE PRACTITIONER

## 2023-10-30 PROCEDURE — 6370000000 HC RX 637 (ALT 250 FOR IP): Performed by: INTERNAL MEDICINE

## 2023-10-30 PROCEDURE — 6360000002 HC RX W HCPCS: Performed by: NURSE PRACTITIONER

## 2023-10-30 PROCEDURE — 1200000000 HC SEMI PRIVATE

## 2023-10-30 PROCEDURE — 85025 COMPLETE CBC W/AUTO DIFF WBC: CPT

## 2023-10-30 PROCEDURE — 2580000003 HC RX 258: Performed by: REGISTERED NURSE

## 2023-10-30 PROCEDURE — 80053 COMPREHEN METABOLIC PANEL: CPT

## 2023-10-30 PROCEDURE — 2580000003 HC RX 258: Performed by: INTERNAL MEDICINE

## 2023-10-30 PROCEDURE — 71045 X-RAY EXAM CHEST 1 VIEW: CPT

## 2023-10-30 PROCEDURE — 97530 THERAPEUTIC ACTIVITIES: CPT

## 2023-10-30 RX ADMIN — Medication 1 CAPSULE: at 20:38

## 2023-10-30 RX ADMIN — AMPICILLIN SODIUM AND SULBACTAM SODIUM 3000 MG: 2; 1 INJECTION, POWDER, FOR SOLUTION INTRAMUSCULAR; INTRAVENOUS at 18:27

## 2023-10-30 RX ADMIN — DIAZEPAM 2 MG: 2 TABLET ORAL at 13:40

## 2023-10-30 RX ADMIN — SODIUM CHLORIDE, PRESERVATIVE FREE 10 ML: 5 INJECTION INTRAVENOUS at 20:38

## 2023-10-30 RX ADMIN — AMPICILLIN SODIUM AND SULBACTAM SODIUM 3000 MG: 2; 1 INJECTION, POWDER, FOR SOLUTION INTRAMUSCULAR; INTRAVENOUS at 12:32

## 2023-10-30 RX ADMIN — AMPICILLIN SODIUM AND SULBACTAM SODIUM 3000 MG: 2; 1 INJECTION, POWDER, FOR SOLUTION INTRAMUSCULAR; INTRAVENOUS at 06:18

## 2023-10-30 RX ADMIN — TRAMADOL HYDROCHLORIDE 50 MG: 50 TABLET, COATED ORAL at 07:13

## 2023-10-30 RX ADMIN — STANDARDIZED SENNA CONCENTRATE 8.6 MG: 8.6 TABLET ORAL at 09:23

## 2023-10-30 RX ADMIN — TRAMADOL HYDROCHLORIDE 50 MG: 50 TABLET, COATED ORAL at 22:32

## 2023-10-30 RX ADMIN — Medication 10 ML: at 20:38

## 2023-10-30 RX ADMIN — STANDARDIZED SENNA CONCENTRATE 8.6 MG: 8.6 TABLET ORAL at 20:38

## 2023-10-30 RX ADMIN — METOPROLOL SUCCINATE 25 MG: 25 TABLET, EXTENDED RELEASE ORAL at 09:23

## 2023-10-30 RX ADMIN — Medication 400 MG: at 20:38

## 2023-10-30 RX ADMIN — LISINOPRIL 10 MG: 10 TABLET ORAL at 09:23

## 2023-10-30 RX ADMIN — PANTOPRAZOLE SODIUM 40 MG: 40 TABLET, DELAYED RELEASE ORAL at 09:23

## 2023-10-30 RX ADMIN — Medication 10 ML: at 09:24

## 2023-10-30 RX ADMIN — MORPHINE SULFATE 2 MG: 2 INJECTION, SOLUTION INTRAMUSCULAR; INTRAVENOUS at 05:21

## 2023-10-30 ASSESSMENT — PAIN SCALES - GENERAL
PAINLEVEL_OUTOF10: 8

## 2023-10-30 ASSESSMENT — PAIN DESCRIPTION - LOCATION: LOCATION: BACK

## 2023-10-30 NOTE — PROGRESS NOTES
[S32.030A]     Sinus node dysfunction (HCC) [I49.5]     A-fib ([address]) [I48.91]     Primary hypertension [I10]          Plan  Enterococcus bacteremia-GI source?:  BC's noted-- awaiting final sensitivities   ABX per ID-Unasyn IV  CIRILO neg for vegetation 10/27     Pleural effusion:  CT noted  Pulmonology consult appreciated--pigtail drain placement on 10/31     Persistent abdominal pain-- radiation from the thoracic compression fracture?:  CT abd/pel noted  GB US noted  sp EGD 10/12-- 2 biopsies taken, results reviewed with patient showing no signs of malignancy  IV morphine prn pain  PRN Zofran for nausea  GI consult appreciated-Case discussed on 10/26     H/o T11 compression fracture:  MRI noted above  Ortho spine consult appreciated--SP T11 kyphoplasty 10/14     Moderate to severe protein calorie malnutrition  Dietary consult to make recommendations to improve oral intake  Agree with oral nutritional supplements to be given  Encourage p.o. intake and activity out of bed     A-fib  Currently in sinus rhythm   BB  Eliquis     Chronic anemia  Hemoglobin remains stable  Recommend repeat blood counts in the nursing home in the next 2 to 3 days  PT-17/24  OT-16/24     Urinary retention:  Urology consultation  David[place] in place    PT AM-PAC-- 17/24  Follow labs   DVT prophylaxis  Please see orders for further management and care. Discharge plan: SNF when medically stable-possibly tomorrow/early this week after pigtail drain placement    Electronically signed by Mil Mcdonald DO on 10/30/2023 at 11:38 AM    I can be reached through [facility].

## 2023-10-30 NOTE — PROGRESS NOTES
Call med to floor and spoke with Néstor Henderson and Berenice Calderon regarding need for CXR to assess fluid and will plan on tomorrow for procedure d/t eliquis given 10/29/23.

## 2023-10-30 NOTE — CARE COORDINATION
CIRILO negative; still on IV unasyn; CIRILO neg for VEG. for pigtail cath placement AM for pleural effusion. AUTH ; WILL NEED UPDATED PT/OT EVALS; NEW PRECERT FOR Lj Patel. Transport forms on chart. facility can accommodate care of pigtail cath. Will follow. Carol Greer.

## 2023-10-30 NOTE — PROGRESS NOTES
Physical Therapy  Facility/Department: [address] MED SURG/TELE  Physical Therapy Treatment Note    Name: Ted Vanessa  : 1940  MRN: [id number]  Date of Service: 10/30/2023    Attending Provider:  Broderick Lan DO    Evaluating PT:  Carolyn Mcmullen P.T. Room #:  2676/3682-Q  Diagnosis:  Bacteremia [R78.81]  Pertinent PMHx/PSHx:  kyphoplasty T11 10/14/23  Precautions:  Falls, bed/chair alarm    SUBJECTIVE:    Pt lives alone in a 1 story home with 1+1+1 steps to enter. Pt ambulated with a rollator ww PTA. Pt came in from [address] [place] [address]. OBJECTIVE:   Initial Evaluation  Date: 10/25/23 Treatment  10/30/2023 Short Term/ Long Term   Goals   Was pt agreeable to Eval/treatment? yes yes    Does pt have pain? C/o pain in her ribs and stomach Back pain    Bed Mobility  Rolling: supervision  Supine to sit: supervision  Sit to supine: supervision  Scooting: supervision Rolling: Min A  Supine to sit: Min A  Sit to supine: SBA  Scooting: SBA seated to EOB Independent    Transfers Sit to stand: SBA  Stand to sit: SBA  Stand pivot: SBA with ww Sit <> stand: Min A Independent    Ambulation   15 feet + 40 feet x2 with ww SBA 15 + 70 + 90 feet with [employer] CG/ feet with ww supervision   Stair negotiation: ascended and descended NA, pt c/o fatigue and weakness with amb NT 1 step 3x with ww SBA or 3 steps with 1 rail SBA   AM-PAC 6 Clicks 47/89 24/      Pt is alert, following instruction  Balance: fair/fair minus dynamic with [employer]    Pt performed therapeutic exercise of the following: NT    Patient education/treatment  Pt was educated on UE usage for transfer safety, gait mechanics for posture/staying within [employer] base of support    Patient response to education:   Pt verbalized understanding Pt demonstrated skill Pt requires further education in this area   yes With instruction yes     ASSESSMENT:   Comments: Nurse ok with Rx. Pt assisted to EOB, sat EOB SBA.  Gait slow and consistent, Pt at times unsteady, required intermittent light hands on assist for balance/safety. Pt briefly sat in a chair, states too uncomfortable to sit in a chair for a prolonged time due to her prior hypoplasty surgeries. Pt states wants to sit on the toilet for a while, was assisted there, staff notified. Pt was in the bathroom with instruction to pull string. Chair/bed alarm: NA    Time in 1449  Time out 1505   Total Treatment Time 16 minutes   CPT codes:     Therapeutic activities 78123 16 minutes   Therapeutic exercises 36623 0 minutes       Pt is making fair progress toward established Physical Therapy goals. Continue with physical therapy current plan of care.     Pauly Hernández PTA   License Number: [id number]

## 2023-10-30 NOTE — PROGRESS NOTES
Message left with Dr Hoy Bumpers of IR unable to do patient today, due to receiving eliquis yesterday morning, IR also requesting for a chest x ray today. Awaiting for return call.

## 2023-10-31 ENCOUNTER — APPOINTMENT (OUTPATIENT)
Dept: ULTRASOUND IMAGING | Age: 83
DRG: 871 | End: 2023-10-31
Payer: MEDICARE

## 2023-10-31 ENCOUNTER — APPOINTMENT (OUTPATIENT)
Dept: GENERAL RADIOLOGY | Age: 83
DRG: 871 | End: 2023-10-31
Payer: MEDICARE

## 2023-10-31 LAB
ALBUMIN SERPL-MCNC: 3.2 G/DL (ref 3.5–5.2)
ALP SERPL-CCNC: 80 U/L (ref 35–104)
ALT SERPL-CCNC: 8 U/L (ref 0–32)
ANION GAP SERPL CALCULATED.3IONS-SCNC: 7 MMOL/L (ref 7–16)
APPEARANCE FLD: NORMAL
AST SERPL-CCNC: 15 U/L (ref 0–31)
BASOPHILS # BLD: 0.03 K/UL (ref 0–0.2)
BASOPHILS NFR BLD: 1 % (ref 0–2)
BILIRUB SERPL-MCNC: 0.2 MG/DL (ref 0–1.2)
BODY FLD TYPE: NORMAL
BUN SERPL-MCNC: 10 MG/DL (ref 6–23)
CALCIUM SERPL-MCNC: 8.7 MG/DL (ref 8.6–10.2)
CHLORIDE SERPL-SCNC: 104 MMOL/L (ref 98–107)
CHOLESTEROL FLUID: 43 MG/DL
CLOT CHECK: NORMAL
CO2 SERPL-SCNC: 29 MMOL/L (ref 22–29)
COLOR FLD: YELLOW
CREAT SERPL-MCNC: 0.6 MG/DL (ref 0.5–1)
CRP SERPL HS-MCNC: <3 MG/L (ref 0–5)
EOSINOPHIL # BLD: 0.06 K/UL (ref 0.05–0.5)
EOSINOPHILS RELATIVE PERCENT: 2 % (ref 0–6)
ERYTHROCYTE [DISTWIDTH] IN BLOOD BY AUTOMATED COUNT: 13.8 % (ref 11.5–15)
ERYTHROCYTE [SEDIMENTATION RATE] IN BLOOD BY WESTERGREN METHOD: 10 MM/HR (ref 0–20)
GFR SERPL CREATININE-BSD FRML MDRD: >60 ML/MIN/1.73M2
GLUCOSE FLD-MCNC: 105 MG/DL
GLUCOSE SERPL-MCNC: 92 MG/DL (ref 74–99)
HCT VFR BLD AUTO: 30.1 % (ref 34–48)
HGB BLD-MCNC: 9.7 G/DL (ref 11.5–15.5)
IMM GRANULOCYTES # BLD AUTO: <0.03 K/UL (ref 0–0.58)
IMM GRANULOCYTES NFR BLD: 0 % (ref 0–5)
LDH FLD L TO P-CCNC: 102 U/L
LYMPHOCYTES NFR BLD: 0.91 K/UL (ref 1.5–4)
LYMPHOCYTES RELATIVE PERCENT: 33 % (ref 20–42)
MAGNESIUM SERPL-MCNC: 2.1 MG/DL (ref 1.6–2.6)
MCH RBC QN AUTO: 28.9 PG (ref 26–35)
MCHC RBC AUTO-ENTMCNC: 32.2 G/DL (ref 32–34.5)
MCV RBC AUTO: 89.6 FL (ref 80–99.9)
MONOCYTES NFR BLD: 0.28 K/UL (ref 0.1–0.95)
MONOCYTES NFR BLD: 10 % (ref 2–12)
MONOCYTES NFR FLD: 98 %
NEUTROPHILS NFR BLD: 53 % (ref 43–80)
NEUTROPHILS NFR FLD: 2 %
NEUTS SEG NFR BLD: 1.48 K/UL (ref 1.8–7.3)
PLATELET # BLD AUTO: 210 K/UL (ref 130–450)
PMV BLD AUTO: 8.8 FL (ref 7–12)
POTASSIUM SERPL-SCNC: 3.5 MMOL/L (ref 3.5–5)
PROT FLD-MCNC: 2 G/DL
PROT SERPL-MCNC: 5.2 G/DL (ref 6.4–8.3)
RBC # BLD AUTO: 3.36 M/UL (ref 3.5–5.5)
RBC # FLD: <2000 CELLS/UL
SODIUM SERPL-SCNC: 140 MMOL/L (ref 132–146)
SPECIMEN TYPE: NORMAL
WBC # FLD: 643 CELLS/UL
WBC OTHER # BLD: 2.8 K/UL (ref 4.5–11.5)

## 2023-10-31 PROCEDURE — 85025 COMPLETE CBC W/AUTO DIFF WBC: CPT

## 2023-10-31 PROCEDURE — 83735 ASSAY OF MAGNESIUM: CPT

## 2023-10-31 PROCEDURE — 2580000003 HC RX 258: Performed by: INTERNAL MEDICINE

## 2023-10-31 PROCEDURE — 85652 RBC SED RATE AUTOMATED: CPT

## 2023-10-31 PROCEDURE — 83615 LACTATE (LD) (LDH) ENZYME: CPT

## 2023-10-31 PROCEDURE — 88112 CYTOPATH CELL ENHANCE TECH: CPT

## 2023-10-31 PROCEDURE — 87205 SMEAR GRAM STAIN: CPT

## 2023-10-31 PROCEDURE — 87015 SPECIMEN INFECT AGNT CONCNTJ: CPT

## 2023-10-31 PROCEDURE — 6360000002 HC RX W HCPCS: Performed by: REGISTERED NURSE

## 2023-10-31 PROCEDURE — 6370000000 HC RX 637 (ALT 250 FOR IP): Performed by: INTERNAL MEDICINE

## 2023-10-31 PROCEDURE — 82465 ASSAY BLD/SERUM CHOLESTEROL: CPT

## 2023-10-31 PROCEDURE — 88305 TISSUE EXAM BY PATHOLOGIST: CPT

## 2023-10-31 PROCEDURE — 6370000000 HC RX 637 (ALT 250 FOR IP): Performed by: NURSE PRACTITIONER

## 2023-10-31 PROCEDURE — 87206 SMEAR FLUORESCENT/ACID STAI: CPT

## 2023-10-31 PROCEDURE — 2500000003 HC RX 250 WO HCPCS: Performed by: RADIOLOGY

## 2023-10-31 PROCEDURE — 80053 COMPREHEN METABOLIC PANEL: CPT

## 2023-10-31 PROCEDURE — 2580000003 HC RX 258: Performed by: REGISTERED NURSE

## 2023-10-31 PROCEDURE — 89051 BODY FLUID CELL COUNT: CPT

## 2023-10-31 PROCEDURE — 0W993ZZ DRAINAGE OF RIGHT PLEURAL CAVITY, PERCUTANEOUS APPROACH: ICD-10-PCS | Performed by: INTERNAL MEDICINE

## 2023-10-31 PROCEDURE — 1200000000 HC SEMI PRIVATE

## 2023-10-31 PROCEDURE — 84157 ASSAY OF PROTEIN OTHER: CPT

## 2023-10-31 PROCEDURE — 87070 CULTURE OTHR SPECIMN AEROBIC: CPT

## 2023-10-31 PROCEDURE — 2709999900 US THORACENTESIS

## 2023-10-31 PROCEDURE — 82945 GLUCOSE OTHER FLUID: CPT

## 2023-10-31 PROCEDURE — 71045 X-RAY EXAM CHEST 1 VIEW: CPT

## 2023-10-31 PROCEDURE — 86140 C-REACTIVE PROTEIN: CPT

## 2023-10-31 RX ORDER — LIDOCAINE HYDROCHLORIDE 10 MG/ML
5 INJECTION, SOLUTION EPIDURAL; INFILTRATION; INTRACAUDAL; PERINEURAL ONCE
Status: COMPLETED | OUTPATIENT
Start: 2023-10-31 | End: 2023-11-01

## 2023-10-31 RX ORDER — SODIUM CHLORIDE 0.9 % (FLUSH) 0.9 %
5-40 SYRINGE (ML) INJECTION PRN
Status: DISCONTINUED | OUTPATIENT
Start: 2023-10-31 | End: 2023-11-02 | Stop reason: HOSPADM

## 2023-10-31 RX ORDER — SODIUM CHLORIDE 0.9 % (FLUSH) 0.9 %
5-40 SYRINGE (ML) INJECTION EVERY 12 HOURS SCHEDULED
Status: DISCONTINUED | OUTPATIENT
Start: 2023-10-31 | End: 2023-11-02 | Stop reason: HOSPADM

## 2023-10-31 RX ORDER — HEPARIN 100 UNIT/ML
3 SYRINGE INTRAVENOUS EVERY 12 HOURS SCHEDULED
Status: DISCONTINUED | OUTPATIENT
Start: 2023-10-31 | End: 2023-11-02 | Stop reason: HOSPADM

## 2023-10-31 RX ORDER — HEPARIN 100 UNIT/ML
3 SYRINGE INTRAVENOUS PRN
Status: DISCONTINUED | OUTPATIENT
Start: 2023-10-31 | End: 2023-11-02 | Stop reason: HOSPADM

## 2023-10-31 RX ORDER — SODIUM CHLORIDE 9 MG/ML
INJECTION, SOLUTION INTRAVENOUS PRN
Status: DISCONTINUED | OUTPATIENT
Start: 2023-10-31 | End: 2023-11-02 | Stop reason: HOSPADM

## 2023-10-31 RX ORDER — LIDOCAINE HYDROCHLORIDE 10 MG/ML
INJECTION, SOLUTION INFILTRATION; PERINEURAL PRN
Status: COMPLETED | OUTPATIENT
Start: 2023-10-31 | End: 2023-10-31

## 2023-10-31 RX ADMIN — DIAZEPAM 2 MG: 2 TABLET ORAL at 20:15

## 2023-10-31 RX ADMIN — AMPICILLIN SODIUM AND SULBACTAM SODIUM 3000 MG: 2; 1 INJECTION, POWDER, FOR SOLUTION INTRAMUSCULAR; INTRAVENOUS at 20:13

## 2023-10-31 RX ADMIN — Medication 10 ML: at 11:08

## 2023-10-31 RX ADMIN — AMPICILLIN SODIUM AND SULBACTAM SODIUM 3000 MG: 2; 1 INJECTION, POWDER, FOR SOLUTION INTRAMUSCULAR; INTRAVENOUS at 00:46

## 2023-10-31 RX ADMIN — TRAMADOL HYDROCHLORIDE 50 MG: 50 TABLET, COATED ORAL at 23:45

## 2023-10-31 RX ADMIN — Medication 1 CAPSULE: at 20:15

## 2023-10-31 RX ADMIN — Medication 10 ML: at 20:13

## 2023-10-31 RX ADMIN — METOPROLOL SUCCINATE 25 MG: 25 TABLET, EXTENDED RELEASE ORAL at 11:07

## 2023-10-31 RX ADMIN — PANTOPRAZOLE SODIUM 40 MG: 40 TABLET, DELAYED RELEASE ORAL at 05:49

## 2023-10-31 RX ADMIN — DIAZEPAM 2 MG: 2 TABLET ORAL at 01:25

## 2023-10-31 RX ADMIN — AMPICILLIN SODIUM AND SULBACTAM SODIUM 3000 MG: 2; 1 INJECTION, POWDER, FOR SOLUTION INTRAMUSCULAR; INTRAVENOUS at 14:24

## 2023-10-31 RX ADMIN — LISINOPRIL 10 MG: 10 TABLET ORAL at 11:07

## 2023-10-31 RX ADMIN — AMPICILLIN SODIUM AND SULBACTAM SODIUM 3000 MG: 2; 1 INJECTION, POWDER, FOR SOLUTION INTRAMUSCULAR; INTRAVENOUS at 05:59

## 2023-10-31 RX ADMIN — Medication 400 MG: at 20:15

## 2023-10-31 RX ADMIN — LIDOCAINE HYDROCHLORIDE 10 ML: 10 INJECTION, SOLUTION INFILTRATION; PERINEURAL at 09:51

## 2023-10-31 RX ADMIN — TRAMADOL HYDROCHLORIDE 50 MG: 50 TABLET, COATED ORAL at 11:07

## 2023-10-31 RX ADMIN — STANDARDIZED SENNA CONCENTRATE 8.6 MG: 8.6 TABLET ORAL at 20:15

## 2023-10-31 RX ADMIN — STANDARDIZED SENNA CONCENTRATE 8.6 MG: 8.6 TABLET ORAL at 11:08

## 2023-10-31 ASSESSMENT — PAIN - FUNCTIONAL ASSESSMENT: PAIN_FUNCTIONAL_ASSESSMENT: ACTIVITIES ARE NOT PREVENTED

## 2023-10-31 ASSESSMENT — PAIN SCALES - GENERAL
PAINLEVEL_OUTOF10: 0
PAINLEVEL_OUTOF10: 5
PAINLEVEL_OUTOF10: 0
PAINLEVEL_OUTOF10: 8

## 2023-10-31 ASSESSMENT — PAIN DESCRIPTION - DESCRIPTORS: DESCRIPTORS: ACHING;SORE

## 2023-10-31 ASSESSMENT — PAIN DESCRIPTION - LOCATION: LOCATION: RIB CAGE

## 2023-10-31 ASSESSMENT — PAIN DESCRIPTION - ORIENTATION: ORIENTATION: RIGHT;LEFT

## 2023-10-31 NOTE — OR NURSING
Patient brought into room, discussed procedure. Dr. Jamar Christianson answered all questions and concerns related to the procedure. Treatment consent signed. Patient positioned and sterile prepped for the procedure. 1% Lidocaine administered by Dr. Jamar Christianson. A total of 200  mL hazy yellow pleural fluid was taken. Patient tolerated procedure well. Site cleaned and dressed with a bandage. Vitals monitored and remained stable throughout. Nurse to nurse called and patient transported back to room.

## 2023-10-31 NOTE — PLAN OF CARE
Problem: ABCDS Injury Assessment  Goal: Absence of physical injury  10/31/2023 1019 by Ashley Tom RN  Outcome: Progressing  10/31/2023 1019 by Ashley Tom RN  Outcome: Progressing  10/30/2023 2147 by Morro Interiano RN  Outcome: Progressing     Problem: Discharge Planning  Goal: Discharge to home or other facility with appropriate resources  Recent Flowsheet Documentation  Taken 10/31/2023 0252 by Joaquim Peralta RN  Discharge to home or other facility with appropriate resources: Refer to discharge planning if patient needs post-hospital services based on physician order or complex needs related to functional status, cognitive ability or social support system  10/30/2023 2147 by Morro Interiano RN  Outcome: Progressing     Problem: ABCDS Injury Assessment  Goal: Absence of physical injury  10/31/2023 1019 by Ashley Tom RN  Outcome: Progressing  10/31/2023 1019 by Ashley Tom RN  Outcome: Progressing  10/30/2023 2147 by Morro Interiano RN  Outcome: Progressing

## 2023-10-31 NOTE — CONSULTS
Orthopedic Spine Surgery  Consult Note    CHIEF COMPLAINT:  back pain    HISTORY OF PRESENT ILLNESS:                The patient is a 78 y.o. female  with history of multiple kyphoplasties secondary to compression fractures. Most recent was kyphoplasty 10/14 for T11 compression fx. At the time patient had back pain, bilateral rib pain, and early satiety. She states back pain somewhat improved. Bilateral rib pain unchanged. Feels early satiety some improvement. States current pain radiates along lower ribs in flank area bilateral.  Pain worsened by movement and deep breathing.   Recently returned to room after thorocentesis, c/o of chills currently  Patient also on abx secondary to bacteremia  Denies radiating LE pain or numbness  Denies bowel/bladder changes or saddle anesthesia  Denies any traumatic events since last kyphoplasty      Past Medical History:        Diagnosis Date    Cancer ([address]) [address] left    8-18-16 for removal    Familial peripheral neuropathy     Hypertension     Lupus ([address])     Paroxysmal atrial fibrillation [facility])      Past Surgical History:        Pro[facility] Laterality Date    [facility]    EYE SURGERY  11/29/2012    right eye cataract with lens implant    EYE SURGERY Left 08/18/2016    removal of cataract with insertion of IOL    HYSTERECTOMY (CERVIX STATUS UNKNOWN)      PAIN MANAGEMENT PROCEDURE N/A 8/10/2023    L3-4 [facility]IDURAL [facility]EROID [facility] DO [facility] [address] [facility] [address] [facility] [address] [facility] [address] Bilateral 5/3/2023    L1 and L2 KYPHOPLASTY performed by Je Covarrubias MD at [address] 8/28/2023    T12, L3, L4 KYPHOPLASTY performed by Je Covarrubias MD at [address] 10/14/2023    KYPHOPLASTY T11 performed by Oliverio Robertson DO at [address] 10/12/2023    EGD BIOPSY performed by Jennifer Aguilera DO at [facility] ENDOSCOPY     Current Medications:   Current Facility-Administered Medications: lidocaine PF 1 % injection 5 mL, 5 mL, IntraDERmal, Once  sodium chloride flush 0.9 % injection 5-40 mL, 5-40 mL, IntraVENous, 2 times per day  sodium chloride flush 0.9 % injection 5-40 mL, 5-40 mL, IntraVENous, PRN  0.9 % sodium chloride infusion, , IntraVENous, PRN  heparin (PF) 100 UNIT/ML injection 300 Units, 3 mL, IntraVENous, 2 times per day  heparin (PF) 100 UNIT/ML injection 300 Units, 3 mL, IntraCATHeter, PRN  sodium chloride flush 0.9 % injection 5-40 mL, 5-40 mL, IntraVENous, 2 times per day  sodium chloride flush 0.9 % injection 5-40 mL, 5-40 mL, IntraVENous, PRN  0.9 % sodium chloride infusion, , IntraVENous, PRN  HYDROmorphone (DILAUDID) injection 0.25 mg, 0.25 mg, IntraVENous, Q5 Min PRN  HYDROmorphone (DILAUDID) injection 0.5 mg, 0.5 mg, IntraVENous, Q5 Min PRN  labetalol (NORMODYNE;TRANDATE) injection 10 mg, 10 mg, IntraVENous, Q15 Min PRN **OR** hydrALAZINE (APRESOLINE) injection 10 mg, 10 mg, IntraVENous, Q15 Min PRN  glucose chewable tablet 16 g, 4 tablet, Oral, PRN  dextrose bolus 10% 125 mL, 125 mL, IntraVENous, PRN **OR** dextrose bolus 10% 250 mL, 250 mL, IntraVENous, PRN  glucagon injection 1 mg, 1 mg, SubCUTAneous, PRN  dextrose 10 % infusion, , IntraVENous, Continuous PRN  lactobacillus (CULTURELLE) capsule 1 capsule, 1 capsule, Oral, Daily  pantoprazole (PROTONIX) tablet 40 mg, 40 mg, Oral, QAM AC  polyvinyl alcohol (LIQUIFILM TEARS) 1.4 % ophthalmic solution 1 drop, 1 drop, Both Eyes, BID PRN  lidocaine 4 % external patch 1 patch, 1 patch, TransDERmal, Daily  ampicillin-sulbactam (UNASYN) 3,000 mg in sodium chloride 0.9 % 100 mL IVPB, 3,000 mg, IntraVENous, Q6H  melatonin tablet 3 mg, 3 mg, Oral, Nightly PRN  sodium chloride flush 0.9 % injection 10 mL, 10 mL, IntraVENous, 2 times per day  sodium chloride flush 0.9 % injection 10 mL, 10 mL, IntraVENous, PRN  0.9 % sodium chloride infusion, , IntraVENous, PRN  potassium chloride (KLOR-CON M) extended release tablet 40 mEq,

## 2023-10-31 NOTE — CARE COORDINATION
Plan for return [facility]. Will need precert. for pigtail cath for pleural effusion today; [facility] 17/24. Await final ID plan for antibiotics. . (they can accommodate care of pigtail). Transport forms on chart. Will follow. Charlene Santos.

## 2023-10-31 NOTE — PLAN OF CARE
Problem: ABCDS Injury Assessment  Goal: Absence of physical injury  Outcome: Progressing     Problem: Skin/Tissue Integrity  Goal: Absence of new skin breakdown  Description: 1. Monitor for areas of redness and/or skin breakdown  2. Assess vascular access sites hourly  3. Every 4-6 hours minimum:  Change oxygen saturation probe site  4. Every 4-6 hours:  If on nasal continuous positive airway pressure, respiratory therapy assess nares and determine need for appliance change or resting period.   Outcome: Progressing     Problem: Discharge Planning  Goal: Discharge to home or other facility with appropriate resources  Outcome: Progressing

## 2023-10-31 NOTE — PLAN OF CARE
Problem: ABCDS Injury Assessment  Goal: Absence of physical injury  10/31/2023 1019 by Jason cM RN  Outcome: Progressing  10/30/2023 2147 by Lu Rios RN  Outcome: Progressing     Problem: Skin/Tissue Integrity  Goal: Absence of new skin breakdown  Description: 1. Monitor for areas of redness and/or skin breakdown  2. Assess vascular access sites hourly  3. Every 4-6 hours minimum:  Change oxygen saturation probe site  4. Every 4-6 hours:  If on nasal continuous positive airway pressure, respiratory therapy assess nares and determine need for appliance change or resting period.   10/31/2023 1019 by Jason Mc RN  Outcome: Progressing  10/30/2023 2147 by Lu Rios RN  Outcome: Progressing     Problem: Discharge Planning  Goal: Discharge to home or other facility with appropriate resources  Recent Flowsheet Documentation  Taken 10/31/2023 0252 by Xochitl De La Cruz RN  Discharge to home or other facility with appropriate resources: Refer to discharge planning if patient needs post-hospital services based on physician order or complex needs related to functional status, cognitive ability or social support system  10/30/2023 2147 by Lu Rios RN  Outcome: Progressing

## 2023-10-31 NOTE — BRIEF OP NOTE
Diagnosis: Pleural Effusion    Procedure: Ultrasound Guided Right Thoracentesis    Findings: Small pleural effusion, successful catheter placement by Dr. Gus Fierro (radiologist) with hazy yellow fluid return    Specimen: Approximately 60ml obtained and sent for analysis (orders from referring physician). Total amount of fluid removed was 200 ml    EBL: Minimal.     Complication: None immediately post procedure. Patient tolerated procedure well. Plan: Post thoracentesis chest xray. Return to floor/room following thoracentesis. Patient Specific Counseling (Will Not Stick From Patient To Patient): Tretinoin 0.05% purchased today in clinic. Detail Level: Zone Detail Level: Generalized

## 2023-11-01 LAB
ALBUMIN SERPL-MCNC: 3.1 G/DL (ref 3.5–5.2)
ALP SERPL-CCNC: 77 U/L (ref 35–104)
ALT SERPL-CCNC: 7 U/L (ref 0–32)
ANION GAP SERPL CALCULATED.3IONS-SCNC: 9 MMOL/L (ref 7–16)
AST SERPL-CCNC: 15 U/L (ref 0–31)
BASOPHILS # BLD: 0.04 K/UL (ref 0–0.2)
BASOPHILS NFR BLD: 1 % (ref 0–2)
BILIRUB SERPL-MCNC: 0.3 MG/DL (ref 0–1.2)
BUN SERPL-MCNC: 10 MG/DL (ref 6–23)
CALCIUM SERPL-MCNC: 8.5 MG/DL (ref 8.6–10.2)
CHLORIDE SERPL-SCNC: 103 MMOL/L (ref 98–107)
CO2 SERPL-SCNC: 27 MMOL/L (ref 22–29)
CREAT SERPL-MCNC: 0.6 MG/DL (ref 0.5–1)
EOSINOPHIL # BLD: 0.07 K/UL (ref 0.05–0.5)
EOSINOPHILS RELATIVE PERCENT: 2 % (ref 0–6)
ERYTHROCYTE [DISTWIDTH] IN BLOOD BY AUTOMATED COUNT: 13.8 % (ref 11.5–15)
GFR SERPL CREATININE-BSD FRML MDRD: >60 ML/MIN/1.73M2
GLUCOSE SERPL-MCNC: 85 MG/DL (ref 74–99)
HCT VFR BLD AUTO: 29.8 % (ref 34–48)
HGB BLD-MCNC: 9.4 G/DL (ref 11.5–15.5)
IMM GRANULOCYTES # BLD AUTO: <0.03 K/UL (ref 0–0.58)
IMM GRANULOCYTES NFR BLD: 0 % (ref 0–5)
LYMPHOCYTES NFR BLD: 1.05 K/UL (ref 1.5–4)
LYMPHOCYTES RELATIVE PERCENT: 30 % (ref 20–42)
MAGNESIUM SERPL-MCNC: 2.2 MG/DL (ref 1.6–2.6)
MCH RBC QN AUTO: 28.1 PG (ref 26–35)
MCHC RBC AUTO-ENTMCNC: 31.5 G/DL (ref 32–34.5)
MCV RBC AUTO: 89.2 FL (ref 80–99.9)
MONOCYTES NFR BLD: 0.3 K/UL (ref 0.1–0.95)
MONOCYTES NFR BLD: 9 % (ref 2–12)
NEUTROPHILS NFR BLD: 58 % (ref 43–80)
NEUTS SEG NFR BLD: 2.03 K/UL (ref 1.8–7.3)
NON-GYN CYTOLOGY REPORT: NORMAL
PLATELET # BLD AUTO: 217 K/UL (ref 130–450)
PMV BLD AUTO: 9.1 FL (ref 7–12)
POTASSIUM SERPL-SCNC: 3.5 MMOL/L (ref 3.5–5)
PROT SERPL-MCNC: 5.1 G/DL (ref 6.4–8.3)
RBC # BLD AUTO: 3.34 M/UL (ref 3.5–5.5)
SODIUM SERPL-SCNC: 139 MMOL/L (ref 132–146)
WBC OTHER # BLD: 3.5 K/UL (ref 4.5–11.5)

## 2023-11-01 PROCEDURE — C1751 CATH, INF, PER/CENT/MIDLINE: HCPCS

## 2023-11-01 PROCEDURE — 2580000003 HC RX 258: Performed by: INTERNAL MEDICINE

## 2023-11-01 PROCEDURE — 1200000000 HC SEMI PRIVATE

## 2023-11-01 PROCEDURE — 80053 COMPREHEN METABOLIC PANEL: CPT

## 2023-11-01 PROCEDURE — 76937 US GUIDE VASCULAR ACCESS: CPT

## 2023-11-01 PROCEDURE — 6370000000 HC RX 637 (ALT 250 FOR IP): Performed by: INTERNAL MEDICINE

## 2023-11-01 PROCEDURE — 02HV33Z INSERTION OF INFUSION DEVICE INTO SUPERIOR VENA CAVA, PERCUTANEOUS APPROACH: ICD-10-PCS | Performed by: INTERNAL MEDICINE

## 2023-11-01 PROCEDURE — 6370000000 HC RX 637 (ALT 250 FOR IP): Performed by: NURSE PRACTITIONER

## 2023-11-01 PROCEDURE — 2500000003 HC RX 250 WO HCPCS: Performed by: SPECIALIST

## 2023-11-01 PROCEDURE — 83735 ASSAY OF MAGNESIUM: CPT

## 2023-11-01 PROCEDURE — 85025 COMPLETE CBC W/AUTO DIFF WBC: CPT

## 2023-11-01 PROCEDURE — 6360000002 HC RX W HCPCS: Performed by: REGISTERED NURSE

## 2023-11-01 PROCEDURE — 2580000003 HC RX 258: Performed by: REGISTERED NURSE

## 2023-11-01 PROCEDURE — 36569 INSJ PICC 5 YR+ W/O IMAGING: CPT

## 2023-11-01 RX ORDER — LIDOCAINE 4 G/G
1 PATCH TOPICAL DAILY
DISCHARGE
Start: 2023-11-01

## 2023-11-01 RX ORDER — LACTOBACILLUS RHAMNOSUS GG 10B CELL
1 CAPSULE ORAL DAILY
DISCHARGE
Start: 2023-11-01

## 2023-11-01 RX ORDER — TRAMADOL HYDROCHLORIDE 50 MG/1
50 TABLET ORAL EVERY 6 HOURS PRN
Qty: 20 TABLET | Refills: 0 | Status: SHIPPED | OUTPATIENT
Start: 2023-11-01 | End: 2023-11-06

## 2023-11-01 RX ORDER — PANTOPRAZOLE SODIUM 40 MG/1
40 TABLET, DELAYED RELEASE ORAL
Qty: 30 TABLET | Refills: 0 | DISCHARGE
Start: 2023-11-02

## 2023-11-01 RX ADMIN — STANDARDIZED SENNA CONCENTRATE 8.6 MG: 8.6 TABLET ORAL at 08:33

## 2023-11-01 RX ADMIN — STANDARDIZED SENNA CONCENTRATE 8.6 MG: 8.6 TABLET ORAL at 20:23

## 2023-11-01 RX ADMIN — LIDOCAINE HYDROCHLORIDE 1 ML: 10 SOLUTION INTRAVENOUS at 15:10

## 2023-11-01 RX ADMIN — SODIUM CHLORIDE, PRESERVATIVE FREE 20 ML: 5 INJECTION INTRAVENOUS at 15:09

## 2023-11-01 RX ADMIN — PANTOPRAZOLE SODIUM 40 MG: 40 TABLET, DELAYED RELEASE ORAL at 05:14

## 2023-11-01 RX ADMIN — METOPROLOL SUCCINATE 25 MG: 25 TABLET, EXTENDED RELEASE ORAL at 08:33

## 2023-11-01 RX ADMIN — Medication 400 MG: at 20:23

## 2023-11-01 RX ADMIN — Medication 10 ML: at 08:38

## 2023-11-01 RX ADMIN — AMPICILLIN SODIUM AND SULBACTAM SODIUM 3000 MG: 2; 1 INJECTION, POWDER, FOR SOLUTION INTRAMUSCULAR; INTRAVENOUS at 08:37

## 2023-11-01 RX ADMIN — Medication 10 ML: at 20:24

## 2023-11-01 RX ADMIN — DIAZEPAM 2 MG: 2 TABLET ORAL at 22:28

## 2023-11-01 RX ADMIN — AMPICILLIN SODIUM AND SULBACTAM SODIUM 3000 MG: 2; 1 INJECTION, POWDER, FOR SOLUTION INTRAMUSCULAR; INTRAVENOUS at 02:10

## 2023-11-01 RX ADMIN — LISINOPRIL 10 MG: 10 TABLET ORAL at 08:34

## 2023-11-01 RX ADMIN — Medication 1 CAPSULE: at 20:23

## 2023-11-01 RX ADMIN — AMPICILLIN SODIUM AND SULBACTAM SODIUM 3000 MG: 2; 1 INJECTION, POWDER, FOR SOLUTION INTRAMUSCULAR; INTRAVENOUS at 20:22

## 2023-11-01 RX ADMIN — AMPICILLIN SODIUM AND SULBACTAM SODIUM 3000 MG: 2; 1 INJECTION, POWDER, FOR SOLUTION INTRAMUSCULAR; INTRAVENOUS at 15:46

## 2023-11-01 ASSESSMENT — PAIN SCALES - GENERAL
PAINLEVEL_OUTOF10: 0

## 2023-11-01 ASSESSMENT — PAIN - FUNCTIONAL ASSESSMENT: PAIN_FUNCTIONAL_ASSESSMENT: ACTIVITIES ARE NOT PREVENTED

## 2023-11-01 ASSESSMENT — PAIN DESCRIPTION - PAIN TYPE: TYPE: CHRONIC PAIN

## 2023-11-01 ASSESSMENT — PAIN SCALES - WONG BAKER
WONGBAKER_NUMERICALRESPONSE: 0
WONGBAKER_NUMERICALRESPONSE: 0

## 2023-11-01 NOTE — PROCEDURES
PICC   Catheter insertion date: 11/1/2023     Product Number:  [id number]   Lot No: [id number]   Gauge: 17   Lumen: 4.5 [place]/ SINGLE   R Basilic    Vein Diameter: 0.44CM   Arm circumference at insertion site: 26CM   Catheter Length: 43CM   Internal Length: 42CM   External Catheter Length: 1CM   Ultrasound Used: YES  VPS [place] confirms PICC tip is placed in the lower 1/3 of the SVC or at the Cavoatrial junction. Floor nurse notified PICC is okay to use. : EJ Pandey RN, VA-BC  ASSISTANT: Xiomara Arredondo RN PAST MEDICAL HISTORY:  [place]     Bacteremia     COPD (chronic obstructive pulmonary disease)     Depression     Epilepsy     GERD (gastroesophageal reflux disease)     H/O Raynaud's syndrome     Heroin abuse     HTN (hypertension)     Rheumatoid arthritis     Sepsis     Smoker     Systemic lupus erythematosus

## 2023-11-01 NOTE — DISCHARGE INSTR - COC
Continuity of Care Form    Patient Name: Cynthia Padilla   :  1940  MRN:  [id number]    Admit date:  10/24/2023  Discharge date:  23    Code Status Order: Full Code   Advance Directives:   [address] Documentation       Date/Time Healthcare Directive Type of Healthcare Directive Copy in [facility] Agent's Name Healthcare Agent's Phone Number    10/30/23 0003 No, patient does not have an advance directive for healthcare treatment -- -- -- -- --    10/27/23 1054 No, patient does not have an advance directive for healthcare treatment -- -- -- -- --            Admitting Physician:  Cee Carr DO  PCP: Shruthi Santiago MD    Discharging Nurse: [facility] Unit/Room#: 5496/7675-Z  Discharging Unit Phone Number: [phone]    Emergency Contact:   Extended Emergency Contact Information  Primary Emergency Contact: Ruth Bryant  Address: [address] [place] [address].  [address] of [address] [facility] Phone: [phone]  Mobile Phone: [phone]  Relation: Brother/Sister  Secondary Emergency Contact: [address] [place] [address] Phone: [phone]  Relation: Child    Past Surgical History:  Past Surgical History:   Procedure Laterality Date    CYSTOSCOPY      MULITPLE    EYE SURGERY  2012    right eye cataract with lens implant    EYE SURGERY Left 2016    removal of cataract with insertion of IOL    HYSTERECTOMY (CERVIX STATUS UNKNOWN)      PAIN MANAGEMENT PROCEDURE N/A 8/10/2023    L3-4 EPIDURAL STEROID INJECTION performed by Jw Cervantes DO at [address] Bilateral 5/3/2023    L1 and L2 KYPHOPLASTY performed by Richelle Lopez MD at [address] 2023    T12, L3, L4 KYPHOPLASTY performed by Richelle Lopez MD at [address] 10/14/2023    KYPHOPLASTY T11 performed by Ryan Ochoa DO at [address] [facility] N/A 10/12/2023    EGD BIOPSY

## 2023-11-01 NOTE — DISCHARGE SUMMARY
Internal Medicine Discharge Summary    NAME: Claudine Starr :  1940  MRN:  [id number] Compa Kiser MD    ADMITTED: 10/24/2023   DISCHARGED: 2023  9:58 AM    ADMITTING PHYSICIAN: Gisel Jacobo DO    PCP: Yony Albarran MD    CONSULTANT(S):   IP CONSULT TO INFECTIOUS DISEASES  IP CONSULT TO INTERNAL MEDICINE  IP CONSULT TO UROLOGY  IP CONSULT TO GI  IP CONSULT TO PULMONOLOGY  IP CONSULT TO ORTHOPEDIC SURGERY  IP CONSULT TO IV TEAM  IP CONSULT TO IV TEAM     ADMITTING DIAGNOSIS:   Bacteremia [R78.81]     Please see H&P for further details    DISCHARGE DIAGNOSES:   Active Hospital Problems    Diagnosis     Bacteremia [R78.81]      Priority: High    Severe protein-calorie malnutrition ([address]) [E43]     Chronic bilateral low back pain without sciatica [M54.50, G89.29]     Chronic pain syndrome [G89.4 (ICD-10-CM)] [G89.4]     Closed compression fracture of third lumbar vertebra (HCC) [S32.030A]     Sinus node dysfunction (HCC) [I49.5]     A-fib (HCC) [I48.91]     Primary hypertension [I10]        BRIEF HISTORY OF PRESENT ILLNESS: Claudine Starr is a 78 y.o. female patient of Yony Albarran MD who  has a past medical history of Cancer ([address]), Cataract, Familial peripheral neuropathy, Hypertension, Lupus ([address]), and Paroxysmal atrial fibrillation ([address] who originally had concerns including Other (Positive BC). at presentation on 10/24/2023, and was found to have Bacteremia [R78.81] after workup. Please see H&P for further details. HOSPITAL COURSE:   The patient presented to the hospital with the chief complaint of Other (Positive BC)  . The patient was admitted to the hospital.     Enterococcus bacteremia-GI source?:  BC's noted-- awaiting final sensitivities   ABX per ID-Unasyn IV-- case discussed on   CIRILO neg for vegetation 10/27  As of , she is refusing PICC line and IV ABX-- she stated that \"if this leads to my demise, so be it. \"     Pleural effusion-- transudate:  CT

## 2023-11-01 NOTE — CARE COORDINATION
Farhana  200cc fluid removed. Await final ID plan for Unasyn. Will probably need PICC. Await final ID plan before initiating precert. Transport forms on chart. [facility] 17/24. Will follow. Santiago Warner. Sin [place] advised to [facility]  for [employer]. Santiago Warner. Per joe; pt approved; auth good through Thursday 11/2; await clearance for discharge. Santiago Warner. Pt now agreeable to PICC. Can d/c once PICC inserted. Santiago Warner.

## 2023-11-01 NOTE — CARE COORDINATION
11/1/2023. Social Work Discharge Planning:SW set up transport for Pt to go to [facility] at [facility] today at [facility] will transport. Still need discharge order. Call family if Pt discharges. BOBBY notified nurse.  Electronically signed by IRISH Swift on 11/1/2023 at 10:16 AM

## 2023-11-01 NOTE — FLOWSHEET NOTE
Inpatient Wound Care    Admit Date: 10/24/2023  2:26 PM    Reason for consult:  left heel    Significant history:  per H&P    Chief Complaint: Other (Positive BC)    PMH:  Cancer ([address])   BLADDER   Cataract   8-18-16 for removal   Familial peripheral neuropathy   Hypertension   Lupus ([address])   Paroxysmal atrial fibrillation ([address])       Findings:     11/01/23 1418   Wound 11/01/23 Heel Left;Medial   Date First Assessed/Time First Assessed: 11/01/23 1418   Present on Original Admission: No  Location: Heel  Wound Location Orientation: Left;Medial   Wound Image    Wound Etiology Deep tissue/Injury   Wound Length (cm) 1 cm   Wound Width (cm) 1 cm   Wound Depth (cm)   (unable to determine)   Wound Surface Area (cm^2) 1 cm^2   Wound Assessment Purple/maroon   Drainage Amount None (dry)       **Informed Consent**    The patient has given verbal consent to have photos taken of left heel and inserted into their chart as part of their permanent medical record for purposes of documentation, treatment management and/or medical review. All Images taken on 11/1/23 of patient name: Sylvia Early were transmitted and stored on secured [employer] located within [employer] Tab by a registered [employer] Mobile Application Device.      Impression: Left medial heel DTI    Plan:  Comfort glide  Heel protectors  Chair cushion  Dietary consult  Patient will need continued preventative care     Gemma Centeno RN 11/1/2023 2:20 PM

## 2023-11-01 NOTE — PLAN OF CARE
Problem: ABCDS Injury Assessment  Goal: Absence of physical injury  10/31/2023 2111 by Yoselyn Soto  Outcome: Progressing  10/31/2023 1019 by Victorina Laughlin RN  Outcome: Progressing  10/31/2023 1019 by Victorina Laughlin RN  Outcome: Progressing     Problem: Skin/Tissue Integrity  Goal: Absence of new skin breakdown  Description: 1. Monitor for areas of redness and/or skin breakdown  2. Assess vascular access sites hourly  3. Every 4-6 hours minimum:  Change oxygen saturation probe site  4. Every 4-6 hours:  If on nasal continuous positive airway pressure, respiratory therapy assess nares and determine need for appliance change or resting period.   10/31/2023 1019 by Victorina Laughlin RN  Outcome: Progressing  10/31/2023 1019 by Victorina Laughlin RN  Outcome: Progressing     Problem: Discharge Planning  Goal: Discharge to home or other facility with appropriate resources  Recent Flowsheet Documentation  Taken 10/31/2023 2000 by Yoselyn Soto  Discharge to home or other facility with appropriate resources: Identify barriers to discharge with patient and caregiver     Problem: Pain  Goal: Verbalizes/displays adequate comfort level or baseline comfort level  Recent Flowsheet Documentation  Taken 10/31/2023 2015 by Yoselyn Soto  Verbalizes/displays adequate comfort level or baseline comfort level: Encourage patient to monitor pain and request assistance  Taken 10/31/2023 1923 by Yoselyn Soto  Verbalizes/displays adequate comfort level or baseline comfort level: Encourage patient to monitor pain and request assistance

## 2023-11-01 NOTE — PROGRESS NOTES
Per nurse at [facility], [facility] is no longer available to transport, transport setup with [facility] - ETA 8 pm

## 2023-11-02 VITALS
SYSTOLIC BLOOD PRESSURE: 138 MMHG | DIASTOLIC BLOOD PRESSURE: 81 MMHG | OXYGEN SATURATION: 99 % | HEIGHT: 64 IN | HEART RATE: 72 BPM | BODY MASS INDEX: 21.1 KG/M2 | WEIGHT: 123.6 LBS | TEMPERATURE: 97.7 F | RESPIRATION RATE: 16 BRPM

## 2023-11-02 LAB
ALBUMIN SERPL-MCNC: 3.2 G/DL (ref 3.5–5.2)
ALP SERPL-CCNC: 82 U/L (ref 35–104)
ALT SERPL-CCNC: 8 U/L (ref 0–32)
ANION GAP SERPL CALCULATED.3IONS-SCNC: 10 MMOL/L (ref 7–16)
AST SERPL-CCNC: 17 U/L (ref 0–31)
BASOPHILS # BLD: 0.02 K/UL (ref 0–0.2)
BASOPHILS NFR BLD: 1 % (ref 0–2)
BILIRUB SERPL-MCNC: 0.3 MG/DL (ref 0–1.2)
BUN SERPL-MCNC: 7 MG/DL (ref 6–23)
CALCIUM SERPL-MCNC: 8.6 MG/DL (ref 8.6–10.2)
CHLORIDE SERPL-SCNC: 105 MMOL/L (ref 98–107)
CO2 SERPL-SCNC: 26 MMOL/L (ref 22–29)
CREAT SERPL-MCNC: 0.5 MG/DL (ref 0.5–1)
EOSINOPHIL # BLD: 0.05 K/UL (ref 0.05–0.5)
EOSINOPHILS RELATIVE PERCENT: 1 % (ref 0–6)
ERYTHROCYTE [DISTWIDTH] IN BLOOD BY AUTOMATED COUNT: 13.8 % (ref 11.5–15)
GFR SERPL CREATININE-BSD FRML MDRD: >60 ML/MIN/1.73M2
GLUCOSE SERPL-MCNC: 104 MG/DL (ref 74–99)
HCT VFR BLD AUTO: 32.7 % (ref 34–48)
HGB BLD-MCNC: 10.6 G/DL (ref 11.5–15.5)
IMM GRANULOCYTES # BLD AUTO: <0.03 K/UL (ref 0–0.58)
IMM GRANULOCYTES NFR BLD: 0 % (ref 0–5)
LYMPHOCYTES NFR BLD: 0.94 K/UL (ref 1.5–4)
LYMPHOCYTES RELATIVE PERCENT: 23 % (ref 20–42)
MAGNESIUM SERPL-MCNC: 2.2 MG/DL (ref 1.6–2.6)
MCH RBC QN AUTO: 28.4 PG (ref 26–35)
MCHC RBC AUTO-ENTMCNC: 32.4 G/DL (ref 32–34.5)
MCV RBC AUTO: 87.7 FL (ref 80–99.9)
MICROORGANISM/AGENT SPEC: NORMAL
MICROORGANISM/AGENT SPEC: NORMAL
MONOCYTES NFR BLD: 0.34 K/UL (ref 0.1–0.95)
MONOCYTES NFR BLD: 8 % (ref 2–12)
NEUTROPHILS NFR BLD: 67 % (ref 43–80)
NEUTS SEG NFR BLD: 2.77 K/UL (ref 1.8–7.3)
PLATELET # BLD AUTO: 215 K/UL (ref 130–450)
PMV BLD AUTO: 9 FL (ref 7–12)
POTASSIUM SERPL-SCNC: 3.4 MMOL/L (ref 3.5–5)
PROT SERPL-MCNC: 5.3 G/DL (ref 6.4–8.3)
RBC # BLD AUTO: 3.73 M/UL (ref 3.5–5.5)
SODIUM SERPL-SCNC: 141 MMOL/L (ref 132–146)
SPECIMEN DESCRIPTION: NORMAL
WBC OTHER # BLD: 4.1 K/UL (ref 4.5–11.5)

## 2023-11-02 PROCEDURE — 83735 ASSAY OF MAGNESIUM: CPT

## 2023-11-02 PROCEDURE — 6360000002 HC RX W HCPCS: Performed by: SPECIALIST

## 2023-11-02 PROCEDURE — 6360000002 HC RX W HCPCS: Performed by: REGISTERED NURSE

## 2023-11-02 PROCEDURE — 85025 COMPLETE CBC W/AUTO DIFF WBC: CPT

## 2023-11-02 PROCEDURE — 2580000003 HC RX 258: Performed by: INTERNAL MEDICINE

## 2023-11-02 PROCEDURE — 2580000003 HC RX 258: Performed by: REGISTERED NURSE

## 2023-11-02 PROCEDURE — 6360000002 HC RX W HCPCS: Performed by: NURSE PRACTITIONER

## 2023-11-02 PROCEDURE — 6370000000 HC RX 637 (ALT 250 FOR IP): Performed by: NURSE PRACTITIONER

## 2023-11-02 PROCEDURE — 80053 COMPREHEN METABOLIC PANEL: CPT

## 2023-11-02 PROCEDURE — 6370000000 HC RX 637 (ALT 250 FOR IP): Performed by: INTERNAL MEDICINE

## 2023-11-02 RX ADMIN — STANDARDIZED SENNA CONCENTRATE 8.6 MG: 8.6 TABLET ORAL at 08:32

## 2023-11-02 RX ADMIN — POTASSIUM BICARBONATE 40 MEQ: 782 TABLET, EFFERVESCENT ORAL at 05:11

## 2023-11-02 RX ADMIN — Medication 3 MG: at 03:04

## 2023-11-02 RX ADMIN — TRAMADOL HYDROCHLORIDE 50 MG: 50 TABLET, COATED ORAL at 03:04

## 2023-11-02 RX ADMIN — Medication 300 UNITS: at 09:19

## 2023-11-02 RX ADMIN — LISINOPRIL 10 MG: 10 TABLET ORAL at 08:33

## 2023-11-02 RX ADMIN — Medication 10 ML: at 08:34

## 2023-11-02 RX ADMIN — PANTOPRAZOLE SODIUM 40 MG: 40 TABLET, DELAYED RELEASE ORAL at 05:01

## 2023-11-02 RX ADMIN — METOPROLOL SUCCINATE 25 MG: 25 TABLET, EXTENDED RELEASE ORAL at 08:33

## 2023-11-02 RX ADMIN — AMPICILLIN SODIUM AND SULBACTAM SODIUM 3000 MG: 2; 1 INJECTION, POWDER, FOR SOLUTION INTRAMUSCULAR; INTRAVENOUS at 03:17

## 2023-11-02 RX ADMIN — AMPICILLIN SODIUM AND SULBACTAM SODIUM 3000 MG: 2; 1 INJECTION, POWDER, FOR SOLUTION INTRAMUSCULAR; INTRAVENOUS at 08:31

## 2023-11-02 RX ADMIN — MORPHINE SULFATE 2 MG: 2 INJECTION, SOLUTION INTRAMUSCULAR; INTRAVENOUS at 05:12

## 2023-11-02 RX ADMIN — MORPHINE SULFATE 2 MG: 2 INJECTION, SOLUTION INTRAMUSCULAR; INTRAVENOUS at 09:34

## 2023-11-02 RX ADMIN — Medication 300 UNITS: at 01:39

## 2023-11-02 ASSESSMENT — PAIN DESCRIPTION - DESCRIPTORS
DESCRIPTORS: ACHING

## 2023-11-02 ASSESSMENT — PAIN DESCRIPTION - ONSET: ONSET: ON-GOING

## 2023-11-02 ASSESSMENT — PAIN DESCRIPTION - PAIN TYPE: TYPE: CHRONIC PAIN

## 2023-11-02 ASSESSMENT — PAIN SCALES - GENERAL
PAINLEVEL_OUTOF10: 10
PAINLEVEL_OUTOF10: 7

## 2023-11-02 ASSESSMENT — PAIN DESCRIPTION - FREQUENCY: FREQUENCY: CONTINUOUS

## 2023-11-02 ASSESSMENT — PAIN DESCRIPTION - LOCATION
LOCATION: ARM;BACK
LOCATION: BUTTOCKS;BACK
LOCATION: OTHER (COMMENT)
LOCATION: OTHER (COMMENT)

## 2023-11-02 ASSESSMENT — PAIN DESCRIPTION - ORIENTATION
ORIENTATION: RIGHT
ORIENTATION: RIGHT;LEFT
ORIENTATION: RIGHT;LEFT

## 2023-11-02 NOTE — PROGRESS NOTES
Internal Medicine Progress Note    Patient's name: Julio Cesar Nicholas  : 1940  Chief complaints (on day of admission): Other (Positive BC)  Admission date: 10/24/2023  Date of service: 2023   Room: [address] MED SURG/TELE  Primary care physician: Marcelle Hernandez MD  Reason for visit: Follow-up for bacteremia, pleural effusion    Subjective  Jose Ornelas was seen and examined. She was resting in bed. She seemed depressed. She is refusing PICC line. She just wants to go to the rehab. She states that \"if this leads to my demise, then so be it. \"    Review of Systems  There are no new complaints of chest pain, shortness of breath, nausea, vomiting, diarrhea, constipation.     Hospital Medications  Current Facility-Administered Medications   Medication Dose Route Frequency Provider Last Rate Last Admin    sodium chloride flush 0.9 % injection 5-40 mL  5-40 mL IntraVENous 2 times per day Roderick Orr MD        sodium chloride flush 0.9 % injection 5-40 mL  5-40 mL IntraVENous PRN Roderick Orr MD        0.9 % sodium chloride infusion   IntraVENous PRN Roderick Orr MD        heparin (PF) 100 UNIT/ML injection 300 Units  3 mL IntraVENous 2 times per day Roderick Orr MD   300 Units at 23 0139    heparin (PF) 100 UNIT/ML injection 300 Units  3 mL IntraCATHeter PRN Roderick Orr MD        sodium chloride flush 0.9 % injection 5-40 mL  5-40 mL IntraVENous 2 times per day Gladys Xavier MD   10 mL at 10/30/23 2038    sodium chloride flush 0.9 % injection 5-40 mL  5-40 mL IntraVENous PRN Gladys Xavier MD        0.9 % sodium chloride infusion   IntraVENous PRN Gladys Xavier MD 12.5 mL/hr at 10/29/23 1827 New Bag at 10/29/23 1827    HYDROmorphone (DILAUDID) injection 0.25 mg  0.25 mg IntraVENous Q5 Min PRN Gladys Xavier MD        HYDROmorphone (DILAUDID) injection 0.5 mg  0.5 mg IntraVENous Q5 Min PRN Gladys Xavier MD        labetalol

## 2023-11-02 NOTE — CARE COORDINATION
11/2/2023. Social Work Discharge Planning:Ashlee at [facility] will pick Pt up at [facility] today. SW notified nurse here and son Shine Fritz.  Electronically signed by IRISH Bright on 11/2/2023 at 8:49 AM

## 2023-11-02 NOTE — PLAN OF CARE
Problem: ABCDS Injury Assessment  Goal: Absence of physical injury  11/2/2023 0932 by Don Carrera RN  Outcome: Adequate for Discharge  11/1/2023 2155 by Roldan Hirsch RN  Outcome: Progressing     Problem: Discharge Planning  Goal: Discharge to home or other facility with appropriate resources  11/2/2023 0932 by Don Carrera RN  Outcome: Adequate for Discharge

## 2023-11-03 LAB
ALBUMIN SERPL-MCNC: 3.5 G/DL (ref 3.5–5.2)
ALP SERPL-CCNC: 86 U/L (ref 35–104)
ALT SERPL-CCNC: 7 U/L (ref 0–32)
ANION GAP SERPL CALCULATED.3IONS-SCNC: 12 MMOL/L (ref 7–16)
AST SERPL-CCNC: 16 U/L (ref 0–31)
BASOPHILS # BLD: 0.04 K/UL (ref 0–0.2)
BASOPHILS NFR BLD: 1 % (ref 0–2)
BILIRUB SERPL-MCNC: 0.3 MG/DL (ref 0–1.2)
BUN SERPL-MCNC: 8 MG/DL (ref 6–23)
CALCIUM SERPL-MCNC: 8.6 MG/DL (ref 8.6–10.2)
CHLORIDE SERPL-SCNC: 103 MMOL/L (ref 98–107)
CHOLEST SERPL-MCNC: 210 MG/DL
CO2 SERPL-SCNC: 26 MMOL/L (ref 22–29)
CREAT SERPL-MCNC: 0.7 MG/DL (ref 0.5–1)
CRP SERPL HS-MCNC: 3 MG/L (ref 0–5)
EOSINOPHIL # BLD: 0.09 K/UL (ref 0.05–0.5)
EOSINOPHILS RELATIVE PERCENT: 2 % (ref 0–6)
ERYTHROCYTE [DISTWIDTH] IN BLOOD BY AUTOMATED COUNT: 14 % (ref 11.5–15)
ERYTHROCYTE [SEDIMENTATION RATE] IN BLOOD BY WESTERGREN METHOD: 6 MM/HR (ref 0–20)
FOLATE SERPL-MCNC: 8.6 NG/ML (ref 4.8–24.2)
GFR SERPL CREATININE-BSD FRML MDRD: >60 ML/MIN/1.73M2
GLUCOSE SERPL-MCNC: 99 MG/DL (ref 74–99)
HCT VFR BLD AUTO: 33.9 % (ref 34–48)
HDLC SERPL-MCNC: 61 MG/DL
HGB BLD-MCNC: 10.7 G/DL (ref 11.5–15.5)
IMM GRANULOCYTES # BLD AUTO: <0.03 K/UL (ref 0–0.58)
IMM GRANULOCYTES NFR BLD: 1 % (ref 0–5)
LDLC SERPL CALC-MCNC: 134 MG/DL
LYMPHOCYTES NFR BLD: 1.02 K/UL (ref 1.5–4)
LYMPHOCYTES RELATIVE PERCENT: 25 % (ref 20–42)
MCH RBC QN AUTO: 28.6 PG (ref 26–35)
MCHC RBC AUTO-ENTMCNC: 31.6 G/DL (ref 32–34.5)
MCV RBC AUTO: 90.6 FL (ref 80–99.9)
MICROORGANISM SPEC CULT: NO GROWTH
MICROORGANISM/AGENT SPEC: NORMAL
MONOCYTES NFR BLD: 0.34 K/UL (ref 0.1–0.95)
MONOCYTES NFR BLD: 9 % (ref 2–12)
NEUTROPHILS NFR BLD: 62 % (ref 43–80)
NEUTS SEG NFR BLD: 2.5 K/UL (ref 1.8–7.3)
PLATELET # BLD AUTO: 199 K/UL (ref 130–450)
PMV BLD AUTO: 9.7 FL (ref 7–12)
POTASSIUM SERPL-SCNC: 3.7 MMOL/L (ref 3.5–5)
PROT SERPL-MCNC: 5.6 G/DL (ref 6.4–8.3)
RBC # BLD AUTO: 3.74 M/UL (ref 3.5–5.5)
SODIUM SERPL-SCNC: 141 MMOL/L (ref 132–146)
SPECIMEN DESCRIPTION: NORMAL
T4 SERPL-MCNC: 9.7 UG/DL (ref 4.5–11.7)
TRIGL SERPL-MCNC: 77 MG/DL
TSH SERPL DL<=0.05 MIU/L-ACNC: 1.85 UIU/ML (ref 0.27–4.2)
VIT B12 SERPL-MCNC: 1053 PG/ML (ref 211–946)
VLDLC SERPL CALC-MCNC: 15 MG/DL
WBC OTHER # BLD: 4 K/UL (ref 4.5–11.5)

## 2023-11-05 LAB — 1,25(OH)2D3 SERPL-MCNC: 43 PG/ML (ref 19.9–79.3)

## 2023-11-06 LAB
ALBUMIN SERPL-MCNC: 3.3 G/DL (ref 3.5–5.2)
ALP SERPL-CCNC: 82 U/L (ref 35–104)
ALT SERPL-CCNC: 10 U/L (ref 0–32)
AST SERPL-CCNC: 21 U/L (ref 0–31)
BASOPHILS # BLD: 0.04 K/UL (ref 0–0.2)
BASOPHILS NFR BLD: 1 % (ref 0–2)
BILIRUB DIRECT SERPL-MCNC: <0.2 MG/DL (ref 0–0.3)
BILIRUB INDIRECT SERPL-MCNC: ABNORMAL MG/DL (ref 0–1)
BILIRUB SERPL-MCNC: 0.2 MG/DL (ref 0–1.2)
BUN SERPL-MCNC: 6 MG/DL (ref 6–23)
CREAT SERPL-MCNC: 0.6 MG/DL (ref 0.5–1)
EOSINOPHIL # BLD: 0.05 K/UL (ref 0.05–0.5)
EOSINOPHILS RELATIVE PERCENT: 1 % (ref 0–6)
ERYTHROCYTE [DISTWIDTH] IN BLOOD BY AUTOMATED COUNT: 14.1 % (ref 11.5–15)
GFR SERPL CREATININE-BSD FRML MDRD: >60 ML/MIN/1.73M2
HCT VFR BLD AUTO: 32.9 % (ref 34–48)
HGB BLD-MCNC: 10.3 G/DL (ref 11.5–15.5)
IMM GRANULOCYTES # BLD AUTO: <0.03 K/UL (ref 0–0.58)
IMM GRANULOCYTES NFR BLD: 0 % (ref 0–5)
LYMPHOCYTES NFR BLD: 1.14 K/UL (ref 1.5–4)
LYMPHOCYTES RELATIVE PERCENT: 20 % (ref 20–42)
MCH RBC QN AUTO: 28.5 PG (ref 26–35)
MCHC RBC AUTO-ENTMCNC: 31.3 G/DL (ref 32–34.5)
MCV RBC AUTO: 91.1 FL (ref 80–99.9)
MONOCYTES NFR BLD: 0.4 K/UL (ref 0.1–0.95)
MONOCYTES NFR BLD: 7 % (ref 2–12)
NEUTROPHILS NFR BLD: 71 % (ref 43–80)
NEUTS SEG NFR BLD: 3.97 K/UL (ref 1.8–7.3)
PLATELET # BLD AUTO: 192 K/UL (ref 130–450)
PMV BLD AUTO: 9.7 FL (ref 7–12)
PROT SERPL-MCNC: 5.5 G/DL (ref 6.4–8.3)
RBC # BLD AUTO: 3.61 M/UL (ref 3.5–5.5)
WBC OTHER # BLD: 5.6 K/UL (ref 4.5–11.5)

## 2023-11-09 LAB
ALBUMIN SERPL-MCNC: 2.9 G/DL (ref 3.5–5.2)
ALP SERPL-CCNC: 73 U/L (ref 35–104)
ALT SERPL-CCNC: 9 U/L (ref 0–32)
AST SERPL-CCNC: 15 U/L (ref 0–31)
BASOPHILS # BLD: 0.02 K/UL (ref 0–0.2)
BASOPHILS NFR BLD: 1 % (ref 0–2)
BILIRUB DIRECT SERPL-MCNC: <0.2 MG/DL (ref 0–0.3)
BILIRUB INDIRECT SERPL-MCNC: ABNORMAL MG/DL (ref 0–1)
BILIRUB SERPL-MCNC: 0.2 MG/DL (ref 0–1.2)
BUN SERPL-MCNC: 7 MG/DL (ref 6–23)
CREAT SERPL-MCNC: 0.7 MG/DL (ref 0.5–1)
CRP SERPL HS-MCNC: <3 MG/L (ref 0–5)
EOSINOPHIL # BLD: 0.1 K/UL (ref 0.05–0.5)
EOSINOPHILS RELATIVE PERCENT: 3 % (ref 0–6)
ERYTHROCYTE [DISTWIDTH] IN BLOOD BY AUTOMATED COUNT: 13.9 % (ref 11.5–15)
ERYTHROCYTE [SEDIMENTATION RATE] IN BLOOD BY WESTERGREN METHOD: 14 MM/HR (ref 0–20)
GFR SERPL CREATININE-BSD FRML MDRD: >60 ML/MIN/1.73M2
HCT VFR BLD AUTO: 29.2 % (ref 34–48)
HGB BLD-MCNC: 9.3 G/DL (ref 11.5–15.5)
IMM GRANULOCYTES # BLD AUTO: <0.03 K/UL (ref 0–0.58)
IMM GRANULOCYTES NFR BLD: 0 % (ref 0–5)
LYMPHOCYTES NFR BLD: 0.95 K/UL (ref 1.5–4)
LYMPHOCYTES RELATIVE PERCENT: 29 % (ref 20–42)
MCH RBC QN AUTO: 28.6 PG (ref 26–35)
MCHC RBC AUTO-ENTMCNC: 31.8 G/DL (ref 32–34.5)
MCV RBC AUTO: 89.8 FL (ref 80–99.9)
MONOCYTES NFR BLD: 0.32 K/UL (ref 0.1–0.95)
MONOCYTES NFR BLD: 10 % (ref 2–12)
NEUTROPHILS NFR BLD: 58 % (ref 43–80)
NEUTS SEG NFR BLD: 1.91 K/UL (ref 1.8–7.3)
PLATELET # BLD AUTO: 164 K/UL (ref 130–450)
PMV BLD AUTO: 9.6 FL (ref 7–12)
PROT SERPL-MCNC: 5 G/DL (ref 6.4–8.3)
RBC # BLD AUTO: 3.25 M/UL (ref 3.5–5.5)
WBC OTHER # BLD: 3.3 K/UL (ref 4.5–11.5)

## 2023-11-13 LAB
ALBUMIN SERPL-MCNC: 3.1 G/DL (ref 3.5–5.2)
ALP SERPL-CCNC: 70 U/L (ref 35–104)
ALT SERPL-CCNC: 8 U/L (ref 0–32)
ANION GAP SERPL CALCULATED.3IONS-SCNC: 10 MMOL/L (ref 7–16)
AST SERPL-CCNC: 17 U/L (ref 0–31)
BASOPHILS # BLD: 0.04 K/UL (ref 0–0.2)
BASOPHILS NFR BLD: 1 % (ref 0–2)
BILIRUB SERPL-MCNC: 0.2 MG/DL (ref 0–1.2)
BUN SERPL-MCNC: 8 MG/DL (ref 6–23)
CALCIUM SERPL-MCNC: 8.5 MG/DL (ref 8.6–10.2)
CHLORIDE SERPL-SCNC: 104 MMOL/L (ref 98–107)
CO2 SERPL-SCNC: 28 MMOL/L (ref 22–29)
CREAT SERPL-MCNC: 0.6 MG/DL (ref 0.5–1)
EOSINOPHIL # BLD: 0.16 K/UL (ref 0.05–0.5)
EOSINOPHILS RELATIVE PERCENT: 5 % (ref 0–6)
ERYTHROCYTE [DISTWIDTH] IN BLOOD BY AUTOMATED COUNT: 14.3 % (ref 11.5–15)
GFR SERPL CREATININE-BSD FRML MDRD: >60 ML/MIN/1.73M2
GLUCOSE SERPL-MCNC: 72 MG/DL (ref 74–99)
HCT VFR BLD AUTO: 30.3 % (ref 34–48)
HGB BLD-MCNC: 9.4 G/DL (ref 11.5–15.5)
IMM GRANULOCYTES # BLD AUTO: <0.03 K/UL (ref 0–0.58)
IMM GRANULOCYTES NFR BLD: 0 % (ref 0–5)
LYMPHOCYTES NFR BLD: 0.93 K/UL (ref 1.5–4)
LYMPHOCYTES RELATIVE PERCENT: 26 % (ref 20–42)
MCH RBC QN AUTO: 28.7 PG (ref 26–35)
MCHC RBC AUTO-ENTMCNC: 31 G/DL (ref 32–34.5)
MCV RBC AUTO: 92.4 FL (ref 80–99.9)
MONOCYTES NFR BLD: 0.31 K/UL (ref 0.1–0.95)
MONOCYTES NFR BLD: 9 % (ref 2–12)
NEUTROPHILS NFR BLD: 59 % (ref 43–80)
NEUTS SEG NFR BLD: 2.11 K/UL (ref 1.8–7.3)
PLATELET # BLD AUTO: 166 K/UL (ref 130–450)
PMV BLD AUTO: 9.5 FL (ref 7–12)
POTASSIUM SERPL-SCNC: 3.9 MMOL/L (ref 3.5–5)
PROT SERPL-MCNC: 5 G/DL (ref 6.4–8.3)
RBC # BLD AUTO: 3.28 M/UL (ref 3.5–5.5)
SODIUM SERPL-SCNC: 142 MMOL/L (ref 132–146)
WBC OTHER # BLD: 3.6 K/UL (ref 4.5–11.5)

## 2023-11-16 LAB
ALBUMIN SERPL-MCNC: 3 G/DL (ref 3.5–5.2)
ALP SERPL-CCNC: 70 U/L (ref 35–104)
ALT SERPL-CCNC: 8 U/L (ref 0–32)
ANION GAP SERPL CALCULATED.3IONS-SCNC: 11 MMOL/L (ref 7–16)
AST SERPL-CCNC: 15 U/L (ref 0–31)
BASOPHILS # BLD: 0.03 K/UL (ref 0–0.2)
BASOPHILS NFR BLD: 1 % (ref 0–2)
BILIRUB SERPL-MCNC: 0.2 MG/DL (ref 0–1.2)
BUN SERPL-MCNC: 9 MG/DL (ref 6–23)
CALCIUM SERPL-MCNC: 8.5 MG/DL (ref 8.6–10.2)
CHLORIDE SERPL-SCNC: 104 MMOL/L (ref 98–107)
CO2 SERPL-SCNC: 27 MMOL/L (ref 22–29)
CREAT SERPL-MCNC: 0.6 MG/DL (ref 0.5–1)
CRP SERPL HS-MCNC: 4 MG/L (ref 0–5)
EOSINOPHIL # BLD: 0.24 K/UL (ref 0.05–0.5)
EOSINOPHILS RELATIVE PERCENT: 6 % (ref 0–6)
ERYTHROCYTE [DISTWIDTH] IN BLOOD BY AUTOMATED COUNT: 14.2 % (ref 11.5–15)
ERYTHROCYTE [SEDIMENTATION RATE] IN BLOOD BY WESTERGREN METHOD: 10 MM/HR (ref 0–20)
GFR SERPL CREATININE-BSD FRML MDRD: >60 ML/MIN/1.73M2
GLUCOSE SERPL-MCNC: 83 MG/DL (ref 74–99)
HCT VFR BLD AUTO: 29.5 % (ref 34–48)
HGB BLD-MCNC: 9.3 G/DL (ref 11.5–15.5)
IMM GRANULOCYTES # BLD AUTO: <0.03 K/UL (ref 0–0.58)
IMM GRANULOCYTES NFR BLD: 0 % (ref 0–5)
LYMPHOCYTES NFR BLD: 1.2 K/UL (ref 1.5–4)
LYMPHOCYTES RELATIVE PERCENT: 32 % (ref 20–42)
MCH RBC QN AUTO: 28.5 PG (ref 26–35)
MCHC RBC AUTO-ENTMCNC: 31.5 G/DL (ref 32–34.5)
MCV RBC AUTO: 90.5 FL (ref 80–99.9)
MONOCYTES NFR BLD: 0.34 K/UL (ref 0.1–0.95)
MONOCYTES NFR BLD: 9 % (ref 2–12)
NEUTROPHILS NFR BLD: 52 % (ref 43–80)
NEUTS SEG NFR BLD: 1.96 K/UL (ref 1.8–7.3)
PLATELET # BLD AUTO: 156 K/UL (ref 130–450)
PMV BLD AUTO: 9.7 FL (ref 7–12)
POTASSIUM SERPL-SCNC: 3.9 MMOL/L (ref 3.5–5)
PROT SERPL-MCNC: 4.9 G/DL (ref 6.4–8.3)
RBC # BLD AUTO: 3.26 M/UL (ref 3.5–5.5)
SODIUM SERPL-SCNC: 142 MMOL/L (ref 132–146)
WBC OTHER # BLD: 3.8 K/UL (ref 4.5–11.5)

## 2023-11-20 LAB
ALBUMIN SERPL-MCNC: 3 G/DL (ref 3.5–5.2)
ALP SERPL-CCNC: 71 U/L (ref 35–104)
ALT SERPL-CCNC: 9 U/L (ref 0–32)
ANION GAP SERPL CALCULATED.3IONS-SCNC: 12 MMOL/L (ref 7–16)
AST SERPL-CCNC: 19 U/L (ref 0–31)
BASOPHILS # BLD: 0.05 K/UL (ref 0–0.2)
BASOPHILS NFR BLD: 1 % (ref 0–2)
BILIRUB SERPL-MCNC: 0.2 MG/DL (ref 0–1.2)
BUN SERPL-MCNC: 7 MG/DL (ref 6–23)
CALCIUM SERPL-MCNC: 8.5 MG/DL (ref 8.6–10.2)
CHLORIDE SERPL-SCNC: 105 MMOL/L (ref 98–107)
CO2 SERPL-SCNC: 23 MMOL/L (ref 22–29)
CREAT SERPL-MCNC: 0.6 MG/DL (ref 0.5–1)
EOSINOPHIL # BLD: 0.16 K/UL (ref 0.05–0.5)
EOSINOPHILS RELATIVE PERCENT: 5 % (ref 0–6)
ERYTHROCYTE [DISTWIDTH] IN BLOOD BY AUTOMATED COUNT: 14.3 % (ref 11.5–15)
GFR SERPL CREATININE-BSD FRML MDRD: >60 ML/MIN/1.73M2
GLUCOSE SERPL-MCNC: 73 MG/DL (ref 74–99)
HCT VFR BLD AUTO: 29.4 % (ref 34–48)
HGB BLD-MCNC: 9 G/DL (ref 11.5–15.5)
IMM GRANULOCYTES # BLD AUTO: <0.03 K/UL (ref 0–0.58)
IMM GRANULOCYTES NFR BLD: 0 % (ref 0–5)
LYMPHOCYTES NFR BLD: 1.16 K/UL (ref 1.5–4)
LYMPHOCYTES RELATIVE PERCENT: 33 % (ref 20–42)
MCH RBC QN AUTO: 28 PG (ref 26–35)
MCHC RBC AUTO-ENTMCNC: 30.6 G/DL (ref 32–34.5)
MCV RBC AUTO: 91.3 FL (ref 80–99.9)
MONOCYTES NFR BLD: 0.37 K/UL (ref 0.1–0.95)
MONOCYTES NFR BLD: 11 % (ref 2–12)
NEUTROPHILS NFR BLD: 50 % (ref 43–80)
NEUTS SEG NFR BLD: 1.74 K/UL (ref 1.8–7.3)
PLATELET # BLD AUTO: 195 K/UL (ref 130–450)
PMV BLD AUTO: 9.4 FL (ref 7–12)
POTASSIUM SERPL-SCNC: 4 MMOL/L (ref 3.5–5)
PROT SERPL-MCNC: 4.9 G/DL (ref 6.4–8.3)
RBC # BLD AUTO: 3.22 M/UL (ref 3.5–5.5)
SODIUM SERPL-SCNC: 140 MMOL/L (ref 132–146)
WBC OTHER # BLD: 3.5 K/UL (ref 4.5–11.5)

## 2024-06-18 LAB
BILIRUBIN, URINE: NORMAL
BLOOD, URINE: NORMAL
CLARITY: NORMAL
COLOR: NORMAL
GLUCOSE URINE: NORMAL
KETONES, URINE: NORMAL
LEUKOCYTE ESTERASE, URINE: NORMAL
NITRITE, URINE: NORMAL
PH UA: NORMAL
PROTEIN UA: NORMAL
SPECIFIC GRAVITY, URINE: NORMAL
UROBILINOGEN, URINE: NORMAL

## 2024-07-10 LAB
BACTERIA URNS QL MICRO: ABNORMAL
BILIRUB UR QL STRIP: NEGATIVE
CLARITY UR: ABNORMAL
COLOR UR: YELLOW
GLUCOSE UR STRIP-MCNC: NEGATIVE MG/DL
HGB UR QL STRIP.AUTO: ABNORMAL
KETONES UR STRIP-MCNC: NEGATIVE MG/DL
LEUKOCYTE ESTERASE UR QL STRIP: ABNORMAL
NITRITE UR QL STRIP: NEGATIVE
PH UR STRIP: 6.5 [PH] (ref 5–9)
PROT UR STRIP-MCNC: ABNORMAL MG/DL
RBC #/AREA URNS HPF: ABNORMAL /HPF
SP GR UR STRIP: 1.02 (ref 1–1.03)
UROBILINOGEN UR STRIP-ACNC: 0.2 EU/DL (ref 0–1)
WBC #/AREA URNS HPF: ABNORMAL /HPF
YEAST URNS QL MICRO: PRESENT

## 2024-07-11 LAB
MICROORGANISM SPEC CULT: ABNORMAL
MICROORGANISM SPEC CULT: ABNORMAL
SPECIMEN DESCRIPTION: ABNORMAL

## 2024-10-29 LAB
AMORPH SED URNS QL MICRO: PRESENT
BACTERIA URNS QL MICRO: ABNORMAL
BILIRUB UR QL STRIP: NEGATIVE
CLARITY UR: ABNORMAL
COLOR UR: YELLOW
GLUCOSE UR STRIP-MCNC: NEGATIVE MG/DL
HGB UR QL STRIP.AUTO: NEGATIVE
KETONES UR STRIP-MCNC: NEGATIVE MG/DL
LEUKOCYTE ESTERASE UR QL STRIP: ABNORMAL
NITRITE UR QL STRIP: POSITIVE
PH UR STRIP: 6.5 [PH] (ref 5–9)
PROT UR STRIP-MCNC: ABNORMAL MG/DL
RBC #/AREA URNS HPF: ABNORMAL /HPF
SP GR UR STRIP: 1.02 (ref 1–1.03)
UROBILINOGEN UR STRIP-ACNC: 0.2 EU/DL (ref 0–1)
WBC #/AREA URNS HPF: ABNORMAL /HPF

## 2024-11-01 LAB
MICROORGANISM SPEC CULT: ABNORMAL
SPECIMEN DESCRIPTION: ABNORMAL

## 2024-11-18 ENCOUNTER — APPOINTMENT (OUTPATIENT)
Dept: CT IMAGING | Age: 84
End: 2024-11-18
Payer: MEDICARE

## 2024-11-18 ENCOUNTER — APPOINTMENT (OUTPATIENT)
Dept: GENERAL RADIOLOGY | Age: 84
End: 2024-11-18
Payer: MEDICARE

## 2024-11-18 ENCOUNTER — HOSPITAL ENCOUNTER (INPATIENT)
Age: 84
LOS: 4 days | Discharge: NURSING, REHAB OR LONG-TERM CARE | End: 2024-11-22
Attending: EMERGENCY MEDICINE | Admitting: INTERNAL MEDICINE
Payer: MEDICARE

## 2024-11-18 DIAGNOSIS — R40.4 TRANSIENT ALTERATION OF AWARENESS: ICD-10-CM

## 2024-11-18 DIAGNOSIS — S80.01XA CONTUSION OF RIGHT KNEE, INITIAL ENCOUNTER: ICD-10-CM

## 2024-11-18 DIAGNOSIS — G89.4 CHRONIC PAIN SYNDROME: ICD-10-CM

## 2024-11-18 DIAGNOSIS — E87.8 HYPOCHLOREMIA: ICD-10-CM

## 2024-11-18 DIAGNOSIS — R26.2 AMBULATORY DYSFUNCTION: ICD-10-CM

## 2024-11-18 DIAGNOSIS — M54.9 INTRACTABLE BACK PAIN: ICD-10-CM

## 2024-11-18 DIAGNOSIS — F41.9 ANXIETY: ICD-10-CM

## 2024-11-18 DIAGNOSIS — E87.1 HYPONATREMIA: ICD-10-CM

## 2024-11-18 DIAGNOSIS — R10.84 GENERALIZED ABDOMINAL PAIN: Primary | ICD-10-CM

## 2024-11-18 LAB
ALBUMIN SERPL-MCNC: 3.4 G/DL (ref 3.5–5.2)
ALP SERPL-CCNC: 89 U/L (ref 35–104)
ALT SERPL-CCNC: 24 U/L (ref 0–32)
ANION GAP SERPL CALCULATED.3IONS-SCNC: 16 MMOL/L (ref 7–16)
AST SERPL-CCNC: 35 U/L (ref 0–31)
BACTERIA URNS QL MICRO: ABNORMAL
BASOPHILS # BLD: 0 K/UL (ref 0–0.2)
BASOPHILS NFR BLD: 0 % (ref 0–2)
BILIRUB SERPL-MCNC: 0.6 MG/DL (ref 0–1.2)
BILIRUB UR QL STRIP: NEGATIVE
BUN SERPL-MCNC: 22 MG/DL (ref 6–23)
CALCIUM SERPL-MCNC: 8.7 MG/DL (ref 8.6–10.2)
CHLORIDE SERPL-SCNC: 94 MMOL/L (ref 98–107)
CLARITY UR: ABNORMAL
CO2 SERPL-SCNC: 21 MMOL/L (ref 22–29)
COLOR UR: YELLOW
CREAT SERPL-MCNC: 0.8 MG/DL (ref 0.5–1)
EOSINOPHIL # BLD: 0 K/UL (ref 0.05–0.5)
EOSINOPHILS RELATIVE PERCENT: 0 % (ref 0–6)
ERYTHROCYTE [DISTWIDTH] IN BLOOD BY AUTOMATED COUNT: 13.1 % (ref 11.5–15)
GFR, ESTIMATED: 77 ML/MIN/1.73M2
GLUCOSE SERPL-MCNC: 75 MG/DL (ref 74–99)
GLUCOSE UR STRIP-MCNC: NEGATIVE MG/DL
HCT VFR BLD AUTO: 36.4 % (ref 34–48)
HGB BLD-MCNC: 11.9 G/DL (ref 11.5–15.5)
HGB UR QL STRIP.AUTO: ABNORMAL
INFLUENZA A BY PCR: NOT DETECTED
INFLUENZA B BY PCR: NOT DETECTED
KETONES UR STRIP-MCNC: >80 MG/DL
LACTATE BLDV-SCNC: 0.9 MMOL/L (ref 0.5–1.9)
LEUKOCYTE ESTERASE UR QL STRIP: ABNORMAL
LIPASE SERPL-CCNC: 12 U/L (ref 13–60)
LYMPHOCYTES NFR BLD: 0.27 K/UL (ref 1.5–4)
LYMPHOCYTES RELATIVE PERCENT: 4 % (ref 20–42)
MCH RBC QN AUTO: 28.9 PG (ref 26–35)
MCHC RBC AUTO-ENTMCNC: 32.7 G/DL (ref 32–34.5)
MCV RBC AUTO: 88.3 FL (ref 80–99.9)
MONOCYTES NFR BLD: 0.27 K/UL (ref 0.1–0.95)
MONOCYTES NFR BLD: 4 % (ref 2–12)
NEUTROPHILS NFR BLD: 93 % (ref 43–80)
NEUTS SEG NFR BLD: 7.35 K/UL (ref 1.8–7.3)
NITRITE UR QL STRIP: POSITIVE
PH UR STRIP: 6 [PH] (ref 5–9)
PLATELET # BLD AUTO: 165 K/UL (ref 130–450)
PMV BLD AUTO: 9 FL (ref 7–12)
POTASSIUM SERPL-SCNC: 3.8 MMOL/L (ref 3.5–5)
PROT SERPL-MCNC: 6.1 G/DL (ref 6.4–8.3)
PROT UR STRIP-MCNC: 100 MG/DL
RBC # BLD AUTO: 4.12 M/UL (ref 3.5–5.5)
RBC # BLD: ABNORMAL 10*6/UL
RBC #/AREA URNS HPF: ABNORMAL /HPF
SARS-COV-2 RDRP RESP QL NAA+PROBE: NOT DETECTED
SODIUM SERPL-SCNC: 131 MMOL/L (ref 132–146)
SP GR UR STRIP: 1.01 (ref 1–1.03)
SPECIMEN DESCRIPTION: NORMAL
TROPONIN I SERPL HS-MCNC: 27 NG/L (ref 0–9)
TSH SERPL DL<=0.05 MIU/L-ACNC: 0.45 UIU/ML (ref 0.27–4.2)
UROBILINOGEN UR STRIP-ACNC: 0.2 EU/DL (ref 0–1)
WBC #/AREA URNS HPF: ABNORMAL /HPF
WBC OTHER # BLD: 7.9 K/UL (ref 4.5–11.5)

## 2024-11-18 PROCEDURE — 6360000002 HC RX W HCPCS: Performed by: EMERGENCY MEDICINE

## 2024-11-18 PROCEDURE — 73562 X-RAY EXAM OF KNEE 3: CPT

## 2024-11-18 PROCEDURE — 2580000003 HC RX 258: Performed by: EMERGENCY MEDICINE

## 2024-11-18 PROCEDURE — 87040 BLOOD CULTURE FOR BACTERIA: CPT

## 2024-11-18 PROCEDURE — 83605 ASSAY OF LACTIC ACID: CPT

## 2024-11-18 PROCEDURE — 6360000004 HC RX CONTRAST MEDICATION: Performed by: RADIOLOGY

## 2024-11-18 PROCEDURE — 6360000002 HC RX W HCPCS: Performed by: PHYSICIAN ASSISTANT

## 2024-11-18 PROCEDURE — 81001 URINALYSIS AUTO W/SCOPE: CPT

## 2024-11-18 PROCEDURE — 1200000000 HC SEMI PRIVATE

## 2024-11-18 PROCEDURE — 87086 URINE CULTURE/COLONY COUNT: CPT

## 2024-11-18 PROCEDURE — 96374 THER/PROPH/DIAG INJ IV PUSH: CPT

## 2024-11-18 PROCEDURE — 83690 ASSAY OF LIPASE: CPT

## 2024-11-18 PROCEDURE — 80053 COMPREHEN METABOLIC PANEL: CPT

## 2024-11-18 PROCEDURE — 70450 CT HEAD/BRAIN W/O DYE: CPT

## 2024-11-18 PROCEDURE — 72125 CT NECK SPINE W/O DYE: CPT

## 2024-11-18 PROCEDURE — 6370000000 HC RX 637 (ALT 250 FOR IP): Performed by: PHYSICIAN ASSISTANT

## 2024-11-18 PROCEDURE — 87502 INFLUENZA DNA AMP PROBE: CPT

## 2024-11-18 PROCEDURE — 2580000003 HC RX 258: Performed by: PHYSICIAN ASSISTANT

## 2024-11-18 PROCEDURE — 71045 X-RAY EXAM CHEST 1 VIEW: CPT

## 2024-11-18 PROCEDURE — 85025 COMPLETE CBC W/AUTO DIFF WBC: CPT

## 2024-11-18 PROCEDURE — 74177 CT ABD & PELVIS W/CONTRAST: CPT

## 2024-11-18 PROCEDURE — 84484 ASSAY OF TROPONIN QUANT: CPT

## 2024-11-18 PROCEDURE — 87077 CULTURE AEROBIC IDENTIFY: CPT

## 2024-11-18 PROCEDURE — 96375 TX/PRO/DX INJ NEW DRUG ADDON: CPT

## 2024-11-18 PROCEDURE — 87635 SARS-COV-2 COVID-19 AMP PRB: CPT

## 2024-11-18 PROCEDURE — 93005 ELECTROCARDIOGRAM TRACING: CPT | Performed by: EMERGENCY MEDICINE

## 2024-11-18 PROCEDURE — 84443 ASSAY THYROID STIM HORMONE: CPT

## 2024-11-18 PROCEDURE — 87150 DNA/RNA AMPLIFIED PROBE: CPT

## 2024-11-18 PROCEDURE — 99285 EMERGENCY DEPT VISIT HI MDM: CPT

## 2024-11-18 RX ORDER — SODIUM CHLORIDE 9 MG/ML
INJECTION, SOLUTION INTRAVENOUS PRN
Status: DISCONTINUED | OUTPATIENT
Start: 2024-11-18 | End: 2024-11-21 | Stop reason: SDUPTHER

## 2024-11-18 RX ORDER — HYDROXYCHLOROQUINE SULFATE 200 MG/1
200 TABLET, FILM COATED ORAL DAILY
COMMUNITY
Start: 2024-10-17

## 2024-11-18 RX ORDER — SODIUM CHLORIDE 0.9 % (FLUSH) 0.9 %
10 SYRINGE (ML) INJECTION EVERY 12 HOURS SCHEDULED
Status: DISCONTINUED | OUTPATIENT
Start: 2024-11-18 | End: 2024-11-21 | Stop reason: SDUPTHER

## 2024-11-18 RX ORDER — POTASSIUM CHLORIDE 1500 MG/1
40 TABLET, EXTENDED RELEASE ORAL PRN
Status: DISCONTINUED | OUTPATIENT
Start: 2024-11-18 | End: 2024-11-22 | Stop reason: HOSPADM

## 2024-11-18 RX ORDER — IOPAMIDOL 755 MG/ML
75 INJECTION, SOLUTION INTRAVASCULAR
Status: COMPLETED | OUTPATIENT
Start: 2024-11-18 | End: 2024-11-18

## 2024-11-18 RX ORDER — DIAZEPAM 5 MG/1
5 TABLET ORAL 2 TIMES DAILY PRN
Status: ON HOLD | COMMUNITY
End: 2024-11-22

## 2024-11-18 RX ORDER — TRAMADOL HYDROCHLORIDE 50 MG/1
TABLET ORAL
Status: ON HOLD | COMMUNITY
Start: 2024-10-16 | End: 2024-11-22 | Stop reason: HOSPADM

## 2024-11-18 RX ORDER — POTASSIUM CHLORIDE 7.45 MG/ML
10 INJECTION INTRAVENOUS PRN
Status: DISCONTINUED | OUTPATIENT
Start: 2024-11-18 | End: 2024-11-18 | Stop reason: RX

## 2024-11-18 RX ORDER — HYDROXYCHLOROQUINE SULFATE 200 MG/1
200 TABLET, FILM COATED ORAL DAILY
Status: DISCONTINUED | OUTPATIENT
Start: 2024-11-19 | End: 2024-11-22 | Stop reason: HOSPADM

## 2024-11-18 RX ORDER — ONDANSETRON 2 MG/ML
4 INJECTION INTRAMUSCULAR; INTRAVENOUS EVERY 6 HOURS PRN
Status: DISCONTINUED | OUTPATIENT
Start: 2024-11-18 | End: 2024-11-22 | Stop reason: HOSPADM

## 2024-11-18 RX ORDER — PANTOPRAZOLE SODIUM 40 MG/1
40 TABLET, DELAYED RELEASE ORAL
Status: DISCONTINUED | OUTPATIENT
Start: 2024-11-19 | End: 2024-11-22 | Stop reason: HOSPADM

## 2024-11-18 RX ORDER — ONDANSETRON 2 MG/ML
4 INJECTION INTRAMUSCULAR; INTRAVENOUS ONCE
Status: COMPLETED | OUTPATIENT
Start: 2024-11-18 | End: 2024-11-18

## 2024-11-18 RX ORDER — DIAZEPAM 5 MG/1
5 TABLET ORAL 2 TIMES DAILY PRN
Status: DISCONTINUED | OUTPATIENT
Start: 2024-11-18 | End: 2024-11-22 | Stop reason: HOSPADM

## 2024-11-18 RX ORDER — ONDANSETRON 4 MG/1
4 TABLET, ORALLY DISINTEGRATING ORAL EVERY 8 HOURS PRN
Status: DISCONTINUED | OUTPATIENT
Start: 2024-11-18 | End: 2024-11-22 | Stop reason: HOSPADM

## 2024-11-18 RX ORDER — MAGNESIUM SULFATE IN WATER 40 MG/ML
2000 INJECTION, SOLUTION INTRAVENOUS PRN
Status: DISCONTINUED | OUTPATIENT
Start: 2024-11-18 | End: 2024-11-22 | Stop reason: HOSPADM

## 2024-11-18 RX ORDER — ACETAMINOPHEN 650 MG/1
650 SUPPOSITORY RECTAL EVERY 6 HOURS PRN
Status: DISCONTINUED | OUTPATIENT
Start: 2024-11-18 | End: 2024-11-22 | Stop reason: HOSPADM

## 2024-11-18 RX ORDER — SODIUM CHLORIDE 0.9 % (FLUSH) 0.9 %
10 SYRINGE (ML) INJECTION PRN
Status: DISCONTINUED | OUTPATIENT
Start: 2024-11-18 | End: 2024-11-21 | Stop reason: SDUPTHER

## 2024-11-18 RX ORDER — LISINOPRIL 10 MG/1
10 TABLET ORAL EVERY MORNING
Status: DISCONTINUED | OUTPATIENT
Start: 2024-11-19 | End: 2024-11-22 | Stop reason: HOSPADM

## 2024-11-18 RX ORDER — SENNOSIDES A AND B 8.6 MG/1
1 TABLET, FILM COATED ORAL DAILY PRN
Status: DISCONTINUED | OUTPATIENT
Start: 2024-11-18 | End: 2024-11-22 | Stop reason: HOSPADM

## 2024-11-18 RX ORDER — FENTANYL CITRATE 50 UG/ML
25 INJECTION, SOLUTION INTRAMUSCULAR; INTRAVENOUS ONCE
Status: COMPLETED | OUTPATIENT
Start: 2024-11-18 | End: 2024-11-18

## 2024-11-18 RX ORDER — MORPHINE SULFATE 4 MG/ML
4 INJECTION, SOLUTION INTRAMUSCULAR; INTRAVENOUS ONCE
Status: COMPLETED | OUTPATIENT
Start: 2024-11-18 | End: 2024-11-18

## 2024-11-18 RX ORDER — METOPROLOL SUCCINATE 25 MG/1
25 TABLET, EXTENDED RELEASE ORAL EVERY MORNING
Status: DISCONTINUED | OUTPATIENT
Start: 2024-11-19 | End: 2024-11-22 | Stop reason: HOSPADM

## 2024-11-18 RX ORDER — ACETAMINOPHEN 325 MG/1
650 TABLET ORAL EVERY 6 HOURS PRN
Status: DISCONTINUED | OUTPATIENT
Start: 2024-11-18 | End: 2024-11-22 | Stop reason: HOSPADM

## 2024-11-18 RX ORDER — TRAMADOL HYDROCHLORIDE 50 MG/1
50 TABLET ORAL EVERY 6 HOURS PRN
Status: DISCONTINUED | OUTPATIENT
Start: 2024-11-18 | End: 2024-11-22 | Stop reason: HOSPADM

## 2024-11-18 RX ORDER — 0.9 % SODIUM CHLORIDE 0.9 %
1000 INTRAVENOUS SOLUTION INTRAVENOUS ONCE
Status: COMPLETED | OUTPATIENT
Start: 2024-11-18 | End: 2024-11-18

## 2024-11-18 RX ADMIN — TRAMADOL HYDROCHLORIDE 50 MG: 50 TABLET, COATED ORAL at 22:35

## 2024-11-18 RX ADMIN — SODIUM CHLORIDE, PRESERVATIVE FREE 10 ML: 5 INJECTION INTRAVENOUS at 22:35

## 2024-11-18 RX ADMIN — ONDANSETRON 4 MG: 2 INJECTION, SOLUTION INTRAMUSCULAR; INTRAVENOUS at 14:18

## 2024-11-18 RX ADMIN — WATER 1000 MG: 1 INJECTION INTRAMUSCULAR; INTRAVENOUS; SUBCUTANEOUS at 22:34

## 2024-11-18 RX ADMIN — MORPHINE SULFATE 4 MG: 4 INJECTION, SOLUTION INTRAMUSCULAR; INTRAVENOUS at 18:29

## 2024-11-18 RX ADMIN — SODIUM CHLORIDE 1000 ML: 9 INJECTION, SOLUTION INTRAVENOUS at 14:19

## 2024-11-18 RX ADMIN — FENTANYL CITRATE 25 MCG: 50 INJECTION INTRAMUSCULAR; INTRAVENOUS at 14:19

## 2024-11-18 RX ADMIN — IOPAMIDOL 75 ML: 755 INJECTION, SOLUTION INTRAVENOUS at 16:07

## 2024-11-18 RX ADMIN — APIXABAN 2.5 MG: 2.5 TABLET, FILM COATED ORAL at 22:34

## 2024-11-18 ASSESSMENT — PAIN - FUNCTIONAL ASSESSMENT: PAIN_FUNCTIONAL_ASSESSMENT: PREVENTS OR INTERFERES SOME ACTIVE ACTIVITIES AND ADLS

## 2024-11-18 ASSESSMENT — PAIN DESCRIPTION - ORIENTATION: ORIENTATION: RIGHT;LEFT

## 2024-11-18 ASSESSMENT — PAIN DESCRIPTION - LOCATION: LOCATION: GENERALIZED

## 2024-11-18 ASSESSMENT — PAIN SCALES - GENERAL: PAINLEVEL_OUTOF10: 10

## 2024-11-18 ASSESSMENT — PAIN DESCRIPTION - DESCRIPTORS: DESCRIPTORS: ACHING;DISCOMFORT;SORE

## 2024-11-18 NOTE — CARE COORDINATION
Internal Medicine On-call Care Coordination Note    I was called by the ED physician because they recommended admission for this patient and I cover their PCP.  The history as I understand it after discussion with the ED physician is as follows:    Presents with generalized weakness / pain  CT head / C spine / abdomen/pelvis negative for acute process  CXR, XR bilateral knees negative  UA with ? UTI vs colonization  Patient unable to ambulate in ED, decision made for admission    I placed admission orders.  Including:    PT/OT  Ceftriaxone pending culture results    Dr. Yung or his coverage will see the patient tomorrow for H&P.    Electronically signed by Gustavo Potter PA-C on 11/18/2024 at 6:48 PM

## 2024-11-18 NOTE — ED PROVIDER NOTES
neuropathy, Hypertension, Lupus (HCC), and Paroxysmal atrial fibrillation (HCC).     Medical Decision Making  Problems Addressed:  Ambulatory dysfunction: acute illness or injury  Contusion of right knee, initial encounter: acute illness or injury  Generalized abdominal pain: acute illness or injury  Hypochloremia: acute illness or injury  Hyponatremia: acute illness or injury    Amount and/or Complexity of Data Reviewed  External Data Reviewed: labs, ECG and notes.  Labs: ordered. Decision-making details documented in ED Course.  Radiology: ordered and independent interpretation performed. Decision-making details documented in ED Course.  ECG/medicine tests: ordered and independent interpretation performed. Decision-making details documented in ED Course.    Risk  Prescription drug management.  Parenteral controlled substances.  Decision regarding hospitalization.           ED Course as of 11/18/24 2159   Mon Nov 18, 2024   1347 I reviewed the patient's chart.  Patient admitted on 10/24/2023 for Enterococcus bacteremia with concern for GI source. [JA]   1410 EKG:  This EKG is signed and interpreted by me, Dr. Stephanie MD    Rate: 95  Rhythm: Sinus  Interpretation: Sinus rhythm, normal CA interval, normal QRS, normal axis, normal QT interval, no acute ST or T wave changes  Comparison: stable as compared to patient's most recent EKG   [JA]   1753 On reevaluation, patient requesting morphine, pudding, and bilateral knee xrays [JA]   1838 Patient unable to ambulate.  Discussed with patient need for possible rehab placement due to inability to ambulate, lives alone at home.  Patient is agreeable for admission at this time [JA]   1846 Hospitalist was consulted. Gustavo accepted the patient for admission under Dr. Yung.  [JA]      ED Course User Index  [HAIR] Shonda Brown MD       Patient remained hemodynamically stable throughout ED course.     Current Discharge Medication List          Counseling:   The  emergency provider has spoken with the patient and sister and discussed today’s results, in addition to providing specific details for the plan of care and counseling regarding the diagnosis and prognosis.  Questions are answered at this time and they are agreeable with the plan.      --------------------------------- IMPRESSION AND DISPOSITION ---------------------------------    IMPRESSION  1. Generalized abdominal pain    2. Hyponatremia    3. Hypochloremia    4. Ambulatory dysfunction    5. Contusion of right knee, initial encounter        DISPOSITION  Disposition: Admit to med/surg floor  Patient condition is stable      NOTE: This report was transcribed using voice recognition software. Every effort was made to ensure accuracy; however, inadvertent computerized transcription errors may be present    Shonda KNOWLES MD, am the primary provider of this record        Shonda Brown MD  11/18/24 1492

## 2024-11-18 NOTE — PROGRESS NOTES
Database initiated pharmacy and medications verified with the patient. She is A&O comes in from home alone. She uses a rollator and is RA at baseline. She has fallen recently. She has a chronic oneill that is due to be changed today! [facility] nurse was at her house today and told her the hospital would change it once she got here?

## 2024-11-19 PROBLEM — R78.81 E COLI BACTEREMIA: Status: ACTIVE | Noted: 2024-11-19

## 2024-11-19 PROBLEM — N39.0 UTI (URINARY TRACT INFECTION): Status: ACTIVE | Noted: 2024-11-19

## 2024-11-19 PROBLEM — B96.20 E COLI BACTEREMIA: Status: ACTIVE | Noted: 2024-11-19

## 2024-11-19 LAB
ALBUMIN SERPL-MCNC: 2.8 G/DL (ref 3.5–5.2)
ALP SERPL-CCNC: 75 U/L (ref 35–104)
ALT SERPL-CCNC: 25 U/L (ref 0–32)
ANION GAP SERPL CALCULATED.3IONS-SCNC: 13 MMOL/L (ref 7–16)
AST SERPL-CCNC: 38 U/L (ref 0–31)
BACTERIA URNS QL MICRO: ABNORMAL
BASOPHILS # BLD: 0 K/UL (ref 0–0.2)
BASOPHILS NFR BLD: 0 % (ref 0–2)
BILIRUB SERPL-MCNC: 0.3 MG/DL (ref 0–1.2)
BILIRUB UR QL STRIP: NEGATIVE
BUN SERPL-MCNC: 22 MG/DL (ref 6–23)
CALCIUM SERPL-MCNC: 8.4 MG/DL (ref 8.6–10.2)
CHLORIDE SERPL-SCNC: 97 MMOL/L (ref 98–107)
CLARITY UR: CLEAR
CO2 SERPL-SCNC: 21 MMOL/L (ref 22–29)
COLOR UR: YELLOW
CREAT SERPL-MCNC: 0.8 MG/DL (ref 0.5–1)
EKG ATRIAL RATE: 95 BPM
EKG P AXIS: 80 DEGREES
EKG P-R INTERVAL: 156 MS
EKG Q-T INTERVAL: 348 MS
EKG QRS DURATION: 76 MS
EKG QTC CALCULATION (BAZETT): 437 MS
EKG R AXIS: 54 DEGREES
EKG T AXIS: 49 DEGREES
EKG VENTRICULAR RATE: 95 BPM
EOSINOPHIL # BLD: 0 K/UL (ref 0.05–0.5)
EOSINOPHILS RELATIVE PERCENT: 0 % (ref 0–6)
ERYTHROCYTE [DISTWIDTH] IN BLOOD BY AUTOMATED COUNT: 13.4 % (ref 11.5–15)
GFR, ESTIMATED: 72 ML/MIN/1.73M2
GLUCOSE SERPL-MCNC: 73 MG/DL (ref 74–99)
GLUCOSE UR STRIP-MCNC: NEGATIVE MG/DL
HCT VFR BLD AUTO: 32.4 % (ref 34–48)
HGB BLD-MCNC: 10.7 G/DL (ref 11.5–15.5)
HGB UR QL STRIP.AUTO: ABNORMAL
IMM GRANULOCYTES # BLD AUTO: <0.03 K/UL (ref 0–0.58)
IMM GRANULOCYTES NFR BLD: 0 % (ref 0–5)
KETONES UR STRIP-MCNC: 40 MG/DL
LEUKOCYTE ESTERASE UR QL STRIP: ABNORMAL
LYMPHOCYTES NFR BLD: 0.46 K/UL (ref 1.5–4)
LYMPHOCYTES RELATIVE PERCENT: 7 % (ref 20–42)
MCH RBC QN AUTO: 29.2 PG (ref 26–35)
MCHC RBC AUTO-ENTMCNC: 33 G/DL (ref 32–34.5)
MCV RBC AUTO: 88.3 FL (ref 80–99.9)
MONOCYTES NFR BLD: 0.45 K/UL (ref 0.1–0.95)
MONOCYTES NFR BLD: 6 % (ref 2–12)
NEUTROPHILS NFR BLD: 87 % (ref 43–80)
NEUTS SEG NFR BLD: 6.07 K/UL (ref 1.8–7.3)
NITRITE UR QL STRIP: NEGATIVE
PH UR STRIP: 6 [PH] (ref 5–9)
PLATELET # BLD AUTO: 146 K/UL (ref 130–450)
PMV BLD AUTO: 9.2 FL (ref 7–12)
POTASSIUM SERPL-SCNC: 4.5 MMOL/L (ref 3.5–5)
PROT SERPL-MCNC: 5.4 G/DL (ref 6.4–8.3)
PROT UR STRIP-MCNC: ABNORMAL MG/DL
RBC # BLD AUTO: 3.67 M/UL (ref 3.5–5.5)
RBC # BLD: ABNORMAL 10*6/UL
RBC #/AREA URNS HPF: ABNORMAL /HPF
SODIUM SERPL-SCNC: 131 MMOL/L (ref 132–146)
SP GR UR STRIP: 1.01 (ref 1–1.03)
UROBILINOGEN UR STRIP-ACNC: 0.2 EU/DL (ref 0–1)
WBC #/AREA URNS HPF: ABNORMAL /HPF
WBC OTHER # BLD: 7 K/UL (ref 4.5–11.5)

## 2024-11-19 PROCEDURE — 97530 THERAPEUTIC ACTIVITIES: CPT

## 2024-11-19 PROCEDURE — 6370000000 HC RX 637 (ALT 250 FOR IP): Performed by: NURSE PRACTITIONER

## 2024-11-19 PROCEDURE — 85025 COMPLETE CBC W/AUTO DIFF WBC: CPT

## 2024-11-19 PROCEDURE — 93010 ELECTROCARDIOGRAM REPORT: CPT | Performed by: INTERNAL MEDICINE

## 2024-11-19 PROCEDURE — 80053 COMPREHEN METABOLIC PANEL: CPT

## 2024-11-19 PROCEDURE — 51702 INSERT TEMP BLADDER CATH: CPT

## 2024-11-19 PROCEDURE — 87086 URINE CULTURE/COLONY COUNT: CPT

## 2024-11-19 PROCEDURE — 87077 CULTURE AEROBIC IDENTIFY: CPT

## 2024-11-19 PROCEDURE — 2580000003 HC RX 258: Performed by: SPECIALIST

## 2024-11-19 PROCEDURE — 97165 OT EVAL LOW COMPLEX 30 MIN: CPT

## 2024-11-19 PROCEDURE — 2580000003 HC RX 258: Performed by: PHYSICIAN ASSISTANT

## 2024-11-19 PROCEDURE — 81001 URINALYSIS AUTO W/SCOPE: CPT

## 2024-11-19 PROCEDURE — 6370000000 HC RX 637 (ALT 250 FOR IP): Performed by: PHYSICIAN ASSISTANT

## 2024-11-19 PROCEDURE — 6360000002 HC RX W HCPCS: Performed by: SPECIALIST

## 2024-11-19 PROCEDURE — 1200000000 HC SEMI PRIVATE

## 2024-11-19 PROCEDURE — 97161 PT EVAL LOW COMPLEX 20 MIN: CPT

## 2024-11-19 PROCEDURE — 6370000000 HC RX 637 (ALT 250 FOR IP): Performed by: INTERNAL MEDICINE

## 2024-11-19 RX ORDER — POLYVINYL ALCOHOL 14 MG/ML
1 SOLUTION/ DROPS OPHTHALMIC PRN
Status: DISCONTINUED | OUTPATIENT
Start: 2024-11-19 | End: 2024-11-22 | Stop reason: HOSPADM

## 2024-11-19 RX ORDER — DOCUSATE SODIUM 100 MG/1
100 CAPSULE, LIQUID FILLED ORAL 2 TIMES DAILY
Status: DISCONTINUED | OUTPATIENT
Start: 2024-11-19 | End: 2024-11-22 | Stop reason: HOSPADM

## 2024-11-19 RX ORDER — BISACODYL 10 MG
10 SUPPOSITORY, RECTAL RECTAL ONCE
Status: COMPLETED | OUTPATIENT
Start: 2024-11-19 | End: 2024-11-19

## 2024-11-19 RX ADMIN — TRAMADOL HYDROCHLORIDE 50 MG: 50 TABLET, COATED ORAL at 08:37

## 2024-11-19 RX ADMIN — BISACODYL 10 MG: 10 SUPPOSITORY RECTAL at 08:37

## 2024-11-19 RX ADMIN — PANTOPRAZOLE SODIUM 40 MG: 40 TABLET, DELAYED RELEASE ORAL at 06:30

## 2024-11-19 RX ADMIN — POLYVINYL ALCOHOL 1 DROP: 1.4 SOLUTION/ DROPS OPHTHALMIC at 16:09

## 2024-11-19 RX ADMIN — APIXABAN 2.5 MG: 2.5 TABLET, FILM COATED ORAL at 21:46

## 2024-11-19 RX ADMIN — APIXABAN 2.5 MG: 2.5 TABLET, FILM COATED ORAL at 08:37

## 2024-11-19 RX ADMIN — SODIUM CHLORIDE, PRESERVATIVE FREE 10 ML: 5 INJECTION INTRAVENOUS at 21:47

## 2024-11-19 RX ADMIN — DOCUSATE SODIUM 100 MG: 100 CAPSULE, LIQUID FILLED ORAL at 21:46

## 2024-11-19 RX ADMIN — HYDROXYCHLOROQUINE SULFATE 200 MG: 200 TABLET ORAL at 08:37

## 2024-11-19 RX ADMIN — SALINE NASAL SPRAY 1 SPRAY: 1.5 SOLUTION NASAL at 12:25

## 2024-11-19 RX ADMIN — LISINOPRIL 10 MG: 10 TABLET ORAL at 08:37

## 2024-11-19 RX ADMIN — TRAMADOL HYDROCHLORIDE 50 MG: 50 TABLET, COATED ORAL at 21:46

## 2024-11-19 RX ADMIN — ERTAPENEM SODIUM 1000 MG: 1 INJECTION INTRAMUSCULAR; INTRAVENOUS at 12:25

## 2024-11-19 RX ADMIN — TRAMADOL HYDROCHLORIDE 50 MG: 50 TABLET, COATED ORAL at 16:09

## 2024-11-19 RX ADMIN — DOCUSATE SODIUM 100 MG: 100 CAPSULE, LIQUID FILLED ORAL at 08:37

## 2024-11-19 RX ADMIN — METOPROLOL SUCCINATE 25 MG: 25 TABLET, EXTENDED RELEASE ORAL at 08:37

## 2024-11-19 RX ADMIN — SODIUM CHLORIDE, PRESERVATIVE FREE 10 ML: 5 INJECTION INTRAVENOUS at 08:37

## 2024-11-19 ASSESSMENT — PAIN DESCRIPTION - ORIENTATION
ORIENTATION: RIGHT;LEFT

## 2024-11-19 ASSESSMENT — PAIN SCALES - GENERAL
PAINLEVEL_OUTOF10: 10
PAINLEVEL_OUTOF10: 7
PAINLEVEL_OUTOF10: 6

## 2024-11-19 ASSESSMENT — PAIN DESCRIPTION - FREQUENCY: FREQUENCY: CONTINUOUS

## 2024-11-19 ASSESSMENT — PAIN DESCRIPTION - LOCATION
LOCATION: GENERALIZED

## 2024-11-19 ASSESSMENT — PAIN - FUNCTIONAL ASSESSMENT
PAIN_FUNCTIONAL_ASSESSMENT: PREVENTS OR INTERFERES SOME ACTIVE ACTIVITIES AND ADLS

## 2024-11-19 ASSESSMENT — PAIN DESCRIPTION - DESCRIPTORS
DESCRIPTORS: ACHING;SORE;DISCOMFORT
DESCRIPTORS: ACHING;SORE
DESCRIPTORS: SORE;ACHING

## 2024-11-19 ASSESSMENT — PAIN DESCRIPTION - PAIN TYPE: TYPE: ACUTE PAIN

## 2024-11-19 ASSESSMENT — PAIN DESCRIPTION - ONSET: ONSET: ON-GOING

## 2024-11-19 NOTE — H&P
reports current alcohol use.    Home Medications  Prior to Admission medications    Medication Sig Start Date End Date Taking? Authorizing Provider   diazePAM (VALIUM) 5 MG tablet Take 1 tablet by mouth 2 times daily as needed.   Yes Nayeli Kincaid MD   hydroxychloroquine (PLAQUENIL) 200 MG tablet Take 1 tablet by mouth daily 10/17/24  Yes Nayeli Kincaid MD   traMADol (ULTRAM) 50 MG tablet TAKE 1 TABLET BY MOUTH EVERY 6 HOURS AS NEEDED FOR 30 DAYS 10/16/24  Yes Nayeli Kincaid MD   pantoprazole (PROTONIX) 40 MG tablet Take 1 tablet by mouth every morning (before breakfast) 11/2/23  Yes Abdirashid Yung DO   bisacodyl (DULCOLAX) 10 MG suppository Place 1 suppository rectally daily as needed for Constipation   Yes Nayeli Kincaid MD   magnesium hydroxide (MILK OF MAGNESIA) 400 MG/5ML suspension Take 5 mLs by mouth daily as needed for Constipation   Yes Nayeli Kincaid MD   metoprolol succinate (TOPROL XL) 25 MG extended release tablet Take 1 tablet by mouth every morning   Yes Nayeli Kincaid MD   acetaminophen (TYLENOL) 500 MG tablet Take 1 tablet by mouth every 6 hours as needed for Pain   Yes Nayeli Kincaid MD   apixaban (ELIQUIS) 2.5 MG TABS tablet Take 1 tablet by mouth 2 times daily 12/28/22  Yes Bruce Rangel MD   lisinopril (PRINIVIL;ZESTRIL) 10 MG tablet Take 1 tablet by mouth every morning   Yes Nayeli Kincaid MD       Allergies  Allergies   Allergen Reactions    Sulfa Antibiotics Nausea And Vomiting, Rash and Other (See Comments)     BODY ACHES       Review of Systems:   Full 12 point review of systems negative unless mentioned in the HPI.    Objective  VITALS:  BP (!) 114/54   Pulse 93   Temp 98.7 °F (37.1 °C) (Oral)   Resp 16   Ht 1.575 m (5' 2\")   Wt 56.7 kg (125 lb)   SpO2 95%   BMI 22.86 kg/m²     Physical Exam:   General: awake and alert, oriented although some situational forgetfulness is noted, seems tired but comfortable, frail    deformities multilevel vertebroplasties. Bilateral hip joint degenerative   changes.         CT Head W/O Contrast   Final Result   No acute intracranial abnormality.         CT CSpine W/O Contrast   Final Result   1. No acute fracture or traumatic malalignment of the cervical spine.   2. Multilevel degenerative changes.         XR CHEST PORTABLE   Final Result   1. No acute cardiopulmonary process.   2. Mild cardiomegaly.             Assessment  Active Hospital Problems    Diagnosis     E coli bacteremia [R78.81, B96.20]      Priority: High    UTI (urinary tract infection) [N39.0]      Priority: Medium    Sinus node dysfunction (HCC) [I49.5]      Priority: Medium    A-fib (HCC) [I48.91]      Priority: Medium    Primary hypertension [I10]      Priority: Medium    Ambulatory dysfunction [R26.2]     Severe protein-calorie malnutrition (HCC) [E43]     Chronic pain syndrome [G89.4 (ICD-10-CM)] [G89.4]     Intractable back pain [M54.9]        Plan  E.Coli and Enterobacter Bacteremia in the setting of CAUTI  Chronic indwelling oneill -- unclear as to why she has this but replaced 11/19 in the hospital by nurse  WALDEMAR noted, culture is pending  Blood cultures +E.Coli/Enterobacter  ID consulted for further antibiotics  Urology consulted for chronic oneill and further management    Generalized weakness and inability to ambulate  Likely worsened due to underlying infectious process  PT AM-PAC-- TBD  OT AM-PAC-- TBD   for discharge planning    Diffuse pain with Hx Rheumatoid Arthritis  Imaging studies noted  Continue Plaquenil  Continue Ultram and Tylenol as needed    Paroxysmal Atrial Fibrillation  Currently in sinus rhythm  Continue Eliquis for anticoagulation  Toprol XL for rate control    Essential Hypertension  Continue Lisinopril and Toprol XL -- add hold parameters  Monitor BP trends and adjust as indicated    Constipation  Noted significant stool burden on CT abdomen/pelvis  Give Dulcolax RS x 1 this

## 2024-11-19 NOTE — CONSULTS
[place] [address]                              CONSULTATION      PATIENT NAME: DENNIS JESSICA             : 1940  MED REC NO: [id number]                        ROOM: 0510  ACCOUNT NO: [id number]                       ADMIT DATE: 2024  PROVIDER: Michael Ray MD      CONSULT DATE: 2024    CHIEF COMPLAINT:  She is admitted to the hospital in room 510 with chief complaint of weakness, nausea, and vomiting.    HISTORY OF PRESENT ILLNESS:  The patient is an 84-year-old female who has been followed in our office for many years.  She initially was found to have a superficial bladder cancer which was removed and has never reoccurred.  She has been followed many years for this.  About 5 years ago, she was found to have what turned out to be a neurogenic bladder.  She was initially treated with intermittent catheterization.  However, about a year ago, it was decided that in view of her chronic retention and sepsis that a Davidson catheter need to be left on an indwelling permanent basis.  This has been changed by the [facility] on a monthly basis.  The patient did have a CAT scan on this admission, which did not show any upper tract problems.  Her catheter has been changed to a new catheter.    The patient really came to the hospital because of weakness and her knees giving out.  She does live alone with help from her sister and occasionally her son from [place] will come back to spend a month or so.  Currently, she is comfortable.    PAST MEDICAL HISTORY:  She has lupus, atrial fibrillation, hypertension.    PAST SURGICAL HISTORY:  Multiple cystoscopies, hysterectomy, spinal surgery, upper and lower endoscopy.    FAMILY HISTORY:  Mother apparently had multiple myeloma.    SOCIAL HISTORY:  The patient is a .  She lives independently.  Has never been a cigarette smoker.    HOME MEDICATIONS:   Include Valium, Plaquenil, Ultram, Protonix, dulcolax, milk of magnesia, Eliquis, Prinivil.    ALLERGIES:  ALLERGIES ARE TO SULFA.      PHYSICAL EXAMINATION:  GENERAL:  The patient is a very alert and very oriented to recent and remote events.  She appears to be relatively comfortable.  She is extremely frail.  ABDOMEN:  Soft.  There are no palpable organs or masses present.  :  Urine is grossly clear.    IMPRESSION:  The patient has a neurogenic bladder with chronic urinary tract infection.  She had been on intermittent self catheterization in the past, which was unsuccessful.  She now has a Davidson catheter in place.    PLAN:  My recommendation is to continue with the Davidson catheter.  I do not believe a suprapubic catheter at this point would be of any benefit.  The patient's bladder cancer has not recurred for greater than 20 years.          NATALIIA GONZALEZ MD      D:  11/19/2024 10:39:42     T:  11/19/2024 13:26:04     MAYCOL/RAVEN  Job #:  [id number]     Doc#:  [id number]

## 2024-11-19 NOTE — PROGRESS NOTES
Admitted with chronic oneill catheter. Oneill exchanged and new urine samples obtained.    Electronically signed by Blanca Tsang RN on 11/19/2024 at 6:30 AM

## 2024-11-19 NOTE — PROGRESS NOTES
Occupational Therapy  OCCUPATIONAL THERAPY INITIAL EVALUATION  [facility]  [address], [place]    Date: 2024     Patient Name: Krys Adame  MRN: [id number]  : 1940  Room: [address] [place] [address]    Evaluating OT: Marla Draper, OTR/L - [id number]    Referring Provider: Gustavo Potter PA-C   Specific Provider Orders/Date: \"OT eval and treat\" - 2024    Diagnosis: Hypochloremia [E87.8]  Hyponatremia [E87.1]  Generalized abdominal pain [R10.84]  Contusion of right knee, initial encounter [S80.01XA]  Ambulatory dysfunction [R26.2]      Pertinent Medical History: bladder CA, HTN, lupus, A fib, kyphoplasty     Precautions: fall risk    Assessment of Current Deficits:    [x] Functional mobility   [x]ADLs  [x] Strength               []Cognition   [x] Functional transfers   [x] IADLs         [x] Safety Awareness   [x]Endurance   [] Fine Coordination              [x] Balance      [] Vision/perception   []Sensation    []Gross Motor Coordination  [] ROM  [] Delirium                   [] Motor Control     OT PLAN OF CARE   OT POC is based on physician orders, patient diagnosis, and results of clinical assessment.  Frequency/Duration 2-5 days/week for 2 weeks PRN   Specific OT Treatment Interventions to Include:   * Instruction/training on adapted ADL techniques and AE recommendations to increase functional independence within precautions       * Training on energy conservation strategies, correct breathing pattern and techniques to improve independence/tolerance for self-care routine  * Functional transfer/mobility training/DME recommendations for increased independence, safety, and fall prevention  * Patient/Family education to increase follow through with safety techniques and functional independence  * Recommendation of environmental modifications for increased safety with functional transfers/mobility and ADLs  * Therapeutic exercise to

## 2024-11-19 NOTE — PROGRESS NOTES
Physical Therapy  Initial Assessment     Name: Krys Adame  : 1940  MRN: [id number]      Date of Service: 2024    Evaluating PT: Conor Birch, PT, DPT [id number]      Room #:  0510/0510-A  Diagnosis:  Hypochloremia [E87.8]  Hyponatremia [E87.1]  Generalized abdominal pain [R10.84]  Contusion of right knee, initial encounter [S80.01XA]  Ambulatory dysfunction [R26.2]  PMHx/PSHx:   has a past medical history of Cancer (HCC), Cataract, Familial peripheral neuropathy, Hypertension, Lupus (HCC), and Paroxysmal atrial fibrillation (HCC).  Precautions:  Fall risk, Chronic oneill, Alarm    SUBJECTIVE:    Pt lives alone in a single story house with 1+1+1 stair(s) and no rail(s) to enter. Pt ambulated with rollator prior to admission.    OBJECTIVE:   Initial Evaluation  Date: 24 Treatment Date: Short Term/ Long Term   Goals   AM-PAC 6 Clicks      Was pt agreeable to Eval/treatment? Yes     Does pt have pain? Chronic generalized back and R knee pain     Bed Mobility  Rolling: NT  Supine to sit: SBA  Sit to supine: SBA  Scooting: SBA to EOB  Rolling: Independent   Supine to sit: Independent   Sit to supine: Independent   Scooting: Independent    Transfers Sit to stand: ModA  Stand to sit: Peterson  Stand pivot: NT  Sit to stand: SBA  Stand to sit: SBA  Stand pivot: SBA with WW   Ambulation   Sidestepped 3 feet with WW with Peterson  >50 feet with WW with SBA   Stair negotiation: ascended and descended NT  3 platform steps with WW with SBA   ROM BUE: Refer to OT note  BLE: WFL     Strength BUE: Refer to OT note  BLE: NT     Balance Sitting EOB: SBA  Dynamic Standing: Peterson with WW  Sitting EOB: Independent   Dynamic Standing: Supervision with WW     Pt is A & O x: 4 to person, place, month/year, and situation.   Sensation: Pt reports numbness and tingling of toes and numbness of fingers.  Edema: Unremarkable    Patient education  Pt educated on hand placement during transfers.    Patient response to

## 2024-11-19 NOTE — PATIENT CARE CONFERENCE
[facility] Quality Flow/Interdisciplinary Rounds Progress Note        Quality Flow Rounds held on November 19, 2024    Disciplines Attending:  Bedside Nurse, , , and Nursing Unit Leadership    Krys Adame was admitted on 11/18/2024  1:11 PM    Anticipated Discharge Date:       Disposition:    Kailash Score:  Kailash Scale Score: 18    Readmission Risk              Risk of Unplanned Readmission:  14           Discussed patient goal for the day, patient clinical progression, and barriers to discharge.  The following Goal(s) of the Day/Commitment(s) have been identified:  Diagnostics - Report Results and Labs - Report Results      Indu Puga RN  November 19, 2024

## 2024-11-19 NOTE — PROGRESS NOTES
Notified ALEJANDRO Blanco covering for Dr. Yung of positive blood culture via Perfect Serve    Electronically signed by Blanca Tsang RN on 11/19/2024 at 2:52 AM

## 2024-11-19 NOTE — PROGRESS NOTES
4 Eyes Skin Assessment     NAME:  Krys Adame  YOB: 1940  MEDICAL RECORD NUMBER:  [id number]    The patient is being assessed for  Admission    I agree that at least one RN has performed a thorough Head to Toe Skin Assessment on the patient. ALL assessment sites listed below have been assessed.      Areas assessed by both nurses:    Head, Face, Ears, Shoulders, Back, Chest, Arms, Elbows, Hands, Sacrum. Buttock, Coccyx, Ischium, Legs. Feet and Heels, and Under Medical Devices         Does the Patient have a Wound? No noted wound(s)       Kailash Prevention initiated by RN: No  Wound Care Orders initiated by RN: No    Pressure Injury (Stage 3,4, Unstageable, DTI, NWPT, and Complex wounds) if present, place Wound referral order by RN under : No    New Ostomies, if present place, Ostomy referral order under : No     Nurse 1 eSignature: Electronically signed by Blanca Tsang RN on 11/19/24 at 12:38 AM EST    **SHARE this note so that the co-signing nurse can place an eSignature**    Nurse 2 eSignature: Electronically signed by Cynthia Alonso RN on 11/19/24 at 1:53 AM EST

## 2024-11-19 NOTE — CONSULTS
[facility]  [facility]  Consultation Note     Admit Date: 11/18/2024  1:11 PM    Reason for Consult:   positive blood culture    Attending Physician:  Abdirashid Yung DO    HISTORY OF PRESENT ILLNESS:             The history is obtained from extensive review of available past medical records. The patient is a 84 y.o. female who is previously known to the ID service.    She presented to the ED at [facility] on 11/18 after her leg gave out and she fell to her knees. She reported weakness, as well as abdominal pain and decreased appetite. She denies fevers or chills. She has chronic back pain associated to 3 kyphoplasties. She has a chronic oneill since January 2024. Urine and blood cultures were sent as part of her work up. Urine culture is showing >100K E..coli and Blood cultures are growing E.coli with a CTX-M gene in 4/4 bottles. She has paz afebrile, and labs were unremarkable. She was treated with Rocephin and ID was asked to evaluate.    Past Medical History:      October 2023: Admitted t [facility] with nausea and abdominal pain, blood cultures turned positive for Enterococcus faecium. Treated with Unasyn. It was felt to be from GI tract vs UTI. CIRILO was negative, with chronic back pain it was felt that we should treat for 6 weeks for the possibility of endocarditis vs vertebral osteo. She was discharged with a PICC line and Unasyn.         Diagnosis Date    Cancer (HCC) 1990 1995    BLADDER    Cataract left    8-18-16 for removal    Familial peripheral neuropathy     Hypertension     Lupus (HCC)     Paroxysmal atrial fibrillation (HCC)      Past Surgical History:        Procedure Laterality Date    CYSTOSCOPY      MULITPLE    EYE SURGERY  11/29/2012    right eye cataract with lens implant    EYE SURGERY Left 08/18/2016    removal of cataract with insertion of IOL    HYSTERECTOMY (CERVIX STATUS UNKNOWN)      PAIN MANAGEMENT PROCEDURE N/A 8/10/2023    L3-4 EPIDURAL STEROID

## 2024-11-20 ENCOUNTER — APPOINTMENT (OUTPATIENT)
Dept: CT IMAGING | Age: 84
End: 2024-11-20
Payer: MEDICARE

## 2024-11-20 PROBLEM — R40.4 TRANSIENT ALTERATION OF AWARENESS: Status: ACTIVE | Noted: 2024-11-20

## 2024-11-20 PROBLEM — R10.84 GENERALIZED ABDOMINAL PAIN: Status: ACTIVE | Noted: 2024-11-20

## 2024-11-20 LAB
ACB COMPLEX DNA BLD POS QL NAA+NON-PROBE: NOT DETECTED
ALBUMIN SERPL-MCNC: 2.8 G/DL (ref 3.5–5.2)
ALP SERPL-CCNC: 77 U/L (ref 35–104)
ALT SERPL-CCNC: 28 U/L (ref 0–32)
AMMONIA PLAS-SCNC: 26 UMOL/L (ref 11–51)
ANION GAP SERPL CALCULATED.3IONS-SCNC: 8 MMOL/L (ref 7–16)
AST SERPL-CCNC: 36 U/L (ref 0–31)
B FRAGILIS DNA BLD POS QL NAA+NON-PROBE: NOT DETECTED
BASOPHILS # BLD: 0 K/UL (ref 0–0.2)
BASOPHILS NFR BLD: 0 % (ref 0–2)
BILIRUB SERPL-MCNC: 0.3 MG/DL (ref 0–1.2)
BIOFIRE TEST COMMENT: ABNORMAL
BLACTX-M ISLT/SPM QL: DETECTED
BLAIMP ISLT/SPM QL: NOT DETECTED
BLAKPC ISLT/SPM QL: NOT DETECTED
BLAOXA-48-LIKE ISLT/SPM QL: NOT DETECTED
BLAVIM ISLT/SPM QL: NOT DETECTED
BUN SERPL-MCNC: 18 MG/DL (ref 6–23)
C ALBICANS DNA BLD POS QL NAA+NON-PROBE: NOT DETECTED
C AURIS DNA BLD POS QL NAA+NON-PROBE: NOT DETECTED
C GATTII+NEOFOR DNA BLD POS QL NAA+N-PRB: NOT DETECTED
C GLABRATA DNA BLD POS QL NAA+NON-PROBE: NOT DETECTED
C KRUSEI DNA BLD POS QL NAA+NON-PROBE: NOT DETECTED
C PARAP DNA BLD POS QL NAA+NON-PROBE: NOT DETECTED
C TROPICLS DNA BLD POS QL NAA+NON-PROBE: NOT DETECTED
CALCIUM SERPL-MCNC: 8.2 MG/DL (ref 8.6–10.2)
CHLORIDE SERPL-SCNC: 99 MMOL/L (ref 98–107)
CO2 SERPL-SCNC: 25 MMOL/L (ref 22–29)
COLISTIN RES MCR-1 ISLT/SPM QL: NOT DETECTED
CREAT SERPL-MCNC: 0.6 MG/DL (ref 0.5–1)
E CLOAC COMP DNA BLD POS NAA+NON-PROBE: NOT DETECTED
E COLI DNA BLD POS QL NAA+NON-PROBE: DETECTED
E FAECALIS DNA BLD POS QL NAA+NON-PROBE: NOT DETECTED
E FAECIUM DNA BLD POS QL NAA+NON-PROBE: NOT DETECTED
ENTEROBACTERALES DNA BLD POS NAA+N-PRB: DETECTED
EOSINOPHIL # BLD: 0 K/UL (ref 0.05–0.5)
EOSINOPHILS RELATIVE PERCENT: 0 % (ref 0–6)
ERYTHROCYTE [DISTWIDTH] IN BLOOD BY AUTOMATED COUNT: 13.3 % (ref 11.5–15)
GFR, ESTIMATED: 86 ML/MIN/1.73M2
GLUCOSE BLD-MCNC: 130 MG/DL (ref 74–99)
GLUCOSE SERPL-MCNC: 105 MG/DL (ref 74–99)
GP B STREP DNA BLD POS QL NAA+NON-PROBE: NOT DETECTED
HAEM INFLU DNA BLD POS QL NAA+NON-PROBE: NOT DETECTED
HCT VFR BLD AUTO: 31.6 % (ref 34–48)
HGB BLD-MCNC: 10.5 G/DL (ref 11.5–15.5)
K OXYTOCA DNA BLD POS QL NAA+NON-PROBE: NOT DETECTED
KLEBSIELLA SP DNA BLD POS QL NAA+NON-PRB: NOT DETECTED
KLEBSIELLA SP DNA BLD POS QL NAA+NON-PRB: NOT DETECTED
L MONOCYTOG DNA BLD POS QL NAA+NON-PROBE: NOT DETECTED
LYMPHOCYTES NFR BLD: 0.27 K/UL (ref 1.5–4)
LYMPHOCYTES RELATIVE PERCENT: 5 % (ref 20–42)
MCH RBC QN AUTO: 28.8 PG (ref 26–35)
MCHC RBC AUTO-ENTMCNC: 33.2 G/DL (ref 32–34.5)
MCV RBC AUTO: 86.6 FL (ref 80–99.9)
MICROORGANISM SPEC CULT: ABNORMAL
MICROORGANISM/AGENT SPEC: ABNORMAL
MICROORGANISM/AGENT SPEC: ABNORMAL
MONOCYTES NFR BLD: 0.59 K/UL (ref 0.1–0.95)
MONOCYTES NFR BLD: 10 % (ref 2–12)
N MEN DNA BLD POS QL NAA+NON-PROBE: NOT DETECTED
NEUTROPHILS NFR BLD: 86 % (ref 43–80)
NEUTS SEG NFR BLD: 5.14 K/UL (ref 1.8–7.3)
P AERUGINOSA DNA BLD POS NAA+NON-PROBE: NOT DETECTED
PLATELET # BLD AUTO: 154 K/UL (ref 130–450)
PMV BLD AUTO: 9.4 FL (ref 7–12)
POTASSIUM SERPL-SCNC: 3.9 MMOL/L (ref 3.5–5)
PROT SERPL-MCNC: 5 G/DL (ref 6.4–8.3)
PROTEUS SP DNA BLD POS QL NAA+NON-PROBE: NOT DETECTED
RBC # BLD AUTO: 3.65 M/UL (ref 3.5–5.5)
RBC # BLD: ABNORMAL 10*6/UL
RBC # BLD: ABNORMAL 10*6/UL
RESISTANT GENE NDM BY PCR: NOT DETECTED
S AUREUS DNA BLD POS QL NAA+NON-PROBE: NOT DETECTED
S AUREUS+CONS DNA BLD POS NAA+NON-PROBE: NOT DETECTED
S EPIDERMIDIS DNA BLD POS QL NAA+NON-PRB: NOT DETECTED
S LUGDUNENSIS DNA BLD POS QL NAA+NON-PRB: NOT DETECTED
S MALTOPHILIA DNA BLD POS QL NAA+NON-PRB: NOT DETECTED
S MARCESCENS DNA BLD POS NAA+NON-PROBE: NOT DETECTED
S PNEUM DNA BLD POS QL NAA+NON-PROBE: NOT DETECTED
S PYO DNA BLD POS QL NAA+NON-PROBE: NOT DETECTED
SALMONELLA DNA BLD POS QL NAA+NON-PROBE: NOT DETECTED
SERVICE CMNT-IMP: ABNORMAL
SODIUM SERPL-SCNC: 132 MMOL/L (ref 132–146)
SPECIMEN DESCRIPTION: ABNORMAL
STREPTOCOCCUS DNA BLD POS NAA+NON-PROBE: NOT DETECTED
WBC OTHER # BLD: 6 K/UL (ref 4.5–11.5)

## 2024-11-20 PROCEDURE — 85025 COMPLETE CBC W/AUTO DIFF WBC: CPT

## 2024-11-20 PROCEDURE — 6360000002 HC RX W HCPCS: Performed by: SPECIALIST

## 2024-11-20 PROCEDURE — 99291 CRITICAL CARE FIRST HOUR: CPT | Performed by: INTERNAL MEDICINE

## 2024-11-20 PROCEDURE — 1200000000 HC SEMI PRIVATE

## 2024-11-20 PROCEDURE — 70450 CT HEAD/BRAIN W/O DYE: CPT

## 2024-11-20 PROCEDURE — 36415 COLL VENOUS BLD VENIPUNCTURE: CPT

## 2024-11-20 PROCEDURE — 6370000000 HC RX 637 (ALT 250 FOR IP): Performed by: NURSE PRACTITIONER

## 2024-11-20 PROCEDURE — 2580000003 HC RX 258: Performed by: SPECIALIST

## 2024-11-20 PROCEDURE — 82947 ASSAY GLUCOSE BLOOD QUANT: CPT

## 2024-11-20 PROCEDURE — 82140 ASSAY OF AMMONIA: CPT

## 2024-11-20 PROCEDURE — 6370000000 HC RX 637 (ALT 250 FOR IP): Performed by: PHYSICIAN ASSISTANT

## 2024-11-20 PROCEDURE — 80053 COMPREHEN METABOLIC PANEL: CPT

## 2024-11-20 PROCEDURE — 2580000003 HC RX 258: Performed by: PHYSICIAN ASSISTANT

## 2024-11-20 RX ADMIN — DOCUSATE SODIUM 100 MG: 100 CAPSULE, LIQUID FILLED ORAL at 21:07

## 2024-11-20 RX ADMIN — PANTOPRAZOLE SODIUM 40 MG: 40 TABLET, DELAYED RELEASE ORAL at 07:52

## 2024-11-20 RX ADMIN — SODIUM CHLORIDE, PRESERVATIVE FREE 10 ML: 5 INJECTION INTRAVENOUS at 21:12

## 2024-11-20 RX ADMIN — METOPROLOL SUCCINATE 25 MG: 25 TABLET, EXTENDED RELEASE ORAL at 08:55

## 2024-11-20 RX ADMIN — SODIUM CHLORIDE, PRESERVATIVE FREE 10 ML: 5 INJECTION INTRAVENOUS at 08:56

## 2024-11-20 RX ADMIN — ERTAPENEM SODIUM 1000 MG: 1 INJECTION INTRAMUSCULAR; INTRAVENOUS at 12:49

## 2024-11-20 RX ADMIN — APIXABAN 2.5 MG: 2.5 TABLET, FILM COATED ORAL at 08:55

## 2024-11-20 RX ADMIN — LISINOPRIL 10 MG: 10 TABLET ORAL at 08:55

## 2024-11-20 RX ADMIN — TRAMADOL HYDROCHLORIDE 50 MG: 50 TABLET, COATED ORAL at 21:06

## 2024-11-20 RX ADMIN — APIXABAN 2.5 MG: 2.5 TABLET, FILM COATED ORAL at 21:06

## 2024-11-20 RX ADMIN — HYDROXYCHLOROQUINE SULFATE 200 MG: 200 TABLET ORAL at 08:55

## 2024-11-20 ASSESSMENT — PAIN SCALES - GENERAL
PAINLEVEL_OUTOF10: 0
PAINLEVEL_OUTOF10: 8

## 2024-11-20 ASSESSMENT — PAIN - FUNCTIONAL ASSESSMENT: PAIN_FUNCTIONAL_ASSESSMENT: PREVENTS OR INTERFERES SOME ACTIVE ACTIVITIES AND ADLS

## 2024-11-20 ASSESSMENT — PAIN DESCRIPTION - DESCRIPTORS: DESCRIPTORS: ACHING;DISCOMFORT

## 2024-11-20 ASSESSMENT — PAIN DESCRIPTION - LOCATION: LOCATION: GENERALIZED

## 2024-11-20 NOTE — PROGRESS NOTES
CT Head W/O Contrast   Final Result   No acute intracranial abnormality.         CT CSpine W/O Contrast   Final Result   1. No acute fracture or traumatic malalignment of the cervical spine.   2. Multilevel degenerative changes.         XR CHEST PORTABLE   Final Result   1. No acute cardiopulmonary process.   2. Mild cardiomegaly.             Assessment   Active Hospital Problems    Diagnosis     E coli bacteremia [R78.81, B96.20]      Priority: High    UTI (urinary tract infection) [N39.0]      Priority: Medium    Sinus node dysfunction (HCC) [I49.5]      Priority: Medium    A-fib (HCC) [I48.91]      Priority: Medium    Primary hypertension [I10]      Priority: Medium    Ambulatory dysfunction [R26.2]     Severe protein-calorie malnutrition (HCC) [E43]     Chronic pain syndrome [G89.4 (ICD-10-CM)] [G89.4]     Intractable back pain [M54.9]          Plan  E.Coli and Enterobacter Bacteremia in the setting of CAUTI  Chronic indwelling oneill -- unclear as to why she has this but replaced 11/19 in the hospital by nurse  WALDEMAR noted, culture +E.Coli  Blood cultrues +CTX-M E.Coli  Continue IV Ertapenem as per ID  ID consult appreciated     Generalized weakness and inability to ambulate  Likely worsened due to underlying infectious process  PT AM-PAC-- 13/24  OT AM-PAC-- 15/24   for discharge planning -- may benefit from SNF prior to returning home     Neurogenic Bladder  Previously with intermittent self catheterization which was unsuccessful  Maintain chronic oneill catheter at this time  Urology consult appreciated -- do not believe a suprapubic catheter would be of any benefit at this point    Diffuse pain with Hx Rheumatoid Arthritis  Imaging studies noted  Continue Plaquenil  Continue Ultram and Tylenol as needed     Paroxysmal Atrial Fibrillation  Currently in sinus rhythm  Continue Eliquis for anticoagulation  Toprol XL for rate control     Essential Hypertension  Continue Lisinopril and Toprol XL  -- add hold parameters  Monitor BP trends and adjust as indicated     Constipation  Noted significant stool burden on CT abdomen/pelvis  Did have BM 11/19  Continue bowel regimen and monitor abdominal exam    Follow labs   DVT prophylaxis  Please see orders for further management and care.  Discharge plan: would likely benefit from SNF prior to returning home    The pertinent details of this case were discussed with Dr. Yung.    Electronically signed by VARSHA Graves CNP on 11/20/2024 at 7:13 AM

## 2024-11-20 NOTE — ACP (ADVANCE CARE PLANNING)
Advance Care Planning   Healthcare Decision Maker:    Primary Decision Maker: Orestes Stuatr - Roger - [phone]    Secondary Decision Maker: Ruth Bryant - Brother/Sister - [phone]    Click here to complete Healthcare Decision Makers including selection of the Healthcare Decision Maker Relationship (ie \"Primary\").

## 2024-11-20 NOTE — PROGRESS NOTES
Spiritual care called per pt. Request.  Electronically signed by Miranda Fuentes RN on 11/20/2024 at 9:33 AM

## 2024-11-20 NOTE — PROGRESS NOTES
Dr. Yung notified of CT head results via perfect serve at this time. Electronically signed by Avani Rangel RN on 11/20/2024 at 4:00 PM

## 2024-11-20 NOTE — SIGNIFICANT EVENT
RRT note    RRT was called due to altered mental status  Patient was admitted after a fall.  Noted to have UTI and bacteremia  Per report, she was alert, oriented, talkative this morning.    Upon my arrival, she was hemodynamically stable.  .  And per reports patient was nonverbal and nonresponsive.  When questioned, she was able to tell me her name and date of birth.  She was able to move all 4 extremities but very weak.  She was able to squeeze my fingers with both hands.    Stat CT head ordered, no acute changes per my review.  Will await official results.    Change in her mental status could be due to her underlying infection and bacteremia.  Continue to monitor for now    Primary Dr. Yung updated.      Electronically signed by Gill Ayala MD on 11/20/2024 at 3:38 PM    CCT 40 mins

## 2024-11-20 NOTE — PROGRESS NOTES
Dr. Yung updated via perfect serve of RRT at this time. Electronically signed by Avani Rangel RN on 11/20/2024 at 2:50 PM

## 2024-11-20 NOTE — CARE COORDINATION
Introduced my self and provided explanation of CM role to patient. Patient is awake, alert, and aware of current diagnosis and discharge planning. Patient is from home alone independently. PCP is Dr. Hwang. Preferred pharmacy is [facility] in [place]. Patient uses a rollator. Patient is active with [facility]. PT micaela 13/24. Spoke to the patient at bedside, patient is agreeable to rehab. SNF list provided. Patient wishes to try [facility] first. Referral made to [place] with [facility]. Await input.   Electronically signed by Hayley Garces RN on 11/20/2024 at 10:27 AM

## 2024-11-20 NOTE — PROGRESS NOTES
Patient's PCA notified me that pt. Stated she felt \"like I was going to pass out\".  Pt. Is disoriented.  Pt. Is normally talkative, alert, and oriented.  I found her nonverbal and non responsive.  Pt. Was sternal rubbed and made eye contact at that point.  Pupils equal, round, reactive.  Slight droop to left side of face and flushing to left side of face.  Neuro assessment seems WDL.  RRT was called.  VSS.  Dr. Ayala at bedside to examine pt.  Dr. Ayala aware of change in mental status and neuro exam.  Pt. Taken for stat CT head.    Electronically signed by Miranda Fuentes RN on 11/20/2024 at 3:16 PM

## 2024-11-20 NOTE — PLAN OF CARE
Problem: ABCDS Injury Assessment  Goal: Absence of physical injury  11/20/2024 1002 by Miranda Fuentes RN  Outcome: Progressing     Problem: Skin/Tissue Integrity  Goal: Absence of new skin breakdown  Description: 1.  Monitor for areas of redness and/or skin breakdown  2.  Assess vascular access sites hourly  3.  Every 4-6 hours minimum:  Change oxygen saturation probe site  4.  Every 4-6 hours:  If on nasal continuous positive airway pressure, respiratory therapy assess nares and determine need for appliance change or resting period.  11/20/2024 1002 by Miranda Fuentes RN  Outcome: Progressing     Problem: Discharge Planning  Goal: Discharge to home or other facility with appropriate resources  11/20/2024 1002 by Miranda Fuentes RN  Outcome: Progressing     Problem: Safety - Adult  Goal: Free from fall injury  11/20/2024 1002 by Miranda Fuentes RN  Outcome: Progressing     Problem: Pain  Goal: Verbalizes/displays adequate comfort level or baseline comfort level  11/20/2024 1002 by Miranda Fuentes RN  Outcome: Progressing

## 2024-11-20 NOTE — PROGRESS NOTES
Attempted to get patient to the chair for lunch, and she is unwilling to try at this time.  She said she did not do well with therapy and her knees will give out when she stands.  Electronically signed by Miranda Fuentes RN on 11/20/2024 at 10:04 AM

## 2024-11-20 NOTE — PROGRESS NOTES
Pt. Does seem to be closer to baseline mental status.  I asked her how she is feeling and she said when she was unresponsive, she could hear me, but she could not see me and was not able to physically respond.    Electronically signed by Miranda Fuentes RN on 11/20/2024 at 4:17 PM

## 2024-11-20 NOTE — PROGRESS NOTES
[facility] Quality Flow/Interdisciplinary Rounds Progress Note        Quality Flow Rounds held on November 20, 2024    Disciplines Attending:  Bedside Nurse, , , and Nursing Unit Leadership    Krys Adame was admitted on 11/18/2024  1:11 PM    Anticipated Discharge Date:       Disposition:    Kailash Score:  Kailash Scale Score: 15    Readmission Risk              Risk of Unplanned Readmission:  15           Discussed patient goal for the day, patient clinical progression, and barriers to discharge.  The following Goal(s) of the Day/Commitment(s) have been identified:  Diagnostics - Report Results and Labs - Report Results      Avani Rangel RN  November 20, 2024

## 2024-11-20 NOTE — CONSULTS
Comprehensive Nutrition Assessment    Type and Reason for Visit:  Initial, Consult    Nutrition Recommendations/Plan:   Continue current diet and ONS with all meals, as tolerated  ORS per provider  Continue inpatient monitoring     Malnutrition Assessment:  Malnutrition Status:  At risk for malnutrition (11/20/24 1207)    Context:  Acute Illness     Findings of the 6 clinical characteristics of malnutrition:  Energy Intake:  50% or less of estimated energy requirements for 5 or more days  Weight Loss:  No weight loss     Body Fat Loss:  Unable to assess     Muscle Mass Loss:  Unable to assess    Fluid Accumulation:  No fluid accumulation     Strength:  Not Performed    Nutrition Assessment:    Pt admit 2/2 E. coli bacteremia. She reports poor appetite/PO x 4 days PTA and current intake 0% at some meals. Feeling fatigued and weak.  PMHx=Bladder CA, chronic oneill, Lupus, 3 kyphoplasties. Will modify ONS to provide variety with meals and promote tolerance. Discharge planning to Rehab.    Nutrition Related Findings:    A&Ox4, chronic constipation, appetite loss, soft abd +BS, +1 edema, Wound Type: None (blanchable buttocks)       Current Nutrition Intake & Therapies:    Average Meal Intake: 0%  Average Supplements Intake: Unable to assess  ADULT DIET; Regular  ORAL HYDRATION; Electrolyte Solution; 300 ml (10 oz)  ADULT ORAL NUTRITION SUPPLEMENT; Dinner, Breakfast; Standard High Calorie/High Protein Oral Supplement  ADULT ORAL NUTRITION SUPPLEMENT; Lunch; Frozen Oral Supplement    Anthropometric Measures:  Height: 157.5 cm (5' 2\")  Ideal Body Weight (IBW): 110 lbs (50 kg)    Admission Body Weight: 54 kg (119 lb 0.8 oz)  Current Body Weight: 54 kg (119 lb 0.8 oz), 108.2 % IBW. Weight Source: Bed scale (11/20)  Current BMI (kg/m2): 21.8  Usual Body Weight: 49.9 kg (109 lb 15.8 oz) (OV 8/22/2024)     % Weight Change (Calculated): 8.2                    BMI Categories: Underweight (BMI less than 22) age over

## 2024-11-20 NOTE — PROGRESS NOTES
Nursing notified me that a rapid response was called.  I discussed the case with the house officer.  Apparently they called the rep response for altered mental status and unresponsiveness.  When she was evaluated by the house officer she was able to move all extremities but she was just weak.  She answered questions for him appropriately.  He told me that she was hemodynamically stable and plan to get a CT head.  I agree with his assessment.    Additional plan:  Add neurochecks  Have nursing call to update me after the CAT scan  Check a set of orthostatic blood pressures  Check ammonia level    Electronically signed by Abdirashid Yung DO on 11/20/2024 at 3:34 PM

## 2024-11-20 NOTE — PROGRESS NOTES
Spiritual Health History and Assessment/Progress Note  [facility]    Crisis, Rapid Response,  ,      Name: Krys Adaem MRN: [id number]    Age: 84 y.o.     Sex: female   Language: English   Anabaptism: Gnosticist   E coli bacteremia     Date: 11/20/2024                           Spiritual Assessment continued in [facility] 5W MED SURG        Referral/Consult From: Multi-disciplinary team   Encounter Overview/Reason: Crisis  Service Provided For: Patient    Eveline, Belief, Meaning:   Patient unable to assess at this time  Family/Friends No family/friends present      Importance and Influence:  Patient unable to assess at this time  Family/Friends No family/friends present    Community:  Patient feels well-supported. Support system includes: Children and Extended family  Family/Friends No family/friends present    Assessment and Plan of Care:     Patient Interventions include: Other: Prayer for patient as the rapid response team were attending to the patient's needs  Family/Friends Interventions include: No family/friends present    Patient Plan of Care: Spiritual Care available upon further referral  Family/Friends Plan of Care: No family/friends present    Electronically signed by Chaplain Bebeto on 11/20/2024 at 2:47 PM

## 2024-11-20 NOTE — PROGRESS NOTES
Ortho BP & P done and (-).  See flowsheets.    Electronically signed by Miranda Fuentes RN on 11/20/2024 at 6:03 PM

## 2024-11-20 NOTE — PROGRESS NOTES
Spiritual Health History and Assessment/Progress Note  [facility]    Attempted Encounter, Spiritual/Emotional Needs,  ,  ,      Name: Krys Adame MRN: [id number]    Age: 84 y.o.     Sex: female   Language: English   Amish: Buddhism   E coli bacteremia     Date: 11/20/2024                           Spiritual Assessment began in [facility] 5W MED SURG        Referral/Consult From: Patient   Encounter Overview/Reason: Attempted Encounter, Spiritual/Emotional Needs  Service Provided For: Patient    Eveline, Belief, Meaning:   Patient identifies as spiritual, is connected with a eveline tradition or spiritual practice, and has beliefs or practices that help with coping during difficult times  Family/Friends No family/friends present      Importance and Influence:  Patient has no beliefs influential to healthcare decision-making identified during this visit  Family/Friends No family/friends present    Community:  Patient is connected with a spiritual community  Family/Friends No family/friends present    Assessment and Plan of Care:     Patient Interventions include: Explored spiritual coping/struggle/distress and Provided sacramental/Rastafarian ritual  Family/Friends Interventions include: No family/friends present    Patient Plan of Care: Other: arranged for pt to have communion.  Family/Friends Plan of Care: No family/friends present    Electronically signed by Chaplain Rosibel on 11/20/2024 at 10:49 AM

## 2024-11-20 NOTE — PROGRESS NOTES
[facility]  [facility]  Progress Note    SUBJECTIVE:  Chief Complaint   Patient presents with    Fatigue     From home. Has been sick and weak x 4 days. Can't keep anything down . Also has chronic pain that she wants treated for. 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 Has lupus and multiple back surgeries     No new problems reported  Tolerating antibiotic.  No fever.  No nausea or vomiting.    Review of systems:  As stated above in the chief complaint, otherwise negative.    Medications:  Scheduled Meds:   docusate sodium  100 mg Oral BID    ertapenem (INVanz) 1,000 mg in sodium chloride (PF) 0.9 % 10 mL IV syringe  1,000 mg IntraVENous Q24H    pantoprazole  40 mg Oral QAM AC    metoprolol succinate  25 mg Oral QAM    lisinopril  10 mg Oral QAM    hydroxychloroquine  200 mg Oral Daily    apixaban  2.5 mg Oral BID    sodium chloride flush  10 mL IntraVENous 2 times per day     Continuous Infusions:   sodium chloride       PRN Meds:polyvinyl alcohol, sodium chloride, traMADol, diazePAM, sodium chloride flush, sodium chloride, potassium chloride **OR** potassium alternative oral replacement **OR** [DISCONTINUED] potassium chloride, magnesium sulfate, ondansetron **OR** ondansetron, senna, acetaminophen **OR** acetaminophen, melatonin    OBJECTIVE:  BP (!) 121/54   Pulse 83   Temp 98.9 °F (37.2 °C) (Oral)   Resp 16   Ht 1.575 m (5' 2\")   Wt 54 kg (119 lb)   SpO2 96%   BMI 21.77 kg/m²   Temp  Av °F (37.2 °C)  Min: 98.9 °F (37.2 °C)  Max: 99.1 °F (37.3 °C)  Constitutional: The patient is awake, alert, and oriented.  No distress.  Skin: Warm and dry. No rashes were noted.   HEENT: Round and reactive pupils.  Moist mucous membranes.  No ulcerations or thrush.  Neck: Supple to movements.   Chest: Good breath sounds.  No crackles.  Cardiovascular: Heart sounds rhythmic and regular.  Abdomen: Positive bowel sounds to auscultation. Benign to palpation.  Extremities: No

## 2024-11-20 NOTE — PROGRESS NOTES
Spiritual Health History and Assessment/Progress Note  [facility]    Spiritual/Emotional Needs (Worship communion),  ,  ,      Name: Krys Adame MRN: [id number]    Age: 84 y.o.     Sex: female   Language: English   Shinto: Anglican   E coli bacteremia     Date: 11/20/2024                           Spiritual Assessment continued in [facility] 5W MED SURG        Referral/Consult From: Patient, Other    Encounter Overview/Reason: Spiritual/Emotional Needs (Worship communion)  Service Provided For: Patient    Eveline, Belief, Meaning:   Patient is connected with a eveline tradition or spiritual practice and has beliefs or practices that help with coping during difficult times  Family/Friends No family/friends present      Importance and Influence:  Patient has spiritual/personal beliefs that influence decisions regarding their health  Family/Friends No family/friends present    Community:  Patient feels well-supported. Support system includes: Children and Extended family  Family/Friends No family/friends present    Assessment and Plan of Care:     Patient Interventions include: Facilitated expression of thoughts and feelings and Affirmed coping skills/support systems  Family/Friends Interventions include: No family/friends present    Patient Plan of Care: Spiritual Care available upon further referral  Family/Friends Plan of Care: No family/friends present    Electronically signed by Chaplain Bebeto on 11/20/2024 at 2:28 PM

## 2024-11-21 ENCOUNTER — APPOINTMENT (OUTPATIENT)
Dept: ULTRASOUND IMAGING | Age: 84
End: 2024-11-21
Payer: MEDICARE

## 2024-11-21 ENCOUNTER — APPOINTMENT (OUTPATIENT)
Dept: MRI IMAGING | Age: 84
End: 2024-11-21
Payer: MEDICARE

## 2024-11-21 LAB
ALBUMIN SERPL-MCNC: 2.8 G/DL (ref 3.5–5.2)
ALP SERPL-CCNC: 78 U/L (ref 35–104)
ALT SERPL-CCNC: 37 U/L (ref 0–32)
ANION GAP SERPL CALCULATED.3IONS-SCNC: 8 MMOL/L (ref 7–16)
AST SERPL-CCNC: 37 U/L (ref 0–31)
BASOPHILS # BLD: 0.02 K/UL (ref 0–0.2)
BASOPHILS NFR BLD: 0 % (ref 0–2)
BILIRUB SERPL-MCNC: 0.3 MG/DL (ref 0–1.2)
BUN SERPL-MCNC: 13 MG/DL (ref 6–23)
CALCIUM SERPL-MCNC: 8.1 MG/DL (ref 8.6–10.2)
CHLORIDE SERPL-SCNC: 100 MMOL/L (ref 98–107)
CO2 SERPL-SCNC: 28 MMOL/L (ref 22–29)
CREAT SERPL-MCNC: 0.5 MG/DL (ref 0.5–1)
EOSINOPHIL # BLD: 0.04 K/UL (ref 0.05–0.5)
EOSINOPHILS RELATIVE PERCENT: 1 % (ref 0–6)
ERYTHROCYTE [DISTWIDTH] IN BLOOD BY AUTOMATED COUNT: 13.4 % (ref 11.5–15)
GFR, ESTIMATED: 90 ML/MIN/1.73M2
GLUCOSE SERPL-MCNC: 101 MG/DL (ref 74–99)
HCT VFR BLD AUTO: 32.8 % (ref 34–48)
HGB BLD-MCNC: 11 G/DL (ref 11.5–15.5)
IMM GRANULOCYTES # BLD AUTO: 0.06 K/UL (ref 0–0.58)
IMM GRANULOCYTES NFR BLD: 1 % (ref 0–5)
LYMPHOCYTES NFR BLD: 0.73 K/UL (ref 1.5–4)
LYMPHOCYTES RELATIVE PERCENT: 14 % (ref 20–42)
MCH RBC QN AUTO: 29 PG (ref 26–35)
MCHC RBC AUTO-ENTMCNC: 33.5 G/DL (ref 32–34.5)
MCV RBC AUTO: 86.5 FL (ref 80–99.9)
MONOCYTES NFR BLD: 0.61 K/UL (ref 0.1–0.95)
MONOCYTES NFR BLD: 12 % (ref 2–12)
NEUTROPHILS NFR BLD: 71 % (ref 43–80)
NEUTS SEG NFR BLD: 3.65 K/UL (ref 1.8–7.3)
PLATELET # BLD AUTO: 166 K/UL (ref 130–450)
PMV BLD AUTO: 8.7 FL (ref 7–12)
POTASSIUM SERPL-SCNC: 3.7 MMOL/L (ref 3.5–5)
PROT SERPL-MCNC: 5.3 G/DL (ref 6.4–8.3)
RBC # BLD AUTO: 3.79 M/UL (ref 3.5–5.5)
SODIUM SERPL-SCNC: 136 MMOL/L (ref 132–146)
WBC OTHER # BLD: 5.1 K/UL (ref 4.5–11.5)

## 2024-11-21 PROCEDURE — 36415 COLL VENOUS BLD VENIPUNCTURE: CPT

## 2024-11-21 PROCEDURE — 05HB33Z INSERTION OF INFUSION DEVICE INTO RIGHT BASILIC VEIN, PERCUTANEOUS APPROACH: ICD-10-PCS | Performed by: INTERNAL MEDICINE

## 2024-11-21 PROCEDURE — 2500000003 HC RX 250 WO HCPCS: Performed by: NURSE PRACTITIONER

## 2024-11-21 PROCEDURE — 93880 EXTRACRANIAL BILAT STUDY: CPT

## 2024-11-21 PROCEDURE — 2580000003 HC RX 258: Performed by: PHYSICIAN ASSISTANT

## 2024-11-21 PROCEDURE — 6360000002 HC RX W HCPCS: Performed by: SPECIALIST

## 2024-11-21 PROCEDURE — 80053 COMPREHEN METABOLIC PANEL: CPT

## 2024-11-21 PROCEDURE — 6370000000 HC RX 637 (ALT 250 FOR IP): Performed by: PHYSICIAN ASSISTANT

## 2024-11-21 PROCEDURE — 36410 VNPNXR 3YR/> PHY/QHP DX/THER: CPT

## 2024-11-21 PROCEDURE — 2580000003 HC RX 258: Performed by: NURSE PRACTITIONER

## 2024-11-21 PROCEDURE — 76937 US GUIDE VASCULAR ACCESS: CPT

## 2024-11-21 PROCEDURE — 1200000000 HC SEMI PRIVATE

## 2024-11-21 PROCEDURE — 6370000000 HC RX 637 (ALT 250 FOR IP): Performed by: NURSE PRACTITIONER

## 2024-11-21 PROCEDURE — C1751 CATH, INF, PER/CENT/MIDLINE: HCPCS

## 2024-11-21 PROCEDURE — 97535 SELF CARE MNGMENT TRAINING: CPT

## 2024-11-21 PROCEDURE — 2580000003 HC RX 258: Performed by: SPECIALIST

## 2024-11-21 PROCEDURE — 70551 MRI BRAIN STEM W/O DYE: CPT

## 2024-11-21 PROCEDURE — 97530 THERAPEUTIC ACTIVITIES: CPT

## 2024-11-21 PROCEDURE — 85025 COMPLETE CBC W/AUTO DIFF WBC: CPT

## 2024-11-21 RX ORDER — SODIUM CHLORIDE 9 MG/ML
INJECTION, SOLUTION INTRAVENOUS PRN
Status: DISCONTINUED | OUTPATIENT
Start: 2024-11-21 | End: 2024-11-22 | Stop reason: HOSPADM

## 2024-11-21 RX ORDER — SODIUM CHLORIDE 0.9 % (FLUSH) 0.9 %
5-40 SYRINGE (ML) INJECTION EVERY 12 HOURS SCHEDULED
Status: DISCONTINUED | OUTPATIENT
Start: 2024-11-21 | End: 2024-11-22 | Stop reason: HOSPADM

## 2024-11-21 RX ORDER — SODIUM CHLORIDE 0.9 % (FLUSH) 0.9 %
5-40 SYRINGE (ML) INJECTION PRN
Status: DISCONTINUED | OUTPATIENT
Start: 2024-11-21 | End: 2024-11-22 | Stop reason: HOSPADM

## 2024-11-21 RX ORDER — SENNOSIDES A AND B 8.6 MG/1
1 TABLET, FILM COATED ORAL NIGHTLY
Status: DISCONTINUED | OUTPATIENT
Start: 2024-11-21 | End: 2024-11-22 | Stop reason: HOSPADM

## 2024-11-21 RX ORDER — LIDOCAINE HYDROCHLORIDE 10 MG/ML
50 INJECTION, SOLUTION EPIDURAL; INFILTRATION; INTRACAUDAL; PERINEURAL ONCE
Status: DISCONTINUED | OUTPATIENT
Start: 2024-11-21 | End: 2024-11-22 | Stop reason: HOSPADM

## 2024-11-21 RX ORDER — LIDOCAINE HYDROCHLORIDE 10 MG/ML
50 INJECTION, SOLUTION EPIDURAL; INFILTRATION; INTRACAUDAL; PERINEURAL ONCE
Status: COMPLETED | OUTPATIENT
Start: 2024-11-21 | End: 2024-11-21

## 2024-11-21 RX ADMIN — TRAMADOL HYDROCHLORIDE 50 MG: 50 TABLET, COATED ORAL at 18:21

## 2024-11-21 RX ADMIN — DOCUSATE SODIUM 100 MG: 100 CAPSULE, LIQUID FILLED ORAL at 08:50

## 2024-11-21 RX ADMIN — TRAMADOL HYDROCHLORIDE 50 MG: 50 TABLET, COATED ORAL at 11:51

## 2024-11-21 RX ADMIN — HYDROXYCHLOROQUINE SULFATE 200 MG: 200 TABLET ORAL at 08:50

## 2024-11-21 RX ADMIN — PANTOPRAZOLE SODIUM 40 MG: 40 TABLET, DELAYED RELEASE ORAL at 06:49

## 2024-11-21 RX ADMIN — LIDOCAINE HYDROCHLORIDE 0.5 MG: 10 SOLUTION INTRAVENOUS at 16:15

## 2024-11-21 RX ADMIN — APIXABAN 2.5 MG: 2.5 TABLET, FILM COATED ORAL at 20:30

## 2024-11-21 RX ADMIN — DIAZEPAM 5 MG: 5 TABLET ORAL at 00:11

## 2024-11-21 RX ADMIN — LISINOPRIL 10 MG: 10 TABLET ORAL at 08:50

## 2024-11-21 RX ADMIN — METOPROLOL SUCCINATE 25 MG: 25 TABLET, EXTENDED RELEASE ORAL at 08:51

## 2024-11-21 RX ADMIN — DOCUSATE SODIUM 100 MG: 100 CAPSULE, LIQUID FILLED ORAL at 20:30

## 2024-11-21 RX ADMIN — SODIUM CHLORIDE, PRESERVATIVE FREE 10 ML: 5 INJECTION INTRAVENOUS at 16:15

## 2024-11-21 RX ADMIN — SODIUM CHLORIDE, PRESERVATIVE FREE 10 ML: 5 INJECTION INTRAVENOUS at 20:31

## 2024-11-21 RX ADMIN — SENNOSIDES 8.6 MG: 8.6 TABLET, COATED ORAL at 20:30

## 2024-11-21 RX ADMIN — ERTAPENEM SODIUM 1000 MG: 1 INJECTION INTRAMUSCULAR; INTRAVENOUS at 12:09

## 2024-11-21 RX ADMIN — APIXABAN 2.5 MG: 2.5 TABLET, FILM COATED ORAL at 08:50

## 2024-11-21 RX ADMIN — SODIUM CHLORIDE, PRESERVATIVE FREE 10 ML: 5 INJECTION INTRAVENOUS at 08:50

## 2024-11-21 RX ADMIN — DIAZEPAM 5 MG: 5 TABLET ORAL at 20:30

## 2024-11-21 ASSESSMENT — PAIN SCALES - WONG BAKER: WONGBAKER_NUMERICALRESPONSE: NO HURT

## 2024-11-21 ASSESSMENT — PAIN SCALES - GENERAL
PAINLEVEL_OUTOF10: 7
PAINLEVEL_OUTOF10: 9

## 2024-11-21 NOTE — DISCHARGE INSTR - COC
Continuity of Care Form    Patient Name: Krys Adame   :  1940  MRN:  [id number]    Admit date:  2024  Discharge date:  2024    Code Status Order: Full Code   Advance Directives:   Advance Care Flowsheet Documentation             Admitting Physician:  Abdirashid Yung DO  PCP: Gino Hwang MD    Discharging Nurse: Natalia Blair  Discharging Hospital Unit/Room#: 0510/0510-A  Discharging Unit Phone Number: [phone]    Emergency Contact:   Extended Emergency Contact Information  Primary Emergency Contact: Ruth Bryant  Address: [address] [place] [address]. [address]           [place] [address] [facility]  Home Phone: [phone]  Mobile Phone: [phone]  Relation: Brother/Sister  Secondary Emergency Contact: Orestes Stuart  Home Phone: [phone]  Relation: Child    Past Surgical History:  Past Surgical History:   Procedure Laterality Date    CYSTOSCOPY      MULITPLE    EYE SURGERY  2012    right eye cataract with lens implant    EYE SURGERY Left 2016    removal of cataract with insertion of IOL    HYSTERECTOMY (CERVIX STATUS UNKNOWN)      PAIN MANAGEMENT PROCEDURE N/A 8/10/2023    L3-4 EPIDURAL STEROID INJECTION performed by Luz Edge DO at [facility] OR    PICC INSERTION VASCULAR ACCESS TEAM  2023    SPINE SURGERY Bilateral 5/3/2023    L1 and L2 KYPHOPLASTY performed by Jason Bettencourt MD at [facility] OR    SPINE SURGERY N/A 2023    T12, L3, L4 KYPHOPLASTY performed by Jason Bettencourt MD at [facility] OR    SPINE SURGERY N/A 10/14/2023    KYPHOPLASTY T11 performed by Morro Freeman DO at [facility] OR    UPPER GASTROINTESTINAL ENDOSCOPY N/A 10/12/2023    EGD BIOPSY performed by Jovani Murray DO at [facility] ENDOSCOPY       Immunization History:   Immunization History   Administered Date(s) Administered    COVID-19, MODERNA BLUE border, Primary or Immunocompromised, (age 12y+), IM, 100 mcg/0.5mL 2021, 2021       Active Problems:  Patient Active Problem List

## 2024-11-21 NOTE — PLAN OF CARE

## 2024-11-21 NOTE — PLAN OF CARE
Problem: ABCDS Injury Assessment  Goal: Absence of physical injury  11/21/2024 1659 by Florence Copeland RN  Outcome: Progressing  11/21/2024 0521 by Blanca Tsang RN  Outcome: Progressing     Problem: Skin/Tissue Integrity  Goal: Absence of new skin breakdown  Description: 1.  Monitor for areas of redness and/or skin breakdown  2.  Assess vascular access sites hourly  3.  Every 4-6 hours minimum:  Change oxygen saturation probe site  4.  Every 4-6 hours:  If on nasal continuous positive airway pressure, respiratory therapy assess nares and determine need for appliance change or resting period.  11/21/2024 1659 by Florence Copeland RN  Outcome: Progressing  11/21/2024 0521 by Blanca Tsang RN  Outcome: Progressing     Problem: Discharge Planning  Goal: Discharge to home or other facility with appropriate resources  11/21/2024 1659 by Florence Copeland RN  Outcome: Progressing  11/21/2024 0521 by Blanca Tsang RN  Outcome: Progressing     Problem: Safety - Adult  Goal: Free from fall injury  11/21/2024 1659 by Florence Copeland RN  Outcome: Progressing  11/21/2024 0521 by Blanca Tsang RN  Outcome: Progressing     Problem: Pain  Goal: Verbalizes/displays adequate comfort level or baseline comfort level  11/21/2024 1659 by Florence Copeland RN  Outcome: Progressing  11/21/2024 0521 by Blanca Tsang RN  Outcome: Progressing     Problem: Nutrition Deficit:  Goal: Optimize nutritional status  11/21/2024 1659 by Florence Copeland RN  Outcome: Progressing  11/21/2024 0521 by Blanca Tsang RN  Outcome: Progressing

## 2024-11-21 NOTE — PATIENT CARE CONFERENCE
[facility] Quality Flow/Interdisciplinary Rounds Progress Note        Quality Flow Rounds held on November 21, 2024    Disciplines Attending:  Bedside Nurse, , , and Nursing Unit Leadership    Krys Adame was admitted on 11/18/2024  1:11 PM    Anticipated Discharge Date:       Disposition:    Kailash Score:  Kailash Scale Score: 14    Readmission Risk              Risk of Unplanned Readmission:  15           Discussed patient goal for the day, patient clinical progression, and barriers to discharge.  The following Goal(s) of the Day/Commitment(s) have been identified:  Diagnostics - Report Results      Brooklyn Matson RN  November 21, 2024

## 2024-11-21 NOTE — PROGRESS NOTES
Physician Progress Note      PATIENT:               DENNIS JESSICA  [facility] #:                  [id number]  :                       1940  ADMIT DATE:       2024 1:11 PM  DISCH DATE:  RESPONDING  PROVIDER #:        Chip Aguilar MD          QUERY TEXT:    Patient admitted with CAUTI. Noted documentation of CAUTI with sepsis   secondary to E. coli, in ID PN,  with WBC - 7.9, 7.0, 6.0 and Temp - 98 -   99.1. In order to support the diagnosis of sepsis, please include additional   clinical indicators in your documentation.  Or please document if the   diagnosis of sepsis has been ruled out after further study    The medical record reflects the following:  Risk Factors: CAUTI, Bacteremia,    Clinical Indicators: ID PN  \"CAUTI with sepsis secondary to E. coli, ESBL   + E. coli septicemia associated to the above; Continue Ertapenem, Patient is   refusing a PICC\".    IM PN  \"E.Coli and Enterobacter Bacteremia in the setting of CAUTI,   Chronic indwelling oneill -- unclear as to why she has this but replaced    in the hospital by nurse,  UA noted, culture +E.Coli, Blood cultrues +CTX-M   E.Coli, Continue IV Ertapenem as per ID;    WBC - 7.9, 7.0, 6.0  Blood culture - Positive for ESCHERICHIA COLI ESBL  Temp - 98 - 99.1  HR - 89, 93, 88, 77, 81, 72  RR - 18, 16, 14    Treatment: Sodium chloride 0.9 % bolus, IV Rocephin, Blood culture, ID Consult    Thank you  Servando Garcia S CDS  Options provided:  -- Sepsis present as evidenced by, Please document evidence.  -- Sepsis was ruled out after study  -- Other - I will add my own diagnosis  -- Disagree - Not applicable / Not valid  -- Disagree - Clinically unable to determine / Unknown  -- Refer to Clinical Documentation Reviewer    PROVIDER RESPONSE TEXT:    This is self-evident    Query created by: Servando Rico on 2024 5:40 AM      Electronically signed by:  Chip Aguilar MD 2024 10:13 AM

## 2024-11-21 NOTE — PROGRESS NOTES
Internal Medicine Progress Note    Patient's name: Krys Adame  : 1940  Chief complaints (on day of admission): Fatigue (From home. Has been sick and weak x 4 days. Can't keep anything down . Also has chronic pain that she wants treated for. 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 Has lupus and multiple back surgeries)  Admission date: 2024  Date of service: 2024   Room: [address] MED SURG  Primary care physician: Gino Hwang MD  Reason for visit: Follow-up for E.Coli septicemia    Subjective  Krys is seen lying in bed asleep in no distress. She does easily awaken to voice. She reports feeling well just a little tired. She denies any nausea or vomiting. Does have some pain/stiffness in her knees. She did have an RRT yesterday afternoon due to altered mental status. She reportedly was not responding. A CT of the head was obtained and showed no acute findings. She reports that she does recall the episode and was \"zoned out\". She reports that she was able to hear what was being said but wasn't able to answer. She expresses that while she knew what to say she couldn't get the words out. In discussion with nursing the episode was brief and did resolve. She was able to get out of bed to do orthos after the event without issues and has been at baseline overnight. No other issues or concerns from nursing.    Review of Systems  Full 10 point review of systems negative unless mentioned above.    Hospital Medications  Current Facility-Administered Medications   Medication Dose Route Frequency Provider Last Rate Last Admin    docusate sodium (COLACE) capsule 100 mg  100 mg Oral BID Blanca Torres, VARSHA - CNP   100 mg at 24    polyvinyl alcohol (LIQUIFILM TEARS) 1.4 % ophthalmic solution 1 drop  1 drop Both Eyes PRN Abdirashid Yung, DO   1 drop at 24 1609    sodium chloride (OCEAN, BABY AYR) 0.65 % nasal spray 1 spray  1 spray Each Nostril PRN Abdirashid Yung, DO   1

## 2024-11-21 NOTE — PROGRESS NOTES
Occupational Therapy  OT BEDSIDE TREATMENT NOTE      Date:2024  Patient Name: Krys Adame  MRN: [id number]  : 1940  Room: [address] [place] [address]        Evaluating OT: Marla Draper OTR/L - [id number]     Referring Provider: Gustavo Potter PA-C   Specific Provider Orders/Date: \"OT eval and treat\" - 2024     Diagnosis: Hypochloremia [E87.8]  Hyponatremia [E87.1]  Generalized abdominal pain [R10.84]  Contusion of right knee, initial encounter [S80.01XA]  Ambulatory dysfunction [R26.2]       Pertinent Medical History: bladder CA, HTN, lupus, A fib, kyphoplasty      Precautions: fall risk     Assessment of Current Deficits:    [x] Functional mobility             [x]ADLs           [x] Strength                  []Cognition   [x] Functional transfers           [x] IADLs         [x] Safety Awareness   [x]Endurance   [] Fine Coordination              [x] Balance      [] Vision/perception   []Sensation     []Gross Motor Coordination  [] ROM           [] Delirium                   [] Motor Control      OT PLAN OF CARE   OT POC is based on physician orders, patient diagnosis, and results of clinical assessment.  Frequency/Duration 2-5 days/week for 2 weeks PRN   Specific OT Treatment Interventions to Include:   * Instruction/training on adapted ADL techniques and AE recommendations to increase functional independence within precautions       * Training on energy conservation strategies, correct breathing pattern and techniques to improve independence/tolerance for self-care routine  * Functional transfer/mobility training/DME recommendations for increased independence, safety, and fall prevention  * Patient/Family education to increase follow through with safety techniques and functional independence  * Recommendation of environmental modifications for increased safety with functional transfers/mobility and ADLs  * Therapeutic exercise to improve motor endurance, ROM, and functional strength for ADLs/functional

## 2024-11-21 NOTE — PROGRESS NOTES
[facility]  [facility]  Progress Note    SUBJECTIVE:  Chief Complaint   Patient presents with    Fatigue     From home. Has been sick and weak x 4 days. Can't keep anything down . Also has chronic pain that she wants treated for. 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 Has lupus and multiple back surgeries     Patient resting in bed.  Complaining of pain in head back knees and legs.  Patient is tolerating medications. No reported adverse drug reactions.  No nausea, vomiting, diarrhea.  Had RRT yesterday for AMS, was nonverbal. AMS resolved/short lived. CT negative for acute event.   Review of systems:  As stated above in the chief complaint, otherwise negative.    Medications:  Scheduled Meds:   senna  1 tablet Oral Nightly    docusate sodium  100 mg Oral BID    ertapenem (INVanz) 1,000 mg in sodium chloride (PF) 0.9 % 10 mL IV syringe  1,000 mg IntraVENous Q24H    pantoprazole  40 mg Oral QAM AC    metoprolol succinate  25 mg Oral QAM    lisinopril  10 mg Oral QAM    hydroxychloroquine  200 mg Oral Daily    apixaban  2.5 mg Oral BID    sodium chloride flush  10 mL IntraVENous 2 times per day     Continuous Infusions:   sodium chloride       PRN Meds:polyvinyl alcohol, sodium chloride, traMADol, diazePAM, sodium chloride flush, sodium chloride, potassium chloride **OR** potassium alternative oral replacement **OR** [DISCONTINUED] potassium chloride, magnesium sulfate, ondansetron **OR** ondansetron, senna, acetaminophen **OR** acetaminophen, melatonin    OBJECTIVE:  BP (!) 133/59   Pulse 71   Temp 97.9 °F (36.6 °C) (Oral)   Resp 16   Ht 1.575 m (5' 2\")   Wt 54.1 kg (119 lb 3.2 oz)   SpO2 98%   BMI 21.80 kg/m²   Temp  Av.7 °F (37.1 °C)  Min: 97.9 °F (36.6 °C)  Max: 99.1 °F (37.3 °C)  Constitutional: No acute distress  Skin: Warm and dry. No rashes were noted.   Neuro: Alert and oriented  HEENT: Round and reactive pupils.  Moist mucous membranes.  No ulcerations or  thrush.  Chest: .Respirations unlabored. Breath sounds clear.   Cardiovascular:  RRR  Abdomen: Soft. Bowel sounds present.   Extremities: No lower extremity edema    Lines: PIV      Laboratory and Tests:  Lab Results   Component Value Date    WBC 5.1 11/21/2024    WBC 6.0 11/20/2024    WBC 7.0 11/19/2024    HGB 11.0 (L) 11/21/2024    HCT 32.8 (L) 11/21/2024    MCV 86.5 11/21/2024     11/21/2024     Lab Results   Component Value Date    CRP 4.0 11/16/2023    CRP <3.0 11/09/2023    CRP 3.0 11/03/2023     Lab Results   Component Value Date    SEDRATE 10 11/16/2023    SEDRATE 14 11/09/2023    SEDRATE 6 11/03/2023     Lab Results   Component Value Date    PROCAL 0.08 10/24/2023     Radiology:  Reviewed    Microbiology:   Blood cultures 11/18: E.coli with CTX-M gene by BCID  Urine culture 11/18: >100K E.coli  Influenza AMB 11/18/2024 not detected  COVID 1911 1824 not detected     ASSESSMENT:  CAUTI with sepsis secondary to E. coli  ESBL + E. coli septicemia associated to the above     PLAN:  Continue Ertapenem  Patient is refusing a PICC.    Discussed having a midline inserted instead of a PICC .She is thinking about it     VARSHA Fitzpatrick - CNP  10:20 AM  11/21/2024    Patient seen and examined. I had a face to face encounter with the patient. Agree with exam.  Assessment and plan as outlined above and directed by me. Addition and corrections were done as deemed appropriate. My exam and plan include: The patient is tolerating antibiotics.  She seems to be agreeable to have a midline.  Order was placed.  Continue current treatment.    Chip Aguilar MD  11/21/2024  12:03 PM

## 2024-11-22 VITALS
OXYGEN SATURATION: 98 % | HEART RATE: 67 BPM | TEMPERATURE: 98.6 F | RESPIRATION RATE: 16 BRPM | BODY MASS INDEX: 21.91 KG/M2 | WEIGHT: 119.05 LBS | SYSTOLIC BLOOD PRESSURE: 123 MMHG | HEIGHT: 62 IN | DIASTOLIC BLOOD PRESSURE: 58 MMHG

## 2024-11-22 LAB
ALBUMIN SERPL-MCNC: 2.8 G/DL (ref 3.5–5.2)
ALP SERPL-CCNC: 79 U/L (ref 35–104)
ALT SERPL-CCNC: 37 U/L (ref 0–32)
ANION GAP SERPL CALCULATED.3IONS-SCNC: 8 MMOL/L (ref 7–16)
AST SERPL-CCNC: 31 U/L (ref 0–31)
ATYPICAL LYMPHOCYTE ABSOLUTE COUNT: 0.05 K/UL (ref 0–0.46)
ATYPICAL LYMPHOCYTES: 1 % (ref 0–4)
BASOPHILS # BLD: 0 K/UL (ref 0–0.2)
BASOPHILS NFR BLD: 0 % (ref 0–2)
BILIRUB SERPL-MCNC: 0.3 MG/DL (ref 0–1.2)
BUN SERPL-MCNC: 10 MG/DL (ref 6–23)
CALCIUM SERPL-MCNC: 8.4 MG/DL (ref 8.6–10.2)
CHLORIDE SERPL-SCNC: 100 MMOL/L (ref 98–107)
CO2 SERPL-SCNC: 27 MMOL/L (ref 22–29)
CREAT SERPL-MCNC: 0.6 MG/DL (ref 0.5–1)
EOSINOPHIL # BLD: 0.05 K/UL (ref 0.05–0.5)
EOSINOPHILS RELATIVE PERCENT: 1 % (ref 0–6)
ERYTHROCYTE [DISTWIDTH] IN BLOOD BY AUTOMATED COUNT: 13.6 % (ref 11.5–15)
GFR, ESTIMATED: 89 ML/MIN/1.73M2
GLUCOSE SERPL-MCNC: 97 MG/DL (ref 74–99)
HCT VFR BLD AUTO: 32.3 % (ref 34–48)
HGB BLD-MCNC: 10.9 G/DL (ref 11.5–15.5)
LYMPHOCYTES NFR BLD: 1.02 K/UL (ref 1.5–4)
LYMPHOCYTES RELATIVE PERCENT: 18 % (ref 20–42)
MCH RBC QN AUTO: 29.1 PG (ref 26–35)
MCHC RBC AUTO-ENTMCNC: 33.7 G/DL (ref 32–34.5)
MCV RBC AUTO: 86.1 FL (ref 80–99.9)
METAMYELOCYTES ABSOLUTE COUNT: 0.05 K/UL (ref 0–0.12)
METAMYELOCYTES: 1 % (ref 0–1)
MICROORGANISM SPEC CULT: ABNORMAL
MONOCYTES NFR BLD: 0.39 K/UL (ref 0.1–0.95)
MONOCYTES NFR BLD: 7 % (ref 2–12)
NEUTROPHILS NFR BLD: 72 % (ref 43–80)
NEUTS SEG NFR BLD: 4.04 K/UL (ref 1.8–7.3)
PLATELET # BLD AUTO: 203 K/UL (ref 130–450)
PMV BLD AUTO: 8.8 FL (ref 7–12)
POTASSIUM SERPL-SCNC: 4.3 MMOL/L (ref 3.5–5)
PROT SERPL-MCNC: 5.4 G/DL (ref 6.4–8.3)
RBC # BLD AUTO: 3.75 M/UL (ref 3.5–5.5)
RBC # BLD: ABNORMAL 10*6/UL
SERVICE CMNT-IMP: ABNORMAL
SODIUM SERPL-SCNC: 135 MMOL/L (ref 132–146)
SPECIMEN DESCRIPTION: ABNORMAL
WBC OTHER # BLD: 5.6 K/UL (ref 4.5–11.5)

## 2024-11-22 PROCEDURE — 2580000003 HC RX 258: Performed by: NURSE PRACTITIONER

## 2024-11-22 PROCEDURE — 85025 COMPLETE CBC W/AUTO DIFF WBC: CPT

## 2024-11-22 PROCEDURE — 6360000002 HC RX W HCPCS: Performed by: SPECIALIST

## 2024-11-22 PROCEDURE — 6370000000 HC RX 637 (ALT 250 FOR IP): Performed by: NURSE PRACTITIONER

## 2024-11-22 PROCEDURE — 2580000003 HC RX 258: Performed by: SPECIALIST

## 2024-11-22 PROCEDURE — 80053 COMPREHEN METABOLIC PANEL: CPT

## 2024-11-22 PROCEDURE — 97530 THERAPEUTIC ACTIVITIES: CPT

## 2024-11-22 PROCEDURE — 6370000000 HC RX 637 (ALT 250 FOR IP): Performed by: PHYSICIAN ASSISTANT

## 2024-11-22 RX ORDER — DIAZEPAM 5 MG/1
5 TABLET ORAL 2 TIMES DAILY PRN
Qty: 14 TABLET | Refills: 0 | Status: SHIPPED | OUTPATIENT
Start: 2024-11-22 | End: 2024-11-29

## 2024-11-22 RX ORDER — TRAMADOL HYDROCHLORIDE 50 MG/1
50 TABLET ORAL EVERY 6 HOURS PRN
Qty: 28 TABLET | Refills: 0 | Status: SHIPPED | OUTPATIENT
Start: 2024-11-22 | End: 2024-11-29

## 2024-11-22 RX ORDER — SENNOSIDES A AND B 8.6 MG/1
1 TABLET, FILM COATED ORAL DAILY PRN
DISCHARGE
Start: 2024-11-22 | End: 2024-12-22

## 2024-11-22 RX ORDER — PSEUDOEPHEDRINE HCL 30 MG
100 TABLET ORAL 2 TIMES DAILY
Qty: 60 CAPSULE | Refills: 0 | Status: SHIPPED | OUTPATIENT
Start: 2024-11-22

## 2024-11-22 RX ADMIN — LISINOPRIL 10 MG: 10 TABLET ORAL at 08:49

## 2024-11-22 RX ADMIN — DOCUSATE SODIUM 100 MG: 100 CAPSULE, LIQUID FILLED ORAL at 08:49

## 2024-11-22 RX ADMIN — APIXABAN 2.5 MG: 2.5 TABLET, FILM COATED ORAL at 08:49

## 2024-11-22 RX ADMIN — PANTOPRAZOLE SODIUM 40 MG: 40 TABLET, DELAYED RELEASE ORAL at 05:33

## 2024-11-22 RX ADMIN — HYDROXYCHLOROQUINE SULFATE 200 MG: 200 TABLET ORAL at 08:49

## 2024-11-22 RX ADMIN — METOPROLOL SUCCINATE 25 MG: 25 TABLET, EXTENDED RELEASE ORAL at 08:49

## 2024-11-22 RX ADMIN — ERTAPENEM SODIUM 1000 MG: 1 INJECTION INTRAMUSCULAR; INTRAVENOUS at 12:40

## 2024-11-22 RX ADMIN — SODIUM CHLORIDE, PRESERVATIVE FREE 10 ML: 5 INJECTION INTRAVENOUS at 08:52

## 2024-11-22 RX ADMIN — DIAZEPAM 5 MG: 5 TABLET ORAL at 10:31

## 2024-11-22 RX ADMIN — TRAMADOL HYDROCHLORIDE 50 MG: 50 TABLET, COATED ORAL at 05:33

## 2024-11-22 ASSESSMENT — PAIN SCALES - GENERAL
PAINLEVEL_OUTOF10: 2
PAINLEVEL_OUTOF10: 7
PAINLEVEL_OUTOF10: 2

## 2024-11-22 ASSESSMENT — PAIN - FUNCTIONAL ASSESSMENT: PAIN_FUNCTIONAL_ASSESSMENT: PREVENTS OR INTERFERES SOME ACTIVE ACTIVITIES AND ADLS

## 2024-11-22 ASSESSMENT — PAIN DESCRIPTION - LOCATION
LOCATION: GENERALIZED
LOCATION: GENERALIZED

## 2024-11-22 ASSESSMENT — PAIN DESCRIPTION - DESCRIPTORS: DESCRIPTORS: ACHING;SORE

## 2024-11-22 NOTE — PROGRESS NOTES
Physical Therapy  Treatment Note    Name: Krys Adame  : 1940  MRN: [id number]      Date of Service: 2024    Evaluating PT: Conor Birch, PT, DPT [id number]      Room #:  0510/0510-A  Diagnosis:  Hypochloremia [E87.8]  Hyponatremia [E87.1]  Generalized abdominal pain [R10.84]  Contusion of right knee, initial encounter [S80.01XA]  Ambulatory dysfunction [R26.2]  PMHx/PSHx:   has a past medical history of Cancer (HCC), Cataract, Familial peripheral neuropathy, Hypertension, Lupus (HCC), and Paroxysmal atrial fibrillation (HCC).  Precautions:  Fall risk, Chronic oneill, Alarm    SUBJECTIVE:    Pt lives alone in a single story house with 1+1+1 stair(s) and no rail(s) to enter. Pt ambulated with rollator prior to admission.    OBJECTIVE:   Initial Evaluation  Date: 24 Treatment Date:  2024 Short Term/ Long Term   Goals   AM-PAC 6 Clicks 13/24 10/24    Was pt agreeable to Eval/treatment? Yes yes    Does pt have pain? Chronic generalized back and R knee pain LBP, B LE pain, general pain all over    Bed Mobility  Rolling: NT  Supine to sit: SBA  Sit to supine: SBA  Scooting: SBA to EOB Rolling; Mod A  Supine to sit; Mod A  Scooting: Min A seated to EOB Rolling: Independent   Supine to sit: Independent   Sit to supine: Independent   Scooting: Independent    Transfers Sit to stand: ModA  Stand to sit: Peterson  Stand pivot: NT Sit <> stand: Mod A Sit to stand: SBA  Stand to sit: SBA  Stand pivot: SBA with WW   Ambulation   Sidestepped 3 feet with WW with Peterson 5 feet with WW Mod A  >50 feet with WW with SBA   Stair negotiation: ascended and descended NT NT 3 platform steps with WW with SBA   ROM BUE: Refer to OT note  BLE: WFL     Strength BUE: Refer to OT note  BLE: NT     Balance Sitting EOB: SBA  Dynamic Standing: Peterson with WW  Sitting EOB: Independent   Dynamic Standing: Supervision with WW     Pt is alert, following slow instruction  Balance: poor during brief mobility with WW    Pt performed

## 2024-11-22 NOTE — PROGRESS NOTES
[facility]  MALOU  Progress Note    SUBJECTIVE:  Chief Complaint   Patient presents with    Fatigue     From home. Has been sick and weak x 4 days. Can't keep anything down . Also has chronic pain that she wants treated for. 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 Has lupus and multiple back surgeries     Patient is resting in bed  In no distress  No fevers  No diarrhea  Tolerating antibiotics     Review of systems:  As stated above in the chief complaint, otherwise negative.    Medications:  Scheduled Meds:   senna  1 tablet Oral Nightly    sodium chloride flush  5-40 mL IntraVENous 2 times per day    lidocaine 1 % injection  50 mg IntraDERmal Once    sodium chloride flush  5-40 mL IntraVENous 2 times per day    docusate sodium  100 mg Oral BID    ertapenem (INVanz) 1,000 mg in sodium chloride (PF) 0.9 % 10 mL IV syringe  1,000 mg IntraVENous Q24H    pantoprazole  40 mg Oral QAM AC    metoprolol succinate  25 mg Oral QAM    lisinopril  10 mg Oral QAM    hydroxychloroquine  200 mg Oral Daily    apixaban  2.5 mg Oral BID     Continuous Infusions:   sodium chloride      sodium chloride       PRN Meds:sodium chloride flush, sodium chloride, sodium chloride flush, sodium chloride, polyvinyl alcohol, sodium chloride, traMADol, diazePAM, potassium chloride **OR** potassium alternative oral replacement **OR** [DISCONTINUED] potassium chloride, magnesium sulfate, ondansetron **OR** ondansetron, senna, acetaminophen **OR** acetaminophen, melatonin    OBJECTIVE:  BP (!) 123/58   Pulse 67   Temp 98.6 °F (37 °C) (Oral)   Resp 16   Ht 1.575 m (5' 2\")   Wt 54 kg (119 lb 0.8 oz)   SpO2 98%   BMI 21.77 kg/m²   Temp  Av.4 °F (36.9 °C)  Min: 98.1 °F (36.7 °C)  Max: 98.6 °F (37 °C)  Constitutional: The patient is awake, alert, and oriented.  No distress. resting in bed  Skin: Warm and dry. No rashes were noted.   HEENT: Round and reactive pupils.  Moist mucous membranes.  No

## 2024-11-22 NOTE — CARE COORDINATION
CASE MANAGEMENT.. Patient is from home alone independently. [facility] accepting. RAVI, demos, transport forms, 46264 completed and in soft chart. Midline placed. Need script for IV ABX. Facility to transport. Precert needed and to be started today.   Electronically signed by Hayley Garces RN on 11/22/2024 at 9:01 AM    UPDATE: Precert approved. Facility to transport at 16:15. Nursing aware. Son notified.   Electronically signed by Hayley Garces RN on 11/22/2024 at 2:26 PM

## 2024-11-22 NOTE — PROGRESS NOTES
1.2 10/12/2023    TSH 0.45 11/18/2024       MRI BRAIN WO CONTRAST   Final Result   1. No acute intracranial abnormality. No sign of acute infarct.   2. Mild age-appropriate atrophy, increased compared to 2020.   3. Stable moderate age-appropriate small vessel ischemia.         Vascular duplex carotid bilateral   Final Result   Less than 50% stenosis of the origin and proximal right internal carotid   artery.      Less than 50% stenosis of the origin of the left internal carotid artery.      Bilateral vertebral arteries are patent with flow in the normal direction.         CT HEAD WO CONTRAST   Final Result   Mild cerebral atrophy, with above limitations.         XR KNEE RIGHT (3 VIEWS)   Final Result   No acute abnormality of the knee.         XR KNEE LEFT (3 VIEWS)   Final Result   No acute abnormality of the knee.         CT ABDOMEN PELVIS W IV CONTRAST Additional Contrast? None   Final Result   1. Diffuse colonic fecal retention with significant stool burden.   2. Stable innumerable liver cysts unchanged compared 10/23/2023.   3. Degenerative changes lumbar spine with chronic appearing compression   deformities multilevel vertebroplasties. Bilateral hip joint degenerative   changes.         CT Head W/O Contrast   Final Result   No acute intracranial abnormality.         CT CSpine W/O Contrast   Final Result   1. No acute fracture or traumatic malalignment of the cervical spine.   2. Multilevel degenerative changes.         XR CHEST PORTABLE   Final Result   1. No acute cardiopulmonary process.   2. Mild cardiomegaly.             Assessment   Active Hospital Problems    Diagnosis     E coli bacteremia [R78.81, B96.20]      Priority: High    UTI (urinary tract infection) [N39.0]      Priority: Medium    Sinus node dysfunction (HCC) [I49.5]      Priority: Medium    A-fib (HCC) [I48.91]      Priority: Medium    Primary hypertension [I10]      Priority: Medium    Transient alteration of awareness [R40.4]      Generalized abdominal pain [R10.84]     Ambulatory dysfunction [R26.2]     Severe protein-calorie malnutrition (HCC) [E43]     Chronic pain syndrome [G89.4 (ICD-10-CM)] [G89.4]     Intractable back pain [M54.9]          Plan  Sepsis in the setting of ESBL E.Coli related to CAUTI  Chronic indwelling oneill due to neurogenic bladder -- replaced 11/19 in the hospital by nursing  Urine and Blood cultures +ESBL E.Coli  S/p midline placement 11/21  Continue IV Ertapenem as per ID   ID consult appreciated     Generalized weakness and inability to ambulate  Likely worsened due to underlying infectious process  PT AM-PAC-- 13/24  OT AM-PAC-- 15/24   for discharge planning -- will need SNF prior to returning home     Neurogenic Bladder  Previously with intermittent self catheterization which was unsuccessful  Maintain chronic oneill catheter at this time  Urology consult appreciated -- do not believe a suprapubic catheter would be of any benefit at this point    Diffuse pain with Hx Rheumatoid Arthritis  Imaging studies noted  Continue Plaquenil  Continue Ultram and Tylenol as needed     Paroxysmal Atrial Fibrillation  Currently in sinus rhythm  Continue Eliquis for anticoagulation  Toprol XL for rate control     Essential Hypertension  Continue Lisinopril and Toprol XL   Monitor BP trends and adjust as indicated     Constipation  Noted significant stool burden on CT abdomen/pelvis  Did have BM 11/19  Continue bowel regimen and monitor abdominal exam    Altered Mental Status -- Probable TIA  RRT 11/20 due to altered mental status -- reportedly was not responding   Stat CT head obtained and negative for any acute findings  MRI brain and carotid US negative  Incident was short-lived and has completely resolved so suspect TIA  Continue to monitor mentation    Follow labs   DVT prophylaxis  Please see orders for further management and care.  Discharge plan: SNF when arrangements finalized and ok with ID    The

## 2024-11-22 NOTE — PROGRESS NOTES
11/22/2024 11:02 AM  Krys Adame  [id number]    Subjective:    She is sleeping in bed   Arouses easily to verbal stimuli  No issues with the oneill     Review of Systems  Constitutional: No fever or chills   Respiratory: negative for cough and hemoptysis  Cardiovascular: negative for chest pain and dyspnea  Gastrointestinal: negative for abdominal pain, diarrhea, nausea and vomiting   : See above  Derm: negative for rash and skin lesion(s)  Neurological: negative for seizures and tremors  Musculoskeletal: Negative    Psychiatric: Negative   All other reviews are negative      Scheduled Meds:   senna  1 tablet Oral Nightly    sodium chloride flush  5-40 mL IntraVENous 2 times per day    lidocaine 1 % injection  50 mg IntraDERmal Once    sodium chloride flush  5-40 mL IntraVENous 2 times per day    docusate sodium  100 mg Oral BID    ertapenem (INVanz) 1,000 mg in sodium chloride (PF) 0.9 % 10 mL IV syringe  1,000 mg IntraVENous Q24H    pantoprazole  40 mg Oral QAM AC    metoprolol succinate  25 mg Oral QAM    lisinopril  10 mg Oral QAM    hydroxychloroquine  200 mg Oral Daily    apixaban  2.5 mg Oral BID       Objective:  Vitals:    11/22/24 0719   BP: (!) 123/58   Pulse: 67   Resp: 16   Temp: 98.6 °F (37 °C)   SpO2: 98%         Allergies: Sulfa antibiotics    General Appearance: alert and oriented to person, place and time and in no acute distress  Skin: no rash or erythema  Head: normocephalic and atraumatic  Pulmonary/Chest: normal air movement, no respiratory distress  Abdomen: soft, non-tender, non-distended  Genitourinary: oneill draining yellow urine   Extremities: no cyanosis, clubbing or edema         Labs:     Recent Labs     11/22/24  0530      K 4.3      CO2 27   BUN 10   CREATININE 0.6   GLUCOSE 97   CALCIUM 8.4*       Lab Results   Component Value Date/Time    HGB 10.9 11/22/2024 05:30 AM    HCT 32.3 11/22/2024 05:30 AM       Assessment/Plan:  NGB managed with nino Patel of

## 2024-11-22 NOTE — PLAN OF CARE
Problem: ABCDS Injury Assessment  Goal: Absence of physical injury  Outcome: Progressing     Problem: Skin/Tissue Integrity  Goal: Absence of new skin breakdown  Description: 1.  Monitor for areas of redness and/or skin breakdown  2.  Assess vascular access sites hourly  3.  Every 4-6 hours minimum:  Change oxygen saturation probe site  4.  Every 4-6 hours:  If on nasal continuous positive airway pressure, respiratory therapy assess nares and determine need for appliance change or resting period.  Outcome: Progressing     Problem: Discharge Planning  Goal: Discharge to home or other facility with appropriate resources  Outcome: Progressing     Problem: Safety - Adult  Goal: Free from fall injury  Outcome: Progressing     Problem: Nutrition Deficit:  Goal: Optimize nutritional status  Outcome: Progressing

## 2024-11-22 NOTE — PROGRESS NOTES
Physical Therapy    Pt refused Rx this AM, wants to eat her breakfast in bed, does not want to get into a chair presently. Pt strong encouraged to participate without success. Will follow on another date/time.  Marc Ferreira PTA [id number]

## 2024-11-22 NOTE — DISCHARGE SUMMARY
Internal Medicine Discharge Summary    NAME: Krys Adame :  1940  MRN:  [id number] PCP:Gino Hwang MD    ADMITTED: 2024   DISCHARGED: No discharge date for patient encounter.    ADMITTING PHYSICIAN: Abdirashid Yung DO    PCP: Gino Hwang MD    CONSULTANT(S):   IP CONSULT TO INTERNAL MEDICINE  IP CONSULT TO INFECTIOUS DISEASES  IP CONSULT TO UROLOGY  IP CONSULT TO DIETITIAN  IP CONSULT TO VASCULAR ACCESS TEAM  IP CONSULT TO VASCULAR ACCESS TEAM     ADMITTING DIAGNOSIS:   Hypochloremia [E87.8]  Hyponatremia [E87.1]  Generalized abdominal pain [R10.84]  Contusion of right knee, initial encounter [S80.01XA]  Ambulatory dysfunction [R26.2]     Please see H&P for further details    DISCHARGE DIAGNOSES:   Active Hospital Problems    Diagnosis     E coli bacteremia [R78.81, B96.20]      Priority: High    UTI (urinary tract infection) [N39.0]      Priority: Medium    Sinus node dysfunction (HCC) [I49.5]      Priority: Medium    A-fib (HCC) [I48.91]      Priority: Medium    Primary hypertension [I10]      Priority: Medium    Transient alteration of awareness [R40.4]     Generalized abdominal pain [R10.84]     Ambulatory dysfunction [R26.2]     Severe protein-calorie malnutrition (HCC) [E43]     Chronic pain syndrome [G89.4 (ICD-10-CM)] [G89.4]     Intractable back pain [M54.9]        BRIEF HISTORY OF PRESENT ILLNESS: Krys Adame is a 84 y.o. female patient of Gino Hwang MD who  has a past medical history of Cancer (HCC), Cataract, Familial peripheral neuropathy, Hypertension, Lupus (HCC), and Paroxysmal atrial fibrillation (HCC). who originally had concerns including Fatigue (From home. Has been sick and weak x 4 days. Can't keep anything down . Also has chronic pain that she wants treated for. 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 Has lupus and multiple back surgeries). at presentation on 2024, and was found to have Hypochloremia [E87.8]  Hyponatremia

## 2024-11-22 NOTE — DISCHARGE INSTRUCTIONS
minutes before the third dose  After starting drug, or   After the dosing or interval is changed.  If the trough level is between 5 and 20 continue dose as ordered.  Draw troughs twice weekly thereafter until troughs are stable (i.e. until trough is between 10 and 20 mcg/ml for two consecutive laboratory values).  Once stable check troughs once weekly or every third dose.  Please do not call physician unless the trough is < 5 or >20.  If the trough is <20 continue dosing as ordered.  If the trough is >20 call the office for further orders.  Do not hold the dose while waiting for the trough result.  Amingoglycosides (e.g. Gentamicin, Tobramycin and Amikacin) peaks and troughs should be drawn twice weekly (preferably on Mondays and Thursdays) or every third dose.  Aminoglycoside peaks are not to be drawn if patient on Once-Daily Dosing (ODD).  Call physician or office if the trough is:     >1 for gentamicin,   >2 for tobramycin, or   >5 for amikacin  When clinically indicated obtain:  Urine culture. If the patient has a fever with purulent drainage from Davidson or suprapubic catheter, or foul smelling urine. Do not irrigate a clogged Davidson catheter. Replace it.  Blood cultures and Wound Gram stain with culture & sensitivity. If the patient has a fever or increasing drainage or foul odor from a wound. Notify the treating physician in a timely manner  Stool specimen. If diarrhea occurs while on antibiotics, send stools for C. difficile and WBCs.  When a drug is discontinued due to a low white blood cell count (WBCs) draw two consecutive CBC with differential and BUN, Ceatinine.         ALLERGIC OR ADVERSE REACTIONS TO MEDICATIONS  Mild reaction: (itching, with or without rash):  Administer Benadryl 50mg po x 1, then 25mg po q6h prn.   Notify office or physician in a timely matter.  Moderate reaction (itching with or without rash and/or wheezing, dyspnea, itchy throat):  Administer Benadryl 50 mg IV push x 1.   Notify

## 2024-11-22 NOTE — PLAN OF CARE
Problem: ABCDS Injury Assessment  Goal: Absence of physical injury  11/22/2024 1534 by Florence Copeland RN  Outcome: Completed  11/22/2024 1034 by Natalia Blair RN  Outcome: Progressing     Problem: Skin/Tissue Integrity  Goal: Absence of new skin breakdown  Description: 1.  Monitor for areas of redness and/or skin breakdown  2.  Assess vascular access sites hourly  3.  Every 4-6 hours minimum:  Change oxygen saturation probe site  4.  Every 4-6 hours:  If on nasal continuous positive airway pressure, respiratory therapy assess nares and determine need for appliance change or resting period.  11/22/2024 1534 by Florence Copeland RN  Outcome: Completed  11/22/2024 1034 by Natalia lBair RN  Outcome: Progressing     Problem: Discharge Planning  Goal: Discharge to home or other facility with appropriate resources  11/22/2024 1534 by Florence Copeland RN  Outcome: Completed  11/22/2024 1034 by Natalia Blair RN  Outcome: Progressing     Problem: Safety - Adult  Goal: Free from fall injury  11/22/2024 1534 by Florence Copeland RN  Outcome: Completed  11/22/2024 1034 by Natalia Blair RN  Outcome: Progressing     Problem: Pain  Goal: Verbalizes/displays adequate comfort level or baseline comfort level  Outcome: Completed     Problem: Nutrition Deficit:  Goal: Optimize nutritional status  11/22/2024 1534 by Florence Copeland RN  Outcome: Completed  11/22/2024 1034 by Natalia Blair, RN  Outcome: Progressing

## 2024-12-14 ENCOUNTER — APPOINTMENT (OUTPATIENT)
Dept: CT IMAGING | Age: 84
DRG: 596 | End: 2024-12-14
Payer: MEDICARE

## 2024-12-14 ENCOUNTER — HOSPITAL ENCOUNTER (INPATIENT)
Age: 84
LOS: 2 days | Discharge: HOME | DRG: 596 | End: 2024-12-18
Attending: EMERGENCY MEDICINE | Admitting: INTERNAL MEDICINE
Payer: MEDICARE

## 2024-12-14 DIAGNOSIS — M54.9 INTRACTABLE BACK PAIN: ICD-10-CM

## 2024-12-14 DIAGNOSIS — N30.00 ACUTE CYSTITIS WITHOUT HEMATURIA: Primary | ICD-10-CM

## 2024-12-14 DIAGNOSIS — K59.00 CONSTIPATION, UNSPECIFIED CONSTIPATION TYPE: ICD-10-CM

## 2024-12-14 DIAGNOSIS — B02.9 HERPES ZOSTER WITHOUT COMPLICATION: ICD-10-CM

## 2024-12-14 LAB
ALBUMIN SERPL-MCNC: 3.2 G/DL (ref 3.5–5.2)
ALP SERPL-CCNC: 83 U/L (ref 35–104)
ALT SERPL-CCNC: 18 U/L (ref 0–32)
ANION GAP SERPL CALCULATED.3IONS-SCNC: 7 MMOL/L (ref 7–16)
AST SERPL-CCNC: 18 U/L (ref 0–31)
BACTERIA URNS QL MICRO: ABNORMAL
BASOPHILS # BLD: 0.01 K/UL (ref 0–0.2)
BASOPHILS NFR BLD: 0 % (ref 0–2)
BILIRUB SERPL-MCNC: 0.2 MG/DL (ref 0–1.2)
BILIRUB UR QL STRIP: NEGATIVE
BUN SERPL-MCNC: 16 MG/DL (ref 6–23)
CALCIUM SERPL-MCNC: 8.8 MG/DL (ref 8.6–10.2)
CHLORIDE SERPL-SCNC: 102 MMOL/L (ref 98–107)
CHP ED QC CHECK: YES
CLARITY UR: CLEAR
CO2 SERPL-SCNC: 26 MMOL/L (ref 22–29)
COLOR UR: YELLOW
CREAT SERPL-MCNC: 0.6 MG/DL (ref 0.5–1)
EOSINOPHIL # BLD: 0 K/UL (ref 0.05–0.5)
EOSINOPHILS RELATIVE PERCENT: 0 % (ref 0–6)
ERYTHROCYTE [DISTWIDTH] IN BLOOD BY AUTOMATED COUNT: 13.5 % (ref 11.5–15)
GFR, ESTIMATED: 86 ML/MIN/1.73M2
GLUCOSE SERPL-MCNC: 118 MG/DL (ref 74–99)
GLUCOSE UR STRIP-MCNC: NEGATIVE MG/DL
HCT VFR BLD AUTO: 36 % (ref 34–48)
HGB BLD-MCNC: 11.6 G/DL (ref 11.5–15.5)
HGB UR QL STRIP.AUTO: ABNORMAL
IMM GRANULOCYTES # BLD AUTO: 0.05 K/UL (ref 0–0.58)
IMM GRANULOCYTES NFR BLD: 1 % (ref 0–5)
KETONES UR STRIP-MCNC: NEGATIVE MG/DL
LACTATE BLDV-SCNC: 1.5 MMOL/L (ref 0.5–1.9)
LEUKOCYTE ESTERASE UR QL STRIP: ABNORMAL
LIPASE SERPL-CCNC: 30 U/L (ref 13–60)
LYMPHOCYTES NFR BLD: 0.92 K/UL (ref 1.5–4)
LYMPHOCYTES RELATIVE PERCENT: 12 % (ref 20–42)
MCH RBC QN AUTO: 28.4 PG (ref 26–35)
MCHC RBC AUTO-ENTMCNC: 32.2 G/DL (ref 32–34.5)
MCV RBC AUTO: 88 FL (ref 80–99.9)
MONOCYTES NFR BLD: 0.44 K/UL (ref 0.1–0.95)
MONOCYTES NFR BLD: 6 % (ref 2–12)
NEUTROPHILS NFR BLD: 81 % (ref 43–80)
NEUTS SEG NFR BLD: 6.11 K/UL (ref 1.8–7.3)
NITRITE UR QL STRIP: POSITIVE
PH UR STRIP: 7 [PH] (ref 5–9)
PLATELET # BLD AUTO: 296 K/UL (ref 130–450)
PMV BLD AUTO: 8.9 FL (ref 7–12)
POTASSIUM SERPL-SCNC: 4.4 MMOL/L (ref 3.5–5)
PROT SERPL-MCNC: 6.2 G/DL (ref 6.4–8.3)
PROT UR STRIP-MCNC: NEGATIVE MG/DL
RBC # BLD AUTO: 4.09 M/UL (ref 3.5–5.5)
RBC #/AREA URNS HPF: ABNORMAL /HPF
SARS-COV-2 RDRP RESP QL NAA+PROBE: NOT DETECTED
SODIUM SERPL-SCNC: 135 MMOL/L (ref 132–146)
SP GR UR STRIP: 1.02 (ref 1–1.03)
SPECIMEN DESCRIPTION: NORMAL
TROPONIN I SERPL HS-MCNC: 23 NG/L (ref 0–9)
TROPONIN I SERPL HS-MCNC: 26 NG/L (ref 0–9)
UROBILINOGEN UR STRIP-ACNC: 0.2 EU/DL (ref 0–1)
WBC #/AREA URNS HPF: ABNORMAL /HPF
WBC OTHER # BLD: 7.5 K/UL (ref 4.5–11.5)

## 2024-12-14 PROCEDURE — 84484 ASSAY OF TROPONIN QUANT: CPT

## 2024-12-14 PROCEDURE — 99285 EMERGENCY DEPT VISIT HI MDM: CPT

## 2024-12-14 PROCEDURE — 96375 TX/PRO/DX INJ NEW DRUG ADDON: CPT

## 2024-12-14 PROCEDURE — 6360000004 HC RX CONTRAST MEDICATION: Performed by: RADIOLOGY

## 2024-12-14 PROCEDURE — 51702 INSERT TEMP BLADDER CATH: CPT

## 2024-12-14 PROCEDURE — 6360000002 HC RX W HCPCS: Performed by: EMERGENCY MEDICINE

## 2024-12-14 PROCEDURE — 83690 ASSAY OF LIPASE: CPT

## 2024-12-14 PROCEDURE — 83605 ASSAY OF LACTIC ACID: CPT

## 2024-12-14 PROCEDURE — 2580000003 HC RX 258: Performed by: EMERGENCY MEDICINE

## 2024-12-14 PROCEDURE — 87077 CULTURE AEROBIC IDENTIFY: CPT

## 2024-12-14 PROCEDURE — 93005 ELECTROCARDIOGRAM TRACING: CPT | Performed by: EMERGENCY MEDICINE

## 2024-12-14 PROCEDURE — 81001 URINALYSIS AUTO W/SCOPE: CPT

## 2024-12-14 PROCEDURE — 74177 CT ABD & PELVIS W/CONTRAST: CPT

## 2024-12-14 PROCEDURE — 85025 COMPLETE CBC W/AUTO DIFF WBC: CPT

## 2024-12-14 PROCEDURE — 87086 URINE CULTURE/COLONY COUNT: CPT

## 2024-12-14 PROCEDURE — 96374 THER/PROPH/DIAG INJ IV PUSH: CPT

## 2024-12-14 PROCEDURE — G0378 HOSPITAL OBSERVATION PER HR: HCPCS

## 2024-12-14 PROCEDURE — 80053 COMPREHEN METABOLIC PANEL: CPT

## 2024-12-14 PROCEDURE — 87635 SARS-COV-2 COVID-19 AMP PRB: CPT

## 2024-12-14 RX ORDER — METOPROLOL SUCCINATE 25 MG/1
25 TABLET, EXTENDED RELEASE ORAL EVERY MORNING
Status: DISCONTINUED | OUTPATIENT
Start: 2024-12-15 | End: 2024-12-18 | Stop reason: HOSPADM

## 2024-12-14 RX ORDER — ONDANSETRON 4 MG/1
4 TABLET, ORALLY DISINTEGRATING ORAL EVERY 8 HOURS PRN
Status: DISCONTINUED | OUTPATIENT
Start: 2024-12-14 | End: 2024-12-18 | Stop reason: HOSPADM

## 2024-12-14 RX ORDER — ACETAMINOPHEN 500 MG
500 TABLET ORAL EVERY 6 HOURS PRN
Status: DISCONTINUED | OUTPATIENT
Start: 2024-12-14 | End: 2024-12-14 | Stop reason: ALTCHOICE

## 2024-12-14 RX ORDER — LISINOPRIL 10 MG/1
10 TABLET ORAL EVERY MORNING
Status: DISCONTINUED | OUTPATIENT
Start: 2024-12-15 | End: 2024-12-18

## 2024-12-14 RX ORDER — MIRTAZAPINE 15 MG/1
15 TABLET, FILM COATED ORAL NIGHTLY
COMMUNITY

## 2024-12-14 RX ORDER — POTASSIUM CHLORIDE 7.45 MG/ML
10 INJECTION INTRAVENOUS PRN
Status: DISCONTINUED | OUTPATIENT
Start: 2024-12-14 | End: 2024-12-14 | Stop reason: RX

## 2024-12-14 RX ORDER — HYDROXYCHLOROQUINE SULFATE 200 MG/1
200 TABLET, FILM COATED ORAL DAILY
Status: DISCONTINUED | OUTPATIENT
Start: 2024-12-14 | End: 2024-12-18 | Stop reason: HOSPADM

## 2024-12-14 RX ORDER — ONDANSETRON 2 MG/ML
4 INJECTION INTRAMUSCULAR; INTRAVENOUS EVERY 6 HOURS PRN
Status: DISCONTINUED | OUTPATIENT
Start: 2024-12-14 | End: 2024-12-18 | Stop reason: HOSPADM

## 2024-12-14 RX ORDER — SENNOSIDES A AND B 8.6 MG/1
1 TABLET, FILM COATED ORAL DAILY PRN
Status: DISCONTINUED | OUTPATIENT
Start: 2024-12-14 | End: 2024-12-18 | Stop reason: HOSPADM

## 2024-12-14 RX ORDER — SODIUM CHLORIDE 0.9 % (FLUSH) 0.9 %
10 SYRINGE (ML) INJECTION PRN
Status: DISCONTINUED | OUTPATIENT
Start: 2024-12-14 | End: 2024-12-18 | Stop reason: HOSPADM

## 2024-12-14 RX ORDER — DOCUSATE SODIUM 100 MG/1
100 CAPSULE, LIQUID FILLED ORAL 2 TIMES DAILY
Status: DISCONTINUED | OUTPATIENT
Start: 2024-12-14 | End: 2024-12-18 | Stop reason: HOSPADM

## 2024-12-14 RX ORDER — VALACYCLOVIR HYDROCHLORIDE 500 MG/1
1000 TABLET, FILM COATED ORAL ONCE
Status: COMPLETED | OUTPATIENT
Start: 2024-12-14 | End: 2024-12-15

## 2024-12-14 RX ORDER — ACETAMINOPHEN 650 MG/1
650 SUPPOSITORY RECTAL EVERY 6 HOURS PRN
Status: DISCONTINUED | OUTPATIENT
Start: 2024-12-14 | End: 2024-12-18 | Stop reason: HOSPADM

## 2024-12-14 RX ORDER — DIAZEPAM 5 MG/1
5 TABLET ORAL EVERY 12 HOURS PRN
Status: ON HOLD | COMMUNITY
End: 2024-12-16

## 2024-12-14 RX ORDER — SODIUM CHLORIDE 9 MG/ML
INJECTION, SOLUTION INTRAVENOUS PRN
Status: DISCONTINUED | OUTPATIENT
Start: 2024-12-14 | End: 2024-12-18 | Stop reason: HOSPADM

## 2024-12-14 RX ORDER — BISACODYL 10 MG
10 SUPPOSITORY, RECTAL RECTAL DAILY PRN
Status: DISCONTINUED | OUTPATIENT
Start: 2024-12-14 | End: 2024-12-18 | Stop reason: HOSPADM

## 2024-12-14 RX ORDER — POTASSIUM CHLORIDE 1500 MG/1
40 TABLET, EXTENDED RELEASE ORAL PRN
Status: DISCONTINUED | OUTPATIENT
Start: 2024-12-14 | End: 2024-12-18 | Stop reason: HOSPADM

## 2024-12-14 RX ORDER — IOPAMIDOL 755 MG/ML
75 INJECTION, SOLUTION INTRAVASCULAR
Status: COMPLETED | OUTPATIENT
Start: 2024-12-14 | End: 2024-12-14

## 2024-12-14 RX ORDER — PANTOPRAZOLE SODIUM 40 MG/1
40 TABLET, DELAYED RELEASE ORAL
Status: DISCONTINUED | OUTPATIENT
Start: 2024-12-15 | End: 2024-12-18 | Stop reason: HOSPADM

## 2024-12-14 RX ORDER — ACETAMINOPHEN 325 MG/1
650 TABLET ORAL EVERY 6 HOURS PRN
Status: DISCONTINUED | OUTPATIENT
Start: 2024-12-14 | End: 2024-12-18 | Stop reason: HOSPADM

## 2024-12-14 RX ORDER — SENNOSIDES A AND B 8.6 MG/1
1 TABLET, FILM COATED ORAL DAILY PRN
Status: DISCONTINUED | OUTPATIENT
Start: 2024-12-14 | End: 2024-12-14

## 2024-12-14 RX ORDER — SODIUM CHLORIDE 0.9 % (FLUSH) 0.9 %
10 SYRINGE (ML) INJECTION EVERY 12 HOURS SCHEDULED
Status: DISCONTINUED | OUTPATIENT
Start: 2024-12-14 | End: 2024-12-18 | Stop reason: HOSPADM

## 2024-12-14 RX ORDER — FENTANYL CITRATE 50 UG/ML
25 INJECTION, SOLUTION INTRAMUSCULAR; INTRAVENOUS ONCE
Status: COMPLETED | OUTPATIENT
Start: 2024-12-14 | End: 2024-12-14

## 2024-12-14 RX ADMIN — FENTANYL CITRATE 25 MCG: 50 INJECTION INTRAMUSCULAR; INTRAVENOUS at 15:22

## 2024-12-14 RX ADMIN — IOPAMIDOL 75 ML: 755 INJECTION, SOLUTION INTRAVENOUS at 16:15

## 2024-12-14 RX ADMIN — WATER 1000 MG: 1 INJECTION INTRAMUSCULAR; INTRAVENOUS; SUBCUTANEOUS at 19:29

## 2024-12-14 ASSESSMENT — PAIN - FUNCTIONAL ASSESSMENT: PAIN_FUNCTIONAL_ASSESSMENT: 0-10

## 2024-12-14 ASSESSMENT — LIFESTYLE VARIABLES
HOW MANY STANDARD DRINKS CONTAINING ALCOHOL DO YOU HAVE ON A TYPICAL DAY: PATIENT DOES NOT DRINK
HOW OFTEN DO YOU HAVE A DRINK CONTAINING ALCOHOL: NEVER
HOW OFTEN DO YOU HAVE A DRINK CONTAINING ALCOHOL: NEVER
HOW MANY STANDARD DRINKS CONTAINING ALCOHOL DO YOU HAVE ON A TYPICAL DAY: PATIENT DOES NOT DRINK

## 2024-12-14 ASSESSMENT — PAIN SCALES - GENERAL: PAINLEVEL_OUTOF10: 3

## 2024-12-14 NOTE — ED PROVIDER NOTES
[facility] EMERGENCY DEPARTMENT  EMERGENCY DEPARTMENT ENCOUNTER        Pt Name: Krys Adame  MRN: [id number]  Birthdate 1940  Date of evaluation: 12/14/2024  Provider: Macy Rea MD  PCP: Gino Ames DO  Note Started: 2:48 PM EST 12/14/24    CHIEF COMPLAINT       Chief Complaint   Patient presents with    Rash     Pt is DNR-[facility] from [facility], rash on right side, doctor wanted her sent in.  Pt has Lupus    Altered Mental Status     Son says pt is altered, according to pt son lives in [place] and came home recently and isnt use to seeing her not taking care of herself.  Pt is completely alert and oriented without confusion     Dysuria     Pt says she has bladder pain since last night        HISTORY OF PRESENT ILLNESS: 1 or more Elements   History From: Patient    Limitations to history : None    Krys Adame is a 84 y.o. female who presents from the emergency department with complaints of bladder pain and spasm.  She states that she has chronic indwelling Davidson catheter from history of bladder cancer in the past.  She states the catheter is overdue for a change of supposed be changed last month.  She had a fall about 3 weeks ago at home and has been in rehab at [facility] ever since.  She arrives with paperwork that confirms DNR comfort care.  She states she does not have any fractures but she has been having trouble ambulating generalized weakness fatigue she only ambulates with physical therapy if she has had progressive decline.  She denies any fevers or chills but reports some suprapubic pain since last night so she asked to be brought in.  She has a rash on her right lower abdomen and flank with been there for the past 4 days denies fevers or chills she does have a history of lupus.  She is alert and oriented x 4 she is an excellent historian gives very specific dates regarding recent follow-up.  Apparently her son recently visited and that she did  the right flank does not cross the midline no secondary signs of infection no purulent drainage.  Dried crusted.    Differential diagnosis (includes but not limited to):  Fairly different thrive UTI JAMES ACS MI dysrhythmia dehydration electrolyte abnormality shingles cellulitis      Chronic conditions:  has a past medical history of Cancer (HCC), Cataract, Familial peripheral neuropathy, Hypertension, Lupus (HCC), and Paroxysmal atrial fibrillation (HCC).          ED Course Summary:(labs and imaging reviewed, interventions, reassessment, consults,shared decision making with patient, disposition)    CBC was ordered as part of my assessment for possible infection, anemia or thrombocytopenia. CMP to assess electrolytes, kidney function, liver function or any metabolic derangements. Lipase to evaluate for pancreatitis. Lactic acid as a marker of hypoperfusion or ischemia. Urinalysis to evaluate for a UTI. Troponin as a marker for myocardial ischemia or heart strain. Viral swabs due to possible viral etiology of symptoms. CT abdomen for, but without limitation to, ureterolithiasis, nephrolithiasis, constipation, hollow organ perforation, small bowel obstruction, bowel ischemia, pneumoperitoneum, diverticulitis, cholecystitis, appendicitis, intra-abdominal abscess, or malignancy.        EKG shows normal sinus rhythm at 71 beats minute no signs of ST changes no STEMI  Patient had a first troponin of 23-second 26 delta of only 3 no chest pain no NSTEMI.  Chemistry was normal creatinine was normal CBC was normal white count was normal at 7.5.  Lipase was normal.  Urinalysis is positive with nitrites and leuks she was started 1 g of IV Rocephin culture was sent.  COVID swab was negative CT abdomen pelvis showed constipation no other acute process.  Davidson catheter in place decompressed bladder.  Patient does appear to have shingles and vesicular rash to the right side of the abdomen.  She was started on valacyclovir.  I spoke

## 2024-12-15 PROBLEM — B02.9 HERPES ZOSTER WITHOUT COMPLICATION: Status: ACTIVE | Noted: 2024-12-15

## 2024-12-15 LAB
ALBUMIN SERPL-MCNC: 3.2 G/DL (ref 3.5–5.2)
ALP SERPL-CCNC: 86 U/L (ref 35–104)
ALT SERPL-CCNC: 16 U/L (ref 0–32)
ANION GAP SERPL CALCULATED.3IONS-SCNC: 9 MMOL/L (ref 7–16)
AST SERPL-CCNC: 18 U/L (ref 0–31)
BASOPHILS # BLD: 0.01 K/UL (ref 0–0.2)
BASOPHILS NFR BLD: 0 % (ref 0–2)
BILIRUB SERPL-MCNC: 0.3 MG/DL (ref 0–1.2)
BUN SERPL-MCNC: 13 MG/DL (ref 6–23)
CALCIUM SERPL-MCNC: 8.5 MG/DL (ref 8.6–10.2)
CHLORIDE SERPL-SCNC: 100 MMOL/L (ref 98–107)
CO2 SERPL-SCNC: 24 MMOL/L (ref 22–29)
CREAT SERPL-MCNC: 0.6 MG/DL (ref 0.5–1)
EOSINOPHIL # BLD: 0.01 K/UL (ref 0.05–0.5)
EOSINOPHILS RELATIVE PERCENT: 0 % (ref 0–6)
ERYTHROCYTE [DISTWIDTH] IN BLOOD BY AUTOMATED COUNT: 13.6 % (ref 11.5–15)
GFR, ESTIMATED: 86 ML/MIN/1.73M2
GLUCOSE SERPL-MCNC: 101 MG/DL (ref 74–99)
HCT VFR BLD AUTO: 37.2 % (ref 34–48)
HGB BLD-MCNC: 11.6 G/DL (ref 11.5–15.5)
IMM GRANULOCYTES # BLD AUTO: 0.06 K/UL (ref 0–0.58)
IMM GRANULOCYTES NFR BLD: 1 % (ref 0–5)
LYMPHOCYTES NFR BLD: 1.37 K/UL (ref 1.5–4)
LYMPHOCYTES RELATIVE PERCENT: 22 % (ref 20–42)
MCH RBC QN AUTO: 28 PG (ref 26–35)
MCHC RBC AUTO-ENTMCNC: 31.2 G/DL (ref 32–34.5)
MCV RBC AUTO: 89.9 FL (ref 80–99.9)
MONOCYTES NFR BLD: 0.52 K/UL (ref 0.1–0.95)
MONOCYTES NFR BLD: 8 % (ref 2–12)
NEUTROPHILS NFR BLD: 68 % (ref 43–80)
NEUTS SEG NFR BLD: 4.21 K/UL (ref 1.8–7.3)
PLATELET # BLD AUTO: 338 K/UL (ref 130–450)
PMV BLD AUTO: 8.7 FL (ref 7–12)
POTASSIUM SERPL-SCNC: 3.6 MMOL/L (ref 3.5–5)
PROT SERPL-MCNC: 6.2 G/DL (ref 6.4–8.3)
RBC # BLD AUTO: 4.14 M/UL (ref 3.5–5.5)
SODIUM SERPL-SCNC: 133 MMOL/L (ref 132–146)
WBC OTHER # BLD: 6.2 K/UL (ref 4.5–11.5)

## 2024-12-15 PROCEDURE — 36415 COLL VENOUS BLD VENIPUNCTURE: CPT

## 2024-12-15 PROCEDURE — 96375 TX/PRO/DX INJ NEW DRUG ADDON: CPT

## 2024-12-15 PROCEDURE — 6370000000 HC RX 637 (ALT 250 FOR IP)

## 2024-12-15 PROCEDURE — 80053 COMPREHEN METABOLIC PANEL: CPT

## 2024-12-15 PROCEDURE — 96376 TX/PRO/DX INJ SAME DRUG ADON: CPT

## 2024-12-15 PROCEDURE — 6360000002 HC RX W HCPCS: Performed by: INTERNAL MEDICINE

## 2024-12-15 PROCEDURE — G0378 HOSPITAL OBSERVATION PER HR: HCPCS

## 2024-12-15 PROCEDURE — 85025 COMPLETE CBC W/AUTO DIFF WBC: CPT

## 2024-12-15 PROCEDURE — 2580000003 HC RX 258

## 2024-12-15 PROCEDURE — 6370000000 HC RX 637 (ALT 250 FOR IP): Performed by: INTERNAL MEDICINE

## 2024-12-15 RX ORDER — ACYCLOVIR 200 MG/1
800 CAPSULE ORAL
Status: DISCONTINUED | OUTPATIENT
Start: 2024-12-15 | End: 2024-12-16

## 2024-12-15 RX ORDER — TRAMADOL HYDROCHLORIDE 50 MG/1
50 TABLET ORAL EVERY 8 HOURS PRN
Status: ON HOLD | COMMUNITY
End: 2024-12-18 | Stop reason: HOSPADM

## 2024-12-15 RX ORDER — TRAMADOL HYDROCHLORIDE 50 MG/1
50 TABLET ORAL EVERY 8 HOURS PRN
Status: DISCONTINUED | OUTPATIENT
Start: 2024-12-15 | End: 2024-12-17

## 2024-12-15 RX ORDER — IODINE/SODIUM IODIDE 2 %
TINCTURE TOPICAL EVERY 6 HOURS PRN
Status: DISCONTINUED | OUTPATIENT
Start: 2024-12-15 | End: 2024-12-18 | Stop reason: HOSPADM

## 2024-12-15 RX ORDER — MIRTAZAPINE 15 MG/1
15 TABLET, FILM COATED ORAL NIGHTLY
Status: DISCONTINUED | OUTPATIENT
Start: 2024-12-15 | End: 2024-12-18 | Stop reason: HOSPADM

## 2024-12-15 RX ORDER — DIAZEPAM 5 MG/1
5 TABLET ORAL EVERY 12 HOURS PRN
Status: DISCONTINUED | OUTPATIENT
Start: 2024-12-15 | End: 2024-12-16

## 2024-12-15 RX ORDER — MORPHINE SULFATE 2 MG/ML
2 INJECTION, SOLUTION INTRAMUSCULAR; INTRAVENOUS
Status: DISCONTINUED | OUTPATIENT
Start: 2024-12-15 | End: 2024-12-18

## 2024-12-15 RX ADMIN — VALACYCLOVIR 1000 MG: 500 TABLET, FILM COATED ORAL at 00:15

## 2024-12-15 RX ADMIN — LISINOPRIL 10 MG: 10 TABLET ORAL at 08:18

## 2024-12-15 RX ADMIN — METOPROLOL SUCCINATE 25 MG: 25 TABLET, FILM COATED, EXTENDED RELEASE ORAL at 08:18

## 2024-12-15 RX ADMIN — DOCUSATE SODIUM 100 MG: 100 CAPSULE, LIQUID FILLED ORAL at 08:18

## 2024-12-15 RX ADMIN — ACYCLOVIR 800 MG: 200 CAPSULE ORAL at 14:48

## 2024-12-15 RX ADMIN — DOCUSATE SODIUM 100 MG: 100 CAPSULE, LIQUID FILLED ORAL at 19:45

## 2024-12-15 RX ADMIN — APIXABAN 2.5 MG: 2.5 TABLET, FILM COATED ORAL at 08:18

## 2024-12-15 RX ADMIN — PANTOPRAZOLE SODIUM 40 MG: 40 TABLET, DELAYED RELEASE ORAL at 06:14

## 2024-12-15 RX ADMIN — HYDROXYCHLOROQUINE SULFATE 200 MG: 200 TABLET ORAL at 08:18

## 2024-12-15 RX ADMIN — TRAMADOL HYDROCHLORIDE 50 MG: 50 TABLET, COATED ORAL at 06:13

## 2024-12-15 RX ADMIN — SODIUM CHLORIDE, PRESERVATIVE FREE 10 ML: 5 INJECTION INTRAVENOUS at 08:19

## 2024-12-15 RX ADMIN — MORPHINE SULFATE 2 MG: 2 INJECTION, SOLUTION INTRAMUSCULAR; INTRAVENOUS at 16:50

## 2024-12-15 RX ADMIN — MORPHINE SULFATE 2 MG: 2 INJECTION, SOLUTION INTRAMUSCULAR; INTRAVENOUS at 19:57

## 2024-12-15 RX ADMIN — MORPHINE SULFATE 2 MG: 2 INJECTION, SOLUTION INTRAMUSCULAR; INTRAVENOUS at 10:06

## 2024-12-15 RX ADMIN — ACYCLOVIR 800 MG: 200 CAPSULE ORAL at 23:31

## 2024-12-15 RX ADMIN — ACYCLOVIR 800 MG: 200 CAPSULE ORAL at 10:07

## 2024-12-15 RX ADMIN — APIXABAN 2.5 MG: 2.5 TABLET, FILM COATED ORAL at 19:43

## 2024-12-15 RX ADMIN — MORPHINE SULFATE 2 MG: 2 INJECTION, SOLUTION INTRAMUSCULAR; INTRAVENOUS at 13:44

## 2024-12-15 RX ADMIN — MIRTAZAPINE 15 MG: 15 TABLET, FILM COATED ORAL at 23:31

## 2024-12-15 RX ADMIN — SODIUM CHLORIDE, PRESERVATIVE FREE 10 ML: 5 INJECTION INTRAVENOUS at 06:14

## 2024-12-15 RX ADMIN — ACYCLOVIR 800 MG: 200 CAPSULE ORAL at 19:42

## 2024-12-15 RX ADMIN — SODIUM CHLORIDE, PRESERVATIVE FREE 10 ML: 5 INJECTION INTRAVENOUS at 19:45

## 2024-12-15 ASSESSMENT — PAIN DESCRIPTION - LOCATION
LOCATION: ABDOMEN;BACK;GENERALIZED
LOCATION: GENERALIZED
LOCATION: ABDOMEN;BACK;FLANK
LOCATION: GENERALIZED;ABDOMEN;BACK
LOCATION: GENERALIZED
LOCATION: GENERALIZED

## 2024-12-15 ASSESSMENT — PAIN DESCRIPTION - DESCRIPTORS
DESCRIPTORS: STABBING
DESCRIPTORS: STABBING
DESCRIPTORS: ACHING;SORE
DESCRIPTORS: STABBING
DESCRIPTORS: ACHING;THROBBING

## 2024-12-15 ASSESSMENT — PAIN SCALES - GENERAL
PAINLEVEL_OUTOF10: 9
PAINLEVEL_OUTOF10: 10
PAINLEVEL_OUTOF10: 6
PAINLEVEL_OUTOF10: 0
PAINLEVEL_OUTOF10: 8
PAINLEVEL_OUTOF10: 10
PAINLEVEL_OUTOF10: 9

## 2024-12-15 ASSESSMENT — PAIN DESCRIPTION - ORIENTATION: ORIENTATION: RIGHT;LOWER

## 2024-12-15 ASSESSMENT — PAIN DESCRIPTION - PAIN TYPE: TYPE: ACUTE PAIN;CHRONIC PAIN

## 2024-12-15 ASSESSMENT — PAIN SCALES - WONG BAKER: WONGBAKER_NUMERICALRESPONSE: NO HURT

## 2024-12-15 NOTE — CARE COORDINATION
Internal Medicine On-call Care Coordination Note    I was called by the ED physician because they recommended admission for this patient and we cover their PCP.  The history as I understand it after discussion with the ED physician is as follows:    The patient presented with rash and dysuria  Hx Bladder cancer and has a chronic catheter  In the ED, UA revealed a UTI, treated with Rocephin. Abdominal rash appearing to be shingles per ED physcian, started on Acyclovir.     I placed admission orders.  Including:    Observation  Continue IV Rocephin    Dr. Yung or his coverage will see the patient tomorrow for H&P.    Electronically signed by VARSHA Yao CNP on 12/14/2024 at 8:41 PM

## 2024-12-15 NOTE — PROGRESS NOTES
Patient being transferred to room 424 for negative air bed. Notified son Orestes. Gave him number to the nurses station.

## 2024-12-15 NOTE — H&P
Wt 49.2 kg (108 lb 7.5 oz)   SpO2 99%   BMI 19.84 kg/m²     Physical Exam:  General: awake, alert, oriented to person, place, time, and purpose, appears stated age, cooperative, no acute distress, pleasant   Head: normocephalic, atraumatic  Eyes: conjunctivae/corneas clear, sclera non icteric  Mouth: mucous membranes moist, no obvious oral sores  Neck: no JVD, no adenopathy, no carotid bruit, neck is supple, trachea is midline  Back: ROM normal, no CVA tenderness.  Lungs: clear to auscultation bilaterally, without rhonchi, crackle, wheezing, or rale, no retractions or use of accessory muscles  Heart: regular rate and regular rhythm, no murmur, normal S1, S2  Abdomen: soft, non-tender; bowel sounds normal; no masses, no organomegaly  Extremities: no lower extremity edema, extremities atraumatic, no cyanosis, no clubbing, 2+ pedal pulses palpated  Skin: diffuse erythematous with blistering and ulceration on the right flank in a dermatomal pattern    Neurologic:5/5 muscle strength throughout, normal muscle tone throughout, PERRLA, EOMI, face symmetric, hearing intact, tongue midline, speech appropriate without slurring.    Labs-   Lab Results   Component Value Date    WBC 7.5 12/14/2024    HGB 11.6 12/14/2024    HCT 36.0 12/14/2024     12/14/2024     12/14/2024    K 4.4 12/14/2024     12/14/2024    CREATININE 0.6 12/14/2024    BUN 16 12/14/2024    CO2 26 12/14/2024    GLUCOSE 118 (H) 12/14/2024    ALT 18 12/14/2024    AST 18 12/14/2024    INR 1.2 10/12/2023     Lab Results   Component Value Date    TROPONINI <0.01 11/11/2020     No results for input(s): \"BNP\" in the last 72 hours.      Recent Radiological Studies:  CT ABDOMEN PELVIS W IV CONTRAST Additional Contrast? None    Result Date: 12/14/2024  EXAMINATION: CT OF THE ABDOMEN AND PELVIS WITH CONTRAST 12/14/2024 4:12 pm TECHNIQUE: CT of the abdomen and pelvis was performed with the administration of intravenous contrast. Multiplanar reformatted  areas of subsegmental atelectasis bilaterally. Patient has severe osteopenia with multiple collapse of vertebral bodies. Previous vertebroplasty have been performed at L4, L3, L2, L1, T12, and T11. No acute inflammatory changes in the omental/mesentery fat planes, free intraperitoneal air, or ascites. There are no signs for diverticulitis.  Cannot conspicuously identified the appendix on this study a separate appendix from adjacent bowel segments.     Findings of mild to moderate constipation with fecal rectal retention/impaction. No indication for acute intraperitoneal or retro peritoneal process in the abdomen or in the pelvis. Presence of a Davidson catheter in the bladder.  Bladder is decompressed.  Air in the bladder lumen more likely relate with presence of the Davidson catheter. No obstructive uropathy.         Assessment/Plan  Patient is a 84 y.o. female who presents with Rash (Pt is DNR-CC from [facility], rash on right side, doctor wanted her sent in.  Pt has Lupus), Altered Mental Status (Son says pt is altered, according to pt son lives in [place] and came home recently and isnt use to seeing her not taking care of herself.  Pt is completely alert and oriented without confusion ), and Dysuria (Pt says she has bladder pain since last night )      Full problem list includes:  Patient Active Problem List    Diagnosis Date Noted    E coli bacteremia 11/19/2024    UTI (urinary tract infection) 11/19/2024    Sinus node dysfunction (HCC) 12/28/2022    Palpitations 12/26/2022    A-fib (HCC) 12/26/2022    Primary hypertension 12/09/2022    Atrial fibrillation with rapid ventricular response (HCC) 12/08/2022    Acute cystitis without hematuria 12/14/2024    Transient alteration of awareness 11/20/2024    Generalized abdominal pain 11/20/2024    Ambulatory dysfunction 11/18/2024    Severe protein-calorie malnutrition (HCC) 10/28/2023    Bacteremia 10/24/2023    Intractable abdominal pain 10/10/2023    Post-operative pain

## 2024-12-16 LAB
ALBUMIN SERPL-MCNC: 3.1 G/DL (ref 3.5–5.2)
ALP SERPL-CCNC: 77 U/L (ref 35–104)
ALT SERPL-CCNC: 13 U/L (ref 0–32)
ANION GAP SERPL CALCULATED.3IONS-SCNC: 9 MMOL/L (ref 7–16)
AST SERPL-CCNC: 15 U/L (ref 0–31)
BASOPHILS # BLD: 0.01 K/UL (ref 0–0.2)
BASOPHILS NFR BLD: 0 % (ref 0–2)
BILIRUB SERPL-MCNC: 0.3 MG/DL (ref 0–1.2)
BUN SERPL-MCNC: 11 MG/DL (ref 6–23)
CALCIUM SERPL-MCNC: 8.7 MG/DL (ref 8.6–10.2)
CHLORIDE SERPL-SCNC: 102 MMOL/L (ref 98–107)
CO2 SERPL-SCNC: 26 MMOL/L (ref 22–29)
CREAT SERPL-MCNC: 0.7 MG/DL (ref 0.5–1)
EOSINOPHIL # BLD: 0.09 K/UL (ref 0.05–0.5)
EOSINOPHILS RELATIVE PERCENT: 2 % (ref 0–6)
ERYTHROCYTE [DISTWIDTH] IN BLOOD BY AUTOMATED COUNT: 13.7 % (ref 11.5–15)
GFR, ESTIMATED: 86 ML/MIN/1.73M2
GLUCOSE SERPL-MCNC: 86 MG/DL (ref 74–99)
HCT VFR BLD AUTO: 35.2 % (ref 34–48)
HGB BLD-MCNC: 11 G/DL (ref 11.5–15.5)
IMM GRANULOCYTES # BLD AUTO: 0.08 K/UL (ref 0–0.58)
IMM GRANULOCYTES NFR BLD: 1 % (ref 0–5)
LYMPHOCYTES NFR BLD: 1.42 K/UL (ref 1.5–4)
LYMPHOCYTES RELATIVE PERCENT: 25 % (ref 20–42)
MCH RBC QN AUTO: 28.5 PG (ref 26–35)
MCHC RBC AUTO-ENTMCNC: 31.3 G/DL (ref 32–34.5)
MCV RBC AUTO: 91.2 FL (ref 80–99.9)
MONOCYTES NFR BLD: 0.51 K/UL (ref 0.1–0.95)
MONOCYTES NFR BLD: 9 % (ref 2–12)
NEUTROPHILS NFR BLD: 63 % (ref 43–80)
NEUTS SEG NFR BLD: 3.6 K/UL (ref 1.8–7.3)
PLATELET # BLD AUTO: 327 K/UL (ref 130–450)
PMV BLD AUTO: 8.7 FL (ref 7–12)
POTASSIUM SERPL-SCNC: 3.8 MMOL/L (ref 3.5–5)
PROT SERPL-MCNC: 5.6 G/DL (ref 6.4–8.3)
RBC # BLD AUTO: 3.86 M/UL (ref 3.5–5.5)
SODIUM SERPL-SCNC: 137 MMOL/L (ref 132–146)
WBC OTHER # BLD: 5.7 K/UL (ref 4.5–11.5)

## 2024-12-16 PROCEDURE — 85025 COMPLETE CBC W/AUTO DIFF WBC: CPT

## 2024-12-16 PROCEDURE — G0378 HOSPITAL OBSERVATION PER HR: HCPCS

## 2024-12-16 PROCEDURE — 96376 TX/PRO/DX INJ SAME DRUG ADON: CPT

## 2024-12-16 PROCEDURE — 6360000002 HC RX W HCPCS: Performed by: INTERNAL MEDICINE

## 2024-12-16 PROCEDURE — 80053 COMPREHEN METABOLIC PANEL: CPT

## 2024-12-16 PROCEDURE — 6370000000 HC RX 637 (ALT 250 FOR IP): Performed by: HOSPITALIST

## 2024-12-16 PROCEDURE — 6370000000 HC RX 637 (ALT 250 FOR IP)

## 2024-12-16 PROCEDURE — 2580000003 HC RX 258

## 2024-12-16 PROCEDURE — 97161 PT EVAL LOW COMPLEX 20 MIN: CPT

## 2024-12-16 PROCEDURE — 6370000000 HC RX 637 (ALT 250 FOR IP): Performed by: INTERNAL MEDICINE

## 2024-12-16 RX ORDER — VALACYCLOVIR HYDROCHLORIDE 1 G/1
1000 TABLET, FILM COATED ORAL 2 TIMES DAILY
Status: DISCONTINUED | OUTPATIENT
Start: 2024-12-17 | End: 2024-12-18 | Stop reason: HOSPADM

## 2024-12-16 RX ORDER — VALACYCLOVIR HYDROCHLORIDE 500 MG/1
1000 TABLET, FILM COATED ORAL 2 TIMES DAILY
Status: DISPENSED | OUTPATIENT
Start: 2024-12-16 | End: 2024-12-17

## 2024-12-16 RX ORDER — VALACYCLOVIR HYDROCHLORIDE 1 G/1
1000 TABLET, FILM COATED ORAL 2 TIMES DAILY
Status: DISCONTINUED | OUTPATIENT
Start: 2024-12-17 | End: 2024-12-16 | Stop reason: SDUPTHER

## 2024-12-16 RX ORDER — DIAZEPAM 2 MG/1
2 TABLET ORAL EVERY 12 HOURS PRN
Status: DISCONTINUED | OUTPATIENT
Start: 2024-12-16 | End: 2024-12-18 | Stop reason: HOSPADM

## 2024-12-16 RX ORDER — DIAZEPAM 2 MG/1
2 TABLET ORAL EVERY 12 HOURS PRN
Qty: 20 TABLET | Status: SHIPPED | DISCHARGE
Start: 2024-12-16 | End: 2024-12-18

## 2024-12-16 RX ORDER — VALACYCLOVIR HYDROCHLORIDE 1 G/1
1000 TABLET, FILM COATED ORAL 2 TIMES DAILY
DISCHARGE
Start: 2024-12-16 | End: 2024-12-19

## 2024-12-16 RX ADMIN — MORPHINE SULFATE 2 MG: 2 INJECTION, SOLUTION INTRAMUSCULAR; INTRAVENOUS at 21:24

## 2024-12-16 RX ADMIN — HYDROXYCHLOROQUINE SULFATE 200 MG: 200 TABLET ORAL at 10:19

## 2024-12-16 RX ADMIN — APIXABAN 2.5 MG: 2.5 TABLET, FILM COATED ORAL at 10:19

## 2024-12-16 RX ADMIN — SODIUM CHLORIDE, PRESERVATIVE FREE 10 ML: 5 INJECTION INTRAVENOUS at 10:13

## 2024-12-16 RX ADMIN — DOCUSATE SODIUM 100 MG: 100 CAPSULE, LIQUID FILLED ORAL at 21:24

## 2024-12-16 RX ADMIN — MORPHINE SULFATE 2 MG: 2 INJECTION, SOLUTION INTRAMUSCULAR; INTRAVENOUS at 11:53

## 2024-12-16 RX ADMIN — DIAZEPAM 2 MG: 2 TABLET ORAL at 21:24

## 2024-12-16 RX ADMIN — APIXABAN 2.5 MG: 2.5 TABLET, FILM COATED ORAL at 21:24

## 2024-12-16 RX ADMIN — MORPHINE SULFATE 2 MG: 2 INJECTION, SOLUTION INTRAMUSCULAR; INTRAVENOUS at 14:23

## 2024-12-16 RX ADMIN — METOPROLOL SUCCINATE 25 MG: 25 TABLET, FILM COATED, EXTENDED RELEASE ORAL at 10:19

## 2024-12-16 RX ADMIN — LISINOPRIL 10 MG: 10 TABLET ORAL at 10:19

## 2024-12-16 RX ADMIN — MIRTAZAPINE 15 MG: 15 TABLET, FILM COATED ORAL at 21:24

## 2024-12-16 RX ADMIN — ACYCLOVIR 800 MG: 200 CAPSULE ORAL at 11:54

## 2024-12-16 RX ADMIN — PANTOPRAZOLE SODIUM 40 MG: 40 TABLET, DELAYED RELEASE ORAL at 06:39

## 2024-12-16 RX ADMIN — SODIUM CHLORIDE, PRESERVATIVE FREE 10 ML: 5 INJECTION INTRAVENOUS at 21:24

## 2024-12-16 RX ADMIN — DOCUSATE SODIUM 100 MG: 100 CAPSULE, LIQUID FILLED ORAL at 10:19

## 2024-12-16 RX ADMIN — ACYCLOVIR 800 MG: 200 CAPSULE ORAL at 06:39

## 2024-12-16 ASSESSMENT — PAIN DESCRIPTION - PAIN TYPE
TYPE: ACUTE PAIN

## 2024-12-16 ASSESSMENT — PAIN DESCRIPTION - ONSET
ONSET: GRADUAL

## 2024-12-16 ASSESSMENT — PAIN DESCRIPTION - DESCRIPTORS
DESCRIPTORS: BURNING

## 2024-12-16 ASSESSMENT — PAIN DESCRIPTION - ORIENTATION
ORIENTATION: RIGHT

## 2024-12-16 ASSESSMENT — PAIN DESCRIPTION - FREQUENCY
FREQUENCY: INTERMITTENT

## 2024-12-16 ASSESSMENT — PAIN SCALES - GENERAL
PAINLEVEL_OUTOF10: 8
PAINLEVEL_OUTOF10: 10
PAINLEVEL_OUTOF10: 0
PAINLEVEL_OUTOF10: 3
PAINLEVEL_OUTOF10: 0
PAINLEVEL_OUTOF10: 9

## 2024-12-16 ASSESSMENT — PAIN DESCRIPTION - LOCATION
LOCATION: ABDOMEN;BACK

## 2024-12-16 ASSESSMENT — PAIN - FUNCTIONAL ASSESSMENT
PAIN_FUNCTIONAL_ASSESSMENT: PREVENTS OR INTERFERES SOME ACTIVE ACTIVITIES AND ADLS

## 2024-12-16 NOTE — PROGRESS NOTES
Internal Medicine Progress Note    Patient's name: Krys Adame  : 1940  Chief complaints (on day of admission): Rash (Pt is DNR-CC from [facility], rash on right side, doctor wanted her sent in.  Pt has Lupus), Altered Mental Status (Son says pt is altered, according to pt son lives in [place] and came home recently and isnt use to seeing her not taking care of herself.  Pt is completely alert and oriented without confusion ), and Dysuria (Pt says she has bladder pain since last night )  Admission date: 2024  Date of service: 2024   Room: [address] INTERNAL MEDICINE   Primary care physician: Gino Ames DO  Reason for visit: Follow-up for shingles    Subjective  Krys was seen and examined.  Still having some pain on her right side with open rash.  Patient states that she is feeling better.  She is currently eating and drinking better.     Review of Systems  There are no new complaints of chest pain, shortness of breath, abdominal pain, nausea, vomiting, diarrhea, constipation.    Hospital Medications  Current Facility-Administered Medications   Medication Dose Route Frequency Provider Last Rate Last Admin    traMADol (ULTRAM) tablet 50 mg  50 mg Oral Q8H PRN Joana Terrell APRN - CNP   50 mg at 12/15/24 0613    diazePAM (VALIUM) tablet 5 mg  5 mg Oral Q12H PRN Joana Terrell APRN - CNP        mirtazapine (REMERON) tablet 15 mg  15 mg Oral Nightly Joana Terrell APRN - CNP   15 mg at 12/15/24 2331    Calamine 8-8 % lotion   Topical Q6H PRN Joana Terrell APRN - CNP        morphine (PF) injection 2 mg  2 mg IntraVENous Q2H PRN Malathi López MD   2 mg at 24 1423    acyclovir (ZOVIRAX) capsule 800 mg  800 mg Oral 5x Daily Malathi López MD   800 mg at 24 1154    apixaban (ELIQUIS) tablet 2.5 mg  2.5 mg Oral BID Joana Terrell APRN - CNP   2.5 mg at 24 1019    bisacodyl (DULCOLAX) suppository 10 mg  10 mg Rectal Daily PRN Joana Terrell, APRN - CNP        docusate

## 2024-12-16 NOTE — DISCHARGE INSTR - COC
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    Compression fracture of body of thoracic vertebra ([facility]) S22.000A    Post-operative pain G89.18    Intractable abdominal pain R10.9    Bacteremia R78.81    Severe protein-calorie malnutrition ([facility]) E43    Ambulatory dysfunction R26.2    E coli bacteremia R78.81, B96.20    UTI (urinary tract infection) N39.0    Transient alteration of awareness R40.4    Generalized abdominal pain R10.84    Acute cystitis without hematuria N30.00    Herpes zoster without complication B02.9       Isolation/Infection:   Isolation            Contact  Airborne          Patient Infection Status       Infection Onset Added Last Indicated Last Indicated By Review Planned Expiration Resolved Resolved By    ESBL (Extended Spectrum Beta Lactamase) 11/18/24 11/20/24 11/19/24 Culture, Urine        Escherichia coli , blood, 11/18/2024  Escherichia coli, urine, 11/19/2024                       Nurse Assessment:  Last Vital Signs: BP (!) 118/56   Pulse 75   Temp 98 °F (36.7 °C) (Oral)   Resp 18   Wt 49.2 kg (108 lb 7.5 oz)   SpO2 98%   BMI 19.84 kg/m²     Last documented pain score (0-10 scale): Pain Level: 0  Last Weight:   Wt Readings from Last 1 Encounters:   12/14/24 49.2 kg (108 lb 7.5 oz)     Mental Status:  oriented and alert    IV Access:  - None    Nursing Mobility/ADLs:  Walking   Assisted  Transfer  Assisted  Bathing  Assisted  Dressing  Assisted  Toileting  Assisted  Feeding  Assisted  Med Admin  Assisted  Med Delivery   whole    Wound Care  Condition:[id number]}    Rehab Potential (if transferring to Rehab): {Prognosis:2646075428}    Recommended Labs or Other Treatments After Discharge: ***    Physician Certification: I certify the above information and transfer of Krys Adame  is necessary for the continuing treatment of the diagnosis listed and that she requires {Admit to Appropriate Level of Care:44687} for {GREATER/LESS:606639168} 30 days.     Update Admission H&P: {CHP DME Changes in HandP:340955431}    PHYSICIAN SIGNATURE:  {Esignature:[id number]}

## 2024-12-16 NOTE — CARE COORDINATION
Patient arrives from [facility].  She has noted some AMS/Encephalopathy.  Same confirmed with bedside nurse.    Call to son - Orestes - as well as to Robyn with [facility].  Patient was at facility under skilled care - she will need a new authorization to return.  Son requested new auth be sought fro [facility] - Robyn is happy to oblige.  Plan will be for auth request submission once OT eval is posted, likely 12/17/2024 and transfr to [facility] once response/approval is received.  Patient will need followed and assisted with discharge planning as necessary.   RAVI initiated, envelope completed with demos.  Will seek facility transport at time of discharge.  Juan Jose Reyes, MSN RN  [facility] Case Management  [phone]

## 2024-12-16 NOTE — CONSULTS
[facility]  [facility]  Consultation Note     Admit Date: 12/14/2024  1:13 PM    Reason for Consult:   CAUTI.  Recently treated.  Concern for reinfection    Attending Physician:  Todd Husain MD    HISTORY OF PRESENT ILLNESS:             The history is obtained from extensive review of available past medical records. The patient is a 84 y.o. female who is previously known to the ID service.  The patient has a chronic indwelling Davidson catheter.  She was sent to the ED at [facility] from [facility] on 12/14/2024.  On presentation her vital signs were normal.  White count was 7.5.  CMP was unremarkable.  The patient has no complaints.  She does have generalized aches and pains for which she takes pain medications and they do give her some relief.  The Davidson catheter was changed here.  She has remained afebrile.    Past Medical History:    November 2024.  Admitted to [facility] after a fall.  Treated with Ceftriaxone.  Urine and blood cultures grew CTX-M E. coli.  Seen by ID and treated for a complicated UTI associated to a chronic indwelling Davidson catheter with Ertapenem.  She completed treatment during that admission.    October 2023: Admitted t [facility] with nausea and abdominal pain, blood cultures turned positive for Enterococcus faecium. Treated with Unasyn. It was felt to be from GI tract vs UTI. CIRILO was negative, with chronic back pain it was felt that we should treat for 6 weeks for the possibility of endocarditis vs vertebral osteo. She was discharged with a PICC line and Unasyn.          Diagnosis Date    Cancer (HCC) 1990 1995    BLADDER    Cataract left    8-18-16 for removal    Familial peripheral neuropathy     Hypertension     Lupus (HCC)     Paroxysmal atrial fibrillation ([facility])      Past Surgical History:        Pro[facility] Laterality Date    [facility] SUR[facility]ERY  11/29/2012    right eye cataract with lens implant    EYE SURGERY Left  clubbing, no cyanosis, no edema.  Lines: Peripheral.  Davidson catheter.  Urine is clear.    Lab Results   Component Value Date    CRP 4.0 11/16/2023    CRP <3.0 11/09/2023    CRP 3.0 11/03/2023      No results found for: \"CRPHS\"  Lab Results   Component Value Date    SEDRATE 10 11/16/2023    SEDRATE 14 11/09/2023    SEDRATE 6 11/03/2023       Radiology:  Noted    Microbiology:  Pending    Assessment:  Chronic indwelling Davidson catheter.  Patient does not have a CAUTI  Asymptomatic bacteriuria    Plan:    No need to treat asymptomatic bacteriuria  No further recommendations  ID will sign off    Thank you for having us see this patient in consultation. I will be discussing this case with the treating physicians or communicated through the electronic health record.  Spoke with nursing.    Chip Aguilar MD  8:56 AM  12/16/2024

## 2024-12-16 NOTE — ACP (ADVANCE CARE PLANNING)
Advance Care Planning   Healthcare Decision Maker:    Primary Decision Maker: Orestes Stuart - Roger - [phone]    Secondary Decision Maker: Ruth Bryant - Brother/Sister - [phone]    Click here to complete Healthcare Decision Makers including selection of the Healthcare Decision Maker Relationship (ie \"Primary\").

## 2024-12-16 NOTE — PROGRESS NOTES
Physical Therapy  Facility/Department: [address] INTERNAL MEDICINE 2  Physical Therapy Initial Assessment    Name: Krys Adame  : 1940  MRN: [id number]  Date of Service: 2024        Patient Diagnosis(es): The primary encounter diagnosis was Acute cystitis without hematuria. Diagnoses of Herpes zoster without complication and Constipation, unspecified constipation type were also pertinent to this visit.  Past Medical History:  has a past medical history of Cancer (HCC), Cataract, Familial peripheral neuropathy, Hypertension, Lupus (HCC), and Paroxysmal atrial fibrillation (HCC).  Past Surgical History:  has a past surgical history that includes Hysterectomy; Cystoscopy; Eye surgery (2012); eye surgery (Left, 2016); Spine surgery (Bilateral, 5/3/2023); Pain management procedure (N/A, 8/10/2023); Spine surgery (N/A, 2023); Upper gastrointestinal endoscopy (N/A, 10/12/2023); Spine surgery (N/A, 10/14/2023); and picc insertion vascular access team (2023).      Evaluating Therapist: Rubi Sheikh PT    Room #:  0424/0424-A  Diagnosis:  UTI (urinary tract infection) [N39.0]  Acute cystitis without hematuria [N30.00]  Herpes zoster without complication [B02.9]  Constipation, unspecified constipation type [K59.00]  PMHx/PSHx:  CA, neuropathy, HTN, Afib  Precautions:  falls, alarm, airborne isolation    Social:  Pt admitted from [facility]. Prior lived alone. States she was walking short distances with ww at home   Initial Evaluation  Date: 24 Treatment      Short Term/ Long Term   Goals   Was pt agreeable to Eval/treatment? yes     Does pt have pain? Pain from shingles     Bed Mobility  Rolling: min assist  Supine to sit: min assist  Sit to supine: NT  Scooting: min assist  SBA   Transfers Sit to stand: min assist  Stand to sit: min assist  Stand pivot: min assist  SBA   Ambulation    20 feet x2  with ww with min assist  75 feet with ww with SBA   Stair Negotiation  Ascended and

## 2024-12-16 NOTE — PLAN OF CARE
Problem: Pain  Goal: Verbalizes/displays adequate comfort level or baseline comfort level  12/16/2024 1253 by April Garcia RN  Outcome: Progressing  12/15/2024 2358 by Jennifer Carlson RN  Outcome: Progressing     Problem: Safety - Adult  Goal: Free from fall injury  12/15/2024 2358 by Jennifer Carlson, RN  Outcome: Progressing     Problem: ABCDS Injury Assessment  Goal: Absence of physical injury  12/15/2024 2358 by Jennifer Carlson, RN  Outcome: Progressing     Problem: Skin/Tissue Integrity  Goal: Absence of new skin breakdown  Description: 1.  Monitor for areas of redness and/or skin breakdown  2.  Assess vascular access sites hourly  3.  Every 4-6 hours minimum:  Change oxygen saturation probe site  4.  Every 4-6 hours:  If on nasal continuous positive airway pressure, respiratory therapy assess nares and determine need for appliance change or resting period.  12/15/2024 2358 by Jennifer Carlson, RN  Outcome: Progressing     Problem: Discharge Planning  Goal: Discharge to home or other facility with appropriate resources  12/15/2024 2358 by Jennifer Carlson, RN  Outcome: Progressing

## 2024-12-17 LAB
ALBUMIN SERPL-MCNC: 3 G/DL (ref 3.5–5.2)
ALP SERPL-CCNC: 71 U/L (ref 35–104)
ALT SERPL-CCNC: 14 U/L (ref 0–32)
ANION GAP SERPL CALCULATED.3IONS-SCNC: 7 MMOL/L (ref 7–16)
AST SERPL-CCNC: 14 U/L (ref 0–31)
BASOPHILS # BLD: 0.02 K/UL (ref 0–0.2)
BASOPHILS NFR BLD: 0 % (ref 0–2)
BILIRUB SERPL-MCNC: 0.3 MG/DL (ref 0–1.2)
BUN SERPL-MCNC: 9 MG/DL (ref 6–23)
CALCIUM SERPL-MCNC: 8.6 MG/DL (ref 8.6–10.2)
CHLORIDE SERPL-SCNC: 105 MMOL/L (ref 98–107)
CO2 SERPL-SCNC: 26 MMOL/L (ref 22–29)
CREAT SERPL-MCNC: 0.7 MG/DL (ref 0.5–1)
EKG ATRIAL RATE: 71 BPM
EKG P AXIS: 65 DEGREES
EKG P-R INTERVAL: 160 MS
EKG Q-T INTERVAL: 402 MS
EKG QRS DURATION: 92 MS
EKG QTC CALCULATION (BAZETT): 436 MS
EKG R AXIS: 51 DEGREES
EKG T AXIS: 66 DEGREES
EKG VENTRICULAR RATE: 71 BPM
EOSINOPHIL # BLD: 0.14 K/UL (ref 0.05–0.5)
EOSINOPHILS RELATIVE PERCENT: 2 % (ref 0–6)
ERYTHROCYTE [DISTWIDTH] IN BLOOD BY AUTOMATED COUNT: 13.7 % (ref 11.5–15)
GFR, ESTIMATED: 84 ML/MIN/1.73M2
GLUCOSE SERPL-MCNC: 93 MG/DL (ref 74–99)
HCT VFR BLD AUTO: 34 % (ref 34–48)
HGB BLD-MCNC: 10.8 G/DL (ref 11.5–15.5)
IMM GRANULOCYTES # BLD AUTO: 0.05 K/UL (ref 0–0.58)
IMM GRANULOCYTES NFR BLD: 1 % (ref 0–5)
LYMPHOCYTES NFR BLD: 1.28 K/UL (ref 1.5–4)
LYMPHOCYTES RELATIVE PERCENT: 21 % (ref 20–42)
MCH RBC QN AUTO: 28.6 PG (ref 26–35)
MCHC RBC AUTO-ENTMCNC: 31.8 G/DL (ref 32–34.5)
MCV RBC AUTO: 89.9 FL (ref 80–99.9)
MONOCYTES NFR BLD: 0.48 K/UL (ref 0.1–0.95)
MONOCYTES NFR BLD: 8 % (ref 2–12)
NEUTROPHILS NFR BLD: 69 % (ref 43–80)
NEUTS SEG NFR BLD: 4.28 K/UL (ref 1.8–7.3)
PLATELET # BLD AUTO: 342 K/UL (ref 130–450)
PMV BLD AUTO: 8.6 FL (ref 7–12)
POTASSIUM SERPL-SCNC: 4.5 MMOL/L (ref 3.5–5)
PROT SERPL-MCNC: 5.7 G/DL (ref 6.4–8.3)
RBC # BLD AUTO: 3.78 M/UL (ref 3.5–5.5)
SODIUM SERPL-SCNC: 138 MMOL/L (ref 132–146)
WBC OTHER # BLD: 6.3 K/UL (ref 4.5–11.5)

## 2024-12-17 PROCEDURE — 6370000000 HC RX 637 (ALT 250 FOR IP)

## 2024-12-17 PROCEDURE — 36415 COLL VENOUS BLD VENIPUNCTURE: CPT

## 2024-12-17 PROCEDURE — 97165 OT EVAL LOW COMPLEX 30 MIN: CPT

## 2024-12-17 PROCEDURE — 6360000002 HC RX W HCPCS: Performed by: INTERNAL MEDICINE

## 2024-12-17 PROCEDURE — 1200000000 HC SEMI PRIVATE

## 2024-12-17 PROCEDURE — 96376 TX/PRO/DX INJ SAME DRUG ADON: CPT

## 2024-12-17 PROCEDURE — 80053 COMPREHEN METABOLIC PANEL: CPT

## 2024-12-17 PROCEDURE — 85025 COMPLETE CBC W/AUTO DIFF WBC: CPT

## 2024-12-17 PROCEDURE — 2580000003 HC RX 258

## 2024-12-17 PROCEDURE — 6370000000 HC RX 637 (ALT 250 FOR IP): Performed by: HOSPITALIST

## 2024-12-17 PROCEDURE — 93010 ELECTROCARDIOGRAM REPORT: CPT | Performed by: INTERNAL MEDICINE

## 2024-12-17 RX ORDER — OXYCODONE AND ACETAMINOPHEN 5; 325 MG/1; MG/1
1 TABLET ORAL EVERY 6 HOURS PRN
Status: DISCONTINUED | OUTPATIENT
Start: 2024-12-17 | End: 2024-12-18 | Stop reason: HOSPADM

## 2024-12-17 RX ADMIN — PANTOPRAZOLE SODIUM 40 MG: 40 TABLET, DELAYED RELEASE ORAL at 06:52

## 2024-12-17 RX ADMIN — LISINOPRIL 10 MG: 10 TABLET ORAL at 07:46

## 2024-12-17 RX ADMIN — MORPHINE SULFATE 2 MG: 2 INJECTION, SOLUTION INTRAMUSCULAR; INTRAVENOUS at 09:54

## 2024-12-17 RX ADMIN — APIXABAN 2.5 MG: 2.5 TABLET, FILM COATED ORAL at 20:59

## 2024-12-17 RX ADMIN — SODIUM CHLORIDE, PRESERVATIVE FREE 10 ML: 5 INJECTION INTRAVENOUS at 07:46

## 2024-12-17 RX ADMIN — VALACYCLOVIR 1000 MG: 1 TABLET, FILM COATED ORAL at 07:46

## 2024-12-17 RX ADMIN — APIXABAN 2.5 MG: 2.5 TABLET, FILM COATED ORAL at 07:46

## 2024-12-17 RX ADMIN — VALACYCLOVIR 1000 MG: 1 TABLET, FILM COATED ORAL at 21:00

## 2024-12-17 RX ADMIN — MORPHINE SULFATE 2 MG: 2 INJECTION, SOLUTION INTRAMUSCULAR; INTRAVENOUS at 21:09

## 2024-12-17 RX ADMIN — OXYCODONE HYDROCHLORIDE AND ACETAMINOPHEN 1 TABLET: 5; 325 TABLET ORAL at 20:59

## 2024-12-17 RX ADMIN — DOCUSATE SODIUM 100 MG: 100 CAPSULE, LIQUID FILLED ORAL at 07:46

## 2024-12-17 RX ADMIN — DOCUSATE SODIUM 100 MG: 100 CAPSULE, LIQUID FILLED ORAL at 20:59

## 2024-12-17 RX ADMIN — MIRTAZAPINE 15 MG: 15 TABLET, FILM COATED ORAL at 20:59

## 2024-12-17 RX ADMIN — MORPHINE SULFATE 2 MG: 2 INJECTION, SOLUTION INTRAMUSCULAR; INTRAVENOUS at 05:10

## 2024-12-17 RX ADMIN — HYDROXYCHLOROQUINE SULFATE 200 MG: 200 TABLET ORAL at 07:46

## 2024-12-17 ASSESSMENT — PAIN SCALES - GENERAL
PAINLEVEL_OUTOF10: 10
PAINLEVEL_OUTOF10: 0
PAINLEVEL_OUTOF10: 0
PAINLEVEL_OUTOF10: 10
PAINLEVEL_OUTOF10: 8
PAINLEVEL_OUTOF10: 0
PAINLEVEL_OUTOF10: 0

## 2024-12-17 ASSESSMENT — PAIN DESCRIPTION - LOCATION
LOCATION: ABDOMEN;BACK
LOCATION: ABDOMEN;BACK
LOCATION: BACK

## 2024-12-17 ASSESSMENT — PAIN DESCRIPTION - PAIN TYPE
TYPE: ACUTE PAIN

## 2024-12-17 ASSESSMENT — PAIN DESCRIPTION - FREQUENCY
FREQUENCY: INTERMITTENT
FREQUENCY: INTERMITTENT

## 2024-12-17 ASSESSMENT — PAIN DESCRIPTION - ONSET
ONSET: GRADUAL
ONSET: GRADUAL

## 2024-12-17 ASSESSMENT — PAIN DESCRIPTION - DESCRIPTORS
DESCRIPTORS: BURNING
DESCRIPTORS: BURNING
DESCRIPTORS: STABBING

## 2024-12-17 ASSESSMENT — PAIN - FUNCTIONAL ASSESSMENT
PAIN_FUNCTIONAL_ASSESSMENT: PREVENTS OR INTERFERES SOME ACTIVE ACTIVITIES AND ADLS

## 2024-12-17 ASSESSMENT — PAIN DESCRIPTION - ORIENTATION
ORIENTATION: RIGHT
ORIENTATION: RIGHT

## 2024-12-17 NOTE — CARE COORDINATION
OT department notified that eval is necessary to allow accepting snf/sandi to submit for authorization.  Of note - received communication from [facility]  - Blanca Rangel - patient is on service with them - they will continue to follow and be available for post discharge needs as necessary.  Patient will need followed and assisted with discharge planning as necessary.   RAVI initiated yesterday, envelope completed with demos.  Will seek facility transport at time of discharge.  Juan Jose Reyes, MSN RN  [facility] Case Management  [phone]

## 2024-12-17 NOTE — PROGRESS NOTES
given to maximize safety.  Independent   Functional Mobility Min A (with walker) for few steps from EOB to bedside chair.  Mod I with functional mobility (with device, as needed/appropriate) in order to maximize independence with ADLs/IADLs and other functional tasks.   Balance Sitting: Fair+ (at EOB)  Standing: Fair- (with walker)  Fair+ dynamic standing balance during completion of ADLs/IADLs and other functional tasks.   Activity Tolerance Fair  Patient will demonstrate Good understanding and consistent implementation of energy conservation techniques and work simplification techniques into ADL/IADL routines.   Visual/  Perceptual WFL grossly     N/A   B UE Strength, ROM B UE ROM: WFL  B UE Strength: 4-/5 grossly  Patient will demonstrate 4/5 B UE strength in order to maximize independence with ADLs/IADLs and functional transfers.     Additional Long-Term Goal: Patient will increase functional independence to PLOF in order to allow patient to live in least restrictive environment.     Hearing: WFL  Sensation: No c/o numbness/tingling in B UEs.  Tone: WFL  Edema: No    Comments: RN approved patient's participation in OOB activities. Upon arrival, patient supine in bed. At end of session, patient seated in bedside chair with call light and phone within reach, chair alarm activated, and all lines and tubes intact. Patient would benefit from continued skilled OT to increase safety and independence with completion of ADL/IADL tasks for functional independence and quality of life.     Patient education provided regardin) importance of having staff assist with OOB activities, 2) safe transfer techniques, 3) importance of OOB activities. Patient indicated understanding.    Further skilled OT treatment indicated to increase patient's safety and independence with completion of ADL/IADL tasks in order to maximize patient's functional independence and quality of life.    Rehab Potential: Good for established  goals.  Patient / Family Goal: Patient anticipates going to a SNF/ECF upon discharge.  Patient and/or family were instructed on functional diagnosis, prognosis/goals, and OT plan of care. Demonstrated Good understanding.    Eval Complexity: Low    Time In: 1345  Time Out: 1405  Total Treatment Time: 0 minutes      Minutes Units   OT Eval Low 57242 20 1   OT Eval Medium 35467     OT Eval High 74540     OT Re-Eval 61098     Therapeutic Ex 15863     Therapeutic Activities 57241     ADL/Self Care 90112     Orthotic Management 93816     Neuro Re-Ed 38495     Non-Billable Time N/A ---     Evaluation time includes thorough review of current medical information, gathering information on past medical history/social history and prior level of function, completion of standardized testing/informal observation of tasks, assessment of data, and education on plan of care and goals.    Hayley Valencia, OTR/L  License Number: [id number]

## 2024-12-17 NOTE — PROGRESS NOTES
Internal Medicine Progress Note    Patient's name: Krys Adame  : 1940  Chief complaints (on day of admission): Rash (Pt is DNR-CC from [facility], rash on right side, doctor wanted her sent in.  Pt has Lupus), Altered Mental Status (Son says pt is altered, according to pt son lives in [place] and came home recently and isnt use to seeing her not taking care of herself.  Pt is completely alert and oriented without confusion ), and Dysuria (Pt says she has bladder pain since last night )  Admission date: 2024  Date of service: 2024   Room: [address] INTERNAL MEDICINE   Primary care physician: Gino Ames DO  Reason for visit: Follow-up for shingles    Subjective  Krys was seen and examined.  Still having some pain on her right side with open rash.  Patient states that she is feeling continued pain and discomfort.  Unable to sit up for longer than a half an hour.  States that she has had this repeatedly secondary to her kyphoplasty.  She is currently eating and drinking better.  Patient denies any episodes of confusion prior to being admitted.  States that she has no confusion currently.  Requesting stronger pain medications.    Review of Systems  There are no new complaints of chest pain, shortness of breath, abdominal pain, nausea, vomiting, diarrhea, constipation.    Hospital Medications  Current Facility-Administered Medications   Medication Dose Route Frequency Provider Last Rate Last Admin    diazePAM (VALIUM) tablet 2 mg  2 mg Oral Q12H PRN Todd Husain MD   2 mg at 24    valACYclovir (VALTREX) tablet 1,000 mg  1,000 mg Oral BID Todd Husain MD   1,000 mg at 24 0746    traMADol (ULTRAM) tablet 50 mg  50 mg Oral Q8H PRN Joana Terrell APRN - CNP   50 mg at 12/15/24 0613    mirtazapine (REMERON) tablet 15 mg  15 mg Oral Nightly Joana Terrell APRN - CNP   15 mg at 24    Calamine 8-8 % lotion   Topical Q6H PRN Joana Terrell APRN - CNP         for rate control     Essential Hypertension  Continue Lisinopril and Toprol XL   Monitor BP trends and adjust as indicated     Constipation  Continue bowel regimen and monitor abdominal exam    PT AM-PAC--pending  Follow labs   DVT prophylaxis  Please see orders for further management and care.  Discharge plan: Stable for discharge today or tomorrow depending on when pre-CERT can be obtained    Electronically signed by Todd Husain MD on 12/17/2024 at 1:20 PM    I can be reached through [employer].

## 2024-12-18 VITALS
SYSTOLIC BLOOD PRESSURE: 109 MMHG | HEART RATE: 66 BPM | BODY MASS INDEX: 19.35 KG/M2 | RESPIRATION RATE: 18 BRPM | WEIGHT: 105.82 LBS | OXYGEN SATURATION: 97 % | DIASTOLIC BLOOD PRESSURE: 54 MMHG | TEMPERATURE: 98.4 F

## 2024-12-18 LAB
ALBUMIN SERPL-MCNC: 2.9 G/DL (ref 3.5–5.2)
ALP SERPL-CCNC: 69 U/L (ref 35–104)
ALT SERPL-CCNC: 12 U/L (ref 0–32)
ANION GAP SERPL CALCULATED.3IONS-SCNC: 9 MMOL/L (ref 7–16)
AST SERPL-CCNC: 18 U/L (ref 0–31)
BASOPHILS # BLD: 0.02 K/UL (ref 0–0.2)
BASOPHILS NFR BLD: 0 % (ref 0–2)
BILIRUB SERPL-MCNC: 0.3 MG/DL (ref 0–1.2)
BUN SERPL-MCNC: 9 MG/DL (ref 6–23)
CALCIUM SERPL-MCNC: 8.2 MG/DL (ref 8.6–10.2)
CHLORIDE SERPL-SCNC: 104 MMOL/L (ref 98–107)
CO2 SERPL-SCNC: 23 MMOL/L (ref 22–29)
CREAT SERPL-MCNC: 0.6 MG/DL (ref 0.5–1)
EOSINOPHIL # BLD: 0.15 K/UL (ref 0.05–0.5)
EOSINOPHILS RELATIVE PERCENT: 2 % (ref 0–6)
ERYTHROCYTE [DISTWIDTH] IN BLOOD BY AUTOMATED COUNT: 13.6 % (ref 11.5–15)
GFR, ESTIMATED: 86 ML/MIN/1.73M2
GLUCOSE SERPL-MCNC: 84 MG/DL (ref 74–99)
HCT VFR BLD AUTO: 32.2 % (ref 34–48)
HGB BLD-MCNC: 10.3 G/DL (ref 11.5–15.5)
IMM GRANULOCYTES # BLD AUTO: 0.05 K/UL (ref 0–0.58)
IMM GRANULOCYTES NFR BLD: 1 % (ref 0–5)
LYMPHOCYTES NFR BLD: 1.46 K/UL (ref 1.5–4)
LYMPHOCYTES RELATIVE PERCENT: 23 % (ref 20–42)
MCH RBC QN AUTO: 28.9 PG (ref 26–35)
MCHC RBC AUTO-ENTMCNC: 32 G/DL (ref 32–34.5)
MCV RBC AUTO: 90.2 FL (ref 80–99.9)
MICROORGANISM SPEC CULT: ABNORMAL
MONOCYTES NFR BLD: 0.59 K/UL (ref 0.1–0.95)
MONOCYTES NFR BLD: 9 % (ref 2–12)
NEUTROPHILS NFR BLD: 64 % (ref 43–80)
NEUTS SEG NFR BLD: 4.07 K/UL (ref 1.8–7.3)
PLATELET # BLD AUTO: 334 K/UL (ref 130–450)
PMV BLD AUTO: 8.6 FL (ref 7–12)
POTASSIUM SERPL-SCNC: 3.7 MMOL/L (ref 3.5–5)
PROT SERPL-MCNC: 5.4 G/DL (ref 6.4–8.3)
RBC # BLD AUTO: 3.57 M/UL (ref 3.5–5.5)
SERVICE CMNT-IMP: ABNORMAL
SODIUM SERPL-SCNC: 136 MMOL/L (ref 132–146)
SPECIMEN DESCRIPTION: ABNORMAL
WBC OTHER # BLD: 6.3 K/UL (ref 4.5–11.5)

## 2024-12-18 PROCEDURE — 6370000000 HC RX 637 (ALT 250 FOR IP)

## 2024-12-18 PROCEDURE — 6360000002 HC RX W HCPCS: Performed by: INTERNAL MEDICINE

## 2024-12-18 PROCEDURE — 80053 COMPREHEN METABOLIC PANEL: CPT

## 2024-12-18 PROCEDURE — 85025 COMPLETE CBC W/AUTO DIFF WBC: CPT

## 2024-12-18 PROCEDURE — 2500000003 HC RX 250 WO HCPCS

## 2024-12-18 PROCEDURE — 6370000000 HC RX 637 (ALT 250 FOR IP): Performed by: HOSPITALIST

## 2024-12-18 PROCEDURE — 36415 COLL VENOUS BLD VENIPUNCTURE: CPT

## 2024-12-18 RX ORDER — OXYCODONE AND ACETAMINOPHEN 5; 325 MG/1; MG/1
1 TABLET ORAL EVERY 6 HOURS PRN
Qty: 12 TABLET | Refills: 0 | Status: SHIPPED | DISCHARGE
Start: 2024-12-18 | End: 2024-12-18

## 2024-12-18 RX ORDER — LISINOPRIL 5 MG/1
5 TABLET ORAL DAILY
DISCHARGE
Start: 2024-12-19 | End: 2024-12-19

## 2024-12-18 RX ORDER — OXYCODONE AND ACETAMINOPHEN 5; 325 MG/1; MG/1
1 TABLET ORAL EVERY 6 HOURS PRN
Qty: 12 TABLET | Refills: 0 | Status: SHIPPED | OUTPATIENT
Start: 2024-12-18 | End: 2024-12-19

## 2024-12-18 RX ORDER — DIAZEPAM 2 MG/1
2 TABLET ORAL EVERY 12 HOURS PRN
Qty: 10 TABLET | Refills: 0 | Status: SHIPPED | OUTPATIENT
Start: 2024-12-18 | End: 2024-12-19

## 2024-12-18 RX ORDER — LISINOPRIL 5 MG/1
5 TABLET ORAL DAILY
Status: DISCONTINUED | OUTPATIENT
Start: 2024-12-18 | End: 2024-12-18 | Stop reason: HOSPADM

## 2024-12-18 RX ADMIN — LISINOPRIL 10 MG: 10 TABLET ORAL at 08:32

## 2024-12-18 RX ADMIN — PANTOPRAZOLE SODIUM 40 MG: 40 TABLET, DELAYED RELEASE ORAL at 05:19

## 2024-12-18 RX ADMIN — OXYCODONE HYDROCHLORIDE AND ACETAMINOPHEN 1 TABLET: 5; 325 TABLET ORAL at 15:59

## 2024-12-18 RX ADMIN — HYDROXYCHLOROQUINE SULFATE 200 MG: 200 TABLET ORAL at 08:32

## 2024-12-18 RX ADMIN — VALACYCLOVIR 1000 MG: 1 TABLET, FILM COATED ORAL at 08:31

## 2024-12-18 RX ADMIN — DOCUSATE SODIUM 100 MG: 100 CAPSULE, LIQUID FILLED ORAL at 08:32

## 2024-12-18 RX ADMIN — SODIUM CHLORIDE, PRESERVATIVE FREE 10 ML: 5 INJECTION INTRAVENOUS at 08:31

## 2024-12-18 RX ADMIN — MORPHINE SULFATE 2 MG: 2 INJECTION, SOLUTION INTRAMUSCULAR; INTRAVENOUS at 05:19

## 2024-12-18 RX ADMIN — APIXABAN 2.5 MG: 2.5 TABLET, FILM COATED ORAL at 08:32

## 2024-12-18 RX ADMIN — MORPHINE SULFATE 2 MG: 2 INJECTION, SOLUTION INTRAMUSCULAR; INTRAVENOUS at 08:32

## 2024-12-18 RX ADMIN — SODIUM CHLORIDE, PRESERVATIVE FREE 10 ML: 5 INJECTION INTRAVENOUS at 05:20

## 2024-12-18 RX ADMIN — METOPROLOL SUCCINATE 25 MG: 25 TABLET, FILM COATED, EXTENDED RELEASE ORAL at 08:32

## 2024-12-18 ASSESSMENT — PAIN DESCRIPTION - LOCATION
LOCATION: ABDOMEN;BACK

## 2024-12-18 ASSESSMENT — PAIN DESCRIPTION - ORIENTATION
ORIENTATION: RIGHT;ANTERIOR
ORIENTATION: RIGHT

## 2024-12-18 ASSESSMENT — PAIN SCALES - GENERAL
PAINLEVEL_OUTOF10: 10
PAINLEVEL_OUTOF10: 10
PAINLEVEL_OUTOF10: 0
PAINLEVEL_OUTOF10: 0
PAINLEVEL_OUTOF10: 8
PAINLEVEL_OUTOF10: 0

## 2024-12-18 ASSESSMENT — PAIN DESCRIPTION - DESCRIPTORS
DESCRIPTORS: STABBING
DESCRIPTORS: BURNING;STABBING

## 2024-12-18 ASSESSMENT — PAIN DESCRIPTION - PAIN TYPE
TYPE: ACUTE PAIN
TYPE: ACUTE PAIN

## 2024-12-18 ASSESSMENT — PAIN DESCRIPTION - FREQUENCY
FREQUENCY: INTERMITTENT
FREQUENCY: INTERMITTENT

## 2024-12-18 ASSESSMENT — PAIN DESCRIPTION - ONSET
ONSET: GRADUAL
ONSET: GRADUAL

## 2024-12-18 NOTE — PLAN OF CARE
Problem: Pain  Goal: Verbalizes/displays adequate comfort level or baseline comfort level  12/18/2024 0856 by April Garcia, RN  Outcome: Progressing  12/18/2024 0316 by Ashley Khan, RN  Outcome: Progressing

## 2024-12-18 NOTE — PROGRESS NOTES
Received call from  that auth was received. Physician's ambulance called and transportation was set up. Earliest available is 1900. [facility] Liaison, notified. Nurse and ALEJANDRO Rios notified.  Honey Taylor RN

## 2024-12-18 NOTE — CARE COORDINATION
Social Work:    Social service left a voice message with Brit at [facility] advised of discharge when authorization is received.     Electronically signed by IRISH Mi on 12/18/2024 at 3:59 PM

## 2024-12-18 NOTE — PROGRESS NOTES
Internal Medicine Progress Note    Patient's name: Krys Adame  : 1940  Chief complaints (on day of admission): Rash (Pt is DNR-CC from [facility], rash on right side, doctor wanted her sent in.  Pt has Lupus), Altered Mental Status (Son says pt is altered, according to pt son lives in [place] and came home recently and isnt use to seeing her not taking care of herself.  Pt is completely alert and oriented without confusion ), and Dysuria (Pt says she has bladder pain since last night )  Admission date: 2024  Date of service: 2024   Room: [address] INTERNAL MEDICINE   Primary care physician: Gino Ames DO  Reason for visit: Follow-up for shingles    Subjective  Krys is seen lying in bed awake and alert, seems tired this morning. She reports ongoing pain in her right side but feels like the pain medications help. She denies any nausea or vomiting, appetite has been fair. Denies any fever or chills. In discussion with nursing her urine resulted positive with ESBL E.Coli -- plan to isolate per protocol however no need to treat per ID. No other issues or concerns from nursing.    Review of Systems  Full 10 point review of systems negative unless mentioned above.    Hospital Medications  Current Facility-Administered Medications   Medication Dose Route Frequency Provider Last Rate Last Admin    oxyCODONE-acetaminophen (PERCOCET) 5-325 MG per tablet 1 tablet  1 tablet Oral Q6H PRN Todd Husain MD   1 tablet at 24    diazePAM (VALIUM) tablet 2 mg  2 mg Oral Q12H PRN Todd Husain MD   2 mg at 24    valACYclovir (VALTREX) tablet 1,000 mg  1,000 mg Oral BID Todd Husain MD   1,000 mg at 24 0831    mirtazapine (REMERON) tablet 15 mg  15 mg Oral Nightly Joana Terrell APRN - CNP   15 mg at 24    Calamine 8-8 % lotion   Topical Q6H PRN Joana Tererll APRN - CNP        morphine (PF) injection 2 mg  2 mg IntraVENous Q2H PRN Malathi López MD   2 mg  the   abdomen or in the pelvis.      Presence of a Oneill catheter in the bladder.  Bladder is decompressed.  Air   in the bladder lumen more likely relate with presence of the Oneill catheter.      No obstructive uropathy.             Assessment   Active Hospital Problems    Diagnosis     UTI (urinary tract infection) [N39.0]      Priority: Medium    A-fib (HCC) [I48.91]      Priority: Medium    Herpes zoster without complication [B02.9]     Acute cystitis without hematuria [N30.00]     Ambulatory dysfunction [R26.2]     Severe protein-calorie malnutrition (HCC) [E43]     Chronic bilateral low back pain without sciatica [M54.50, G89.29]     Chronic pain syndrome [G89.4 (ICD-10-CM)] [G89.4]          Plan  Herpes Zoster Right Flank  Continue Valtrex  Isolation as per protocol  Continue Percocet  Consider trial of Neurontin  ID consult appreciated     Mild confusion on admission with concern for encephalopathy  Valium dosing has previously been decreased  Overall mentation remains fairly stable and at baseline  Did have CT head/MRI brain/US Carotids during prior hospitalization    Recent history of E. Coli CAUTI with sepsis  UA noted, culture +ESBL E.Coli  Isolate per protocol  This is asymptomatic bacteruria per ID, no need for antibiotics  ID consult appreciated -- to see prn     Neurogenic Bladder  Previously with intermittent self catheterization which was unsuccessful  Maintain chronic oneill catheter at this time  Urology consult last admission -- no benefit to suprapubic catheter placement     Diffuse pain with Hx Rheumatoid Arthritis  Imaging studies noted  Continue Plaquenil  Switch to Percocet     Paroxysmal Atrial Fibrillation  Currently in sinus rhythm  Continue Eliquis for anticoagulation  Toprol XL for rate control     Essential Hypertension  Continue Lisinopril and Toprol XL   Monitor BP trends and adjust as indicated     Constipation  Continue bowel regimen and monitor abdominal exam    Generalized

## 2024-12-18 NOTE — PLAN OF CARE
Problem: Pain  Goal: Verbalizes/displays adequate comfort level or baseline comfort level  12/18/2024 1802 by April Garcia RN  Outcome: Completed  12/18/2024 0856 by April Garcia RN  Outcome: Progressing     Problem: Safety - Adult  Goal: Free from fall injury  Outcome: Completed     Problem: ABCDS Injury Assessment  Goal: Absence of physical injury  Outcome: Completed     Problem: Skin/Tissue Integrity  Goal: Absence of new skin breakdown  Description: 1.  Monitor for areas of redness and/or skin breakdown  2.  Assess vascular access sites hourly  3.  Every 4-6 hours minimum:  Change oxygen saturation probe site  4.  Every 4-6 hours:  If on nasal continuous positive airway pressure, respiratory therapy assess nares and determine need for appliance change or resting period.  Outcome: Completed     Problem: Discharge Planning  Goal: Discharge to home or other facility with appropriate resources  Outcome: Completed

## 2024-12-18 NOTE — DISCHARGE SUMMARY
of joint effusion. No focal soft tissue abnormality.  Medial and patellofemoral compartment degenerative changes.     No acute abnormality of the knee.     CT ABDOMEN PELVIS W IV CONTRAST Additional Contrast? None    Result Date: 11/18/2024  EXAMINATION: CT OF THE ABDOMEN AND PELVIS WITH CONTRAST 11/18/2024 3:51 pm TECHNIQUE: CT of the abdomen and pelvis was performed with the administration of intravenous contrast. Multiplanar reformatted images are provided for review. Automated exposure control, iterative reconstruction, and/or weight based adjustment of the mA/kV was utilized to reduce the radiation dose to as low as reasonably achievable. COMPARISON: 10/23/2 HISTORY: ORDERING SYSTEM PROVIDED HISTORY: diffuse abdominal pain, emesis TECHNOLOGIST PROVIDED HISTORY: Additional Contrast?->None Reason for exam:->diffuse abdominal pain, emesis Decision Support Exception - unselect if not a suspected or confirmed emergency medical condition->Emergency Medical Condition (MA) FINDINGS: Lower Chest:   Unremarkable. Organs: Stable innumerable liver cysts unchanged compared 10/23/2023.  The gallbladder, spleen, adrenal glands are normal.  The patent pancreas is not well seen.  Normal appearing kidneys. GI/Bowel:   Diffuse colonic fecal retention with significant stool burden. Normal small bowel.  The appendix not visualized. Pelvis: Davidson catheter in a decompressed urinary bladder. Peritoneum/Retroperitoneum: No free fluid or free air.  Normal caliber abdominal aorta with scattered calcified atherosclerotic plaque. Bones/Soft Tissues: Degenerative changes lumbar spine with chronic appearing compression deformities multilevel vertebroplasties.  Bilateral hip joint degenerative changes.     1. Diffuse colonic fecal retention with significant stool burden. 2. Stable innumerable liver cysts unchanged compared 10/23/2023. 3. Degenerative changes lumbar spine with chronic appearing compression deformities multilevel  visit in 1 week(s)  for follow-up appointment from this hospital stay      Preparing for this patient's discharge, including paperwork, orders, instructions, and meeting with patient did require greater than 35 minutes.    Electronically signed by VARSHA Graves CNP on 12/18/2024 at 9:24 PM

## 2024-12-19 RX ORDER — DIAZEPAM 2 MG/1
2 TABLET ORAL EVERY 12 HOURS PRN
Qty: 10 TABLET | Refills: 0
Start: 2024-12-19 | End: 2024-12-19

## 2024-12-19 RX ORDER — OXYCODONE AND ACETAMINOPHEN 5; 325 MG/1; MG/1
1 TABLET ORAL EVERY 6 HOURS PRN
Qty: 12 TABLET | Refills: 0 | Status: SHIPPED | OUTPATIENT
Start: 2024-12-19 | End: 2024-12-22

## 2024-12-19 RX ORDER — VALACYCLOVIR HYDROCHLORIDE 1 G/1
1000 TABLET, FILM COATED ORAL 2 TIMES DAILY
Qty: 14 TABLET | Refills: 0 | Status: SHIPPED | OUTPATIENT
Start: 2024-12-19 | End: 2024-12-26

## 2024-12-19 RX ORDER — VALACYCLOVIR HYDROCHLORIDE 1 G/1
1000 TABLET, FILM COATED ORAL 2 TIMES DAILY
Qty: 14 TABLET | Refills: 0
Start: 2024-12-19 | End: 2024-12-19

## 2024-12-19 RX ORDER — LISINOPRIL 5 MG/1
5 TABLET ORAL DAILY
Qty: 30 TABLET | Refills: 0 | Status: SHIPPED | OUTPATIENT
Start: 2024-12-19

## 2024-12-19 RX ORDER — OXYCODONE AND ACETAMINOPHEN 5; 325 MG/1; MG/1
1 TABLET ORAL EVERY 6 HOURS PRN
Qty: 12 TABLET | Refills: 0
Start: 2024-12-19 | End: 2024-12-19

## 2024-12-19 RX ORDER — LISINOPRIL 5 MG/1
5 TABLET ORAL DAILY
Qty: 30 TABLET | Refills: 0
Start: 2024-12-19 | End: 2024-12-19

## 2024-12-19 RX ORDER — DIAZEPAM 2 MG/1
2 TABLET ORAL EVERY 12 HOURS PRN
Qty: 10 TABLET | Refills: 0 | Status: SHIPPED | OUTPATIENT
Start: 2024-12-19 | End: 2024-12-24

## 2024-12-19 NOTE — CARE COORDINATION
12/19/24 @ 0858    Received notification from Mabel, unit manager regarding [facility] refusing pt last evening (12/18). Placed call to PAS who informed this CM that pt was dropped off at 2102. Placed call to Brit (liaison) who is contacting owner to remedy situation. Advised Brit that facility needs to contact pt's family to discuss what occurred.    1109  Spoke with Brit at [facility] who has been in communication with pt's son regarding situation. This CM assisted with discharge medications being re-rerouted to [facility] in [place] as plan is to remain at home. This CM spoke with Dr. Yung to e-sribe Percocet and Valium. This CM confirmed with pharmacist at [facility] that scripts were received for Percocet, Valium, Valtrex and Lisinopril. Brit updated of this, who will contact family. JACOBO Monroe aware and AZEB Ingram aware.     Willy Michael, MSN, RN  Manager Care Coordination  [facility]

## 2024-12-19 NOTE — PROGRESS NOTES
Physician Progress Note      PATIENT:               DENNIS JESSICA  CSN #:                  [id number]  :                       1940  ADMIT DATE:       2024 1:13 PM  DISCH DATE:        2024 9:24 PM  RESPONDING  PROVIDER #:        Todd Husain MD          QUERY TEXT:    Patient admitted with Herpes zoster, noted to have h/o PAF and is maintained   on Eliquis. If possible, please document in progress notes and discharge   summary if you are evaluating and/or treating any of the following:?  ?  The medical record reflects the following:  Risk Factors: afib, HTN, age  Clinical Indicators: Pt ith h/o Afib maintained on Eliquis  Treatment: cont Eliquis  Options provided:  -- Secondary hypercoagulable state in a patient with atrial fibrillation  -- Other - I will add my own diagnosis  -- Disagree - Not applicable / Not valid  -- Disagree - Clinically unable to determine / Unknown  -- Refer to Clinical Documentation Reviewer    PROVIDER RESPONSE TEXT:    This patient has secondary hypercoagulable state in a patient with atrial   fibrillation.    Query created by: Augustina Ferraro on 2024 12:06 PM      Electronically signed by:  Todd Husain MD 2024 7:34 AM

## 2025-01-13 PROBLEM — N39.0 UTI (URINARY TRACT INFECTION): Status: RESOLVED | Noted: 2024-11-19 | Resolved: 2025-01-13

## 2025-02-22 NOTE — PROGRESS NOTES
Radiology dept to check on eliquis guidelines for thora, await response - recommend 48 hours.      Electronically signed by Sushma Davis RN on 10/29/2023 at 12:28 PM [Dear  ___] : Dear  [unfilled], [Attached please find my note.] : Attached please find my note.

## 2025-03-05 NOTE — PROGRESS NOTES
Internal Medicine Progress Note    Patient's name: Gerald Preston  : 1940  Chief complaints (on day of admission): Other (Positive BC)  Admission date: 10/24/2023  Date of service: 10/27/2023   Room: [address] MED SURG/TELE  Primary care physician: Claude Shah MD  Reason for visit: Follow-up for bacteremia     Subjective  Ross Strickland was seen and examined. She was lying in bed. She was resting comfortably. She was getting ready to go for her CIRILO. She denied any new complaints or issues and told her that she was feeling relatively well. She still has chronic back pain. She told me that she really did not have much abdominal pain today. Review of Systems  There are no new complaints of chest pain, shortness of breath, nausea, vomiting, diarrhea, constipation.     Hospital Medications  Current Facility-Administered Medications   Medication Dose Route Frequency Provider Last Rate Last Admin    bisacodyl (DULCOLAX) suppository 10 mg  10 mg Rectal Once Suni Mercado APRN - CNP        lactobacillus (CULTURELLE) capsule 1 capsule  1 capsule Oral Daily Rocío Hamilton APRN - CNP   1 capsule at 10/26/23 2051    pantoprazole (PROTONIX) tablet 40 mg  40 mg Oral QAM AC Rocío Hamilton APRN - CNP        polyvinyl alcohol (LIQUIFILM TEARS) 1.4 % ophthalmic solution 1 drop  1 drop Both Eyes BID PRN Abdirashid Yung DO   1 drop at 10/27/23 0842    lidocaine 4 % external patch 1 patch  1 patch TransDERmal Daily Jabier Rogers APRN - CNP   1 patch at 10/25/23 0227    ampicillin-sulbactam (UNASYN) 3,000 mg in sodium chloride 0.9 % 100 mL IVPB  3,000 mg IntraVENous Q6H Blanca Lagos APRN - CNP   Stopped at 10/27/23 0641    melatonin tablet 3 mg  3 mg Oral Nightly PRN Abdirashid Yung DO        sodium chloride flush 0.9 % injection 10 mL  10 mL IntraVENous 2 times per day Abdirashid Yung DO   10 mL at 10/27/23 0843    sodium chloride flush 0.9 % injection 10 mL  10 mL IntraVENous PRN Abdirashid Yung DO no

## 2025-04-10 NOTE — TELEPHONE ENCOUNTER
"Thank you for attending the Healthy Eating and Activity Class!   Here are some things that were discussed:       1) Consider using Carbohydrate Counting or the Plate Method, aiming for multiple food groups including 1/4 plate carbohydrates. Focus on \"high quality\" carbohydrates (whole grains, starchy vegetables, fruits, beans/legumes, etc).      2) Activity can help improve blood sugar.     -At least 150 minutes a week of moderate-intensity activity such as brisk walking OR 75 minutes a week of vigorous-intensity activity such as hiking, jogging, or running.     -Try to have at least 2 days a week of activities that strengthen muscles. You can use body weight, weighted vest, resistance bands, weights, or things in your home (canned foods, milk jug, etc).      3) If you are overweight aim for a 3-7% weight loss, this can improve blood sugar, cholesterol, and blood pressure!       4) Create a SMART goal and send your goal to an instructor through [employer]       5) Follow-up Plan    -Attend all 3 new type 2 diabetes classes     -Consider scheduling one-on-one appointment with a diabetes educator if you have more questions or concerns     -At minimum, meet with the diabetes educator yearly, sooner as needed        The Diabetes Care Team   Adult Diabetes Education Triage #[phone]   Diabetes Education Scheduling #[phone]        " Patient would like to proceed with kyphoplasty with Dr Jamel Bloch. Requestin 1st week of May.   Ph# [phone]

## 2025-08-11 ENCOUNTER — OUTSIDE SERVICES (OUTPATIENT)
Dept: INTERNAL MEDICINE CLINIC | Age: 85
End: 2025-08-11

## (undated) DEVICE — MEDIA CONTRAST RX ISOVUE-300 61% 30ML VIALS

## (undated) DEVICE — SYRINGE 20ML LL S/C 50

## (undated) DEVICE — CONMED PEDIATRIC GASTROSCOPE SHEATHED CYTOLOGY BRUSH, RING HANDLE, 3 MM X 160 CM: Brand: CONMED

## (undated) DEVICE — BONE CEMENT C01B HV-R WITH MIXER  US: Brand: KYPHON® HV-R® BONE CEMENT AND KYPHON® MIXER PACK

## (undated) DEVICE — BONE BIOPSY DEVICE F05A BBD SIZE 3: Brand: MEDTRONIC REUSABLE INSTRUMENTS

## (undated) DEVICE — SYRINGE A08A KYPHX XPANDER INFLATION: Brand: KYPHON®  INFLATION SYRINGE

## (undated) DEVICE — BLOCK BITE 60FR RUBBER ADLT DENTAL

## (undated) DEVICE — GOWN,SIRUS,FABRNF,XL,20/CS: Brand: MEDLINE

## (undated) DEVICE — Z INACTIVE USE 2863041 SPONGE GZ W4XL4IN 100% COT 16 PLY RADPQ HIGHLY ABSRB

## (undated) DEVICE — JACKSON TABLE POSITIONER KIT: Brand: MEDLINE INDUSTRIES, INC.

## (undated) DEVICE — GAUZE,SPONGE,4"X4",8PLY,STRL,LF,10/TRAY: Brand: MEDLINE

## (undated) DEVICE — SPONGE GZ W4XL4IN RAYON POLY CVR W/NONWOVEN FAB STRL 2/PK

## (undated) DEVICE — MEDIA CONTRAST ISOVUE 300 100ML

## (undated) DEVICE — GLOVE SURG 8.5 PF POLYMER WHT STRL SIGN LTX ESSENTIAL LTX

## (undated) DEVICE — GLOVE ORANGE PI 8   MSG9080

## (undated) DEVICE — GLOVE SURG SZ 85 L12IN FNGR THK13MIL WHT ISOLEX POLYISOPRENE

## (undated) DEVICE — GAUZE,SPONGE,4"X4",12PLY,STERILE,LF,2'S: Brand: MEDLINE

## (undated) DEVICE — SINGLE-USE BIOPSY FORCEPS: Brand: RADIAL JAW 4

## (undated) DEVICE — DRAPE THER FLUID WARMING 66X44 IN FLAT SLUSH DBL DISC ORS

## (undated) DEVICE — ADHESIVE SKIN CLOSURE TOP 36 CC HI VISC DERMBND MINI

## (undated) DEVICE — PAD,NON-ADHERENT,2X3,STERILE,LF,1/PK: Brand: MEDLINE

## (undated) DEVICE — KIT KPT1504 KYPAK TRY 15/3 ADDL FRACTURE: Brand: KYPHOPAK™ TRAY

## (undated) DEVICE — TAMP K08A XPAN INFLAT BONE  SIZE 20/3-RB: Brand: KYPHON XPANDER™ INFLATABLE BONE TAMP

## (undated) DEVICE — DOUBLE BASIN SET: Brand: MEDLINE INDUSTRIES, INC.

## (undated) DEVICE — SYRINGE, LUER LOCK, 5ML: Brand: MEDLINE

## (undated) DEVICE — DRAPE,LAPAROTOMY,PCH,STERILE: Brand: MEDLINE

## (undated) DEVICE — BLADE,STAINLESS-STEEL,15,STRL,DISPOSABLE: Brand: MEDLINE

## (undated) DEVICE — 4-PORT MANIFOLD: Brand: NEPTUNE 2

## (undated) DEVICE — HYPODERMIC SAFETY NEEDLE: Brand: MAGELLAN

## (undated) DEVICE — 12 ML SYRINGE,LUER-LOCK TIP: Brand: MONOJECT

## (undated) DEVICE — 3M™ IOBAN™ 2 ANTIMICROBIAL INCISE DRAPE 6650EZ: Brand: IOBAN™ 2

## (undated) DEVICE — STRIP,CLOSURE,WOUND,MEDI-STRIP,1/2X4: Brand: MEDLINE

## (undated) DEVICE — COVER,TABLE,60X90,STERILE: Brand: MEDLINE

## (undated) DEVICE — SYRINGE A08E KIS INFLATION HP: Brand: KYPHON®  INFLATION SYRINGE

## (undated) DEVICE — GLOVE ORANGE PI 7 1/2   MSG9075

## (undated) DEVICE — NEEDLE HYPO 25GA L1.5IN BLU POLYPR HUB S STL REG BVL STR

## (undated) DEVICE — 3M™ RED DOT™ MONITORING ELECTRODE WITH FOAM TAPE AND STICKY GEL 2560, 50/BAG, 20/CASE, 72/PLT: Brand: RED DOT™

## (undated) DEVICE — DRAPE,REIN 53X77,STERILE: Brand: MEDLINE

## (undated) DEVICE — ADHESIVE DERMABOND TOPICAL SKIN MINI .36ML

## (undated) DEVICE — INTRODUCER T15K ONE STEP OID BEVEL: Brand: KYPHON® ONE-STEP™ OSTEO INTRODUCER™ SYSTEM

## (undated) DEVICE — COVER,MAYO STAND,STERILE: Brand: MEDLINE

## (undated) DEVICE — 1000 S-DRAPE TOWEL DRAPE 10/BX: Brand: STERI-DRAPE™

## (undated) DEVICE — NON-DEHP CATHETER EXTENSION SET, MALE LUER LOCK ADAPTER

## (undated) DEVICE — LIQUIBAND RAPID ADHESIVE 36/CS 0.8ML: Brand: MEDLINE

## (undated) DEVICE — MARKER,SKIN,WI/RULER AND LABELS: Brand: MEDLINE

## (undated) DEVICE — BANDAGE ADH W1XL3IN NAT FAB WVN FLX DURABLE N ADH PD SEAL

## (undated) DEVICE — GRADUATE TRIANG MEASURE 1000ML BLK PRNT

## (undated) DEVICE — BONE FILLER DEVICE F04B SIZE 3

## (undated) DEVICE — INTRODUCER T15E AOIS: Brand: KYPHON® ADVANCED OSTEO INTRODUCER® SYSTEM

## (undated) DEVICE — 6 ML SYRINGE LUER-LOCK TIP: Brand: MONOJECT

## (undated) DEVICE — C-ARM: Brand: UNBRANDED

## (undated) DEVICE — C-ARMOR C-ARM EQUIPMENT COVERS CLEAR STERILE UNIVERSAL FIT 12 PER CASE: Brand: C-ARMOR

## (undated) DEVICE — TOWEL,OR,DSP,ST,BLUE,STD,6/PK,12PK/CS: Brand: MEDLINE

## (undated) DEVICE — SWABSTCK, BENZOIN TINCTURE, 1/PK, STRL: Brand: APLICARE

## (undated) DEVICE — NEEDLE HYPO 18GA L1.5IN PNK POLYPR HUB S STL THN WALL FILL

## (undated) DEVICE — GAUZE,SPONGE,4"X4",16PLY,XRAY,STRL,LF: Brand: MEDLINE

## (undated) DEVICE — SYRINGE, LUER LOCK, 10ML: Brand: MEDLINE

## (undated) DEVICE — APPLICATOR MEDICATED 26 CC SOLUTION HI LT ORNG CHLORAPREP

## (undated) DEVICE — COUNTER NDL 30 COUNT DBL MAG

## (undated) DEVICE — 3M™ TEGADERM™ TRANSPARENT FILM DRESSING FRAME STYLE, 1626W, 4 IN X 4-3/4 IN (10 CM X 12 CM), 50/CT 4CT/CASE: Brand: 3M™ TEGADERM™

## (undated) DEVICE — Device: Brand: PORTEX